# Patient Record
Sex: MALE | Race: WHITE | NOT HISPANIC OR LATINO
[De-identification: names, ages, dates, MRNs, and addresses within clinical notes are randomized per-mention and may not be internally consistent; named-entity substitution may affect disease eponyms.]

---

## 2017-01-05 ENCOUNTER — APPOINTMENT (OUTPATIENT)
Dept: CARDIOLOGY | Facility: CLINIC | Age: 77
End: 2017-01-05

## 2017-01-05 VITALS — BODY MASS INDEX: 28.8 KG/M2 | WEIGHT: 195 LBS

## 2017-02-14 ENCOUNTER — OUTPATIENT (OUTPATIENT)
Dept: OUTPATIENT SERVICES | Facility: HOSPITAL | Age: 77
LOS: 1 days | Discharge: HOME | End: 2017-02-14

## 2017-04-06 ENCOUNTER — APPOINTMENT (OUTPATIENT)
Dept: CARDIOLOGY | Facility: CLINIC | Age: 77
End: 2017-04-06

## 2017-04-06 VITALS — WEIGHT: 192 LBS | SYSTOLIC BLOOD PRESSURE: 160 MMHG | DIASTOLIC BLOOD PRESSURE: 80 MMHG | BODY MASS INDEX: 28.35 KG/M2

## 2017-04-20 ENCOUNTER — APPOINTMENT (OUTPATIENT)
Dept: CARDIOLOGY | Facility: CLINIC | Age: 77
End: 2017-04-20

## 2017-04-21 ENCOUNTER — APPOINTMENT (OUTPATIENT)
Dept: CARDIOLOGY | Facility: CLINIC | Age: 77
End: 2017-04-21

## 2017-04-21 VITALS — SYSTOLIC BLOOD PRESSURE: 120 MMHG | RESPIRATION RATE: 18 BRPM | HEART RATE: 84 BPM | DIASTOLIC BLOOD PRESSURE: 80 MMHG

## 2017-04-21 VITALS — BODY MASS INDEX: 29.92 KG/M2 | HEIGHT: 69 IN | WEIGHT: 202 LBS

## 2017-06-27 DIAGNOSIS — R97.20 ELEVATED PROSTATE SPECIFIC ANTIGEN [PSA]: ICD-10-CM

## 2017-07-13 ENCOUNTER — OUTPATIENT (OUTPATIENT)
Dept: OUTPATIENT SERVICES | Facility: HOSPITAL | Age: 77
LOS: 1 days | Discharge: HOME | End: 2017-07-13

## 2017-07-13 DIAGNOSIS — R06.09 OTHER FORMS OF DYSPNEA: ICD-10-CM

## 2017-07-13 DIAGNOSIS — E03.9 HYPOTHYROIDISM, UNSPECIFIED: ICD-10-CM

## 2017-07-13 DIAGNOSIS — I50.9 HEART FAILURE, UNSPECIFIED: ICD-10-CM

## 2017-07-13 DIAGNOSIS — R97.20 ELEVATED PROSTATE SPECIFIC ANTIGEN [PSA]: ICD-10-CM

## 2017-07-13 DIAGNOSIS — T67.1XXA HEAT SYNCOPE, INITIAL ENCOUNTER: ICD-10-CM

## 2017-07-13 DIAGNOSIS — I42.9 CARDIOMYOPATHY, UNSPECIFIED: ICD-10-CM

## 2017-07-13 DIAGNOSIS — I10 ESSENTIAL (PRIMARY) HYPERTENSION: ICD-10-CM

## 2017-07-13 DIAGNOSIS — N40.0 BENIGN PROSTATIC HYPERPLASIA WITHOUT LOWER URINARY TRACT SYMPTOMS: ICD-10-CM

## 2017-09-01 ENCOUNTER — OUTPATIENT (OUTPATIENT)
Dept: OUTPATIENT SERVICES | Facility: HOSPITAL | Age: 77
LOS: 1 days | Discharge: HOME | End: 2017-09-01

## 2017-09-01 DIAGNOSIS — R06.09 OTHER FORMS OF DYSPNEA: ICD-10-CM

## 2017-09-01 DIAGNOSIS — I10 ESSENTIAL (PRIMARY) HYPERTENSION: ICD-10-CM

## 2017-09-01 DIAGNOSIS — R05 COUGH: ICD-10-CM

## 2017-09-01 DIAGNOSIS — E03.9 HYPOTHYROIDISM, UNSPECIFIED: ICD-10-CM

## 2017-09-01 DIAGNOSIS — I42.9 CARDIOMYOPATHY, UNSPECIFIED: ICD-10-CM

## 2017-09-01 DIAGNOSIS — T67.1XXA HEAT SYNCOPE, INITIAL ENCOUNTER: ICD-10-CM

## 2017-09-01 DIAGNOSIS — N40.0 BENIGN PROSTATIC HYPERPLASIA WITHOUT LOWER URINARY TRACT SYMPTOMS: ICD-10-CM

## 2017-09-01 DIAGNOSIS — I50.9 HEART FAILURE, UNSPECIFIED: ICD-10-CM

## 2017-10-12 ENCOUNTER — APPOINTMENT (OUTPATIENT)
Dept: CARDIOLOGY | Facility: CLINIC | Age: 77
End: 2017-10-12

## 2017-10-12 ENCOUNTER — OUTPATIENT (OUTPATIENT)
Dept: OUTPATIENT SERVICES | Facility: HOSPITAL | Age: 77
LOS: 1 days | Discharge: HOME | End: 2017-10-12

## 2017-10-12 VITALS — WEIGHT: 200 LBS | BODY MASS INDEX: 29.54 KG/M2 | DIASTOLIC BLOOD PRESSURE: 68 MMHG | SYSTOLIC BLOOD PRESSURE: 136 MMHG

## 2017-10-12 DIAGNOSIS — Z00.00 ENCOUNTER FOR GENERAL ADULT MEDICAL EXAMINATION WITHOUT ABNORMAL FINDINGS: ICD-10-CM

## 2017-10-12 DIAGNOSIS — E03.9 HYPOTHYROIDISM, UNSPECIFIED: ICD-10-CM

## 2017-10-12 DIAGNOSIS — I50.9 HEART FAILURE, UNSPECIFIED: ICD-10-CM

## 2017-10-12 DIAGNOSIS — I10 ESSENTIAL (PRIMARY) HYPERTENSION: ICD-10-CM

## 2017-10-12 DIAGNOSIS — R06.09 OTHER FORMS OF DYSPNEA: ICD-10-CM

## 2017-10-12 DIAGNOSIS — T67.1XXA HEAT SYNCOPE, INITIAL ENCOUNTER: ICD-10-CM

## 2017-10-12 DIAGNOSIS — N40.0 BENIGN PROSTATIC HYPERPLASIA WITHOUT LOWER URINARY TRACT SYMPTOMS: ICD-10-CM

## 2017-10-12 DIAGNOSIS — I42.9 CARDIOMYOPATHY, UNSPECIFIED: ICD-10-CM

## 2017-10-23 ENCOUNTER — APPOINTMENT (OUTPATIENT)
Dept: CARDIOLOGY | Facility: CLINIC | Age: 77
End: 2017-10-23

## 2017-10-23 VITALS — RESPIRATION RATE: 18 BRPM | SYSTOLIC BLOOD PRESSURE: 110 MMHG | DIASTOLIC BLOOD PRESSURE: 70 MMHG | HEART RATE: 84 BPM

## 2017-10-23 VITALS — BODY MASS INDEX: 29.33 KG/M2 | WEIGHT: 198 LBS | HEIGHT: 69 IN

## 2017-11-01 ENCOUNTER — OUTPATIENT (OUTPATIENT)
Dept: OUTPATIENT SERVICES | Facility: HOSPITAL | Age: 77
LOS: 1 days | Discharge: HOME | End: 2017-11-01

## 2017-11-01 DIAGNOSIS — R06.09 OTHER FORMS OF DYSPNEA: ICD-10-CM

## 2017-11-01 DIAGNOSIS — R10.9 UNSPECIFIED ABDOMINAL PAIN: ICD-10-CM

## 2017-11-01 DIAGNOSIS — I10 ESSENTIAL (PRIMARY) HYPERTENSION: ICD-10-CM

## 2017-11-01 DIAGNOSIS — E03.9 HYPOTHYROIDISM, UNSPECIFIED: ICD-10-CM

## 2017-11-01 DIAGNOSIS — T67.1XXA HEAT SYNCOPE, INITIAL ENCOUNTER: ICD-10-CM

## 2017-11-01 DIAGNOSIS — I42.9 CARDIOMYOPATHY, UNSPECIFIED: ICD-10-CM

## 2017-11-01 DIAGNOSIS — I50.9 HEART FAILURE, UNSPECIFIED: ICD-10-CM

## 2017-11-01 DIAGNOSIS — N40.0 BENIGN PROSTATIC HYPERPLASIA WITHOUT LOWER URINARY TRACT SYMPTOMS: ICD-10-CM

## 2017-11-13 ENCOUNTER — APPOINTMENT (OUTPATIENT)
Dept: CARDIOLOGY | Facility: CLINIC | Age: 77
End: 2017-11-13

## 2017-12-07 ENCOUNTER — MOBILE ON CALL (OUTPATIENT)
Age: 77
End: 2017-12-07

## 2017-12-21 ENCOUNTER — APPOINTMENT (OUTPATIENT)
Dept: CARDIOLOGY | Facility: CLINIC | Age: 77
End: 2017-12-21

## 2017-12-21 ENCOUNTER — INPATIENT (INPATIENT)
Facility: HOSPITAL | Age: 77
LOS: 4 days | Discharge: HOME | End: 2017-12-26
Attending: INTERNAL MEDICINE | Admitting: INTERNAL MEDICINE

## 2017-12-21 DIAGNOSIS — E03.9 HYPOTHYROIDISM, UNSPECIFIED: ICD-10-CM

## 2017-12-21 DIAGNOSIS — I42.9 CARDIOMYOPATHY, UNSPECIFIED: ICD-10-CM

## 2017-12-21 DIAGNOSIS — N40.0 BENIGN PROSTATIC HYPERPLASIA WITHOUT LOWER URINARY TRACT SYMPTOMS: ICD-10-CM

## 2017-12-21 DIAGNOSIS — T67.1XXA HEAT SYNCOPE, INITIAL ENCOUNTER: ICD-10-CM

## 2017-12-21 DIAGNOSIS — R06.09 OTHER FORMS OF DYSPNEA: ICD-10-CM

## 2017-12-21 DIAGNOSIS — I50.9 HEART FAILURE, UNSPECIFIED: ICD-10-CM

## 2017-12-21 DIAGNOSIS — I10 ESSENTIAL (PRIMARY) HYPERTENSION: ICD-10-CM

## 2018-01-02 DIAGNOSIS — I48.0 PAROXYSMAL ATRIAL FIBRILLATION: ICD-10-CM

## 2018-01-02 DIAGNOSIS — J96.01 ACUTE RESPIRATORY FAILURE WITH HYPOXIA: ICD-10-CM

## 2018-01-02 DIAGNOSIS — I47.2 VENTRICULAR TACHYCARDIA: ICD-10-CM

## 2018-01-02 DIAGNOSIS — I13.0 HYPERTENSIVE HEART AND CHRONIC KIDNEY DISEASE WITH HEART FAILURE AND STAGE 1 THROUGH STAGE 4 CHRONIC KIDNEY DISEASE, OR UNSPECIFIED CHRONIC KIDNEY DISEASE: ICD-10-CM

## 2018-01-02 DIAGNOSIS — J96.02 ACUTE RESPIRATORY FAILURE WITH HYPERCAPNIA: ICD-10-CM

## 2018-01-02 DIAGNOSIS — Z87.891 PERSONAL HISTORY OF NICOTINE DEPENDENCE: ICD-10-CM

## 2018-01-02 DIAGNOSIS — I50.23 ACUTE ON CHRONIC SYSTOLIC (CONGESTIVE) HEART FAILURE: ICD-10-CM

## 2018-01-02 DIAGNOSIS — N18.9 CHRONIC KIDNEY DISEASE, UNSPECIFIED: ICD-10-CM

## 2018-01-02 DIAGNOSIS — E78.00 PURE HYPERCHOLESTEROLEMIA, UNSPECIFIED: ICD-10-CM

## 2018-01-02 DIAGNOSIS — E11.22 TYPE 2 DIABETES MELLITUS WITH DIABETIC CHRONIC KIDNEY DISEASE: ICD-10-CM

## 2018-01-02 DIAGNOSIS — Z79.84 LONG TERM (CURRENT) USE OF ORAL HYPOGLYCEMIC DRUGS: ICD-10-CM

## 2018-01-02 DIAGNOSIS — E03.9 HYPOTHYROIDISM, UNSPECIFIED: ICD-10-CM

## 2018-01-02 DIAGNOSIS — N40.0 BENIGN PROSTATIC HYPERPLASIA WITHOUT LOWER URINARY TRACT SYMPTOMS: ICD-10-CM

## 2018-01-02 DIAGNOSIS — Z95.810 PRESENCE OF AUTOMATIC (IMPLANTABLE) CARDIAC DEFIBRILLATOR: ICD-10-CM

## 2018-01-02 DIAGNOSIS — I42.0 DILATED CARDIOMYOPATHY: ICD-10-CM

## 2018-01-02 DIAGNOSIS — I48.92 UNSPECIFIED ATRIAL FLUTTER: ICD-10-CM

## 2018-01-02 DIAGNOSIS — J69.0 PNEUMONITIS DUE TO INHALATION OF FOOD AND VOMIT: ICD-10-CM

## 2018-02-08 ENCOUNTER — APPOINTMENT (OUTPATIENT)
Dept: CARDIOLOGY | Facility: CLINIC | Age: 78
End: 2018-02-08

## 2018-02-08 VITALS — OXYGEN SATURATION: 92 % | HEART RATE: 82 BPM

## 2018-02-08 VITALS — DIASTOLIC BLOOD PRESSURE: 84 MMHG | WEIGHT: 198 LBS | SYSTOLIC BLOOD PRESSURE: 130 MMHG | BODY MASS INDEX: 29.24 KG/M2

## 2018-02-12 ENCOUNTER — OUTPATIENT (OUTPATIENT)
Dept: OUTPATIENT SERVICES | Facility: HOSPITAL | Age: 78
LOS: 1 days | Discharge: HOME | End: 2018-02-12

## 2018-02-12 DIAGNOSIS — R97.20 ELEVATED PROSTATE SPECIFIC ANTIGEN [PSA]: ICD-10-CM

## 2018-03-08 ENCOUNTER — APPOINTMENT (OUTPATIENT)
Dept: CARDIOLOGY | Facility: CLINIC | Age: 78
End: 2018-03-08

## 2018-03-08 VITALS — DIASTOLIC BLOOD PRESSURE: 60 MMHG | BODY MASS INDEX: 28.8 KG/M2 | SYSTOLIC BLOOD PRESSURE: 130 MMHG | WEIGHT: 195 LBS

## 2018-03-22 ENCOUNTER — APPOINTMENT (OUTPATIENT)
Dept: CARDIOLOGY | Facility: CLINIC | Age: 78
End: 2018-03-22

## 2018-03-29 ENCOUNTER — OUTPATIENT (OUTPATIENT)
Dept: OUTPATIENT SERVICES | Facility: HOSPITAL | Age: 78
LOS: 1 days | Discharge: HOME | End: 2018-03-29

## 2018-03-29 ENCOUNTER — RESULT REVIEW (OUTPATIENT)
Age: 78
End: 2018-03-29

## 2018-03-29 VITALS
DIASTOLIC BLOOD PRESSURE: 61 MMHG | TEMPERATURE: 98 F | RESPIRATION RATE: 16 BRPM | WEIGHT: 195.11 LBS | HEART RATE: 86 BPM | HEIGHT: 68 IN | SYSTOLIC BLOOD PRESSURE: 97 MMHG

## 2018-03-29 VITALS — RESPIRATION RATE: 18 BRPM | DIASTOLIC BLOOD PRESSURE: 77 MMHG | SYSTOLIC BLOOD PRESSURE: 101 MMHG | HEART RATE: 80 BPM

## 2018-03-29 DIAGNOSIS — Z98.890 OTHER SPECIFIED POSTPROCEDURAL STATES: Chronic | ICD-10-CM

## 2018-03-29 DIAGNOSIS — Z95.810 PRESENCE OF AUTOMATIC (IMPLANTABLE) CARDIAC DEFIBRILLATOR: Chronic | ICD-10-CM

## 2018-03-29 NOTE — CHART NOTE - NSCHARTNOTEFT_GEN_A_CORE
PACU ANESTHESIA ADMISSION NOTE      Procedure:   Post op diagnosis:      ____  Intubated  TV:______       Rate: ______      FiO2: ______    _x___  Patent Airway    _x___  Full return of protective reflexes    _x___  Full recovery from anesthesia / back to baseline status    Vitals:  T(C): 36.5 (03-29-18 @ 12:37), Max: 36.5 (03-29-18 @ 12:37)  HR: 86 (03-29-18 @ 13:12) (86 - 86)  BP: 97/61 (03-29-18 @ 13:12) (97/61 - 97/61)  RR: 16 (03-29-18 @ 13:12) (16 - 16)  SpO2: --    Mental Status:  _x___ Awake   _____ Alert   _____ Drowsy   _____ Sedated    Nausea/Vomiting:  _x___  NO       ______Yes,   See Post - Op Orders         Pain Scale (0-10):  __0___    Treatment: _x___ None    ____ See Post - Op/PCA Orders    Post - Operative Fluids:   __x__ Oral   ____ See Post - Op Orders    Plan: Discharge:   _x___Home       _____Floor     _____Critical Care    _____  Other:_________________    Comments:  No anesthesia issues or complications noted.  Discharge when criteria met.

## 2018-03-29 NOTE — ASU PATIENT PROFILE, ADULT - PMH
Atrial fibrillation    Congestive heart disease    Hypercholesteremia    Hypercholesteremia    Hypothyroidism

## 2018-03-30 ENCOUNTER — APPOINTMENT (OUTPATIENT)
Dept: CARDIOLOGY | Facility: CLINIC | Age: 78
End: 2018-03-30

## 2018-04-02 LAB — SURGICAL PATHOLOGY STUDY: SIGNIFICANT CHANGE UP

## 2018-04-03 DIAGNOSIS — K29.60 OTHER GASTRITIS WITHOUT BLEEDING: ICD-10-CM

## 2018-04-03 DIAGNOSIS — I48.91 UNSPECIFIED ATRIAL FIBRILLATION: ICD-10-CM

## 2018-04-03 DIAGNOSIS — Z95.810 PRESENCE OF AUTOMATIC (IMPLANTABLE) CARDIAC DEFIBRILLATOR: ICD-10-CM

## 2018-04-03 DIAGNOSIS — Z79.01 LONG TERM (CURRENT) USE OF ANTICOAGULANTS: ICD-10-CM

## 2018-04-03 DIAGNOSIS — K44.9 DIAPHRAGMATIC HERNIA WITHOUT OBSTRUCTION OR GANGRENE: ICD-10-CM

## 2018-04-03 DIAGNOSIS — Z91.040 LATEX ALLERGY STATUS: ICD-10-CM

## 2018-04-03 DIAGNOSIS — E78.00 PURE HYPERCHOLESTEROLEMIA, UNSPECIFIED: ICD-10-CM

## 2018-04-03 DIAGNOSIS — R10.9 UNSPECIFIED ABDOMINAL PAIN: ICD-10-CM

## 2018-04-03 DIAGNOSIS — E03.9 HYPOTHYROIDISM, UNSPECIFIED: ICD-10-CM

## 2018-04-24 ENCOUNTER — APPOINTMENT (OUTPATIENT)
Dept: CARDIOLOGY | Facility: CLINIC | Age: 78
End: 2018-04-24

## 2018-05-04 ENCOUNTER — APPOINTMENT (OUTPATIENT)
Dept: CARDIOLOGY | Facility: CLINIC | Age: 78
End: 2018-05-04

## 2018-05-04 VITALS
SYSTOLIC BLOOD PRESSURE: 114 MMHG | OXYGEN SATURATION: 95 % | HEIGHT: 69 IN | DIASTOLIC BLOOD PRESSURE: 69 MMHG | HEART RATE: 71 BPM | WEIGHT: 196 LBS | BODY MASS INDEX: 29.03 KG/M2

## 2018-05-07 ENCOUNTER — CLINICAL ADVICE (OUTPATIENT)
Age: 78
End: 2018-05-07

## 2018-05-08 ENCOUNTER — APPOINTMENT (OUTPATIENT)
Dept: CARDIOLOGY | Facility: CLINIC | Age: 78
End: 2018-05-08

## 2018-05-13 ENCOUNTER — INPATIENT (INPATIENT)
Facility: HOSPITAL | Age: 78
LOS: 2 days | Discharge: HOME | End: 2018-05-16
Attending: INTERNAL MEDICINE | Admitting: INTERNAL MEDICINE

## 2018-05-13 VITALS
DIASTOLIC BLOOD PRESSURE: 75 MMHG | OXYGEN SATURATION: 95 % | HEART RATE: 91 BPM | SYSTOLIC BLOOD PRESSURE: 132 MMHG | TEMPERATURE: 96 F | RESPIRATION RATE: 20 BRPM

## 2018-05-13 DIAGNOSIS — Z95.810 PRESENCE OF AUTOMATIC (IMPLANTABLE) CARDIAC DEFIBRILLATOR: Chronic | ICD-10-CM

## 2018-05-13 DIAGNOSIS — Z98.890 OTHER SPECIFIED POSTPROCEDURAL STATES: Chronic | ICD-10-CM

## 2018-05-13 LAB
ALBUMIN SERPL ELPH-MCNC: 4.1 G/DL — SIGNIFICANT CHANGE UP (ref 3.5–5.2)
ALP SERPL-CCNC: 76 U/L — SIGNIFICANT CHANGE UP (ref 30–115)
ALT FLD-CCNC: 13 U/L — SIGNIFICANT CHANGE UP (ref 0–41)
ANION GAP SERPL CALC-SCNC: 15 MMOL/L — HIGH (ref 7–14)
APTT BLD: 30.2 SEC — SIGNIFICANT CHANGE UP (ref 27–39.2)
AST SERPL-CCNC: 13 U/L — SIGNIFICANT CHANGE UP (ref 0–41)
BASOPHILS # BLD AUTO: 0.06 K/UL — SIGNIFICANT CHANGE UP (ref 0–0.2)
BASOPHILS NFR BLD AUTO: 0.5 % — SIGNIFICANT CHANGE UP (ref 0–1)
BILIRUB SERPL-MCNC: 0.5 MG/DL — SIGNIFICANT CHANGE UP (ref 0.2–1.2)
BUN SERPL-MCNC: 26 MG/DL — HIGH (ref 10–20)
CALCIUM SERPL-MCNC: 9.1 MG/DL — SIGNIFICANT CHANGE UP (ref 8.5–10.1)
CHLORIDE SERPL-SCNC: 99 MMOL/L — SIGNIFICANT CHANGE UP (ref 98–110)
CK MB CFR SERPL CALC: 2.2 NG/ML — SIGNIFICANT CHANGE UP (ref 0.6–6.3)
CK SERPL-CCNC: 93 U/L — SIGNIFICANT CHANGE UP (ref 0–225)
CO2 SERPL-SCNC: 27 MMOL/L — SIGNIFICANT CHANGE UP (ref 17–32)
CREAT SERPL-MCNC: 1.5 MG/DL — SIGNIFICANT CHANGE UP (ref 0.7–1.5)
EOSINOPHIL # BLD AUTO: 0.15 K/UL — SIGNIFICANT CHANGE UP (ref 0–0.7)
EOSINOPHIL NFR BLD AUTO: 1.3 % — SIGNIFICANT CHANGE UP (ref 0–8)
GLUCOSE SERPL-MCNC: 216 MG/DL — HIGH (ref 70–99)
HCT VFR BLD CALC: 42.1 % — SIGNIFICANT CHANGE UP (ref 42–52)
HGB BLD-MCNC: 13.4 G/DL — LOW (ref 14–18)
IMM GRANULOCYTES NFR BLD AUTO: 0.3 % — SIGNIFICANT CHANGE UP (ref 0.1–0.3)
INR BLD: 1.16 RATIO — SIGNIFICANT CHANGE UP (ref 0.65–1.3)
LYMPHOCYTES # BLD AUTO: 0.92 K/UL — LOW (ref 1.2–3.4)
LYMPHOCYTES # BLD AUTO: 7.8 % — LOW (ref 20.5–51.1)
MCHC RBC-ENTMCNC: 28.6 PG — SIGNIFICANT CHANGE UP (ref 27–31)
MCHC RBC-ENTMCNC: 31.8 G/DL — LOW (ref 32–37)
MCV RBC AUTO: 90 FL — SIGNIFICANT CHANGE UP (ref 80–94)
MONOCYTES # BLD AUTO: 0.78 K/UL — HIGH (ref 0.1–0.6)
MONOCYTES NFR BLD AUTO: 6.6 % — SIGNIFICANT CHANGE UP (ref 1.7–9.3)
NEUTROPHILS # BLD AUTO: 9.93 K/UL — HIGH (ref 1.4–6.5)
NEUTROPHILS NFR BLD AUTO: 83.5 % — HIGH (ref 42.2–75.2)
NT-PROBNP SERPL-SCNC: 6982 PG/ML — HIGH (ref 0–300)
PLATELET # BLD AUTO: 255 K/UL — SIGNIFICANT CHANGE UP (ref 130–400)
POTASSIUM SERPL-MCNC: 4.6 MMOL/L — SIGNIFICANT CHANGE UP (ref 3.5–5)
POTASSIUM SERPL-SCNC: 4.6 MMOL/L — SIGNIFICANT CHANGE UP (ref 3.5–5)
PROT SERPL-MCNC: 6.1 G/DL — SIGNIFICANT CHANGE UP (ref 6–8)
PROTHROM AB SERPL-ACNC: 12.6 SEC — SIGNIFICANT CHANGE UP (ref 9.95–12.87)
RBC # BLD: 4.68 M/UL — LOW (ref 4.7–6.1)
RBC # FLD: 14.2 % — SIGNIFICANT CHANGE UP (ref 11.5–14.5)
SODIUM SERPL-SCNC: 141 MMOL/L — SIGNIFICANT CHANGE UP (ref 135–146)
TROPONIN T SERPL-MCNC: <0.01 NG/ML — SIGNIFICANT CHANGE UP
WBC # BLD: 11.87 K/UL — HIGH (ref 4.8–10.8)
WBC # FLD AUTO: 11.87 K/UL — HIGH (ref 4.8–10.8)

## 2018-05-13 RX ORDER — ASPIRIN/CALCIUM CARB/MAGNESIUM 324 MG
162 TABLET ORAL ONCE
Qty: 0 | Refills: 0 | Status: COMPLETED | OUTPATIENT
Start: 2018-05-13 | End: 2018-05-13

## 2018-05-13 RX ORDER — FUROSEMIDE 40 MG
40 TABLET ORAL ONCE
Qty: 0 | Refills: 0 | Status: COMPLETED | OUTPATIENT
Start: 2018-05-13 | End: 2018-05-13

## 2018-05-13 RX ADMIN — Medication 162 MILLIGRAM(S): at 19:34

## 2018-05-13 RX ADMIN — Medication 40 MILLIGRAM(S): at 21:02

## 2018-05-13 NOTE — ED PROVIDER NOTE - ATTENDING CONTRIBUTION TO CARE
77 y M PMH CHF, Afib, s/p AICD, HLD, HTN,   Exam:  A/p: Eval ACS, likely admit for high risk chest pain. 77 y M PMH CHF, Afib, s/p AICD, HLD, HTN, pw exertional chest pain today, as well as chest pain at rest. + SOB as well. no aicd firing felt.  Exam: heent nl, heart irr irr, lungs b/l crackles to bases, abd soft ntnd, ext no edema or skin changes, nl rom. neuro A&Ox3, nl strength and sensation.  A/p: Eval ACS, CHF exacerbation. likely admit for high risk chest pain.

## 2018-05-13 NOTE — ED PROVIDER NOTE - INTERPRETATION
Atrial paced rhythm at 86 with 1st degree AVB. LBBB. LAD. Normal QT, no acute ST-T changes. Similar to previous EKG

## 2018-05-13 NOTE — ED ADULT NURSE NOTE - PMH
AICD (automatic cardioverter/defibrillator) present    Atrial fibrillation    Congestive heart disease    Hypercholesteremia    Hypothyroidism    Pacemaker AICD (automatic cardioverter/defibrillator) present    Atrial fibrillation    CAD (coronary artery disease)    Congestive heart disease    COPD (chronic obstructive pulmonary disease)    Hypercholesteremia    Hypothyroidism    NANDO on CPAP    Pacemaker    Ventricular tachycardia

## 2018-05-13 NOTE — ED PROVIDER NOTE - NS ED ROS FT
Constitutional: No fever, chills.  Eyes: No visual changes.  ENT: No hearing changes. No sore throat.  Neck: No neck pain or stiffness.  Cardiovascular: + chest pain. No palpitations, edema.  Pulmonary: + SOB. No cough. No hemoptysis.  Abdominal: No abdominal pain, nausea, vomiting, diarrhea.  : No dysuria, frequency.  Neuro: No headache, syncope, dizziness.  MS: No back pain. No calf pain/swelling.  Psych: No suicidal ideations.

## 2018-05-13 NOTE — ED ADULT NURSE NOTE - OBJECTIVE STATEMENT
Pt presents with L sided chest pressure and increasing shortness of breath today. Pt states "I walk 10 feet and have to catch my breath." Pt presents with L sided chest pressure and increasing shortness of breath today. Pt states "I walk 10 feet and have to catch my breath." Pt denies n/v, chills, fever. Pt with PMH of CHF, AICD/PPM. +2 edema noted on b/l LE

## 2018-05-13 NOTE — ED PROVIDER NOTE - PHYSICAL EXAMINATION
Constitutional: Well developed, well nourished. NAD.  Head: Normocephalic, atraumatic.  Eyes: PERRL. EOMI.  ENT: No nasal discharge. Mucous membranes moist.  Neck: Supple. Painless ROM.  Cardiovascular: Normal S1, S2. Regular rate and rhythm. No murmurs, rubs, or gallops.  Pulmonary: Normal respiratory rate and effort. Lungs clear to auscultation bilaterally. No wheezing, rales, or rhonchi.  Abdominal: Soft. Nondistended. Nontender. No rebound, guarding, rigidity.  Extremities. Pelvis stable. No lower extremity edema, symmetric calves.  Skin: No rashes, cyanosis.  Neuro: AAOx3. No focal neurological deficits.  Psych: Normal mood. Normal affect.

## 2018-05-13 NOTE — ED PROVIDER NOTE - PROGRESS NOTE DETAILS
Labs, imaging reviewed. Lasix, aspirin given. Will admit for CHF exacerbation, unstable angina. Hospitalist paged. Endorsed to MAR. Received call regarding troponin elevation from [facility]. Communicated results to inpatient MDs. While admitted pending inpt bed, on monitor in ED, patient had run of sustained v-tach, interrupted by overdrive pacing of AICD, asymptomatic, lasting approx 1 minute, ending in AICD discharge. Rapid response called to the ED. Patient moved to the critical care area. Cardiology in ED.

## 2018-05-13 NOTE — ED PROVIDER NOTE - OBJECTIVE STATEMENT
Pt is a 76 y/o male with hx of CHF s/p AICD, afib on eliquis/amio, HTN, DLD, hypothyroidism, who presents to ED for worsening exertional chest pain, FRANKEL over the past month, worse today. Chest pain is left sided, does not radiate, relieved by rest. No cough, fever, n/v, diaphoresis, leg pain, swelling.

## 2018-05-14 DIAGNOSIS — Z95.810 PRESENCE OF AUTOMATIC (IMPLANTABLE) CARDIAC DEFIBRILLATOR: ICD-10-CM

## 2018-05-14 DIAGNOSIS — I25.10 ATHEROSCLEROTIC HEART DISEASE OF NATIVE CORONARY ARTERY WITHOUT ANGINA PECTORIS: ICD-10-CM

## 2018-05-14 LAB
ALBUMIN SERPL ELPH-MCNC: 3.5 G/DL — SIGNIFICANT CHANGE UP (ref 3.5–5.2)
ALP SERPL-CCNC: 59 U/L — SIGNIFICANT CHANGE UP (ref 30–115)
ALT FLD-CCNC: 10 U/L — SIGNIFICANT CHANGE UP (ref 0–41)
ANION GAP SERPL CALC-SCNC: 15 MMOL/L — HIGH (ref 7–14)
AST SERPL-CCNC: 11 U/L — SIGNIFICANT CHANGE UP (ref 0–41)
BILIRUB SERPL-MCNC: 0.7 MG/DL — SIGNIFICANT CHANGE UP (ref 0.2–1.2)
BUN SERPL-MCNC: 24 MG/DL — HIGH (ref 10–20)
CALCIUM SERPL-MCNC: 8.5 MG/DL — SIGNIFICANT CHANGE UP (ref 8.5–10.1)
CHLORIDE SERPL-SCNC: 100 MMOL/L — SIGNIFICANT CHANGE UP (ref 98–110)
CK MB CFR SERPL CALC: 2.2 NG/ML — SIGNIFICANT CHANGE UP (ref 0.6–6.3)
CK SERPL-CCNC: 84 U/L — SIGNIFICANT CHANGE UP (ref 0–225)
CK SERPL-CCNC: 88 U/L — SIGNIFICANT CHANGE UP (ref 0–225)
CK SERPL-CCNC: 93 U/L — SIGNIFICANT CHANGE UP (ref 0–225)
CO2 SERPL-SCNC: 26 MMOL/L — SIGNIFICANT CHANGE UP (ref 17–32)
CREAT SERPL-MCNC: 1.5 MG/DL — SIGNIFICANT CHANGE UP (ref 0.7–1.5)
GLUCOSE SERPL-MCNC: 139 MG/DL — HIGH (ref 70–99)
HCT VFR BLD CALC: 36.1 % — LOW (ref 42–52)
HGB BLD-MCNC: 11.7 G/DL — LOW (ref 14–18)
MAGNESIUM SERPL-MCNC: 1.9 MG/DL — SIGNIFICANT CHANGE UP (ref 1.8–2.4)
MCHC RBC-ENTMCNC: 28.7 PG — SIGNIFICANT CHANGE UP (ref 27–31)
MCHC RBC-ENTMCNC: 32.4 G/DL — SIGNIFICANT CHANGE UP (ref 32–37)
MCV RBC AUTO: 88.5 FL — SIGNIFICANT CHANGE UP (ref 80–94)
NRBC # BLD: 0 /100 WBCS — SIGNIFICANT CHANGE UP (ref 0–0)
PLATELET # BLD AUTO: 198 K/UL — SIGNIFICANT CHANGE UP (ref 130–400)
POTASSIUM SERPL-MCNC: 4.1 MMOL/L — SIGNIFICANT CHANGE UP (ref 3.5–5)
POTASSIUM SERPL-SCNC: 4.1 MMOL/L — SIGNIFICANT CHANGE UP (ref 3.5–5)
PROT SERPL-MCNC: 5.1 G/DL — LOW (ref 6–8)
RBC # BLD: 4.08 M/UL — LOW (ref 4.7–6.1)
RBC # FLD: 14.1 % — SIGNIFICANT CHANGE UP (ref 11.5–14.5)
SODIUM SERPL-SCNC: 141 MMOL/L — SIGNIFICANT CHANGE UP (ref 135–146)
TROPONIN T SERPL-MCNC: 0.01 NG/ML — SIGNIFICANT CHANGE UP
TROPONIN T SERPL-MCNC: 0.02 NG/ML — HIGH
TROPONIN T SERPL-MCNC: 0.03 NG/ML — CRITICAL HIGH
WBC # BLD: 8.84 K/UL — SIGNIFICANT CHANGE UP (ref 4.8–10.8)
WBC # FLD AUTO: 8.84 K/UL — SIGNIFICANT CHANGE UP (ref 4.8–10.8)

## 2018-05-14 RX ORDER — SIMVASTATIN 20 MG/1
20 TABLET, FILM COATED ORAL AT BEDTIME
Qty: 0 | Refills: 0 | Status: DISCONTINUED | OUTPATIENT
Start: 2018-05-14 | End: 2018-05-16

## 2018-05-14 RX ORDER — AMIODARONE HYDROCHLORIDE 400 MG/1
200 TABLET ORAL DAILY
Qty: 0 | Refills: 0 | Status: DISCONTINUED | OUTPATIENT
Start: 2018-05-14 | End: 2018-05-16

## 2018-05-14 RX ORDER — LEVOTHYROXINE SODIUM 125 MCG
50 TABLET ORAL DAILY
Qty: 0 | Refills: 0 | Status: DISCONTINUED | OUTPATIENT
Start: 2018-05-14 | End: 2018-05-16

## 2018-05-14 RX ORDER — MEXILETINE HYDROCHLORIDE 150 MG/1
150 CAPSULE ORAL EVERY 12 HOURS
Qty: 0 | Refills: 0 | Status: DISCONTINUED | OUTPATIENT
Start: 2018-05-14 | End: 2018-05-16

## 2018-05-14 RX ORDER — LIDOCAINE HCL 20 MG/ML
1 VIAL (ML) INJECTION
Qty: 2 | Refills: 0 | Status: DISCONTINUED | OUTPATIENT
Start: 2018-05-14 | End: 2018-05-14

## 2018-05-14 RX ORDER — SPIRONOLACTONE 25 MG/1
25 TABLET, FILM COATED ORAL
Qty: 0 | Refills: 0 | Status: DISCONTINUED | OUTPATIENT
Start: 2018-05-14 | End: 2018-05-14

## 2018-05-14 RX ORDER — PANTOPRAZOLE SODIUM 20 MG/1
40 TABLET, DELAYED RELEASE ORAL
Qty: 0 | Refills: 0 | Status: DISCONTINUED | OUTPATIENT
Start: 2018-05-14 | End: 2018-05-16

## 2018-05-14 RX ORDER — SPIRONOLACTONE 25 MG/1
25 TABLET, FILM COATED ORAL
Qty: 0 | Refills: 0 | Status: DISCONTINUED | OUTPATIENT
Start: 2018-05-14 | End: 2018-05-16

## 2018-05-14 RX ORDER — METOPROLOL TARTRATE 50 MG
50 TABLET ORAL
Qty: 0 | Refills: 0 | Status: DISCONTINUED | OUTPATIENT
Start: 2018-05-14 | End: 2018-05-16

## 2018-05-14 RX ORDER — FUROSEMIDE 40 MG
40 TABLET ORAL
Qty: 0 | Refills: 0 | Status: DISCONTINUED | OUTPATIENT
Start: 2018-05-14 | End: 2018-05-16

## 2018-05-14 RX ORDER — LOSARTAN POTASSIUM 100 MG/1
100 TABLET, FILM COATED ORAL DAILY
Qty: 0 | Refills: 0 | Status: DISCONTINUED | OUTPATIENT
Start: 2018-05-14 | End: 2018-05-16

## 2018-05-14 RX ADMIN — SPIRONOLACTONE 25 MILLIGRAM(S): 25 TABLET, FILM COATED ORAL at 17:12

## 2018-05-14 RX ADMIN — LOSARTAN POTASSIUM 100 MILLIGRAM(S): 100 TABLET, FILM COATED ORAL at 06:29

## 2018-05-14 RX ADMIN — Medication 7.5 MG/MIN: at 08:15

## 2018-05-14 RX ADMIN — Medication 50 MILLIGRAM(S): at 06:29

## 2018-05-14 RX ADMIN — SPIRONOLACTONE 25 MILLIGRAM(S): 25 TABLET, FILM COATED ORAL at 06:29

## 2018-05-14 RX ADMIN — AMIODARONE HYDROCHLORIDE 200 MILLIGRAM(S): 400 TABLET ORAL at 06:43

## 2018-05-14 RX ADMIN — Medication 40 MILLIGRAM(S): at 06:47

## 2018-05-14 RX ADMIN — Medication 50 MICROGRAM(S): at 06:29

## 2018-05-14 RX ADMIN — SIMVASTATIN 20 MILLIGRAM(S): 20 TABLET, FILM COATED ORAL at 22:12

## 2018-05-14 RX ADMIN — PANTOPRAZOLE SODIUM 40 MILLIGRAM(S): 20 TABLET, DELAYED RELEASE ORAL at 06:29

## 2018-05-14 RX ADMIN — Medication 50 MILLIGRAM(S): at 17:12

## 2018-05-14 RX ADMIN — Medication 40 MILLIGRAM(S): at 17:11

## 2018-05-14 NOTE — CONSULT NOTE ADULT - SUBJECTIVE AND OBJECTIVE BOX
HPI:  77 male with non-ischemic CM, HFrEF, CAD, VT, A-fib (on Eliquis), reactive airway disease, BPH presenting for dyspnea on exertion for 2 weeks, progressively getting worse associated with orthopnea. Today he felt left-sided chest pain, non-radiating, 7/10 in intensity, lasting for 10 mins, related to activity. Denies palpitations, ICD firing, dizziness, LE swelling, PND. Reports compliance to all his meds. To note the patient story was unreliable, as his thinking is tangential. As per [facility] outpatient records, patient saw Dr Holt on May 4th for ICD shocks and was started on Mexilitin and VT ablation option was discussed. He was also instructed to increase Lasix to 40 q12h for 4 days.    Cardio: Dr Varela  EP: Dr Holt  Pulm: Dr Gardner (14 May 2018 00:27)      Patient is a 77y old  Male who presents with a chief complaint of Shortness of breath (14 May 2018 00:27)        PAST MEDICAL & SURGICAL HISTORY:  CAD (coronary artery disease)  Ventricular tachycardia  NANDO on CPAP  COPD (chronic obstructive pulmonary disease)  Pacemaker  AICD (automatic cardioverter/defibrillator) present  Hypothyroidism  Atrial fibrillation  Congestive heart disease  Hypercholesteremia  AICD (automatic cardioverter/defibrillator) present  History of laparoscopic cholecystectomy      PREVIOUS DIAGNOSTIC TESTING:      ECHO   FINDINGS:  [address] [place] [address]     Transthoracic Echocardiogram   2D, M-mode, Doppler, and Color Doppler     GUIDO VILCHIS [id number]   Account number: [id number]   : 1940   Age: 77 years   Gender: Male   Study date: 23-Dec-2017   Height: 69 in   Weight: 189.6 lb   BSA: 2.02 msq     Allergies: MORPHINE, LATEX     Sonographer: DOMONIQUE Fuentes   Referring MD: RENA NAVARRO MD,   Patient Location:  Printer   Fellow: Severino Child MD   Reading Physician: CHRISTIANA Terrazas MD     Summary   Clinical question: CHF.   Left ventricle: The ventricle was mildly dilated. Systolic function was   severely reduced. Ejection fraction was estimated in the range of 15 % to 20   %. There was severe diffuse hypokinesis.   Ventricular septum: There was paradoxical motion. These changes are consistent   with right ventricular pacing.   Aortic valve: There was mild regurgitation.   Mitral valve: There was mild regurgitation.   Left atrium: The atrium was mildly dilated.   Pulmonary arteries: The artery was mildly dilated.   Tricuspid valve: There was mild regurgitation.   Pericardium: A small pericardial effusion was identified mostly posterior to   the heart.     Clinical question: CHF. Assess left ventricular function.     Procedure: The procedure was performed in the echo lab. The transthoracic   approach was used. The study included complete 2D imaging, M-mode, complete   spectral Doppler, and color Doppler. Systolic blood pressure was 114 mmHg, at   the start of the study. Diastolic blood pressure was 64 mmHg, at the start of   the study.     Left ventricle: The ventricle was mildly dilated. Systolic function was   severely reduced. Ejection fraction was estimated in the range of 15 % to 20   %. There was severe diffuse hypokinesis. Wall thickness was normal.     Ventricular septum: There was paradoxical motion. These changes are consistent   with right ventricular pacing.     Aortic valve: The valve was trileaflet. Leaflets exhibited mildly increased   thickness and mildly reduced cuspal separation. Doppler: Transaortic velocity   was within the normal range. There was no stenosis. There was mild   regurgitation.     Aorta: The root exhibited normal size.     Mitral valve: There was mild focal thickening of the posterior leaflet. There   was normal leaflet separation. Doppler: The transmitral velocity was within   the normal range. There was no evidence for stenosis. There was mild   regurgitation.     Left atrium: The atrium was mildly dilated.     Right ventricle: The size was normal. Systolic function was normal. Wall   thickness was normal.     Pulmonic valve: Leaflets exhibited normal thickness, no calcification, and   normal cuspal separation. Doppler: The transpulmonic velocity was within the   normal range. There was trivial regurgitation.     Pulmonary artery: The artery was mildly dilated. Doppler: Systolic pressure   was within the normal range.     Tricuspid valve: The valve structure was normal. There was normal leaflet   separation. Doppler: The transtricuspid velocity was within the normal range.   There was no evidence for tricuspid stenosis. There was mild regurgitation.     Right atrium: Size was normal.     Systemic veins: IVC: The inferior vena cava was normal in size.     Pericardium: A small pericardial effusion was identified mostly posterior to   the heart. The pericardium was normal in appearance.     System Measurement tables     2D   EF Biplane: 31.16 %   LVEDV MOD A2C: 82.31 ml   LVEDV MOD A4C: 198.9 ml   LVEDV MOD BP: 136.28 ml   LVEF MOD A2C: 21.59 %   LVEF MOD A4C: 37.43 %   LVESV MOD A2C: 64.54 ml   LVESV MOD A4C: 124.46 ml   LVESV MOD BP: 93.82 ml   LVLd A2C: 7.88 cm   LVLd A4C: 9.42 cm   LVLs A2C: 7.69 cm   LVLs A4C: 8.58 cm   LVOT Diam: 2.12 cm   SV MOD A2C: 17.77 ml   SV MOD A4C: 74.44 ml     CW   AR Dec [place]: 1.69 m/s2   AR Dec Time: 1904.32 ms   AR PHT: 552.25 ms   AR Vmax: 3.19 m/s   AR maxP.66 mmHg   AV Vmax: 1.55 m/s   AV maxP.13 mmHg   PV Vmax: 0.97 m/s   PV maxPG: 3.78 mmHg   RAP: 3 mmHg   TR Vmax: 2.48 m/s   TR maxP.51 mmHg     MM   %FS: 11.97 %   AV Cusp: 2.2 cm   Ao Diam: 4.29 cm   EDV(Teich): 345.6 ml   EF(Teich): 24.87 %   ESV(Teich): 259.66 ml   IVSd: 1.13 cm   IVSs: 1.18 cm   LA Diam: 5.47 cm   LA/Ao: 1.28   LVIDd: 8.01 cm   LVIDs: 7.05 cm   LVPWd: 0.96 cm   LVPWs: 0.85 cm   SV(Teich): 85.94 ml     PW   MYCHAL Vmax: 1.38 cm2   HR: 81.28 BPM   LVCO Dopp: 3.33 L/min   LVOT VTI: 11.59 cm   LVOT Vmax: 0.61 m/s   LVOT Vmean: 0.45 m/s   LVOT maxP.47 mmHg   LVOT meanP.9 mmHg   LVSV Dopp: 40.91 ml   RVSP: 27.51 mmHg     Prepared and signed by     CHRISTIANA Terrazas MD   Signed 23-Dec-2017 14:38:49    CATHETERIZATION  FINDINGS:  [facility]   [address] [place] [address]     [facility]   Cardiac Catheterization -- Comprehensive Report   MR number: [id number]   : 1940   Age: 75 years   Gender: Male   Account number: [id number]   Height: 68.9 in   Weight: 193.6 lb   BSA: 2.04 msq   Study date: 2016   Cine ID: [id number]   PCI ID:   Peripheral ID:     Allergies: MORPHINE, LATEX     Pre Procedure Nurse: Cally Ferguson RN   Diagnostic Cardiologist: Keyon Ramirez MD   Fellow: Brooklynn Vásquez MD   Anesthesiologist: MARCY Mendoza MD   Monitor: Isha Torres RN   Circulator: Akanksha Chin RN   Referring MD: NOEMY Holt MD   Referring MD: Malcolm Varela MD     SUMMARY:     IMPRESSIONS: There is significant single vessel coronary artery disease.     CORONARY CIRCULATION: There was 1-vessel coronary artery disease (circumflex).     INDICATIONS: Angina/MI: atypical chest pain. Coronary artery disease:   suspected. Congestive heart failure with dyspnea.     HISTORY: 75 year old man with history of Non-Ischemic cardiomyopathy now with   worsening Dyspnea on exertion.     PROCEDURES PERFORMED:     -- Right coronary angiography.   -- Left coronary angiography.     PROCEDURE: The risks and alternatives of the procedures and conscious sedation   were explained to the patient and informed consent was obtained. The patient   was brought to the cath lab and placed on the table. The planned puncture   sites were prepped and draped in the usual sterile fashion. Cardiac   catheterization performed electively.     -- Right radial artery access. The puncture site was infiltrated with 2 ml 2   % lidocaine. The vessel was accessed using the modified Seldinger technique,   a wire was threaded into the vessel, and a 6Fr Sheath Prelude was advanced   over the wire into the vessel.     -- Right coronary artery angiography. A JR4 catheter was advanced to the   aorta and positioned in the vessel ostium under fluoroscopic guidance.   Angiography was performed in multiple projections using hand-injection of   contrast.     -- Left coronary artery angiography. A JL3.5 catheter was advanced to the   aorta and positioned in the vessel ostium under fluoroscopic guidance.   Angiography was performed in multiple projections using hand-injection of   contrast.     PROCEDURE COMPLETION: TIMING: Test started at 09:30. Test concluded at 09:50.   RADIATION EXPOSURE: Fluoroscopy time: 7 min. MEDIA: Cine ID [id number].     HEMOSTASIS: The sheath was removed. The site was compressed with a D-Stat   device. Hemostasis was successful at the right radial artery access site.     MEDICATIONS GIVEN: Midazolam, 2 mg, IV, at 09:27. Fentanyl, 50 mcg, IV, at   09:32. Nitroglycerin, 100 mcg, intracoronary, at 09:42. Verapamil (Isoptin,   Calan, Covera), 1 mg, IA, at 09:48. Nitroglycerin, 200 mcg, IA, at 09:48.   Heparin, 3000 units, IA, last dose at 09:48. Neosynephrine (Phenylephrine),   50 mcg, IV, at 09:35. Neosynephrine (Phenylephrine), 100 mcg, IV, at 09:44.     CONTRAST GIVEN: Visipaque 65 ml.     HEMODYNAMICS: Hemodynamic assessment demonstrates normal hemodynamics.     VENTRICLES: No LV gram was performed; however, a recent echocardiogram   demonstrated an EF of 25 %.     CORONARY CIRCULATION: The coronary circulation is right dominant. There was   1-vessel coronary artery disease (circumflex). Left main: Normal. LAD: The   vessel was normal sized. Angiography showed minor luminal irregularities with   no flow limiting lesions. 1st diagonal: The vessel was normal sized.   Angiography showed moderate atherosclerosis with no clinical lesions   appreciated. Circumflex: Angiography showed minor luminal irregularities with   no flow limiting lesions. 1st obtuse marginal: The vessel was medium to large   sized. There was a discrete 70 % stenosis in the proximal third of the vessel   segment. The lesion was eccentric. There was KAEL grade 3 flow through the   vessel (brisk flow). It appears amenable to percutaneous intervention. RCA:   Angiography showed minor luminal irregularities with no flow limiting   lesions.     COMPLICATIONS: No complications occurred during the cath lab visit.     IMPRESSIONS: There is significant single vessel coronary artery disease.     RECOMMENDATIONS:   The patient should continue with the present medications.   The patient's diuretic regimen should be carefully increased.   The patient's anti-anginal regimen should be further intensified.   Patient management should include aggressive medical therapy and aggressive   risk factor modification.   Patient's heart failure management should be optimized. consider myocardial   perfusion imaging stress test to assess for ischemia in lcx/om1 territory.     DISPOSITION: The patient left the catheterization laboratory in stable   condition. The patient will be discharged on the day of the procedure,   following bed rest and subsequent ambulation, provided the recovery   parameters are appropriate. The patient has been instructed to call the   referring physician immediately if symptoms recur, or should there be any   problems with the puncture site, such as bleeding, swelling, pain or signs of   infection. The patient has been instructed to follow up with the referring   physician in the near future.     HEMODYNAMIC TABLES     Pressures: Baseline   Pressures: - HR: 64   Pressures: - Rhythm:   Pressures: -- Aortic Pressure (S/D/M): 75/66/72     Outputs: Baseline   Outputs: -- CALCULATIONS[age]   Outputs: -- CALCULATIONS: Body Surface Area: 2.04   Outputs: -- CALCULATIONS: Height in cm: 175.00   Outputs: -- CALCULATIONS: Sex: Male   Outputs: -- CALCULATIONS: Weight in k.00   Outputs: -- OUTPUTS: O2 consumption: 254.62   Outputs: -- OUTPUTS: Vo2 Indexed: 125.00     Prepared and signed by     Keyon Ramirez MD   Signed 2016 11:22:13    CHEST CT PULMONARY ANGIO with IV Contrast:  FINDINGS:  MEDICATIONS  (STANDING):  amiodarone    Tablet 200 milliGRAM(s) Oral daily  furosemide   Injectable 40 milliGRAM(s) IV Push two times a day  levothyroxine 50 MICROGram(s) Oral daily  lidocaine   Infusion 1 mG/Min (7.5 mL/Hr) IV Continuous <Continuous>  losartan 100 milliGRAM(s) Oral daily  metoprolol tartrate 50 milliGRAM(s) Oral two times a day  mexiletine 150 milliGRAM(s) Oral every 12 hours  pantoprazole    Tablet 40 milliGRAM(s) Oral before breakfast  simvastatin 20 milliGRAM(s) Oral at bedtime  spironolactone 25 milliGRAM(s) Oral two times a day    MEDICATIONS  (PRN):   Home Medications:  amiodarone 200 mg oral tablet: 1 tab(s) orally once a day (14 May 2018 06:30)  Eliquis 2.5 mg oral tablet: 1 tab(s) orally 2 times a day (14 May 2018 06:30)  furosemide 40 mg oral tablet: 1 tab(s) orally once a day (14 May 2018 06:30)  Incruse Ellipta 62.5 mcg/inh inhalation powder: 1 inch(es) inhaled once a day (14 May 2018 06:30)  levothyroxine 50 mcg (0.05 mg) oral capsule: 1 cap(s) orally once a day (14 May 2018 06:30)  losartan 100 mg oral tablet: 1 tab(s) orally once a day (14 May 2018 06:30)  metoprolol tartrate 50 mg oral tablet: 1 tab(s) orally 2 times a day (14 May 2018 06:30)  MEXILETINE 150 MG CAPSULE:  (14 May 2018 06:30)  montelukast 10 mg oral tablet: 1 tab(s) orally once a day (14 May 2018 06:30)  OMEPRAZOLE DR 40 MG CAPSULE:  (14 May 2018 06:30)  pravastatin 20 mg oral tablet: 1 tab(s) orally once a day (14 May 2018 06:30)  spironolactone 25 mg oral tablet: 1 tab(s) orally 2 times a day (14 May 2018 06:30)      FAMILY HISTORY:  No pertinent family history in first degree relatives      SOCIAL HISTORY:    CIGARETTES:     ALCOHOL:          Vital Signs Last 24 Hrs  T(C): 37 (14 May 2018 07:43), Max: 37 (14 May 2018 07:43)  T(F): 98.6 (14 May 2018 07:43), Max: 98.6 (14 May 2018 07:43)  HR: 80 (14 May 2018 07:43) (77 - 91)  BP: 105/67 (14 May 2018 07:43) (105/67 - 137/76)  BP(mean): --  RR: 18 (14 May 2018 07:43) (18 - 20)  SpO2: 98% (14 May 2018 07:43) (93% - 98%)          INTERPRETATION OF TELEMETRY:    ECG:            LABS:                        11.7   8.84  )-----------( 198      ( 14 May 2018 05:33 )             36.1     05-14    141  |  100  |  24<H>  ----------------------------<  139<H>  4.1   |  26  |  1.5    Ca    8.5      14 May 2018 05:33  Mg     1.9     05-14    TPro  5.1<L>  /  Alb  3.5  /  TBili  0.7  /  DBili  x   /  AST  11  /  ALT  10  /  AlkPhos  59  05-14    CARDIAC MARKERS ( 14 May 2018 05:33 )  x     / 0.02 ng/mL / 84 U/L / x     / x      CARDIAC MARKERS ( 14 May 2018 00:45 )  x     / 0.03 ng/mL / 93 U/L / x     / x      CARDIAC MARKERS ( 13 May 2018 19:40 )  x     / <0.01 ng/mL / 93 U/L / x     / 2.2 ng/mL      PT/INR - ( 13 May 2018 19:40 )   PT: 12.60 sec;   INR: 1.16 ratio         PTT - ( 13 May 2018 19:40 )  PTT:30.2 sec  LIVER FUNCTIONS - ( 14 May 2018 05:33 )  Alb: 3.5 g/dL / Pro: 5.1 g/dL / ALK PHOS: 59 U/L / ALT: 10 U/L / AST: 11 U/L / GGT: x               BNPSerum Pro-Brain Natriuretic Peptide: 6982 pg/mL <H> ( @ 19:40)    I&O's Detail    Daily     Daily     RADIOLOGY & ADDITIONAL STUDIES:  X-Ray: HPI:  77 male with non-ischemic CM, HFrEF, CAD, VT, A-fib (on Eliquis), reactive airway disease, BPH presenting for dyspnea on exertion for 2 weeks, progressively getting worse associated with orthopnea. Today he felt left-sided chest pain, non-radiating, 7/10 in intensity, lasting for 10 mins, related to activity. Denies palpitations, ICD firing, dizziness, LE swelling, PND. Reports compliance to all his meds. To note the patient story was unreliable, as his thinking is tangential. As per [employer] outpatient records, patient saw Dr Holt on May 4th for ICD shocks and was started on Mexilitin and VT ablation option was discussed. He was also instructed to increase Lasix to 40 q12h for 4 days.    Cardio: Dr Varela  EP: Dr Holt  Pulm: Dr Gardner (14 May 2018 00:27)      Patient is a 77y old  Male who presents with a chief complaint of Shortness of breath (14 May 2018 00:27)        PAST MEDICAL & SURGICAL HISTORY:  CAD (coronary artery disease)  Ventricular tachycardia  NANDO on CPAP  COPD (chronic obstructive pulmonary disease)  Pacemaker  AICD (automatic cardioverter/defibrillator) present  Hypothyroidism  Atrial fibrillation  Congestive heart disease  Hypercholesteremia  AICD (automatic cardioverter/defibrillator) present  History of laparoscopic cholecystectomy      PREVIOUS DIAGNOSTIC TESTING:      ECHO   FINDINGS:  [address] [place] [address]     Transthoracic Echocardiogram   2D, M-mode, Doppler, and Color Doppler     GUIDO VILCHIS [id number]   Account number: [id number]   : 1940   Age: 77 years   Gender: Male   Study date: 23-Dec-2017   Height: 69 in   Weight: 189.6 lb   BSA: 2.02 msq     Allergies: MORPHINE, LATEX     Sonographer: DOMONIQUE Fuentes   Referring MD: RENA NAVARRO MD,   Patient Location:  Printer   Fellow: Severino Child MD   Reading Physician: CHRISTIANA Terrazas MD     Summary   Clinical question: CHF.   Left ventricle: The ventricle was mildly dilated. Systolic function was   severely reduced. Ejection fraction was estimated in the range of 15 % to 20   %. There was severe diffuse hypokinesis.   Ventricular septum: There was paradoxical motion. These changes are consistent   with right ventricular pacing.   Aortic valve: There was mild regurgitation.   Mitral valve: There was mild regurgitation.   Left atrium: The atrium was mildly dilated.   Pulmonary arteries: The artery was mildly dilated.   Tricuspid valve: There was mild regurgitation.   Pericardium: A small pericardial effusion was identified mostly posterior to   the heart.     Clinical question: CHF. Assess left ventricular function.     Procedure: The procedure was performed in the echo lab. The transthoracic   approach was used. The study included complete 2D imaging, M-mode, complete   spectral Doppler, and color Doppler. Systolic blood pressure was 114 mmHg, at   the start of the study. Diastolic blood pressure was 64 mmHg, at the start of   the study.     Left ventricle: The ventricle was mildly dilated. Systolic function was   severely reduced. Ejection fraction was estimated in the range of 15 % to 20   %. There was severe diffuse hypokinesis. Wall thickness was normal.     Ventricular septum: There was paradoxical motion. These changes are consistent   with right ventricular pacing.     Aortic valve: The valve was trileaflet. Leaflets exhibited mildly increased   thickness and mildly reduced cuspal separation. Doppler: Transaortic velocity   was within the normal range. There was no stenosis. There was mild   regurgitation.     Aorta: The root exhibited normal size.     Mitral valve: There was mild focal thickening of the posterior leaflet. There   was normal leaflet separation. Doppler: The transmitral velocity was within   the normal range. There was no evidence for stenosis. There was mild   regurgitation.     Left atrium: The atrium was mildly dilated.     Right ventricle: The size was normal. Systolic function was normal. Wall   thickness was normal.     Pulmonic valve: Leaflets exhibited normal thickness, no calcification, and   normal cuspal separation. Doppler: The transpulmonic velocity was within the   normal range. There was trivial regurgitation.     Pulmonary artery: The artery was mildly dilated. Doppler: Systolic pressure   was within the normal range.     Tricuspid valve: The valve structure was normal. There was normal leaflet   separation. Doppler: The transtricuspid velocity was within the normal range.   There was no evidence for tricuspid stenosis. There was mild regurgitation.     Right atrium: Size was normal.     Systemic veins: IVC: The inferior vena cava was normal in size.     Pericardium: A small pericardial effusion was identified mostly posterior to   the heart. The pericardium was normal in appearance.     Prepared and signed by     CHRISTIANA Terrazas MD   Signed 23-Dec-2017 14:38:49    CATHETERIZATION  FINDINGS:  [facility]   [address] [place] [address]     [facility]   Cardiac Catheterization -- Comprehensive Report   MR number: [id number]   : 1940   Age: 75 years   Gender: Male   Account number: [id number]   Height: 68.9 in   Weight: 193.6 lb   BSA: 2.04 msq   Study date: 2016   Cine ID: [id number]   PCI ID:   Peripheral ID:     Allergies: MORPHINE, LATEX     Pre Procedure Nurse: Cally Ferguson RN   Diagnostic Cardiologist: Keyon Ramirez MD   Fellow: Brooklynn Vásquez MD   Anesthesiologist: MARCY Mendoza MD   Monitor: Isha Torres RN   Circulator: Akanksha Chin RN   Referring MD: NOEMY Holt MD   Referring MD: Malcolm Varela MD     SUMMARY:     IMPRESSIONS: There is significant single vessel coronary artery disease.     CORONARY CIRCULATION: There was 1-vessel coronary artery disease (circumflex).     INDICATIONS: Angina/MI: atypical chest pain. Coronary artery disease:   suspected. Congestive heart failure with dyspnea.     HISTORY: 75 year old man with history of Non-Ischemic cardiomyopathy now with   worsening Dyspnea on exertion.     PROCEDURES PERFORMED:     -- Right coronary angiography.   -- Left coronary angiography.     PROCEDURE: The risks and alternatives of the procedures and conscious sedation   were explained to the patient and informed consent was obtained. The patient   was brought to the cath lab and placed on the table. The planned puncture   sites were prepped and draped in the usual sterile fashion. Cardiac   catheterization performed electively.     -- Right radial artery access. The puncture site was infiltrated with 2 ml 2   % lidocaine. The vessel was accessed using the modified Seldinger technique,   a wire was threaded into the vessel, and a 6Fr Sheath Prelude was advanced   over the wire into the vessel.     -- Right coronary artery angiography. A JR4 catheter was advanced to the   aorta and positioned in the vessel ostium under fluoroscopic guidance.   Angiography was performed in multiple projections using hand-injection of   contrast.     -- Left coronary artery angiography. A JL3.5 catheter was advanced to the   aorta and positioned in the vessel ostium under fluoroscopic guidance.   Angiography was performed in multiple projections using hand-injection of   contrast.     PROCEDURE COMPLETION: TIMING: Test started at 09:30. Test concluded at 09:50.   RADIATION EXPOSURE: Fluoroscopy time: 7 min. MEDIA: Cine ID [id number].     HEMOSTASIS: The sheath was removed. The site was compressed with a D-Stat   device. Hemostasis was successful at the right radial artery access site.     MEDICATIONS GIVEN: Midazolam, 2 mg, IV, at 09:27. Fentanyl, 50 mcg, IV, at   09:32. Nitroglycerin, 100 mcg, intracoronary, at 09:42. Verapamil (Isoptin,   Calan, Covera), 1 mg, IA, at 09:48. Nitroglycerin, 200 mcg, IA, at 09:48.   Heparin, 3000 units, IA, last dose at 09:48. Neosynephrine (Phenylephrine),   50 mcg, IV, at 09:35. Neosynephrine (Phenylephrine), 100 mcg, IV, at 09:44.     CONTRAST GIVEN: Visipaque 65 ml.     HEMODYNAMICS: Hemodynamic assessment demonstrates normal hemodynamics.     VENTRICLES: No LV gram was performed; however, a recent echocardiogram   demonstrated an EF of 25 %.     CORONARY CIRCULATION: The coronary circulation is right dominant. There was   1-vessel coronary artery disease (circumflex). Left main: Normal. LAD: The   vessel was normal sized. Angiography showed minor luminal irregularities with   no flow limiting lesions. 1st diagonal: The vessel was normal sized.   Angiography showed moderate atherosclerosis with no clinical lesions   appreciated. Circumflex: Angiography showed minor luminal irregularities with   no flow limiting lesions. 1st obtuse marginal: The vessel was medium to large   sized. There was a discrete 70 % stenosis in the proximal third of the vessel   segment. The lesion was eccentric. There was KAEL grade 3 flow through the   vessel (brisk flow). It appears amenable to percutaneous intervention. RCA:   Angiography showed minor luminal irregularities with no flow limiting   lesions.     COMPLICATIONS: No complications occurred during the cath lab visit.     IMPRESSIONS: There is significant single vessel coronary artery disease.     RECOMMENDATIONS:   The patient should continue with the present medications.   The patient's diuretic regimen should be carefully increased.   The patient's anti-anginal regimen should be further intensified.   Patient management should include aggressive medical therapy and aggressive   risk factor modification.   Patient's heart failure management should be optimized. consider myocardial   perfusion imaging stress test to assess for ischemia in lcx/om1 territory.     DISPOSITION: The patient left the catheterization laboratory in stable   condition. The patient will be discharged on the day of the procedure,   following bed rest and subsequent ambulation, provided the recovery   parameters are appropriate. The patient has been instructed to call the   referring physician immediately if symptoms recur, or should there be any   problems with the puncture site, such as bleeding, swelling, pain or signs of   infection. The patient has been instructed to follow up with the referring   physician in the near future.     HEMODYNAMIC TABLES     Pressures: Baseline   Pressures: - HR: 64   Pressures: - Rhythm:   Pressures: -- Aortic Pressure (S/D/M): 75/66/72     Outputs: Baseline   Outputs: -- CALCULATIONS[age]   Outputs: -- CALCULATIONS: Body Surface Area: 2.04   Outputs: -- CALCULATIONS: Height in cm: 175.00   Outputs: -- CALCULATIONS: Sex: Male   Outputs: -- CALCULATIONS: Weight in k.00   Outputs: -- OUTPUTS: O2 consumption: 254.62   Outputs: -- OUTPUTS: Vo2 Indexed: 125.00     Prepared and signed by     Keyon Ramirez MD   Signed 2016 11:22:13    CHEST CT PULMONARY ANGIO with IV Contrast:  FINDINGS:  MEDICATIONS  (STANDING):  amiodarone    Tablet 200 milliGRAM(s) Oral daily  furosemide   Injectable 40 milliGRAM(s) IV Push two times a day  levothyroxine 50 MICROGram(s) Oral daily  lidocaine   Infusion 1 mG/Min (7.5 mL/Hr) IV Continuous <Continuous>  losartan 100 milliGRAM(s) Oral daily  metoprolol tartrate 50 milliGRAM(s) Oral two times a day  mexiletine 150 milliGRAM(s) Oral every 12 hours  pantoprazole    Tablet 40 milliGRAM(s) Oral before breakfast  simvastatin 20 milliGRAM(s) Oral at bedtime  spironolactone 25 milliGRAM(s) Oral two times a day    MEDICATIONS  (PRN):   Home Medications:  amiodarone 200 mg oral tablet: 1 tab(s) orally once a day (14 May 2018 06:30)  Eliquis 2.5 mg oral tablet: 1 tab(s) orally 2 times a day (14 May 2018 06:30)  furosemide 40 mg oral tablet: 1 tab(s) orally once a day (14 May 2018 06:30)  Incruse Ellipta 62.5 mcg/inh inhalation powder: 1 inch(es) inhaled once a day (14 May 2018 06:30)  levothyroxine 50 mcg (0.05 mg) oral capsule: 1 cap(s) orally once a day (14 May 2018 06:30)  losartan 100 mg oral tablet: 1 tab(s) orally once a day (14 May 2018 06:30)  metoprolol tartrate 50 mg oral tablet: 1 tab(s) orally 2 times a day (14 May 2018 06:30)  MEXILETINE 150 MG CAPSULE:  (14 May 2018 06:30)  montelukast 10 mg oral tablet: 1 tab(s) orally once a day (14 May 2018 06:30)  OMEPRAZOLE DR 40 MG CAPSULE:  (14 May 2018 06:30)  pravastatin 20 mg oral tablet: 1 tab(s) orally once a day (14 May 2018 06:30)  spironolactone 25 mg oral tablet: 1 tab(s) orally 2 times a day (14 May 2018 06:30)      FAMILY HISTORY:  No pertinent family history in first degree relatives    SOCIAL HISTORY:    CIGARETTES: denies    ALCOHOL: social    Vital Signs Last 24 Hrs  T(C): 37 (14 May 2018 07:43), Max: 37 (14 May 2018 07:43)  T(F): 98.6 (14 May 2018 07:43), Max: 98.6 (14 May 2018 07:43)  HR: 80 (14 May 2018 07:43) (77 - 91)  BP: 105/67 (14 May 2018 07:43) (105/67 - 137/76)  BP(mean): --  RR: 18 (14 May 2018 07:43) (18 - 20)  SpO2: 98% (14 May 2018 07:43) (93% - 98%)    INTERPRETATION OF TELEMETRY:  80 bpm SR, LBBB  on lidocaine Infusion 1 mG/Min    ECG:  < from: 12 Lead ECG (18 @ 18:07) >  Ventricular Rate 86 BPM    Atrial Rate 86 BPM    P-R Interval 230 ms    QRS Duration 122 ms    Q-T Interval 404 ms    QTC Calculation(Bezet) 483 ms    P Axis -22 degrees    R Axis -31 degrees    T Axis 135 degrees    Diagnosis Line Atrial-paced rhythm with prolonged AV conduction  Left axis deviation  Left bundle branch block  Abnormal ECG    Confirmed by Anca Pendleton MD ([id number]) on 2018 12:56:18 PM    LABS:                        11.7   8.84  )-----------( 198      ( 14 May 2018 05:33 )             36.1     05-14    141  |  100  |  24<H>  ----------------------------<  139<H>  4.1   |  26  |  1.5    Ca    8.5      14 May 2018 05:33  Mg     1.9     -14    TPro  5.1<L>  /  Alb  3.5  /  TBili  0.7  /  DBili  x   /  AST  11  /  ALT  10  /  AlkPhos  59  -14    CARDIAC MARKERS ( 14 May 2018 05:33 )  x     / 0.02 ng/mL / 84 U/L / x     / x      CARDIAC MARKERS ( 14 May 2018 00:45 )  x     / 0.03 ng/mL / 93 U/L / x     / x      CARDIAC MARKERS ( 13 May 2018 19:40 )  x     / <0.01 ng/mL / 93 U/L / x     / 2.2 ng/mL      PT/INR - ( 13 May 2018 19:40 )   PT: 12.60 sec;   INR: 1.16 ratio         PTT - ( 13 May 2018 19:40 )  PTT:30.2 sec  LIVER FUNCTIONS - ( 14 May 2018 05:33 )  Alb: 3.5 g/dL / Pro: 5.1 g/dL / ALK PHOS: 59 U/L / ALT: 10 U/L / AST: 11 U/L / GGT: x           BNPSerum Pro-Brain Natriuretic Peptide: 6982 pg/mL <H> ( @ 19:40)    RADIOLOGY & ADDITIONAL STUDIES:  X-Ray:  < from: Xray Chest 1 View AP/PA (18 @ 20:11) >  EXAM:  XR CHEST FRONTAL 1V            PROCEDURE DATE:  2018      INTERPRETATION:  Clinical History / Reason for exam: Chest pain    Comparison : Chest radiograph 2017.    Technique/Positioning: Frontal, portable.    Findings:    Support devices: Left-sided cardiac pacing device is again noted    Cardiac/mediastinum/hilum: Significant cardiomegaly, stable    Lung parenchyma/Pleura: Stable small right pleural effusion and   increasing right base opacity    Skeleton/soft tissues: Degenerative change    Impression:      Increasing right base opacity with stable small right pleural effusion      JAMI MONTANA M.D., ATTENDING RADIOLOGIST  This document has been electronically signed. May 14 2018  1:47PM

## 2018-05-14 NOTE — H&P ADULT - NSHPPHYSICALEXAM_GEN_ALL_CORE
PHYSICAL EXAM:    CONSTITUTIONAL: NAD, well-developed, well-nourished  HEAD:  Atraumatic, normo-cephalic  EYES: EOMI, PERRLA, clear conjunctivae, anicteric sclerae, no nystagmus  NECK: Supple, no JVD, no carotid bruit   CHEST/LUNG: Bibasilar crackles. No wheezing  HEART: S1 S2 normal, regular rate and rhythm. No murmurs, rubs, or gallops  ABDOMEN: Bowel sounds present. Soft, non-tender, non-distended. No rebound or guarding  EXTREMITIES:  2+ peripheral pulses, no clubbing, cyanosis, or edema  PSYCH: Normal affect. Cooperative.  NEUROLOGY: AAOx3. No focal deficits. CN II-XII grossly intact  MSK: No joint swelling, redness   SKIN: Normal turgor, no rashes or lesions PHYSICAL EXAM:    CONSTITUTIONAL: NAD, well-developed, well-nourished  HEAD:  Atraumatic, normo-cephalic  EYES: EOMI, PERRLA, clear conjunctivae, anicteric sclerae, no nystagmus  NECK: Supple, no JVD, no carotid bruit   CHEST/LUNG: Bibasilar crackles. No wheezing  HEART: S1 S2 normal, regular rate and rhythm. No murmurs, rubs, or gallops  ABDOMEN: Bowel sounds present. Soft, non-tender, non-distended. No rebound or guarding  EXTREMITIES:  2+ peripheral pulses, no clubbing, cyanosis, +1 ankle edema.  PSYCH: Normal affect. Cooperative.  NEUROLOGY: AAOx3. No focal deficits. CN II-XII grossly intact  MSK: No joint swelling, redness   SKIN: Normal turgor, no rashes or lesions PHYSICAL EXAM:    CONSTITUTIONAL: Anxious, well-developed, well-nourished  HEAD:  Atraumatic, normo-cephalic  EYES: EOMI, PERRLA, clear conjunctivae, anicteric sclerae, no nystagmus  NECK: Supple, no JVD, no carotid bruit   CHEST/LUNG: Bibasilar crackles. No wheezing  HEART: S1 S2 normal, regular rate and rhythm. No murmurs, rubs, or gallops  ABDOMEN: Bowel sounds present. Soft, non-tender, non-distended. No rebound or guarding  EXTREMITIES:  2+ peripheral pulses, no clubbing, cyanosis, +1 ankle edema.  PSYCH: Normal affect. Cooperative.  NEUROLOGY: AAOx3. No focal deficits. CN II-XII grossly intact  MSK: No joint swelling, redness   SKIN: Normal turgor, no rashes or lesions

## 2018-05-14 NOTE — PATIENT PROFILE ADULT. - VISION (WITH CORRECTIVE LENSES IF THE PATIENT USUALLY WEARS THEM):
glasses at the bedside/Partially impaired: cannot see medication labels or newsprint, but can see obstacles in path, and the surrounding layout; can count fingers at arm's length

## 2018-05-14 NOTE — ED ADULT NURSE REASSESSMENT NOTE - NS ED NURSE REASSESS COMMENT FT1
Rapid response initiated 0547, AICD fired x1, sustained v tach noted on monitor. Pt placed on 2L NC, AED pads applied. Pt transferred to ED critical care,endorsed to RN
Pt transfer to CCU area for his heart going to V-Tach.  Pt denies chest pain at this time and Rapid response team  was activate.  Medicine resident and cardio fellow at bedside

## 2018-05-14 NOTE — H&P ADULT - NSHPREVIEWOFSYSTEMS_GEN_ALL_CORE
REVIEW OF SYSTEMS:    CONSTITUTIONAL: No weakness, fevers or chills.  HEENT: No visual changes, no hearing loss, no throat pain, no headache.  NECK: No pain or stiffness, no lymphadenopathy.  RESPIRATORY: see HPI.   CARDIOVASCULAR: see HPI.  GASTROINTESTINAL: No abdominal pain. No nausea, vomiting, or hematemesis. No diarrhea or constipation, no bloating.  No melena or hematochezia.  GENITOURINARY: obstructive symptoms.  NEUROLOGICAL: No paresthesia or weakness, no dizziness.  MUSCULOSKELETAL: No back pain. No joint pain or swelling, no morning stiffness. Normal ROM.  SKIN: No itching, no rashes.

## 2018-05-14 NOTE — CONSULT NOTE ADULT - SUBJECTIVE AND OBJECTIVE BOX
Date of Admission:    CHIEF COMPLAINT:    HISTORY OF PRESENT ILLNESS:   77 male with non-ischemic CM, HFrEF, CAD, VT, A-fib (on Eliquis), reactive airway disease, DM,  BPH presenting for dyspnea on exertion for 2 weeks, progressively getting worse associated with orthopnea. Today he felt left-sided chest pain, non-radiating, 7/10 in intensity, lasting for 10 mins, related to activity. Denies palpitations, ICD firing, dizziness, LE swelling, PND. Reports compliance to all his meds. To note the patient story was unreliable, as his thinking is tangential. [facility], patient saw Dr Holt on May 4th for ICD shocks and was started on Mexilitine and VT ablation option was discussed. He was also instructed to increase Lasix to 40 q12h for 4 days. Last echo in Dec 2017 showing EF 15-20%.  Cardiac cath. in 2016 showed 70% OM1 stenosis.   (14 May 2018 00:27)      PAST MEDICAL & SURGICAL HISTORY:  CAD (coronary artery disease)  Ventricular tachycardia  NANDO on CPAP  COPD (chronic obstructive pulmonary disease)  Pacemaker  AICD (automatic cardioverter/defibrillator) present  Hypothyroidism  Atrial fibrillation  Congestive heart disease  Hypercholesteremia  AICD (automatic cardioverter/defibrillator) present  History of laparoscopic cholecystectomy    HEALTH ISSUES - PROBLEM Dx:        FAMILY HISTORY:  No pertinent family history in first degree relatives    Allergies    morphine (Stomach Upset)    Intolerances    	  Home Medications:  Eliquis 2.5 mg oral tablet: 1 tab(s) orally 2 times a day (14 May 2018 02:05)  furosemide 40 mg oral tablet: 1 tab(s) orally once a day (14 May 2018 02:05)  Incruse Ellipta 62.5 mcg/inh inhalation powder: 1 inch(es) inhaled once a day (14 May 2018 02:05)  levothyroxine 50 mcg (0.05 mg) oral capsule: 1 cap(s) orally once a day (14 May 2018 02:05)  losartan 100 mg oral tablet: 1 tab(s) orally once a day (14 May 2018 02:05)  metoprolol tartrate 50 mg oral tablet: 1 tab(s) orally 2 times a day (14 May 2018 02:05)  MEXILETINE 150 MG CAPSULE:  (14 May 2018 02:05)  montelukast 10 mg oral tablet: 1 tab(s) orally once a day (14 May 2018 02:05)  OMEPRAZOLE DR 40 MG CAPSULE:  (14 May 2018 02:05)  pravastatin 20 mg oral tablet: 1 tab(s) orally once a day (14 May 2018 02:05)  spironolactone 25 mg oral tablet: 1 tab(s) orally 2 times a day (14 May 2018 02:05)    MEDICATIONS  (STANDING):  furosemide   Injectable 40 milliGRAM(s) IV Push two times a day  levothyroxine 50 MICROGram(s) Oral daily  losartan 100 milliGRAM(s) Oral daily  metoprolol tartrate 50 milliGRAM(s) Oral two times a day  mexiletine 150 milliGRAM(s) Oral every 12 hours  pantoprazole    Tablet 40 milliGRAM(s) Oral before breakfast  simvastatin 20 milliGRAM(s) Oral at bedtime  spironolactone 25 milliGRAM(s) Oral two times a day      SOCIAL HISTORY:    [ ] Non-smoker  [ ] Smoker  [ ] Alcohol        PHYSICAL EXAM:  T(C): 36.2 (05-13-18 @ 21:00), Max: 36.2 (05-13-18 @ 21:00)  HR: 79 (05-14-18 @ 05:50) (79 - 91)  BP: 137/76 (05-14-18 @ 05:50) (128/77 - 137/76)  RR: 18 (05-14-18 @ 05:50) (18 - 20)  SpO2: 93% (05-14-18 @ 05:50) (93% - 96%)  Wt(kg): --  I&O's Summary    Daily     Daily             LABS:	 	                          13.4   11.87 )-----------( 255      ( 13 May 2018 19:40 )             42.1     05-13    141  |  99  |  26<H>  ----------------------------<  216<H>  4.6   |  27  |  1.5    Ca    9.1      13 May 2018 19:40    TPro  6.1  /  Alb  4.1  /  TBili  0.5  /  DBili  x   /  AST  13  /  ALT  13  /  AlkPhos  76  05-13    CARDIAC MARKERS ( 14 May 2018 00:45 )  x     / 0.03 ng/mL / 93 U/L / x     / x      CARDIAC MARKERS ( 13 May 2018 19:40 )  x     / <0.01 ng/mL / 93 U/L / x     / 2.2 ng/mL      PT/INR - ( 13 May 2018 19:40 )   PT: 12.60 sec;   INR: 1.16 ratio         PTT - ( 13 May 2018 19:40 )  PTT:30.2 sec    proBNP: Serum Pro-Brain Natriuretic Peptide: 6982 pg/mL (05-13 @ 19:40)    Lipid Profile:   HgA1c:   TSH:       CARDIAC MARKERS:            TELEMETRY EVENTS: 	    ECG:  	  RADIOLOGY:  OTHER: 	    PREVIOUS DIAGNOSTIC TESTING:    [x] Echocardiogram:  [x] Catheterization:  [ ] Stress Test:  	  	  ASSESSMENT/PLAN: Date of Admission:    CHIEF COMPLAINT:    HISTORY OF PRESENT ILLNESS:   77 male with non-ischemic CM, HFrEF, CAD, VT, A-fib (on Eliquis), reactive airway disease, DM,  BPH presenting for dyspnea on exertion for 2 weeks, progressively getting worse associated with orthopnea. Today he felt left-sided chest pain, non-radiating, 7/10 in intensity, lasting for 10 mins, related to activity. Denies palpitations, ICD firing, dizziness, LE swelling, PND. Reports compliance to all his meds. To note the patient story was unreliable, as his thinking is tangential. As per [facility] outpatient records, patient saw Dr Holt on May 4th for ICD shocks and was started on Mexilitine and VT ablation option was discussed. He was also instructed to increase Lasix to 40 q12h for 4 days. Last echo in Dec 2017 showing EF 15-20%.  Cardiac cath. in 2016 showed 70% OM1 stenosis.   (14 May 2018 00:27)      PAST MEDICAL & SURGICAL HISTORY:  CAD (coronary artery disease)  Ventricular tachycardia  NANDO on CPAP  COPD (chronic obstructive pulmonary disease)  Pacemaker  AICD (automatic cardioverter/defibrillator) present  Hypothyroidism  Atrial fibrillation  Congestive heart disease  Hypercholesteremia  AICD (automatic cardioverter/defibrillator) present  History of laparoscopic cholecystectomy    HEALTH ISSUES - PROBLEM Dx:        FAMILY HISTORY:  No pertinent family history in first degree relatives    Allergies    morphine (Stomach Upset)    Intolerances    	  Home Medications:  Eliquis 2.5 mg oral tablet: 1 tab(s) orally 2 times a day (14 May 2018 02:05)  furosemide 40 mg oral tablet: 1 tab(s) orally once a day (14 May 2018 02:05)  Incruse Ellipta 62.5 mcg/inh inhalation powder: 1 inch(es) inhaled once a day (14 May 2018 02:05)  levothyroxine 50 mcg (0.05 mg) oral capsule: 1 cap(s) orally once a day (14 May 2018 02:05)  losartan 100 mg oral tablet: 1 tab(s) orally once a day (14 May 2018 02:05)  metoprolol tartrate 50 mg oral tablet: 1 tab(s) orally 2 times a day (14 May 2018 02:05)  MEXILETINE 150 MG CAPSULE:  (14 May 2018 02:05)  montelukast 10 mg oral tablet: 1 tab(s) orally once a day (14 May 2018 02:05)  OMEPRAZOLE DR 40 MG CAPSULE:  (14 May 2018 02:05)  pravastatin 20 mg oral tablet: 1 tab(s) orally once a day (14 May 2018 02:05)  spironolactone 25 mg oral tablet: 1 tab(s) orally 2 times a day (14 May 2018 02:05)    MEDICATIONS  (STANDING):  furosemide   Injectable 40 milliGRAM(s) IV Push two times a day  levothyroxine 50 MICROGram(s) Oral daily  losartan 100 milliGRAM(s) Oral daily  metoprolol tartrate 50 milliGRAM(s) Oral two times a day  mexiletine 150 milliGRAM(s) Oral every 12 hours  pantoprazole    Tablet 40 milliGRAM(s) Oral before breakfast  simvastatin 20 milliGRAM(s) Oral at bedtime  spironolactone 25 milliGRAM(s) Oral two times a day      SOCIAL HISTORY:    [ ] Non-smoker  [ ] Smoker  [ ] Alcohol        PHYSICAL EXAM:  T(C): 36.2 (05-13-18 @ 21:00), Max: 36.2 (05-13-18 @ 21:00)  HR: 79 (05-14-18 @ 05:50) (79 - 91)  BP: 137/76 (05-14-18 @ 05:50) (128/77 - 137/76)  RR: 18 (05-14-18 @ 05:50) (18 - 20)  SpO2: 93% (05-14-18 @ 05:50) (93% - 96%)  Wt(kg): --  I&O's Summary    Daily     Daily             LABS:	 	                          13.4   11.87 )-----------( 255      ( 13 May 2018 19:40 )             42.1     05-13    141  |  99  |  26<H>  ----------------------------<  216<H>  4.6   |  27  |  1.5    Ca    9.1      13 May 2018 19:40    TPro  6.1  /  Alb  4.1  /  TBili  0.5  /  DBili  x   /  AST  13  /  ALT  13  /  AlkPhos  76  05-13    CARDIAC MARKERS ( 14 May 2018 00:45 )  x     / 0.03 ng/mL / 93 U/L / x     / x      CARDIAC MARKERS ( 13 May 2018 19:40 )  x     / <0.01 ng/mL / 93 U/L / x     / 2.2 ng/mL      PT/INR - ( 13 May 2018 19:40 )   PT: 12.60 sec;   INR: 1.16 ratio         PTT - ( 13 May 2018 19:40 )  PTT:30.2 sec    proBNP: Serum Pro-Brain Natriuretic Peptide: 6982 pg/mL (05-13 @ 19:40)    Lipid Profile:   HgA1c:   TSH:       CARDIAC MARKERS:            TELEMETRY EVENTS: 	    ECG:  	  RADIOLOGY:  OTHER: 	    PREVIOUS DIAGNOSTIC TESTING:    [x] Echocardiogram: Left ventricle: The ventricle was mildly dilated. Systolic function was   severely reduced. Ejection fraction was estimated in the range of 15 % to 20   %. There was severe diffuse hypokinesis.   Ventricular septum: There was paradoxical motion. These changes are consistent   with right ventricular pacing.   Aortic valve: There was mild regurgitation.   Mitral valve: There was mild regurgitation.   Left atrium: The atrium was mildly dilated.   Pulmonary arteries: The artery was mildly dilated.   Tricuspid valve: There was mild regurgitation.   Pericardium: A small pericardial effusion was identified mostly posterior to   the heart.       [x] Catheterization: There is significant single vessel coronary artery disease:  1st obtuse marginal: The vessel was medium to large   sized. There was a discrete 70 % stenosis in the proximal third of the vessel   segment. The lesion was eccentric.  [ ] Stress Test:  	  	  ASSESSMENT/PLAN: 	Patient is a 77y old  Male who presents with a chief complaint of Shortness of breath (14 May 2018 00:27)    #VT:   -Lidocaine gtt   -CCU admission  -Monitor electrolytes   -EP evaluation    #HFrEF:   -Repeat echo   -Continue diuresis Date of Admission:    CHIEF COMPLAINT:    HISTORY OF PRESENT ILLNESS:   77 male with non-ischemic CM, HFrEF, CAD, VT, A-fib (on Eliquis), reactive airway disease, DM,  BPH presenting for dyspnea on exertion for 2 weeks, progressively getting worse associated with orthopnea. Today he felt left-sided chest pain, non-radiating, 7/10 in intensity, lasting for 10 mins, related to activity. Denies palpitations, ICD firing, dizziness, LE swelling, PND. Reports compliance to all his meds. To note the patient story was unreliable, as his thinking is tangential. [facility] patient saw Dr Holt on May 4th for ICD shocks and was started on Mexilitine and VT ablation option was discussed. He was also instructed to increase Lasix to 40 q12h for 4 days. Last echo in Dec 2017 showing EF 15-20%.  Cardiac cath. in 2016 showed 70% OM1 stenosis.   (14 May 2018 00:27)      PAST MEDICAL & SURGICAL HISTORY:  CAD (coronary artery disease)  Ventricular tachycardia  NANDO on CPAP  COPD (chronic obstructive pulmonary disease)  Pacemaker  AICD (automatic cardioverter/defibrillator) present  Hypothyroidism  Atrial fibrillation  Congestive heart disease  Hypercholesteremia  AICD (automatic cardioverter/defibrillator) present  History of laparoscopic cholecystectomy    HEALTH ISSUES - PROBLEM Dx:        FAMILY HISTORY:  No pertinent family history in first degree relatives    Allergies    morphine (Stomach Upset)    Intolerances    	  Home Medications:  Eliquis 2.5 mg oral tablet: 1 tab(s) orally 2 times a day (14 May 2018 02:05)  furosemide 40 mg oral tablet: 1 tab(s) orally once a day (14 May 2018 02:05)  Incruse Ellipta 62.5 mcg/inh inhalation powder: 1 inch(es) inhaled once a day (14 May 2018 02:05)  levothyroxine 50 mcg (0.05 mg) oral capsule: 1 cap(s) orally once a day (14 May 2018 02:05)  losartan 100 mg oral tablet: 1 tab(s) orally once a day (14 May 2018 02:05)  metoprolol tartrate 50 mg oral tablet: 1 tab(s) orally 2 times a day (14 May 2018 02:05)  MEXILETINE 150 MG CAPSULE:  (14 May 2018 02:05)  montelukast 10 mg oral tablet: 1 tab(s) orally once a day (14 May 2018 02:05)  OMEPRAZOLE DR 40 MG CAPSULE:  (14 May 2018 02:05)  pravastatin 20 mg oral tablet: 1 tab(s) orally once a day (14 May 2018 02:05)  spironolactone 25 mg oral tablet: 1 tab(s) orally 2 times a day (14 May 2018 02:05)    MEDICATIONS  (STANDING):  furosemide   Injectable 40 milliGRAM(s) IV Push two times a day  levothyroxine 50 MICROGram(s) Oral daily  losartan 100 milliGRAM(s) Oral daily  metoprolol tartrate 50 milliGRAM(s) Oral two times a day  mexiletine 150 milliGRAM(s) Oral every 12 hours  pantoprazole    Tablet 40 milliGRAM(s) Oral before breakfast  simvastatin 20 milliGRAM(s) Oral at bedtime  spironolactone 25 milliGRAM(s) Oral two times a day      SOCIAL HISTORY:    [ ] Non-smoker  [ ] Smoker  [ ] Alcohol        PHYSICAL EXAM:  T(C): 36.2 (05-13-18 @ 21:00), Max: 36.2 (05-13-18 @ 21:00)  HR: 79 (05-14-18 @ 05:50) (79 - 91)  BP: 137/76 (05-14-18 @ 05:50) (128/77 - 137/76)  RR: 18 (05-14-18 @ 05:50) (18 - 20)  SpO2: 93% (05-14-18 @ 05:50) (93% - 96%)  Wt(kg): --  I&O's Summary    Daily     Daily             LABS:	 	                          13.4   11.87 )-----------( 255      ( 13 May 2018 19:40 )             42.1     05-13    141  |  99  |  26<H>  ----------------------------<  216<H>  4.6   |  27  |  1.5    Ca    9.1      13 May 2018 19:40    TPro  6.1  /  Alb  4.1  /  TBili  0.5  /  DBili  x   /  AST  13  /  ALT  13  /  AlkPhos  76  05-13    CARDIAC MARKERS ( 14 May 2018 00:45 )  x     / 0.03 ng/mL / 93 U/L / x     / x      CARDIAC MARKERS ( 13 May 2018 19:40 )  x     / <0.01 ng/mL / 93 U/L / x     / 2.2 ng/mL      PT/INR - ( 13 May 2018 19:40 )   PT: 12.60 sec;   INR: 1.16 ratio         PTT - ( 13 May 2018 19:40 )  PTT:30.2 sec    proBNP: Serum Pro-Brain Natriuretic Peptide: 6982 pg/mL (05-13 @ 19:40)    Lipid Profile:   HgA1c:   TSH:       CARDIAC MARKERS:            TELEMETRY EVENTS: 	    ECG:  Atrial paced at 84 bpm. LBBB	  RADIOLOGY: Bilateral pleural effusions  OTHER: 	    PREVIOUS DIAGNOSTIC TESTING:    [x] Echocardiogram: Left ventricle: The ventricle was mildly dilated. Systolic function was   severely reduced. Ejection fraction was estimated in the range of 15 % to 20   %. There was severe diffuse hypokinesis.   Ventricular septum: There was paradoxical motion. These changes are consistent   with right ventricular pacing.   Aortic valve: There was mild regurgitation.   Mitral valve: There was mild regurgitation.   Left atrium: The atrium was mildly dilated.   Pulmonary arteries: The artery was mildly dilated.   Tricuspid valve: There was mild regurgitation.   Pericardium: A small pericardial effusion was identified mostly posterior to   the heart.       [x] Catheterization: There is significant single vessel coronary artery disease:  1st obtuse marginal: The vessel was medium to large   sized. There was a discrete 70 % stenosis in the proximal third of the vessel   segment. The lesion was eccentric.  [ ] Stress Test:  	  	  ASSESSMENT/PLAN: 	Patient is a 77y old  Male who presents with a chief complaint of Shortness of breath (14 May 2018 00:27)    #VT:   -Lidocaine gtt   -CCU admission  -Monitor electrolytes   -EP evaluation    #HFrEF:   -Repeat echo   -Continue diuresis Date of Admission:    CHIEF COMPLAINT:    HISTORY OF PRESENT ILLNESS:   77 male with non-ischemic CM, HFrEF, CAD, VT, A-fib (on Eliquis), reactive airway disease, DM,  BPH presenting for dyspnea on exertion for 2 weeks, progressively getting worse associated with orthopnea. Denies palpitations, ICD firing, dizziness, LE swelling, PND. Reports compliance to all his meds. Patient saw Dr Holt on May 4th for ICD shocks and was started on Mexilitine and VT ablation option was discussed. He was also instructed to increase Lasix to 40 q12h for 4 days. Last echo in Dec 2017 showing EF 15-20%.  Cardiac cath. in 2016 showed 70% OM1 stenosis.   (14 May 2018 00:27)      PAST MEDICAL & SURGICAL HISTORY:  CAD (coronary artery disease)  Ventricular tachycardia  NANDO on CPAP  COPD (chronic obstructive pulmonary disease)  Pacemaker  AICD (automatic cardioverter/defibrillator) present  Hypothyroidism  Atrial fibrillation  Congestive heart disease  Hypercholesteremia  AICD (automatic cardioverter/defibrillator) present  History of laparoscopic cholecystectomy    HEALTH ISSUES - PROBLEM Dx:        FAMILY HISTORY:  No pertinent family history in first degree relatives    Allergies    morphine (Stomach Upset)    Intolerances    	  Home Medications:  Eliquis 2.5 mg oral tablet: 1 tab(s) orally 2 times a day (14 May 2018 02:05)  furosemide 40 mg oral tablet: 1 tab(s) orally once a day (14 May 2018 02:05)  Incruse Ellipta 62.5 mcg/inh inhalation powder: 1 inch(es) inhaled once a day (14 May 2018 02:05)  levothyroxine 50 mcg (0.05 mg) oral capsule: 1 cap(s) orally once a day (14 May 2018 02:05)  losartan 100 mg oral tablet: 1 tab(s) orally once a day (14 May 2018 02:05)  metoprolol tartrate 50 mg oral tablet: 1 tab(s) orally 2 times a day (14 May 2018 02:05)  MEXILETINE 150 MG CAPSULE:  (14 May 2018 02:05)  montelukast 10 mg oral tablet: 1 tab(s) orally once a day (14 May 2018 02:05)  OMEPRAZOLE DR 40 MG CAPSULE:  (14 May 2018 02:05)  pravastatin 20 mg oral tablet: 1 tab(s) orally once a day (14 May 2018 02:05)  spironolactone 25 mg oral tablet: 1 tab(s) orally 2 times a day (14 May 2018 02:05)    MEDICATIONS  (STANDING):  furosemide   Injectable 40 milliGRAM(s) IV Push two times a day  levothyroxine 50 MICROGram(s) Oral daily  losartan 100 milliGRAM(s) Oral daily  metoprolol tartrate 50 milliGRAM(s) Oral two times a day  mexiletine 150 milliGRAM(s) Oral every 12 hours  pantoprazole    Tablet 40 milliGRAM(s) Oral before breakfast  simvastatin 20 milliGRAM(s) Oral at bedtime  spironolactone 25 milliGRAM(s) Oral two times a day      SOCIAL HISTORY:    former smoker  social Alcohol    Review Of Systems  (+) chest pain  (+) shortness of breath  as above  all others unremarkable    PHYSICAL EXAM:  T(C): 36.2 (05-13-18 @ 21:00), Max: 36.2 (05-13-18 @ 21:00)  HR: 79 (05-14-18 @ 05:50) (79 - 91)  BP: 137/76 (05-14-18 @ 05:50) (128/77 - 137/76)  RR: 18 (05-14-18 @ 05:50) (18 - 20)    Well developed, well nourished AAO x 3 NAD.  HEENMT: WNL  Neck (+) JVD  Chest basal rales  CVS: S1 S2 irregularly irregular  Abd: soft non-tender BS(+) normoactive  Ext: No C\C\E     SpO2: 93% (05-14-18 @ 05:50) (93% - 96%)          LABS:	 	                          13.4   11.87 )-----------( 255      ( 13 May 2018 19:40 )             42.1     05-13    141  |  99  |  26<H>  ----------------------------<  216<H>  4.6   |  27  |  1.5    Ca    9.1      13 May 2018 19:40    TPro  6.1  /  Alb  4.1  /  TBili  0.5  /  DBili  x   /  AST  13  /  ALT  13  /  AlkPhos  76  05-13    CARDIAC MARKERS ( 14 May 2018 00:45 )  x     / 0.03 ng/mL / 93 U/L / x     / x      CARDIAC MARKERS ( 13 May 2018 19:40 )  x     / <0.01 ng/mL / 93 U/L / x     / 2.2 ng/mL      PT/INR - ( 13 May 2018 19:40 )   PT: 12.60 sec;   INR: 1.16 ratio         PTT - ( 13 May 2018 19:40 )  PTT:30.2 sec    proBNP: Serum Pro-Brain Natriuretic Peptide: 6982 pg/mL (05-13 @ 19:40)    Lipid Profile:   HgA1c:   TSH:       CARDIAC MARKERS:            TELEMETRY EVENTS: 	    ECG:  Atrial paced at 84 bpm. LBBB	  RADIOLOGY: Bilateral pleural effusions  OTHER: 	    PREVIOUS DIAGNOSTIC TESTING:    [x] Echocardiogram: Left ventricle: The ventricle was mildly dilated. Systolic function was   severely reduced. Ejection fraction was estimated in the range of 15 % to 20   %. There was severe diffuse hypokinesis.   Ventricular septum: There was paradoxical motion. These changes are consistent   with right ventricular pacing.   Aortic valve: There was mild regurgitation.   Mitral valve: There was mild regurgitation.   Left atrium: The atrium was mildly dilated.   Pulmonary arteries: The artery was mildly dilated.   Tricuspid valve: There was mild regurgitation.   Pericardium: A small pericardial effusion was identified mostly posterior to   the heart.       [x] Catheterization: There is significant single vessel coronary artery disease:  1st obtuse marginal: The vessel was medium to large   sized. There was a discrete 70 % stenosis in the proximal third of the vessel   segment. The lesion was eccentric.  [ ] Stress Test:  	  	  ASSESSMENT/PLAN: 	Patient is a 77y old  Male who presents with a chief complaint of Shortness of breath (14 May 2018 00:27)    #VT:   -Lidocaine gtt   -CCU admission  -Monitor electrolytes   -EP evaluation    #HFrEF:   -Repeat echo   -Continue diuresis IV Lasix Q12h  -Check BNP    #Atrial Fibrillation  -Continue Eliquis

## 2018-05-14 NOTE — H&P ADULT - NSHPSOCIALHISTORY_GEN_ALL_CORE
Marital Status:  ( x  )    (   ) Single    (   )    (  )    Lives with: (  ) alone  (  ) children   ( x ) spouse   (  ) parents  (  ) other  Substance Use (street drugs): ( x ) never used  (  ) other:  Tobacco Usage:  ( x  ) never smoked   (   ) former smoker   (   ) current smoker  (     ) pack year

## 2018-05-14 NOTE — H&P ADULT - ASSESSMENT
Patient is a 77y old  Male who presents with a chief complaint of Shortness of breath (14 May 2018 00:27) Patient is a 77y old  Male who presents with a chief complaint of Shortness of breath (14 May 2018 00:27)    # Acute exacerbation HFrEF (EF 15-20%): r/o VT, uncontrolled a-fib, ACS    - repeat CE  - Telemonitor  - IV lasix 40 q12h, strict Is and Os, low sodium diet  - Device interrogation in AM  - Cardio consult   - Continue GDMT    # A-fib:   - continue Eliquis 2.5 q12h and metoprolol    # Hyperglycemia: (DM?)    - Check HbA1c  - Monitor FS, start insulin if needed.     # Gastritis:    - Continue PPI Patient is a 77y old  Male who presents with a chief complaint of Shortness of breath (14 May 2018 00:27)    # Acute exacerbation HFrEF (EF 15-20%): r/o VT, uncontrolled a-fib, ACS    - repeat CE  - Telemonitor  - IV lasix 40 q12h, strict Is and Os, low sodium diet  - Device interrogation in AM  - Cardio consult   - Continue GDMT    # A-fib:     - continue Eliquis 2.5 q12h and metoprolol    # Hyperglycemia:     - Check HbA1c  - Monitor FS, start insulin if needed.     # Gastritis:    - Continue PPI

## 2018-05-14 NOTE — CONSULT NOTE ADULT - ATTENDING COMMENTS
Patient examined, chart reviewed, case discussed with Fellow
VT - Patient has history of multiple episodes of VT. I have discussed with patient on multiple occasions VT ablation. At this time patient is not sure if she wants the ablation. At this time I recommend continue amiodarone and mexilatin.

## 2018-05-14 NOTE — H&P ADULT - NSHPLABSRESULTS_GEN_ALL_CORE
LABS:                                   13.4<L>  11.87<H>   )-----------(   255      ( 13 May 2018 19:40 )                        42.1     Neutro%  83.5<H>   Lympho%  7.8<L>   Mono%    6.6     Bands    x        05-13    141  |  99  |  26<H>  ----------------------------<  216<H>  4.6   |  27  |  1.5    Ca    9.1      13 May 2018 19:40    TPro  6.1  /  Alb  4.1  /  TBili  0.5  /  DBili  x   /  AST  13  /  ALT  13  /  AlkPhos  76  05-13      PT/INR - ( 13 May 2018 19:40 )   PT: 12.60 sec;   INR: 1.16 ratio         PTT - ( 13 May 2018 19:40 )  PTT:30.2 sec    CARDIAC MARKERS ( 13 May 2018 19:40 )  x     / <0.01 ng/mL / 93 U/L / x     / 2.2 ng/mL        LIVER FUNCTIONS - ( 13 May 2018 19:40 )  Alb: 4.1 g/dL / Pro: 6.1 g/dL / ALK PHOS: 76 U/L / ALT: 13 U/L / AST: 13 U/L / GGT: x    Serum Pro-Brain Natriuretic Peptide: 6982 pg/mL (05.13.18 @ 19:40)    EKG: Atrial paced, v-sensed. LBBB LABS:                                   13.4<L>  11.87<H>   )-----------(   255      ( 13 May 2018 19:40 )                        42.1     Neutro%  83.5<H>   Lympho%  7.8<L>   Mono%    6.6     Bands    x        05-13    141  |  99  |  26<H>  ----------------------------<  216<H>  4.6   |  27  |  1.5    Ca    9.1      13 May 2018 19:40    TPro  6.1  /  Alb  4.1  /  TBili  0.5  /  DBili  x   /  AST  13  /  ALT  13  /  AlkPhos  76  05-13      PT/INR - ( 13 May 2018 19:40 )   PT: 12.60 sec;   INR: 1.16 ratio         PTT - ( 13 May 2018 19:40 )  PTT:30.2 sec    CARDIAC MARKERS ( 13 May 2018 19:40 )  x     / <0.01 ng/mL / 93 U/L / x     / 2.2 ng/mL        LIVER FUNCTIONS - ( 13 May 2018 19:40 )  Alb: 4.1 g/dL / Pro: 6.1 g/dL / ALK PHOS: 76 U/L / ALT: 13 U/L / AST: 13 U/L / GGT: x    Serum Pro-Brain Natriuretic Peptide: 6982 pg/mL (05.13.18 @ 19:40)    EKG: Atrial paced at 84 bpm. LBBB

## 2018-05-14 NOTE — CHART NOTE - NSCHARTNOTEFT_GEN_A_CORE
Rapid response called for Vtach on tele monitor. Pt seen and examined. States he felt a shock while he was at rest, no prodrome of "body feeling light" that he experienced with prior shocks. Denied chest pain, SOB, dizziness. Last shock was ~1 month ago. Sees Dr. Bettencourt and Dr. Holt    ICU Vital Signs Last 24 Hrs  T(C): 36.4 (14 May 2018 06:56), Max: 36.4 (14 May 2018 06:56)  T(F): 97.5 (14 May 2018 06:56), Max: 97.5 (14 May 2018 06:56)  HR: 80 (14 May 2018 06:56) (77 - 91)  BP: 114/69 (14 May 2018 06:56) (113/70 - 137/76)  BP(mean): --  ABP: --  ABP(mean): --  RR: 18 (14 May 2018 06:56) (18 - 20)  SpO2: 97% (14 May 2018 06:56) (93% - 97%)      MEDICATIONS  (STANDING):  amiodarone    Tablet 200 milliGRAM(s) Oral daily  furosemide   Injectable 40 milliGRAM(s) IV Push two times a day  levothyroxine 50 MICROGram(s) Oral daily  lidocaine   Infusion 1 mG/Min (7.5 mL/Hr) IV Continuous <Continuous>  losartan 100 milliGRAM(s) Oral daily  metoprolol tartrate 50 milliGRAM(s) Oral two times a day  mexiletine 150 milliGRAM(s) Oral every 12 hours  pantoprazole    Tablet 40 milliGRAM(s) Oral before breakfast  simvastatin 20 milliGRAM(s) Oral at bedtime  spironolactone 25 milliGRAM(s) Oral two times a day    MEDICATIONS  (PRN):      PHYSICAL EXAM:    Constitutional: NAD, EOMI    HEENT; NC/AT    Cardiovascular: +S1S2  no MGR    Extremities: no lower extremity edema    Neurological: AAO x3, no focal deficits.            A&P      VSS, no active c/p.  1st set CE negative, 2nd set 0.03  Seen by Cardiac Fellow at bedside; interrogated AICD and shocked deemed appropriate as Vtach was noted.   Continue Amio, metoprolol, mexilitine. Started on lidocaine gtt 1mg/min  Upgrade to CCU for monitoring  EP consult pending

## 2018-05-14 NOTE — H&P ADULT - HISTORY OF PRESENT ILLNESS
77 male with non-ischemic CM, HFrEF, CAD, VT, A-fib (on Eliquis), reactive airway disease, BPH presenting for dyspnea on exertion for 2 weeks, progressively getting worse associated with orthopnea. Today he felt left-sided chest pain, non-radiating, 7/10 in intensity, lasting for 10 mins, related to activity. Denies palpitations, ICD firing, dizziness, LE swelling, PND. Reports compliance to all his meds. To note the patient story was unreliable, as his thinking is tangential. As per [employer] outpatient records, patient saw Dr Holt on May 4th for ICD shocks and was started on Mexilitin and VT ablation option was discussed. He was also instructed to increase Lasix to 40 q12h for 4 days.    Cardio: Dr Varela  EP: Dr Holt  Pulm: Dr Gardner

## 2018-05-14 NOTE — H&P ADULT - PMH
<<-----Click on this checkbox to enter Past Medical History AICD (automatic cardioverter/defibrillator) present    Atrial fibrillation    CAD (coronary artery disease)    Congestive heart disease    COPD (chronic obstructive pulmonary disease)    Hypercholesteremia    Hypothyroidism    NANDO on CPAP    Pacemaker    Ventricular tachycardia

## 2018-05-15 ENCOUNTER — APPOINTMENT (OUTPATIENT)
Dept: CARDIOLOGY | Facility: CLINIC | Age: 78
End: 2018-05-15

## 2018-05-15 LAB
ANION GAP SERPL CALC-SCNC: 14 MMOL/L — SIGNIFICANT CHANGE UP (ref 7–14)
BUN SERPL-MCNC: 27 MG/DL — HIGH (ref 10–20)
CALCIUM SERPL-MCNC: 9.1 MG/DL — SIGNIFICANT CHANGE UP (ref 8.5–10.1)
CHLORIDE SERPL-SCNC: 95 MMOL/L — LOW (ref 98–110)
CO2 SERPL-SCNC: 30 MMOL/L — SIGNIFICANT CHANGE UP (ref 17–32)
CREAT SERPL-MCNC: 1.6 MG/DL — HIGH (ref 0.7–1.5)
GLUCOSE SERPL-MCNC: 157 MG/DL — HIGH (ref 70–99)
HCT VFR BLD CALC: 42.9 % — SIGNIFICANT CHANGE UP (ref 42–52)
HGB BLD-MCNC: 13.7 G/DL — LOW (ref 14–18)
MAGNESIUM SERPL-MCNC: 2.1 MG/DL — SIGNIFICANT CHANGE UP (ref 1.8–2.4)
MCHC RBC-ENTMCNC: 28.4 PG — SIGNIFICANT CHANGE UP (ref 27–31)
MCHC RBC-ENTMCNC: 31.9 G/DL — LOW (ref 32–37)
MCV RBC AUTO: 89 FL — SIGNIFICANT CHANGE UP (ref 80–94)
NRBC # BLD: 0 /100 WBCS — SIGNIFICANT CHANGE UP (ref 0–0)
PLATELET # BLD AUTO: 238 K/UL — SIGNIFICANT CHANGE UP (ref 130–400)
POTASSIUM SERPL-MCNC: 4.4 MMOL/L — SIGNIFICANT CHANGE UP (ref 3.5–5)
POTASSIUM SERPL-SCNC: 4.4 MMOL/L — SIGNIFICANT CHANGE UP (ref 3.5–5)
RBC # BLD: 4.82 M/UL — SIGNIFICANT CHANGE UP (ref 4.7–6.1)
RBC # FLD: 14 % — SIGNIFICANT CHANGE UP (ref 11.5–14.5)
SODIUM SERPL-SCNC: 139 MMOL/L — SIGNIFICANT CHANGE UP (ref 135–146)
WBC # BLD: 9.16 K/UL — SIGNIFICANT CHANGE UP (ref 4.8–10.8)
WBC # FLD AUTO: 9.16 K/UL — SIGNIFICANT CHANGE UP (ref 4.8–10.8)

## 2018-05-15 RX ORDER — SENNA PLUS 8.6 MG/1
2 TABLET ORAL AT BEDTIME
Qty: 0 | Refills: 0 | Status: DISCONTINUED | OUTPATIENT
Start: 2018-05-15 | End: 2018-05-16

## 2018-05-15 RX ORDER — DOCUSATE SODIUM 100 MG
100 CAPSULE ORAL THREE TIMES A DAY
Qty: 0 | Refills: 0 | Status: DISCONTINUED | OUTPATIENT
Start: 2018-05-15 | End: 2018-05-16

## 2018-05-15 RX ORDER — APIXABAN 2.5 MG/1
2.5 TABLET, FILM COATED ORAL EVERY 12 HOURS
Qty: 0 | Refills: 0 | Status: DISCONTINUED | OUTPATIENT
Start: 2018-05-15 | End: 2018-05-16

## 2018-05-15 RX ADMIN — Medication 50 MICROGRAM(S): at 06:29

## 2018-05-15 RX ADMIN — Medication 100 MILLIGRAM(S): at 13:49

## 2018-05-15 RX ADMIN — Medication 40 MILLIGRAM(S): at 06:29

## 2018-05-15 RX ADMIN — Medication 40 MILLIGRAM(S): at 17:56

## 2018-05-15 RX ADMIN — MEXILETINE HYDROCHLORIDE 150 MILLIGRAM(S): 150 CAPSULE ORAL at 06:29

## 2018-05-15 RX ADMIN — SIMVASTATIN 20 MILLIGRAM(S): 20 TABLET, FILM COATED ORAL at 21:27

## 2018-05-15 RX ADMIN — APIXABAN 2.5 MILLIGRAM(S): 2.5 TABLET, FILM COATED ORAL at 17:56

## 2018-05-15 RX ADMIN — Medication 100 MILLIGRAM(S): at 21:27

## 2018-05-15 RX ADMIN — PANTOPRAZOLE SODIUM 40 MILLIGRAM(S): 20 TABLET, DELAYED RELEASE ORAL at 06:29

## 2018-05-15 RX ADMIN — SPIRONOLACTONE 25 MILLIGRAM(S): 25 TABLET, FILM COATED ORAL at 06:29

## 2018-05-15 RX ADMIN — AMIODARONE HYDROCHLORIDE 200 MILLIGRAM(S): 400 TABLET ORAL at 06:29

## 2018-05-15 RX ADMIN — MEXILETINE HYDROCHLORIDE 150 MILLIGRAM(S): 150 CAPSULE ORAL at 17:57

## 2018-05-15 RX ADMIN — SENNA PLUS 2 TABLET(S): 8.6 TABLET ORAL at 21:28

## 2018-05-15 RX ADMIN — SPIRONOLACTONE 25 MILLIGRAM(S): 25 TABLET, FILM COATED ORAL at 17:57

## 2018-05-15 RX ADMIN — Medication 50 MILLIGRAM(S): at 17:56

## 2018-05-15 NOTE — PROGRESS NOTE ADULT - SUBJECTIVE AND OBJECTIVE BOX
Patient is a 77y old Male who presents with a chief complaint of Shortness of breath (14 May 2018 00:27)    Currently admitted to medicine with the primary diagnosis of CHF exacerbation    Today is hospital day 2d.    BRIEF HOSPITAL COURSE: 77 male with non-ischemic CM, HFrEF, CAD, VT, A-fib (on Eliquis), reactive airway disease, BPH presenting for dyspnea on exertion for 2 weeks, progressively getting worse associated with orthopnea. Today he felt left-sided chest pain, non-radiating, 7/10 in intensity, lasting for 10 mins, related to activity. Denies palpitations, ICD firing, dizziness, LE swelling, PND. Reports compliance to all his meds. To note the patient story was unreliable, as his thinking is tangential. As per [facility] outpatient records, patient saw Dr Holt on May 4th for ICD shocks and was started on Mexilitin and VT ablation option was discussed. He was also instructed to increase Lasix to 40 q12h for 4 days.    EVENTS LAST 24HRS: Patient went into V-tach and his AICD fired while in the ED. Was started on a lidocaine gtt and upgraded to CCU. Patient has no complaints at this time.    PAST MEDICAL & SURGICAL HISTORY  CAD (coronary artery disease)  Ventricular tachycardia  NANDO on CPAP  COPD (chronic obstructive pulmonary disease)  Pacemaker  AICD (automatic cardioverter/defibrillator) present  Hypothyroidism  Atrial fibrillation  Congestive heart disease  Hypercholesteremia  AICD (automatic cardioverter/defibrillator) present  History of laparoscopic cholecystectomy      SOCIAL HISTORY:      REVIEW OF SYSTEMS:  See HPI    ALLERGIES:  morphine (Stomach Upset)    MEDICATIONS:  STANDING MEDICATIONS  amiodarone    Tablet 200 milliGRAM(s) Oral daily  docusate sodium 100 milliGRAM(s) Oral three times a day  furosemide   Injectable 40 milliGRAM(s) IV Push two times a day  levothyroxine 50 MICROGram(s) Oral daily  losartan 100 milliGRAM(s) Oral daily  metoprolol tartrate 50 milliGRAM(s) Oral two times a day  mexiletine 150 milliGRAM(s) Oral every 12 hours  pantoprazole    Tablet 40 milliGRAM(s) Oral before breakfast  senna 2 Tablet(s) Oral at bedtime  simvastatin 20 milliGRAM(s) Oral at bedtime  spironolactone 25 milliGRAM(s) Oral two times a day    PRN MEDICATIONS    VITALS:         ICU Vital Signs Last 24 Hrs:    T(C): 36.1 (15 May 2018 12:00), Max: 36.9 (14 May 2018 13:41)  T(F): 97 (15 May 2018 12:00), Max: 98.5 (14 May 2018 13:41)  HR: 92 (15 May 2018 12:00) (78 - 92)  BP: 109/- (15 May 2018 12:00) (95/58 - 131/80)  BP(mean): 83 (15 May 2018 12:00) (69 - 84)  ABP: --  ABP(mean): --  RR: 20 (15 May 2018 12:00) (18 - 38)  SpO2: 93% (15 May 2018 12:00) (91% - 98%)          I&O's Detail:      14 May 2018 07:01  -  15 May 2018 07:00  --------------------------------------------------------  IN:  Total IN: 0 mL    OUT:    Voided: 820 mL  Total OUT: 820 mL    Total NET: -820 mL      15 May 2018 07:01  -  15 May 2018 13:40  --------------------------------------------------------  IN:    Oral Fluid: 220 mL  Total IN: 220 mL    OUT:    Voided: 250 mL  Total OUT: 250 mL    Total NET: -30 mL          LABS:                        13.7   9.16  )-----------( 238      ( 15 May 2018 12:13 )             42.9     05-14    141  |  100  |  24<H>  ----------------------------<  139<H>  4.1   |  26  |  1.5    Ca    8.5      14 May 2018 05:33  Mg     1.9     05-14    TPro  5.1<L>  /  Alb  3.5  /  TBili  0.7  /  DBili  x   /  AST  11  /  ALT  10  /  AlkPhos  59  05-14      CARDIAC MARKERS ( 14 May 2018 11:36 )  x     / 0.01 ng/mL / 88 U/L / x     / 2.2 ng/mL  CARDIAC MARKERS ( 14 May 2018 05:33 )  x     / 0.02 ng/mL / 84 U/L / x     / x      CARDIAC MARKERS ( 14 May 2018 00:45 )  x     / 0.03 ng/mL / 93 U/L / x     / x      CARDIAC MARKERS ( 13 May 2018 19:40 )  x     / <0.01 ng/mL / 93 U/L / x     / 2.2 ng/mL      CAPILLARY BLOOD GLUCOSE  149 (14 May 2018 22:00)        PT/INR - ( 13 May 2018 19:40 )   PT: 12.60 sec;   INR: 1.16 ratio         PTT - ( 13 May 2018 19:40 )  PTT:30.2 sec    CULTURES:      PHYSICAL EXAM:    General: No acute distress.  Alert, oriented, interactive, nonfocal.    HEENT: Pupils equal, reactive to light symmetrically.    PULM: Clear to auscultation bilaterally, no significant sputum production.    CVS: Regular rate and rhythm, no murmurs, rubs, or gallops.    ABD: Soft, nondistended, nontender, no masses.    EXT: No edema, nontender.    SKIN: Warm and well perfused, no rashes noted.    RADIOLOGY:

## 2018-05-15 NOTE — PROGRESS NOTE ADULT - ASSESSMENT
Patient is a 77y old  Male who presents with a chief complaint of Shortness of breath (14 May 2018 00:27)    # V-tach s/p AICD Discharge  - lidocaine drip stopped, device interoggated  - on Mexiletine and Amiodarone   - patient requesting second opinion, consult placed for Dr. Salazar    # Acute exacerbation HFrEF (EF 15-20%): r/o VT, uncontrolled a-fib, ACS  - trend CE  - IV lasix 40 q12h, strict Is and Os, low sodium diet  - Continue GDMT    # A-fib:   - continue Eliquis 2.5 q12h and metoprolol    # Hyperglycemia:   - Check HbA1c  - Monitor FS, start insulin if needed.     # Gastritis:  - Continue PPI Patient is a 77y old  Male who presents with a chief complaint of Shortness of breath (14 May 2018 00:27)    # V-tach s/p AICD Discharge  - lidocaine drip stopped, device interoggated  - on Mexiletine and Amiodarone   - patient requesting second opinion, consult placed for Dr. Salazar    # Acute exacerbation HFrEF (EF 15-20%): r/o VT, uncontrolled a-fib, ACS  - trend CE  - IV lasix 40 q12h, strict Is and Os, low sodium diet  - Continue GDMT    # A-fib:   - OFF Eliquis/ on Metoprolol    # Hyperglycemia:   - Check HbA1c  - Monitor FS, start insulin if needed.     # Gastritis:  - Continue PPI

## 2018-05-15 NOTE — PROGRESS NOTE ADULT - ATTENDING COMMENTS
Patient is seen and examined independently, I agree with resident note above and plan of care -edited and corrected where applicable- with addition:    PAST MEDICAL & SURGICAL HISTORY:  CAD (coronary artery disease)  Ventricular tachycardia  NANDO on CPAP  COPD (chronic obstructive pulmonary disease)        Vitals:  T(F): 97 (05-15-18 @ 12:00)  HR: 92 (05-15-18 @ 12:00)  BP: 109/- (05-15-18 @ 12:00)  RR: 20 (05-15-18 @ 12:00)  SpO2: 93% (05-15-18 @ 12:00)    TESTS & MEASUREMENTS:                        13.7   9.16  )-----------( 238      ( 15 May 2018 12:13 )             42.9     PT/INR - ( 13 May 2018 19:40 )   PT: 12.60 sec;   INR: 1.16 ratio         PTT - ( 13 May 2018 19:40 )  PTT:30.2 sec  05-15    139  |  95<L>  |  27<H>  ----------------------------<  157<H>  4.4   |  30  |  1.6<H>    Creatinine Trend:  Creatinine: 1.6 (05-15 @ 12:13)    Creatinine: 1.5 (05-14 @ 05:33)    Creatinine: 1.5 (05-13 @ 19:40)        Ca    9.1      15 May 2018 12:13  Mg     2.1     05-15    TPro  5.1<L>  /  Alb  3.5  /  TBili  0.7  /  DBili  x   /  AST  11  /  ALT  10  /  AlkPhos  59  05-14    LIVER FUNCTIONS - ( 14 May 2018 05:33 )  Alb: 3.5 g/dL / Pro: 5.1 g/dL / ALK PHOS: 59 U/L / ALT: 10 U/L / AST: 11 U/L / GGT: x           CARDIAC MARKERS ( 14 May 2018 11:36 )  x     / 0.01 ng/mL / 88 U/L / x     / 2.2 ng/mL  CARDIAC MARKERS ( 14 May 2018 05:33 )  x     / 0.02 ng/mL / 84 U/L / x     / x      CARDIAC MARKERS ( 14 May 2018 00:45 )  x     / 0.03 ng/mL / 93 U/L / x     / x      CARDIAC MARKERS ( 13 May 2018 19:40 )  x     / <0.01 ng/mL / 93 U/L / x     / 2.2 ng/mL    In summary:  HEALTH ISSUES - PROBLEM Dx:  1. Confirmed VTach, upgraded to critical setting  2. R/O ACS; no evidence at this time  3. Acute HFrEF on IV lasix  4. AF, OFF AC at this time (was on Eliquis), rate-controlled    At high risk for bleed and CV events despite all care  d/w PGY1

## 2018-05-16 ENCOUNTER — TRANSCRIPTION ENCOUNTER (OUTPATIENT)
Age: 78
End: 2018-05-16

## 2018-05-16 VITALS
RESPIRATION RATE: 15 BRPM | OXYGEN SATURATION: 96 % | DIASTOLIC BLOOD PRESSURE: 84 MMHG | HEART RATE: 83 BPM | SYSTOLIC BLOOD PRESSURE: 137 MMHG

## 2018-05-16 LAB
ANION GAP SERPL CALC-SCNC: 13 MMOL/L — SIGNIFICANT CHANGE UP (ref 7–14)
BASOPHILS # BLD AUTO: 0.05 K/UL — SIGNIFICANT CHANGE UP (ref 0–0.2)
BASOPHILS NFR BLD AUTO: 0.8 % — SIGNIFICANT CHANGE UP (ref 0–1)
BUN SERPL-MCNC: 32 MG/DL — HIGH (ref 10–20)
CALCIUM SERPL-MCNC: 8.6 MG/DL — SIGNIFICANT CHANGE UP (ref 8.5–10.1)
CHLORIDE SERPL-SCNC: 97 MMOL/L — LOW (ref 98–110)
CO2 SERPL-SCNC: 31 MMOL/L — SIGNIFICANT CHANGE UP (ref 17–32)
CREAT SERPL-MCNC: 1.6 MG/DL — HIGH (ref 0.7–1.5)
EOSINOPHIL # BLD AUTO: 0.32 K/UL — SIGNIFICANT CHANGE UP (ref 0–0.7)
EOSINOPHIL NFR BLD AUTO: 5.4 % — SIGNIFICANT CHANGE UP (ref 0–8)
GLUCOSE SERPL-MCNC: 126 MG/DL — HIGH (ref 70–99)
HCT VFR BLD CALC: 37.7 % — LOW (ref 42–52)
HGB BLD-MCNC: 12.2 G/DL — LOW (ref 14–18)
IMM GRANULOCYTES NFR BLD AUTO: 0.3 % — SIGNIFICANT CHANGE UP (ref 0.1–0.3)
LYMPHOCYTES # BLD AUTO: 0.93 K/UL — LOW (ref 1.2–3.4)
LYMPHOCYTES # BLD AUTO: 15.6 % — LOW (ref 20.5–51.1)
MAGNESIUM SERPL-MCNC: 2 MG/DL — SIGNIFICANT CHANGE UP (ref 1.8–2.4)
MCHC RBC-ENTMCNC: 28.4 PG — SIGNIFICANT CHANGE UP (ref 27–31)
MCHC RBC-ENTMCNC: 32.4 G/DL — SIGNIFICANT CHANGE UP (ref 32–37)
MCV RBC AUTO: 87.9 FL — SIGNIFICANT CHANGE UP (ref 80–94)
MONOCYTES # BLD AUTO: 0.68 K/UL — HIGH (ref 0.1–0.6)
MONOCYTES NFR BLD AUTO: 11.4 % — HIGH (ref 1.7–9.3)
NEUTROPHILS # BLD AUTO: 3.95 K/UL — SIGNIFICANT CHANGE UP (ref 1.4–6.5)
NEUTROPHILS NFR BLD AUTO: 66.5 % — SIGNIFICANT CHANGE UP (ref 42.2–75.2)
NRBC # BLD: 0 /100 WBCS — SIGNIFICANT CHANGE UP (ref 0–0)
PLATELET # BLD AUTO: 190 K/UL — SIGNIFICANT CHANGE UP (ref 130–400)
POTASSIUM SERPL-MCNC: 3.8 MMOL/L — SIGNIFICANT CHANGE UP (ref 3.5–5)
POTASSIUM SERPL-SCNC: 3.8 MMOL/L — SIGNIFICANT CHANGE UP (ref 3.5–5)
RBC # BLD: 4.29 M/UL — LOW (ref 4.7–6.1)
RBC # FLD: 14 % — SIGNIFICANT CHANGE UP (ref 11.5–14.5)
SODIUM SERPL-SCNC: 141 MMOL/L — SIGNIFICANT CHANGE UP (ref 135–146)
WBC # BLD: 5.95 K/UL — SIGNIFICANT CHANGE UP (ref 4.8–10.8)
WBC # FLD AUTO: 5.95 K/UL — SIGNIFICANT CHANGE UP (ref 4.8–10.8)

## 2018-05-16 RX ORDER — MEXILETINE HYDROCHLORIDE 150 MG/1
1 CAPSULE ORAL
Qty: 30 | Refills: 0 | OUTPATIENT
Start: 2018-05-16 | End: 2018-05-30

## 2018-05-16 RX ORDER — MEXILETINE HYDROCHLORIDE 150 MG/1
150 CAPSULE ORAL
Qty: 120 | Refills: 0 | OUTPATIENT
Start: 2018-05-16

## 2018-05-16 RX ORDER — SPIRONOLACTONE 25 MG/1
1 TABLET, FILM COATED ORAL
Qty: 0 | Refills: 0 | COMMUNITY

## 2018-05-16 RX ORDER — FUROSEMIDE 40 MG
40 TABLET ORAL DAILY
Qty: 0 | Refills: 0 | Status: DISCONTINUED | OUTPATIENT
Start: 2018-05-16 | End: 2018-05-16

## 2018-05-16 RX ORDER — PANTOPRAZOLE SODIUM 20 MG/1
1 TABLET, DELAYED RELEASE ORAL
Qty: 0 | Refills: 0 | COMMUNITY
Start: 2018-05-16

## 2018-05-16 RX ORDER — FUROSEMIDE 40 MG
1 TABLET ORAL
Qty: 0 | Refills: 0 | COMMUNITY

## 2018-05-16 RX ORDER — OMEPRAZOLE 10 MG/1
0 CAPSULE, DELAYED RELEASE ORAL
Qty: 30 | Refills: 0 | COMMUNITY

## 2018-05-16 RX ORDER — FUROSEMIDE 40 MG
1 TABLET ORAL
Qty: 20 | Refills: 0 | OUTPATIENT
Start: 2018-05-16 | End: 2018-06-04

## 2018-05-16 RX ORDER — MEXILETINE HYDROCHLORIDE 150 MG/1
0 CAPSULE ORAL
Qty: 120 | Refills: 0 | COMMUNITY

## 2018-05-16 RX ORDER — MEXILETINE HYDROCHLORIDE 150 MG/1
1 CAPSULE ORAL
Qty: 15 | Refills: 0 | OUTPATIENT
Start: 2018-05-16 | End: 2018-05-30

## 2018-05-16 RX ADMIN — Medication 30 MILLILITER(S): at 10:45

## 2018-05-16 RX ADMIN — Medication 50 MILLIGRAM(S): at 17:44

## 2018-05-16 RX ADMIN — AMIODARONE HYDROCHLORIDE 200 MILLIGRAM(S): 400 TABLET ORAL at 06:16

## 2018-05-16 RX ADMIN — Medication 100 MILLIGRAM(S): at 06:16

## 2018-05-16 RX ADMIN — Medication 100 MILLIGRAM(S): at 13:05

## 2018-05-16 RX ADMIN — APIXABAN 2.5 MILLIGRAM(S): 2.5 TABLET, FILM COATED ORAL at 17:44

## 2018-05-16 RX ADMIN — SPIRONOLACTONE 25 MILLIGRAM(S): 25 TABLET, FILM COATED ORAL at 17:44

## 2018-05-16 RX ADMIN — APIXABAN 2.5 MILLIGRAM(S): 2.5 TABLET, FILM COATED ORAL at 06:16

## 2018-05-16 RX ADMIN — Medication 50 MILLIGRAM(S): at 06:16

## 2018-05-16 RX ADMIN — PANTOPRAZOLE SODIUM 40 MILLIGRAM(S): 20 TABLET, DELAYED RELEASE ORAL at 06:16

## 2018-05-16 RX ADMIN — MEXILETINE HYDROCHLORIDE 150 MILLIGRAM(S): 150 CAPSULE ORAL at 17:44

## 2018-05-16 RX ADMIN — MEXILETINE HYDROCHLORIDE 150 MILLIGRAM(S): 150 CAPSULE ORAL at 06:16

## 2018-05-16 RX ADMIN — SPIRONOLACTONE 25 MILLIGRAM(S): 25 TABLET, FILM COATED ORAL at 06:16

## 2018-05-16 RX ADMIN — LOSARTAN POTASSIUM 100 MILLIGRAM(S): 100 TABLET, FILM COATED ORAL at 06:16

## 2018-05-16 RX ADMIN — Medication 40 MILLIGRAM(S): at 06:17

## 2018-05-16 RX ADMIN — Medication 50 MICROGRAM(S): at 06:16

## 2018-05-16 NOTE — PROGRESS NOTE ADULT - SUBJECTIVE AND OBJECTIVE BOX
MAME GUIDO  77y, Male  Allergy: morphine (Stomach Upset)    Hospital Day: 3d    Patient seen and examined earlier today.     PMH/PSH:  PAST MEDICAL & SURGICAL HISTORY:  CAD (coronary artery disease)  Ventricular tachycardia  NANDO on CPAP  COPD (chronic obstructive pulmonary disease)  Pacemaker  AICD (automatic cardioverter/defibrillator) present  Hypothyroidism  Atrial fibrillation  Congestive heart disease  Hypercholesteremia  AICD (automatic cardioverter/defibrillator) present  History of laparoscopic cholecystectomy      LAST 24-Hr EVENTS:    VITALS:  T(F): 96.9 (05-16-18 @ 08:00), Max: 97.3 (05-16-18 @ 00:00)  HR: 88 (05-16-18 @ 10:08)  BP: 96/65 (05-16-18 @ 10:08) (83/52 - 127/76)  RR: 15 (05-16-18 @ 10:00)  SpO2: 95% (05-16-18 @ 08:00)    TESTS & MEASUREMENTS:  Weight (Kg): 87.9 (05-14-18 @ 19:10)    05-14-18 @ 07:01  -  05-15-18 @ 07:00  --------------------------------------------------------  IN: 0 mL / OUT: 820 mL / NET: -820 mL    05-15-18 @ 07:01  -  05-16-18 @ 07:00  --------------------------------------------------------  IN: 1140 mL / OUT: 2090 mL / NET: -950 mL    05-16-18 @ 07:01  -  05-16-18 @ 10:47  --------------------------------------------------------  IN: 240 mL / OUT: 300 mL / NET: -60 mL                            12.2   5.95  )-----------( 190      ( 16 May 2018 04:48 )             37.7       05-16    141  |  97<L>  |  32<H>  ----------------------------<  126<H>  3.8   |  31  |  1.6<H>    Creatinine Trend:  1.6 (05-16 @ 04:48)    1.6 (05-15 @ 12:13)    1.5 (05-14 @ 05:33)    1.5 (05-13 @ 19:40)      BUN Trend:  32 mg/dL (05-16-18 @ 04:48)  27 mg/dL (05-15-18 @ 12:13)  24 mg/dL (05-14-18 @ 05:33)      Ca    8.6      16 May 2018 04:48  Mg     2.0     05-16    CARDIAC MARKERS ( 14 May 2018 11:36 )  x     / 0.01 ng/mL / 88 U/L / x     / 2.2 ng/mL      RADIOLOGY & ADDITIONAL TESTS:  < from: Xray Chest 1 View- PORTABLE-Routine (05.15.18 @ 04:53) >  Stable right pleural effusion and bibasilar opacities        RECENT DIAGNOSTIC ORDERS:  Diet, Regular:   Low Sodium (05-15-18 @ 14:18)      MEDICATIONS:  MEDICATIONS  (STANDING):  amiodarone    Tablet 200 milliGRAM(s) Oral daily  apixaban 2.5 milliGRAM(s) Oral every 12 hours  docusate sodium 100 milliGRAM(s) Oral three times a day  furosemide   Injectable 40 milliGRAM(s) IV Push two times a day  levothyroxine 50 MICROGram(s) Oral daily  losartan 100 milliGRAM(s) Oral daily  metoprolol tartrate 50 milliGRAM(s) Oral two times a day  mexiletine 150 milliGRAM(s) Oral every 12 hours  pantoprazole    Tablet 40 milliGRAM(s) Oral before breakfast  senna 2 Tablet(s) Oral at bedtime  simvastatin 20 milliGRAM(s) Oral at bedtime  spironolactone 25 milliGRAM(s) Oral two times a day    MEDICATIONS  (PRN):  aluminum hydroxide/magnesium hydroxide/simethicone Suspension 30 milliLiter(s) Oral every 4 hours PRN Dyspepsia      Home Medications:  amiodarone 200 mg oral tablet: 1 tab(s) orally once a day (14 May 2018 06:30)  Eliquis 2.5 mg oral tablet: 1 tab(s) orally 2 times a day (14 May 2018 06:30)  furosemide 40 mg oral tablet: 1 tab(s) orally once a day (14 May 2018 06:30)  Incruse Ellipta 62.5 mcg/inh inhalation powder: 1 inch(es) inhaled once a day (14 May 2018 06:30)  levothyroxine 50 mcg (0.05 mg) oral capsule: 1 cap(s) orally once a day (14 May 2018 06:30)  losartan 100 mg oral tablet: 1 tab(s) orally once a day (14 May 2018 06:30)  metoprolol tartrate 50 mg oral tablet: 1 tab(s) orally 2 times a day (14 May 2018 06:30)  MEXILETINE 150 MG CAPSULE:  (14 May 2018 06:30)  montelukast 10 mg oral tablet: 1 tab(s) orally once a day (14 May 2018 06:30)  OMEPRAZOLE DR 40 MG CAPSULE:  (14 May 2018 06:30)  pravastatin 20 mg oral tablet: 1 tab(s) orally once a day (14 May 2018 06:30)  spironolactone 25 mg oral tablet: 1 tab(s) orally 2 times a day (14 May 2018 06:30)            PHYSICAL EXAM:  GENERAL: A&O x3,  in NAD/P,   NECK: No Swelling, No JVD.   CHEST/LUNG: Good air entry, No crackles, No rhonchi, No wheezing.  HEART: RRR, No murmurs.  ABDOMEN: Soft, Nontender, Nondistended; Bowel sounds present.  EXTREMITIES:  Peripheral Pulses Present , No clubbing, No edema MAMEGUIDO  77y, Male  Allergy: morphine (Stomach Upset)    Hospital Day: 3d    Patient seen and examined earlier today.   Frustrated since he was expecting to be discharged early in AM today.    PMH/PSH:  PAST MEDICAL & SURGICAL HISTORY:  CAD (coronary artery disease)  Ventricular tachycardia  NANDO on CPAP  COPD (chronic obstructive pulmonary disease)  Pacemaker  AICD (automatic cardioverter/defibrillator) present  Hypothyroidism  Atrial fibrillation  Congestive heart disease  Hypercholesteremia  AICD (automatic cardioverter/defibrillator) present  History of laparoscopic cholecystectomy      LAST 24-Hr EVENTS:  NO events on tele    VITALS:  T(F): 96.9 (05-16-18 @ 08:00), Max: 97.3 (05-16-18 @ 00:00)  HR: 88 (05-16-18 @ 10:08)  BP: 96/65 (05-16-18 @ 10:08) (83/52 - 127/76)  RR: 15 (05-16-18 @ 10:00)  SpO2: 95% (05-16-18 @ 08:00)    TESTS & MEASUREMENTS:  Weight (Kg): 87.9 (05-14-18 @ 19:10)    05-14-18 @ 07:01  -  05-15-18 @ 07:00  --------------------------------------------------------  IN: 0 mL / OUT: 820 mL / NET: -820 mL    05-15-18 @ 07:01  -  05-16-18 @ 07:00  --------------------------------------------------------  IN: 1140 mL / OUT: 2090 mL / NET: -950 mL    05-16-18 @ 07:01  -  05-16-18 @ 10:47  --------------------------------------------------------  IN: 240 mL / OUT: 300 mL / NET: -60 mL                            12.2   5.95  )-----------( 190      ( 16 May 2018 04:48 )             37.7       05-16    141  |  97<L>  |  32<H>  ----------------------------<  126<H>  3.8   |  31  |  1.6<H>    Creatinine Trend:  1.6 (05-16 @ 04:48)    1.6 (05-15 @ 12:13)    1.5 (05-14 @ 05:33)    1.5 (05-13 @ 19:40)      BUN Trend:  32 mg/dL (05-16-18 @ 04:48)  27 mg/dL (05-15-18 @ 12:13)  24 mg/dL (05-14-18 @ 05:33)      Ca    8.6      16 May 2018 04:48  Mg     2.0     05-16    CARDIAC MARKERS ( 14 May 2018 11:36 )  x     / 0.01 ng/mL / 88 U/L / x     / 2.2 ng/mL      RADIOLOGY & ADDITIONAL TESTS:  < from: Xray Chest 1 View- PORTABLE-Routine (05.15.18 @ 04:53) >  Stable right pleural effusion and bibasilar opacities        RECENT DIAGNOSTIC ORDERS:  Diet, Regular:   Low Sodium (05-15-18 @ 14:18)      MEDICATIONS:  MEDICATIONS  (STANDING):  amiodarone    Tablet 200 milliGRAM(s) Oral daily  apixaban 2.5 milliGRAM(s) Oral every 12 hours  docusate sodium 100 milliGRAM(s) Oral three times a day  furosemide   Injectable 40 milliGRAM(s) IV Push two times a day  levothyroxine 50 MICROGram(s) Oral daily  losartan 100 milliGRAM(s) Oral daily  metoprolol tartrate 50 milliGRAM(s) Oral two times a day  mexiletine 150 milliGRAM(s) Oral every 12 hours  pantoprazole    Tablet 40 milliGRAM(s) Oral before breakfast  senna 2 Tablet(s) Oral at bedtime  simvastatin 20 milliGRAM(s) Oral at bedtime  spironolactone 25 milliGRAM(s) Oral two times a day    MEDICATIONS  (PRN):  aluminum hydroxide/magnesium hydroxide/simethicone Suspension 30 milliLiter(s) Oral every 4 hours PRN Dyspepsia      Home Medications:  amiodarone 200 mg oral tablet: 1 tab(s) orally once a day (14 May 2018 06:30)  Eliquis 2.5 mg oral tablet: 1 tab(s) orally 2 times a day (14 May 2018 06:30)  furosemide 40 mg oral tablet: 1 tab(s) orally once a day (14 May 2018 06:30)  Incruse Ellipta 62.5 mcg/inh inhalation powder: 1 inch(es) inhaled once a day (14 May 2018 06:30)  levothyroxine 50 mcg (0.05 mg) oral capsule: 1 cap(s) orally once a day (14 May 2018 06:30)  losartan 100 mg oral tablet: 1 tab(s) orally once a day (14 May 2018 06:30)  metoprolol tartrate 50 mg oral tablet: 1 tab(s) orally 2 times a day (14 May 2018 06:30)  MEXILETINE 150 MG CAPSULE:  (14 May 2018 06:30)  montelukast 10 mg oral tablet: 1 tab(s) orally once a day (14 May 2018 06:30)  OMEPRAZOLE DR 40 MG CAPSULE:  (14 May 2018 06:30)  pravastatin 20 mg oral tablet: 1 tab(s) orally once a day (14 May 2018 06:30)  spironolactone 25 mg oral tablet: 1 tab(s) orally 2 times a day (14 May 2018 06:30)            PHYSICAL EXAM:  GENERAL: A&O x3,  in NAD/P,   NECK: No Swelling, No JVD.   CHEST/LUNG: Good air entry, No crackles, No rhonchi, No wheezing.  HEART: RRR, No murmurs.  ABDOMEN: Soft, Nontender, Nondistended; Bowel sounds present.  EXTREMITIES:  Peripheral Pulses Present , No clubbing, No edema

## 2018-05-16 NOTE — DISCHARGE NOTE ADULT - CARE PLAN
Principal Discharge DX:	Ventricular tachycardia  Goal:	V-tach ablation  Assessment and plan of treatment:	Please continue taking your mexelitine and amidiodarone and follow up with Dr. Holt.  Secondary Diagnosis:	Acute on chronic combined systolic and diastolic congestive heart failure

## 2018-05-16 NOTE — DISCHARGE NOTE ADULT - VISION (WITH CORRECTIVE LENSES IF THE PATIENT USUALLY WEARS THEM):
Partially impaired: cannot see medication labels or newsprint, but can see obstacles in path, and the surrounding layout; can count fingers at arm's length/glasses at the bedside

## 2018-05-16 NOTE — PROGRESS NOTE ADULT - ASSESSMENT
Patient is a 77y old  Male who presents with a chief complaint of Shortness of breath (14 May 2018 00:27)    # V-tach s/p AICD Discharge  - lidocaine drip stopped, device interoggated  - on Mexiletine and Amiodarone   - patient agreeable to v-tach ablation, d/w EP if procedure must be done during this admission or can be scheduled as outpatient    # Acute exacerbation HFrEF (EF 20-25%)  - CE negative  - Lasix PO, strict Is and Os, low sodium diet  - Continue GDMT    # A-fib:   - Eliquis and Metoprolol      # Gastritis:  - Continue PPI    Dispo: DC home if cleared by EP  Code Status: Full Code

## 2018-05-16 NOTE — PROGRESS NOTE ADULT - ASSESSMENT
1. Confirmed VTach, upgraded to critical setting, stable  2. R/O ACS; no evidence at this time; no further w/u as per cardio  3. Acute HFrEF on IV lasix; will be switched to PO (early signs of contraction alkalosis)  4. AF, OFF AC at this time (was on Eliquis), rate-controlled.    A/P  May resume home (outpatient) regimen of diuretics  May resume previous meds  Antiarrhythmics as per EP  Cleared by cardio attending (Dr Varela) to be d/c today after EP eval    Needs to f/u with cardio and EP as outpatient re: need for ablation  D/W RN and CCU team 1. Confirmed VTach, upgraded to critical setting, stable  2. R/O ACS; no evidence at this time; no further w/u as per cardio  3. Acute HFrEF on IV lasix; will be switched to PO (early signs of contraction alkalosis)  4. AF, OFF AC at this time (was on Eliquis), rate-controlled.    A/P  May resume home (outpatient) regimen of diuretics  May resume previous meds  Antiarrhythmics as per EP  Cleared by cardio attending (Dr Varela) to be d/c today after EP eval    Needs to f/u with cardio and EP as outpatient re: need for ablation  D/W RN and CCU team  total time for d/c 35 mins (coordination, resident supervision..)

## 2018-05-16 NOTE — DISCHARGE NOTE ADULT - MEDICATION SUMMARY - MEDICATIONS TO TAKE
I will START or STAY ON the medications listed below when I get home from the hospital:    spironolactone 25 mg oral tablet  -- 1 tab(s) by mouth 2 times a day  -- Indication: For CHF EXACERBATION;UNSTABLE ANGINA    losartan 100 mg oral tablet  -- 1 tab(s) by mouth once a day  -- Indication: For CHF EXACERBATION;UNSTABLE ANGINA    MEXILETINE 150 MG CAPSULE  -- Indication: For Ventricular tachycardia    amiodarone 200 mg oral tablet  -- 1 tab(s) by mouth once a day  -- Indication: For Ventricular tachycardia    Eliquis 2.5 mg oral tablet  -- 1 tab(s) by mouth 2 times a day  -- Indication: For Atrial Fibrillation    pravastatin 20 mg oral tablet  -- 1 tab(s) by mouth once a day  -- Indication: For Hyperlipidemia    metoprolol tartrate 50 mg oral tablet  -- 1 tab(s) by mouth 2 times a day  -- Indication: For CHF EXACERBATION;UNSTABLE ANGINA    Incruse Ellipta 62.5 mcg/inh inhalation powder  -- 1 inch(es) inhaled once a day  -- Indication: For COPD (chronic obstructive pulmonary disease)    furosemide 40 mg oral tablet  -- 1 tab(s) by mouth once a day  -- Indication: For Acute on chronic combined systolic and diastolic congestive heart failure    montelukast 10 mg oral tablet  -- 1 tab(s) by mouth once a day  -- Indication: For COPD (chronic obstructive pulmonary disease)    OMEPRAZOLE DR 40 MG CAPSULE  -- Indication: For GERD    levothyroxine 50 mcg (0.05 mg) oral capsule  -- 1 cap(s) by mouth once a day  -- Indication: For Hypothyroidism I will START or STAY ON the medications listed below when I get home from the hospital:    losartan 100 mg oral tablet  -- 1 tab(s) by mouth once a day  -- Indication: For CHF EXACERBATION;UNSTABLE ANGINA    amiodarone 200 mg oral tablet  -- 1 tab(s) by mouth once a day  -- Indication: For Ventricular tachycardia    mexiletine 150 mg oral capsule  -- 1 cap(s) by mouth 2 times a day   -- It is very important that you take or use this exactly as directed.  Do not skip doses or discontinue unless directed by your doctor.  May cause drowsiness or dizziness.  Take with food or milk.    -- Indication: For Ventricular tachycardia    Eliquis 2.5 mg oral tablet  -- 1 tab(s) by mouth 2 times a day  -- Indication: For Atrial Fibrillation    pravastatin 20 mg oral tablet  -- 1 tab(s) by mouth once a day  -- Indication: For Hyperlipidemia    metoprolol tartrate 50 mg oral tablet  -- 1 tab(s) by mouth 2 times a day  -- Indication: For CHF EXACERBATION;UNSTABLE ANGINA    Incruse Ellipta 62.5 mcg/inh inhalation powder  -- 1 inch(es) inhaled once a day  -- Indication: For COPD (chronic obstructive pulmonary disease)    furosemide 40 mg oral tablet  -- 1 tab(s) by mouth once a day   -- Avoid prolonged or excessive exposure to direct and/or artificial sunlight while taking this medication.  It is very important that you take or use this exactly as directed.  Do not skip doses or discontinue unless directed by your doctor.  It may be advisable to drink a full glass orange juice or eat a banana daily while taking this medication.    -- Indication: For CHF EXACERBATION;UNSTABLE ANGINA    montelukast 10 mg oral tablet  -- 1 tab(s) by mouth once a day  -- Indication: For COPD (chronic obstructive pulmonary disease)    OMEPRAZOLE DR 40 MG CAPSULE  -- 40  by mouth once a day  -- Indication: For gerd    Protonix 40 mg oral delayed release tablet  -- 1 tab(s) by mouth once a day (before a meal)  -- Indication: For gerd    levothyroxine 50 mcg (0.05 mg) oral capsule  -- 1 cap(s) by mouth once a day  -- Indication: For Hypothyroidism

## 2018-05-16 NOTE — PROGRESS NOTE ADULT - SUBJECTIVE AND OBJECTIVE BOX
Patient is a 77y old Male who presents with a chief complaint of Shortness of breath (14 May 2018 00:27)    Currently admitted to medicine with the primary diagnosis of CHF exacerbation    Today is hospital day 3d.    BRIEF HOSPITAL COURSE: 77 male with non-ischemic CM, HFrEF, CAD, VT, A-fib (on Eliquis), reactive airway disease, BPH presenting for dyspnea on exertion for 2 weeks, progressively getting worse associated with orthopnea. Today he felt left-sided chest pain, non-radiating, 7/10 in intensity, lasting for 10 mins, related to activity. Denies palpitations, ICD firing, dizziness, LE swelling, PND. Reports compliance to all his meds. To note the patient story was unreliable, as his thinking is tangential. As per [employer] outpatient records, patient saw Dr Holt on May 4th for ICD shocks and was started on Mexilitin and VT ablation option was discussed. He was also instructed to increase Lasix to 40 q12h for 4 days.    Patient went into V-tach and his AICD fired while in the ED. Was started on a lidocaine gtt and upgraded to CCU. Patient has no complaints at this time.    EVENTS LAST 24HRS: No acute overnight events, patient denies any complaints wishes to go home. He is agreeable to v-tach ablation by Dr. Holt      PAST MEDICAL & SURGICAL HISTORY  CAD (coronary artery disease)  Ventricular tachycardia  NANDO on CPAP  COPD (chronic obstructive pulmonary disease)  Pacemaker  AICD (automatic cardioverter/defibrillator) present  Hypothyroidism  Atrial fibrillation  Congestive heart disease  Hypercholesteremia  AICD (automatic cardioverter/defibrillator) present  History of laparoscopic cholecystectomy      SOCIAL HISTORY:      REVIEW OF SYSTEMS:  See HPI    ALLERGIES:  morphine (Stomach Upset)    MEDICATIONS:  STANDING MEDICATIONS  amiodarone    Tablet 200 milliGRAM(s) Oral daily  apixaban 2.5 milliGRAM(s) Oral every 12 hours  docusate sodium 100 milliGRAM(s) Oral three times a day  furosemide   Injectable 40 milliGRAM(s) IV Push daily  levothyroxine 50 MICROGram(s) Oral daily  losartan 100 milliGRAM(s) Oral daily  metoprolol tartrate 50 milliGRAM(s) Oral two times a day  mexiletine 150 milliGRAM(s) Oral every 12 hours  pantoprazole    Tablet 40 milliGRAM(s) Oral before breakfast  senna 2 Tablet(s) Oral at bedtime  simvastatin 20 milliGRAM(s) Oral at bedtime  spironolactone 25 milliGRAM(s) Oral two times a day    PRN MEDICATIONS  aluminum hydroxide/magnesium hydroxide/simethicone Suspension 30 milliLiter(s) Oral every 4 hours PRN    VITALS:         ICU Vital Signs Last 24 Hrs:    T(C): 36.1 (16 May 2018 12:00), Max: 36.3 (16 May 2018 00:00)  T(F): 97 (16 May 2018 12:00), Max: 97.3 (16 May 2018 00:00)  HR: 82 (16 May 2018 12:00) (78 - 90)  BP: 100/57 (16 May 2018 12:00) (83/52 - 127/76)  BP(mean): 74 (16 May 2018 12:00) (60 - 98)  ABP: --  ABP(mean): --  RR: 16 (16 May 2018 12:00) (14 - 23)  SpO2: 95% (16 May 2018 12:00) (94% - 98%)          I&O's Detail:      15 May 2018 07:01  -  16 May 2018 07:00  --------------------------------------------------------  IN:    IV PiggyBack: 500 mL    Oral Fluid: 640 mL  Total IN: 1140 mL    OUT:    Voided: 2090 mL  Total OUT: 2090 mL    Total NET: -950 mL      16 May 2018 07:01  -  16 May 2018 13:27  --------------------------------------------------------  IN:    Oral Fluid: 480 mL  Total IN: 480 mL    OUT:    Voided: 550 mL  Total OUT: 550 mL    Total NET: -70 mL          LABS:                        12.2   5.95  )-----------( 190      ( 16 May 2018 04:48 )             37.7     05-16    141  |  97<L>  |  32<H>  ----------------------------<  126<H>  3.8   |  31  |  1.6<H>    Ca    8.6      16 May 2018 04:48  Mg     2.0     05-16            CAPILLARY BLOOD GLUCOSE  154 (15 May 2018 22:00)            CULTURES:      PHYSICAL EXAM:    General: No acute distress.  Alert, oriented, interactive, nonfocal.    HEENT: Pupils equal, reactive to light symmetrically.    PULM: Clear to auscultation bilaterally, no significant sputum production.    CVS: Regular rate and rhythm, no murmurs, rubs, or gallops.    ABD: Soft, nondistended, nontender, no masses.    EXT: No edema, nontender.    SKIN: Warm and well perfused, no rashes noted.    RADIOLOGY:

## 2018-05-16 NOTE — DISCHARGE NOTE ADULT - HOSPITAL COURSE
77 male with non-ischemic CM, HFrEF, CAD, VT, A-fib (on Eliquis), reactive airway disease, BPH presenting for dyspnea on exertion for 2 weeks, progressively getting worse associated with orthopnea. Today he felt left-sided chest pain, non-radiating, 7/10 in intensity, lasting for 10 mins, related to activity. Denies palpitations, ICD firing, dizziness, LE swelling, PND. Reports compliance to all his meds. To note the patient story was unreliable, as his thinking is tangential. As per [facility] outpatient records, patient saw Dr Holt on May 4th for ICD shocks and was started on Mexilitin and VT ablation option was discussed. He was also instructed to increase Lasix to 40 q12h for 4 days.Patient went into V-tach and his AICD fired while in the ED. Was started on a lidocaine gtt and upgraded to CCU. Lidocaine was stopped later in the day, patient's shortness of breath improved with IV Lasix and now he is feeling much better.

## 2018-05-16 NOTE — DISCHARGE NOTE ADULT - CARE PROVIDER_API CALL
Malcolm Varela), Cardiovascular Disease; Internal Medicine  [address]  [address]  [place] [address]  Phone: [phone]  Fax: [phone]    Dimas Holt; CHARLES), Cardiac Electrophysiology; Cardiovascular Disease  [address] [place] [address]  Phone: [phone]  Fax: [phone]

## 2018-05-16 NOTE — DISCHARGE NOTE ADULT - PLAN OF CARE
V-tach ablation Please continue taking your mexelitine and amidiodarone and follow up with Dr. Holt.

## 2018-05-17 ENCOUNTER — CHART COPY (OUTPATIENT)
Age: 78
End: 2018-05-17

## 2018-05-21 ENCOUNTER — MOBILE ON CALL (OUTPATIENT)
Age: 78
End: 2018-05-21

## 2018-05-23 DIAGNOSIS — R07.9 CHEST PAIN, UNSPECIFIED: ICD-10-CM

## 2018-05-23 DIAGNOSIS — I42.9 CARDIOMYOPATHY, UNSPECIFIED: ICD-10-CM

## 2018-05-23 DIAGNOSIS — E78.00 PURE HYPERCHOLESTEROLEMIA, UNSPECIFIED: ICD-10-CM

## 2018-05-23 DIAGNOSIS — J45.909 UNSPECIFIED ASTHMA, UNCOMPLICATED: ICD-10-CM

## 2018-05-23 DIAGNOSIS — Z91.040 LATEX ALLERGY STATUS: ICD-10-CM

## 2018-05-23 DIAGNOSIS — E11.65 TYPE 2 DIABETES MELLITUS WITH HYPERGLYCEMIA: ICD-10-CM

## 2018-05-23 DIAGNOSIS — G47.33 OBSTRUCTIVE SLEEP APNEA (ADULT) (PEDIATRIC): ICD-10-CM

## 2018-05-23 DIAGNOSIS — I47.2 VENTRICULAR TACHYCARDIA: ICD-10-CM

## 2018-05-23 DIAGNOSIS — I11.0 HYPERTENSIVE HEART DISEASE WITH HEART FAILURE: ICD-10-CM

## 2018-05-23 DIAGNOSIS — Z79.01 LONG TERM (CURRENT) USE OF ANTICOAGULANTS: ICD-10-CM

## 2018-05-23 DIAGNOSIS — N40.0 BENIGN PROSTATIC HYPERPLASIA WITHOUT LOWER URINARY TRACT SYMPTOMS: ICD-10-CM

## 2018-05-23 DIAGNOSIS — I48.91 UNSPECIFIED ATRIAL FIBRILLATION: ICD-10-CM

## 2018-05-23 DIAGNOSIS — K29.70 GASTRITIS, UNSPECIFIED, WITHOUT BLEEDING: ICD-10-CM

## 2018-05-23 DIAGNOSIS — I25.110 ATHEROSCLEROTIC HEART DISEASE OF NATIVE CORONARY ARTERY WITH UNSTABLE ANGINA PECTORIS: ICD-10-CM

## 2018-05-23 DIAGNOSIS — I50.43 ACUTE ON CHRONIC COMBINED SYSTOLIC (CONGESTIVE) AND DIASTOLIC (CONGESTIVE) HEART FAILURE: ICD-10-CM

## 2018-05-23 DIAGNOSIS — Z95.810 PRESENCE OF AUTOMATIC (IMPLANTABLE) CARDIAC DEFIBRILLATOR: ICD-10-CM

## 2018-05-23 DIAGNOSIS — E03.9 HYPOTHYROIDISM, UNSPECIFIED: ICD-10-CM

## 2018-05-30 ENCOUNTER — OTHER (OUTPATIENT)
Age: 78
End: 2018-05-30

## 2018-05-30 ENCOUNTER — INPATIENT (INPATIENT)
Facility: HOSPITAL | Age: 78
LOS: 0 days | Discharge: HOME | End: 2018-05-31
Attending: INTERNAL MEDICINE | Admitting: INTERNAL MEDICINE

## 2018-05-30 VITALS
DIASTOLIC BLOOD PRESSURE: 88 MMHG | HEART RATE: 80 BPM | HEIGHT: 67.72 IN | RESPIRATION RATE: 20 BRPM | SYSTOLIC BLOOD PRESSURE: 119 MMHG | WEIGHT: 193.79 LBS

## 2018-05-30 DIAGNOSIS — Z45.018 ENCOUNTER FOR ADJUSTMENT AND MANAGEMENT OF OTHER PART OF CARDIAC PACEMAKER: ICD-10-CM

## 2018-05-30 DIAGNOSIS — I48.91 UNSPECIFIED ATRIAL FIBRILLATION: ICD-10-CM

## 2018-05-30 DIAGNOSIS — I50.22 CHRONIC SYSTOLIC (CONGESTIVE) HEART FAILURE: ICD-10-CM

## 2018-05-30 DIAGNOSIS — Z95.810 PRESENCE OF AUTOMATIC (IMPLANTABLE) CARDIAC DEFIBRILLATOR: Chronic | ICD-10-CM

## 2018-05-30 DIAGNOSIS — Z45.02 ENCOUNTER FOR ADJUSTMENT AND MANAGEMENT OF AUTOMATIC IMPLANTABLE CARDIAC DEFIBRILLATOR: ICD-10-CM

## 2018-05-30 DIAGNOSIS — G47.33 OBSTRUCTIVE SLEEP APNEA (ADULT) (PEDIATRIC): ICD-10-CM

## 2018-05-30 DIAGNOSIS — I25.10 ATHEROSCLEROTIC HEART DISEASE OF NATIVE CORONARY ARTERY WITHOUT ANGINA PECTORIS: ICD-10-CM

## 2018-05-30 DIAGNOSIS — Z98.890 OTHER SPECIFIED POSTPROCEDURAL STATES: Chronic | ICD-10-CM

## 2018-05-30 DIAGNOSIS — J44.9 CHRONIC OBSTRUCTIVE PULMONARY DISEASE, UNSPECIFIED: ICD-10-CM

## 2018-05-30 DIAGNOSIS — E78.00 PURE HYPERCHOLESTEROLEMIA, UNSPECIFIED: ICD-10-CM

## 2018-05-30 DIAGNOSIS — E03.9 HYPOTHYROIDISM, UNSPECIFIED: ICD-10-CM

## 2018-05-30 DIAGNOSIS — Z87.891 PERSONAL HISTORY OF NICOTINE DEPENDENCE: ICD-10-CM

## 2018-05-30 RX ORDER — ACETAMINOPHEN 500 MG
650 TABLET ORAL EVERY 6 HOURS
Qty: 0 | Refills: 0 | Status: DISCONTINUED | OUTPATIENT
Start: 2018-05-30 | End: 2018-05-31

## 2018-05-30 RX ORDER — METOPROLOL TARTRATE 50 MG
50 TABLET ORAL
Qty: 0 | Refills: 0 | Status: DISCONTINUED | OUTPATIENT
Start: 2018-05-30 | End: 2018-05-31

## 2018-05-30 RX ORDER — HYDROMORPHONE HYDROCHLORIDE 2 MG/ML
0.2 INJECTION INTRAMUSCULAR; INTRAVENOUS; SUBCUTANEOUS
Qty: 0 | Refills: 0 | Status: DISCONTINUED | OUTPATIENT
Start: 2018-05-30 | End: 2018-05-31

## 2018-05-30 RX ORDER — AMIODARONE HYDROCHLORIDE 400 MG/1
200 TABLET ORAL DAILY
Qty: 0 | Refills: 0 | Status: DISCONTINUED | OUTPATIENT
Start: 2018-05-30 | End: 2018-05-31

## 2018-05-30 RX ORDER — FUROSEMIDE 40 MG
40 TABLET ORAL DAILY
Qty: 0 | Refills: 0 | Status: DISCONTINUED | OUTPATIENT
Start: 2018-05-30 | End: 2018-05-31

## 2018-05-30 RX ORDER — PANTOPRAZOLE SODIUM 20 MG/1
40 TABLET, DELAYED RELEASE ORAL
Qty: 0 | Refills: 0 | Status: DISCONTINUED | OUTPATIENT
Start: 2018-05-30 | End: 2018-05-31

## 2018-05-30 RX ORDER — HYDROMORPHONE HYDROCHLORIDE 2 MG/ML
0.5 INJECTION INTRAMUSCULAR; INTRAVENOUS; SUBCUTANEOUS
Qty: 0 | Refills: 0 | Status: DISCONTINUED | OUTPATIENT
Start: 2018-05-30 | End: 2018-05-31

## 2018-05-30 RX ORDER — MONTELUKAST 4 MG/1
10 TABLET, CHEWABLE ORAL AT BEDTIME
Qty: 0 | Refills: 0 | Status: DISCONTINUED | OUTPATIENT
Start: 2018-05-30 | End: 2018-05-31

## 2018-05-30 RX ORDER — MEXILETINE HYDROCHLORIDE 150 MG/1
150 CAPSULE ORAL EVERY 12 HOURS
Qty: 0 | Refills: 0 | Status: DISCONTINUED | OUTPATIENT
Start: 2018-05-30 | End: 2018-05-31

## 2018-05-30 RX ORDER — VANCOMYCIN HCL 1 G
1000 VIAL (EA) INTRAVENOUS EVERY 12 HOURS
Qty: 0 | Refills: 0 | Status: DISCONTINUED | OUTPATIENT
Start: 2018-05-30 | End: 2018-05-31

## 2018-05-30 RX ORDER — LOSARTAN POTASSIUM 100 MG/1
100 TABLET, FILM COATED ORAL DAILY
Qty: 0 | Refills: 0 | Status: DISCONTINUED | OUTPATIENT
Start: 2018-05-30 | End: 2018-05-31

## 2018-05-30 RX ADMIN — MONTELUKAST 10 MILLIGRAM(S): 4 TABLET, CHEWABLE ORAL at 21:59

## 2018-05-30 RX ADMIN — MEXILETINE HYDROCHLORIDE 150 MILLIGRAM(S): 150 CAPSULE ORAL at 19:05

## 2018-05-30 RX ADMIN — Medication 250 MILLIGRAM(S): at 21:59

## 2018-05-30 NOTE — PRE-ANESTHESIA EVALUATION ADULT - NSANTHOSAYNRD_GEN_A_CORE
No. NANDO screening performed.  STOP BANG Legend: 0-2 = LOW Risk; 3-4 = INTERMEDIATE Risk; 5-8 = HIGH Risk

## 2018-05-31 ENCOUNTER — TRANSCRIPTION ENCOUNTER (OUTPATIENT)
Age: 78
End: 2018-05-31

## 2018-05-31 VITALS
RESPIRATION RATE: 18 BRPM | SYSTOLIC BLOOD PRESSURE: 123 MMHG | DIASTOLIC BLOOD PRESSURE: 59 MMHG | TEMPERATURE: 97 F | HEART RATE: 73 BPM

## 2018-05-31 RX ORDER — CEPHALEXIN 500 MG
1 CAPSULE ORAL
Qty: 10 | Refills: 0 | OUTPATIENT
Start: 2018-05-31 | End: 2018-06-04

## 2018-05-31 RX ORDER — DOCUSATE SODIUM 100 MG
100 CAPSULE ORAL DAILY
Qty: 0 | Refills: 0 | Status: DISCONTINUED | OUTPATIENT
Start: 2018-05-31 | End: 2018-05-31

## 2018-05-31 RX ADMIN — LOSARTAN POTASSIUM 100 MILLIGRAM(S): 100 TABLET, FILM COATED ORAL at 07:09

## 2018-05-31 RX ADMIN — Medication 250 MILLIGRAM(S): at 11:48

## 2018-05-31 RX ADMIN — PANTOPRAZOLE SODIUM 40 MILLIGRAM(S): 20 TABLET, DELAYED RELEASE ORAL at 07:09

## 2018-05-31 RX ADMIN — AMIODARONE HYDROCHLORIDE 200 MILLIGRAM(S): 400 TABLET ORAL at 07:09

## 2018-05-31 RX ADMIN — Medication 40 MILLIGRAM(S): at 07:09

## 2018-05-31 RX ADMIN — Medication 50 MILLIGRAM(S): at 07:09

## 2018-05-31 RX ADMIN — Medication 100 MILLIGRAM(S): at 11:49

## 2018-05-31 RX ADMIN — MEXILETINE HYDROCHLORIDE 150 MILLIGRAM(S): 150 CAPSULE ORAL at 07:09

## 2018-05-31 NOTE — PROGRESS NOTE ADULT - SUBJECTIVE AND OBJECTIVE BOX
Electrophysiology Brief Post-Op Note    I have personally seen and examined the patient.  I agree with the history and physical which I have reviewed and noted any changes below.  05-30-18 @ 21:12    PRE-OP DIAGNOSIS: HF, VT    POST-OP DIAGNOSIS: HF, VT    PROCEDURE: LV lead implant    Physician: Yanet  Assistant: None    ESTIMATED BLOOD LOSS:   20    mL    ANESTHESIA TYPE:  [  ]General Anesthesia  [X  ] Sedation  [  ] Local/Regional    CONDITION  [  ] Critical  [  ] Serious  [  ]Fair  [ X ]Good      SPECIMENS REMOVED (IF APPLICABLE):  generator    IMPLANTS (IF APPLICABLE)  LV lead and new generator    FINDINGS  PLAN OF CARE    - Vancomyocin 1g IV q12 h  - Chest X-ray PA/Lat now and tomorrow am  - Device interrogation tomorrow in am  - DC all heparin produces and lovenox  - No anticoagulation unless recomended by EP attending
INTERVAL HPI/OVERNIGHT EVENTS:    Patietn s/p ICD upgrade to BiV- ICD implant  No event over night. Pt without complains    MEDICATIONS  (STANDING):  amiodarone    Tablet 200 milliGRAM(s) Oral daily  docusate sodium 100 milliGRAM(s) Oral daily  furosemide    Tablet 40 milliGRAM(s) Oral daily  losartan 100 milliGRAM(s) Oral daily  metoprolol tartrate 50 milliGRAM(s) Oral two times a day  mexiletine 150 milliGRAM(s) Oral every 12 hours  montelukast 10 milliGRAM(s) Oral at bedtime  pantoprazole    Tablet 40 milliGRAM(s) Oral before breakfast  vancomycin  IVPB 1000 milliGRAM(s) IV Intermittent every 12 hours    MEDICATIONS  (PRN):    Allergies  morphine (Stomach Upset)    Vital Signs Last 24 Hrs  T(C): 35.9 (31 May 2018 05:54), Max: 37 (30 May 2018 21:00)  T(F): 96.7 (31 May 2018 05:54), Max: 98.6 (30 May 2018 21:00)  HR: 69 (31 May 2018 05:54) (68 - 97)  BP: 125/66 (31 May 2018 05:54) (90/61 - 125/66)  BP(mean): --  RR: 18 (31 May 2018 05:54) (15 - 21)  SpO2: 95% (31 May 2018 02:36) (94% - 98%)    Dressing removed, steri strips dry and intact, wound healing well; no hematoma; no bleeding.  Device interrogated no events noted, parameters in appropriate range, chest x ray reviewed.     A/P   Patient s/p ICD upgrade to BiV- ICD implant    - restart Eliquis on Friday June 1 2018  - Keflex 500 mg PO q12 h x 5 days  - post device implant instructions discussed with patient,  all questions answered  - follow up in office in 3-4 weeks
DISPLAY PLAN FREE TEXT

## 2018-05-31 NOTE — DISCHARGE NOTE ADULT - PATIENT PORTAL LINK FT
You can access the [facility] Patient Portal, offered by [facility], by registering with the following website: [email or web address]

## 2018-05-31 NOTE — DISCHARGE NOTE ADULT - PLAN OF CARE
Resolution of symptoms Keep all follow up appointments  Take medications as prescribed  Follow diet as prescribed Keep all follow up appointments  Take medications as prescribed  Resume Eliquis on Friday, June 1 2018  Follow diet as prescribed

## 2018-05-31 NOTE — DISCHARGE NOTE ADULT - ADDITIONAL INSTRUCTIONS
no over extending left arm, no lifting anything over 10 lbs with left arm, no driving x 2 weeks  follow up in office in 3 - weeks

## 2018-05-31 NOTE — PATIENT PROFILE ADULT. - PMH
AICD (automatic cardioverter/defibrillator) present    Atrial fibrillation    CAD (coronary artery disease)    Congestive heart disease    COPD (chronic obstructive pulmonary disease)    Hypercholesteremia    Hypothyroidism    NANDO on CPAP    Pacemaker    Ventricular tachycardia

## 2018-05-31 NOTE — DISCHARGE NOTE ADULT - MEDICATION SUMMARY - MEDICATIONS TO TAKE
I will START or STAY ON the medications listed below when I get home from the hospital:    losartan 100 mg oral tablet  -- 1 tab(s) by mouth once a day  -- Indication: For HTN    amiodarone 200 mg oral tablet  -- 1 tab(s) by mouth once a day  -- Indication: For ARRHYTHMIA    mexiletine 150 mg oral capsule  -- 1 cap(s) by mouth 2 times a day   -- It is very important that you take or use this exactly as directed.  Do not skip doses or discontinue unless directed by your doctor.  May cause drowsiness or dizziness.  Take with food or milk.    -- Indication: For ARRHYTHMIA    Eliquis 2.5 mg oral tablet  -- 1 tab(s) by mouth 2 times a day  -- Indication: For ARRHYTHMIA    pravastatin 20 mg oral tablet  -- 1 tab(s) by mouth once a day  -- Indication: For CAD    metoprolol tartrate 50 mg oral tablet  -- 1 tab(s) by mouth 2 times a day  -- Indication: For ARRHYTHMIA    Incruse Ellipta 62.5 mcg/inh inhalation powder  -- 1 inch(es) inhaled once a day  -- Indication: For INHALER    Keflex 500 mg oral capsule  -- 1 cap(s) by mouth 2 times a day   -- Finish all this medication unless otherwise directed by prescriber.    -- Indication: For ANTIBIOTIC    furosemide 40 mg oral tablet  -- 1 tab(s) by mouth once a day   -- Avoid prolonged or excessive exposure to direct and/or artificial sunlight while taking this medication.  It is very important that you take or use this exactly as directed.  Do not skip doses or discontinue unless directed by your doctor.  It may be advisable to drink a full glass orange juice or eat a banana daily while taking this medication.    -- Indication: For DIURETIC    montelukast 10 mg oral tablet  -- 1 tab(s) by mouth once a day  -- Indication: For ALLERGIES/RESPIRATORY    OMEPRAZOLE DR 40 MG CAPSULE  -- 40  by mouth once a day  -- Indication: For GERD    Protonix 40 mg oral delayed release tablet  -- 1 tab(s) by mouth once a day (before a meal)  -- Indication: For GERD    levothyroxine 50 mcg (0.05 mg) oral capsule  -- 1 cap(s) by mouth once a day  -- Indication: For THYROID

## 2018-05-31 NOTE — DISCHARGE NOTE ADULT - CARE PROVIDER_API CALL
Dimas Holt; CHARLES), Cardiac Electrophysiology; Cardiovascular Disease  [address] [place] [address]  Phone: [phone]  Fax: [phone]

## 2018-05-31 NOTE — DISCHARGE NOTE ADULT - CARE PLAN
Principal Discharge DX:	Congestive heart disease  Goal:	Resolution of symptoms  Assessment and plan of treatment:	Keep all follow up appointments  Take medications as prescribed  Follow diet as prescribed Principal Discharge DX:	Congestive heart disease  Goal:	Resolution of symptoms  Assessment and plan of treatment:	Keep all follow up appointments  Take medications as prescribed  Tysone Eliquis on Friday, June 1 2018  Follow diet as prescribed

## 2018-06-22 ENCOUNTER — APPOINTMENT (OUTPATIENT)
Dept: CARDIOLOGY | Facility: CLINIC | Age: 78
End: 2018-06-22

## 2018-06-22 VITALS — BODY MASS INDEX: 28.21 KG/M2 | WEIGHT: 191 LBS

## 2018-06-22 VITALS — SYSTOLIC BLOOD PRESSURE: 124 MMHG | HEART RATE: 60 BPM | DIASTOLIC BLOOD PRESSURE: 66 MMHG

## 2018-07-12 ENCOUNTER — APPOINTMENT (OUTPATIENT)
Dept: CARDIOLOGY | Facility: CLINIC | Age: 78
End: 2018-07-12

## 2018-07-17 PROBLEM — E78.00 PURE HYPERCHOLESTEROLEMIA, UNSPECIFIED: Chronic | Status: INACTIVE | Noted: 2018-03-29 | Resolved: 2018-05-13

## 2018-08-14 ENCOUNTER — APPOINTMENT (OUTPATIENT)
Dept: CARDIOLOGY | Facility: CLINIC | Age: 78
End: 2018-08-14

## 2018-08-14 VITALS — DIASTOLIC BLOOD PRESSURE: 80 MMHG | HEART RATE: 84 BPM | SYSTOLIC BLOOD PRESSURE: 130 MMHG | RESPIRATION RATE: 18 BRPM

## 2018-08-14 VITALS — WEIGHT: 183 LBS | BODY MASS INDEX: 27.11 KG/M2 | HEIGHT: 69 IN

## 2018-08-14 PROBLEM — J44.9 CHRONIC OBSTRUCTIVE PULMONARY DISEASE, UNSPECIFIED: Chronic | Status: ACTIVE | Noted: 2018-05-13

## 2018-08-14 PROBLEM — E78.00 PURE HYPERCHOLESTEROLEMIA, UNSPECIFIED: Chronic | Status: ACTIVE | Noted: 2018-03-29

## 2018-08-14 PROBLEM — I50.9 HEART FAILURE, UNSPECIFIED: Chronic | Status: ACTIVE | Noted: 2018-03-29

## 2018-08-14 PROBLEM — I47.2 VENTRICULAR TACHYCARDIA: Chronic | Status: ACTIVE | Noted: 2018-05-13

## 2018-08-14 PROBLEM — Z95.810 PRESENCE OF AUTOMATIC (IMPLANTABLE) CARDIAC DEFIBRILLATOR: Chronic | Status: ACTIVE | Noted: 2018-05-13

## 2018-08-14 PROBLEM — I25.10 ATHEROSCLEROTIC HEART DISEASE OF NATIVE CORONARY ARTERY WITHOUT ANGINA PECTORIS: Chronic | Status: ACTIVE | Noted: 2018-05-13

## 2018-08-14 PROBLEM — G47.33 OBSTRUCTIVE SLEEP APNEA (ADULT) (PEDIATRIC): Chronic | Status: ACTIVE | Noted: 2018-05-13

## 2018-08-14 PROBLEM — E03.9 HYPOTHYROIDISM, UNSPECIFIED: Chronic | Status: ACTIVE | Noted: 2018-03-29

## 2018-08-14 PROBLEM — Z95.0 PRESENCE OF CARDIAC PACEMAKER: Chronic | Status: ACTIVE | Noted: 2018-05-13

## 2018-08-14 PROBLEM — I48.91 UNSPECIFIED ATRIAL FIBRILLATION: Chronic | Status: ACTIVE | Noted: 2018-03-29

## 2018-08-24 ENCOUNTER — APPOINTMENT (OUTPATIENT)
Dept: CARDIOLOGY | Facility: CLINIC | Age: 78
End: 2018-08-24

## 2018-08-30 ENCOUNTER — APPOINTMENT (OUTPATIENT)
Dept: CARDIOLOGY | Facility: CLINIC | Age: 78
End: 2018-08-30

## 2018-08-30 VITALS — SYSTOLIC BLOOD PRESSURE: 138 MMHG | DIASTOLIC BLOOD PRESSURE: 80 MMHG

## 2018-09-28 ENCOUNTER — OUTPATIENT (OUTPATIENT)
Dept: OUTPATIENT SERVICES | Facility: HOSPITAL | Age: 78
LOS: 1 days | Discharge: HOME | End: 2018-09-28

## 2018-09-28 DIAGNOSIS — Z98.890 OTHER SPECIFIED POSTPROCEDURAL STATES: Chronic | ICD-10-CM

## 2018-09-28 DIAGNOSIS — Z95.810 PRESENCE OF AUTOMATIC (IMPLANTABLE) CARDIAC DEFIBRILLATOR: Chronic | ICD-10-CM

## 2018-09-28 DIAGNOSIS — R10.9 UNSPECIFIED ABDOMINAL PAIN: ICD-10-CM

## 2018-10-18 ENCOUNTER — APPOINTMENT (OUTPATIENT)
Dept: CARDIOLOGY | Facility: CLINIC | Age: 78
End: 2018-10-18

## 2018-10-18 VITALS — DIASTOLIC BLOOD PRESSURE: 70 MMHG | SYSTOLIC BLOOD PRESSURE: 130 MMHG | WEIGHT: 181 LBS | BODY MASS INDEX: 26.73 KG/M2

## 2018-10-22 ENCOUNTER — INPATIENT (INPATIENT)
Facility: HOSPITAL | Age: 78
LOS: 9 days | Discharge: HOME HEALTH | End: 2018-11-01
Attending: INTERNAL MEDICINE | Admitting: INTERNAL MEDICINE

## 2018-10-22 VITALS
WEIGHT: 190.04 LBS | DIASTOLIC BLOOD PRESSURE: 75 MMHG | TEMPERATURE: 96 F | HEART RATE: 77 BPM | HEIGHT: 69 IN | SYSTOLIC BLOOD PRESSURE: 133 MMHG | OXYGEN SATURATION: 97 % | RESPIRATION RATE: 20 BRPM

## 2018-10-22 DIAGNOSIS — N40.0 BENIGN PROSTATIC HYPERPLASIA WITHOUT LOWER URINARY TRACT SYMPTOMS: ICD-10-CM

## 2018-10-22 DIAGNOSIS — I25.10 ATHEROSCLEROTIC HEART DISEASE OF NATIVE CORONARY ARTERY WITHOUT ANGINA PECTORIS: ICD-10-CM

## 2018-10-22 DIAGNOSIS — E78.5 HYPERLIPIDEMIA, UNSPECIFIED: ICD-10-CM

## 2018-10-22 DIAGNOSIS — K59.00 CONSTIPATION, UNSPECIFIED: ICD-10-CM

## 2018-10-22 DIAGNOSIS — J44.9 CHRONIC OBSTRUCTIVE PULMONARY DISEASE, UNSPECIFIED: ICD-10-CM

## 2018-10-22 DIAGNOSIS — I47.2 VENTRICULAR TACHYCARDIA: ICD-10-CM

## 2018-10-22 DIAGNOSIS — E87.6 HYPOKALEMIA: ICD-10-CM

## 2018-10-22 DIAGNOSIS — I42.9 CARDIOMYOPATHY, UNSPECIFIED: ICD-10-CM

## 2018-10-22 DIAGNOSIS — Z87.891 PERSONAL HISTORY OF NICOTINE DEPENDENCE: ICD-10-CM

## 2018-10-22 DIAGNOSIS — R91.1 SOLITARY PULMONARY NODULE: ICD-10-CM

## 2018-10-22 DIAGNOSIS — Z98.890 OTHER SPECIFIED POSTPROCEDURAL STATES: Chronic | ICD-10-CM

## 2018-10-22 DIAGNOSIS — Z79.01 LONG TERM (CURRENT) USE OF ANTICOAGULANTS: ICD-10-CM

## 2018-10-22 DIAGNOSIS — E03.9 HYPOTHYROIDISM, UNSPECIFIED: ICD-10-CM

## 2018-10-22 DIAGNOSIS — R00.0 TACHYCARDIA, UNSPECIFIED: ICD-10-CM

## 2018-10-22 DIAGNOSIS — G47.33 OBSTRUCTIVE SLEEP APNEA (ADULT) (PEDIATRIC): ICD-10-CM

## 2018-10-22 DIAGNOSIS — N18.2 CHRONIC KIDNEY DISEASE, STAGE 2 (MILD): ICD-10-CM

## 2018-10-22 DIAGNOSIS — I13.0 HYPERTENSIVE HEART AND CHRONIC KIDNEY DISEASE WITH HEART FAILURE AND STAGE 1 THROUGH STAGE 4 CHRONIC KIDNEY DISEASE, OR UNSPECIFIED CHRONIC KIDNEY DISEASE: ICD-10-CM

## 2018-10-22 DIAGNOSIS — I48.0 PAROXYSMAL ATRIAL FIBRILLATION: ICD-10-CM

## 2018-10-22 DIAGNOSIS — Z95.810 PRESENCE OF AUTOMATIC (IMPLANTABLE) CARDIAC DEFIBRILLATOR: ICD-10-CM

## 2018-10-22 DIAGNOSIS — E11.22 TYPE 2 DIABETES MELLITUS WITH DIABETIC CHRONIC KIDNEY DISEASE: ICD-10-CM

## 2018-10-22 DIAGNOSIS — I50.22 CHRONIC SYSTOLIC (CONGESTIVE) HEART FAILURE: ICD-10-CM

## 2018-10-22 DIAGNOSIS — Z95.810 PRESENCE OF AUTOMATIC (IMPLANTABLE) CARDIAC DEFIBRILLATOR: Chronic | ICD-10-CM

## 2018-10-22 DIAGNOSIS — Z66 DO NOT RESUSCITATE: ICD-10-CM

## 2018-10-22 DIAGNOSIS — G93.41 METABOLIC ENCEPHALOPATHY: ICD-10-CM

## 2018-10-22 LAB
ALBUMIN SERPL ELPH-MCNC: 4 G/DL — SIGNIFICANT CHANGE UP (ref 3.5–5.2)
ALP SERPL-CCNC: 105 U/L — SIGNIFICANT CHANGE UP (ref 30–115)
ALT FLD-CCNC: 10 U/L — SIGNIFICANT CHANGE UP (ref 0–41)
ANION GAP SERPL CALC-SCNC: 18 MMOL/L — HIGH (ref 7–14)
AST SERPL-CCNC: 13 U/L — SIGNIFICANT CHANGE UP (ref 0–41)
BASOPHILS # BLD AUTO: 0.05 K/UL — SIGNIFICANT CHANGE UP (ref 0–0.2)
BASOPHILS NFR BLD AUTO: 0.7 % — SIGNIFICANT CHANGE UP (ref 0–1)
BILIRUB SERPL-MCNC: 0.3 MG/DL — SIGNIFICANT CHANGE UP (ref 0.2–1.2)
BUN SERPL-MCNC: 16 MG/DL — SIGNIFICANT CHANGE UP (ref 10–20)
CALCIUM SERPL-MCNC: 9.3 MG/DL — SIGNIFICANT CHANGE UP (ref 8.5–10.1)
CHLORIDE SERPL-SCNC: 96 MMOL/L — LOW (ref 98–110)
CK SERPL-CCNC: 75 U/L — SIGNIFICANT CHANGE UP (ref 0–225)
CO2 SERPL-SCNC: 26 MMOL/L — SIGNIFICANT CHANGE UP (ref 17–32)
CREAT SERPL-MCNC: 1.3 MG/DL — SIGNIFICANT CHANGE UP (ref 0.7–1.5)
EOSINOPHIL # BLD AUTO: 0.24 K/UL — SIGNIFICANT CHANGE UP (ref 0–0.7)
EOSINOPHIL NFR BLD AUTO: 3.5 % — SIGNIFICANT CHANGE UP (ref 0–8)
GLUCOSE SERPL-MCNC: 213 MG/DL — HIGH (ref 70–99)
HCT VFR BLD CALC: 38.5 % — LOW (ref 42–52)
HGB BLD-MCNC: 12.2 G/DL — LOW (ref 14–18)
IMM GRANULOCYTES NFR BLD AUTO: 0.3 % — SIGNIFICANT CHANGE UP (ref 0.1–0.3)
LYMPHOCYTES # BLD AUTO: 0.83 K/UL — LOW (ref 1.2–3.4)
LYMPHOCYTES # BLD AUTO: 12 % — LOW (ref 20.5–51.1)
MAGNESIUM SERPL-MCNC: 2.1 MG/DL — SIGNIFICANT CHANGE UP (ref 1.8–2.4)
MCHC RBC-ENTMCNC: 27.1 PG — SIGNIFICANT CHANGE UP (ref 27–31)
MCHC RBC-ENTMCNC: 31.7 G/DL — LOW (ref 32–37)
MCV RBC AUTO: 85.6 FL — SIGNIFICANT CHANGE UP (ref 80–94)
MONOCYTES # BLD AUTO: 0.49 K/UL — SIGNIFICANT CHANGE UP (ref 0.1–0.6)
MONOCYTES NFR BLD AUTO: 7.1 % — SIGNIFICANT CHANGE UP (ref 1.7–9.3)
NEUTROPHILS # BLD AUTO: 5.27 K/UL — SIGNIFICANT CHANGE UP (ref 1.4–6.5)
NEUTROPHILS NFR BLD AUTO: 76.4 % — HIGH (ref 42.2–75.2)
NRBC # BLD: 0 /100 WBCS — SIGNIFICANT CHANGE UP (ref 0–0)
PLATELET # BLD AUTO: 313 K/UL — SIGNIFICANT CHANGE UP (ref 130–400)
POTASSIUM SERPL-MCNC: 4 MMOL/L — SIGNIFICANT CHANGE UP (ref 3.5–5)
POTASSIUM SERPL-SCNC: 4 MMOL/L — SIGNIFICANT CHANGE UP (ref 3.5–5)
PROT SERPL-MCNC: 6.8 G/DL — SIGNIFICANT CHANGE UP (ref 6–8)
RBC # BLD: 4.5 M/UL — LOW (ref 4.7–6.1)
RBC # FLD: 14.9 % — HIGH (ref 11.5–14.5)
SODIUM SERPL-SCNC: 140 MMOL/L — SIGNIFICANT CHANGE UP (ref 135–146)
TROPONIN T SERPL-MCNC: <0.01 NG/ML — SIGNIFICANT CHANGE UP
WBC # BLD: 6.9 K/UL — SIGNIFICANT CHANGE UP (ref 4.8–10.8)
WBC # FLD AUTO: 6.9 K/UL — SIGNIFICANT CHANGE UP (ref 4.8–10.8)

## 2018-10-22 RX ORDER — LIDOCAINE HCL 20 MG/ML
1 VIAL (ML) INJECTION
Qty: 2 | Refills: 0 | Status: DISCONTINUED | OUTPATIENT
Start: 2018-10-22 | End: 2018-10-27

## 2018-10-22 NOTE — ED PROVIDER NOTE - MEDICAL DECISION MAKING DETAILS
77 Y/O M S/P AICD FIRING TODAY, INTERROGATIO REVEALED V TACH AND VT STORM. PT TO BE SCHEDULED FOR ABLATION WITH BETHANY, EVALUATED BY CARDIOLOGY FELLOW. ADMITTED TO CCU.

## 2018-10-22 NOTE — ED PROVIDER NOTE - NS ED ROS FT
Constitutional: No fever. +fatigue  Eyes:  No visual changes  ENMT:  No neck pain  Cardiac:  No chest pain, palpitations  Respiratory:  No cough. +SOB  GI:  No nausea, vomiting, diarrhea, abdominal pain  :  No dysuria  MS:  No back pain.  Neuro:  No headache or lightheadedness  Skin:  No skin rash  Endocrine: No history of thyroid disease or diabetes  Except as documented in the HPI,  all other systems are negative.

## 2018-10-22 NOTE — ED PROVIDER NOTE - OBJECTIVE STATEMENT
79yo M with PMHx HTN, DLD, CAD, CHF, Afib on eliquis, Vtach / AICD (biventricular), ischemic CM, COPD, hypothyroid, NANDO/CPAP, p/w chief complaint of AICD firing early this am. Patient has difficult to describe prodrome where he feels like AICD is going to fire, felt it again this evening so came to ED. C/o generalized fatigue and SOB for past week. Denies CP, headache, lightheadedness, cough, fever, nausea, vomiting. In the past, pt refused ablation twice but now AICD firing more regularly so waiting to be scheduled for ablation (Dr. oreilly).

## 2018-10-22 NOTE — ED PROVIDER NOTE - PROGRESS NOTE DETAILS
I personally evaluated the patient. I reviewed the Resident’s or Physician Assistant’s note (as assigned above), and agree with the findings and plan except as documented in my note.  79 Y/O M HTN, DYSLIPIDEMIA, CAD, ISCHEMIC CM, CHF, AFIB ON ELIQUIS, COPD, HYPOTHYROIDISM, NANDO ON CPAP NIGHTLY, VTACH, S/P AICD UPGRADED TO BI-V 5/2018 NOW REPORTS AOCD FIRED AT 4:30 AM TODAY. D/W [employer] REP    Looks like the patient had 8 VT episodes that were pace terminated with ATP, the last one, the ATP accelerated the VT into the VF zone and the patient was terminated with 35J shock early this AM.  The patient's Optivol also suggests pulmonary congestion on page 5.  Dr. Holt is the patient's EP.  Young Pineda, Jack.   [id number]    Patient also had 52 NSVT episodes suggestive of VT storm CASE D/W DR. MONTEMAYOR, CARDILOGY FELLOW, WILL EVALUATE PT IN THE ED. I personally evaluated the patient. I reviewed the Resident’s or Physician Assistant’s note (as assigned above), and agree with the findings and plan except as documented in my note.  79 Y/O M HTN, DYSLIPIDEMIA, CAD, ISCHEMIC CM, CHF, AFIB ON ELIQUIS, COPD, HYPOTHYROIDISM, NANDO ON CPAP NIGHTLY, V TACH, S/P AICD UPGRADED TO BI-V 5/2018 NOW REPORTS AICD FIRED AT 4:30 AM TODAY. THIS EVENING, PT FELT AS THOUGH HIS DEFIBRILLATOR WOULD FIRE AGAIN. PT C/O LETHARGY X 2 DAYS AND SOB X ONE WEEK. NO CP. NO PALPITATIONS, DIZZINESS. NO RECENT ILLNESS, FEVER, CHILLS. PT HAD BEEN REFUSING ABLATION UNTIL RECENTLY AND IS NOW AGREEABLE AND IS WAITING TO BE SCHEDULED FOR THE PROCEDURE. VITALS NOTED. ALERT OX3 NAD WELL APPEARING. NECK SUPPLE. NO JVD. LUNGS CLEAR B/L. RRR S1S2. AICD SITE WITH NO EVIDENCE OF INFECTION. ABD-SOFT NONTENDER. + VENTRAL HERNIA-REDUCIBLE. NO LEG EDEMA. CASE D/W DR. MONTEMAYOR, CARDIOLOGY FELLOW, WILL EVALUATE PT IN THE ED. DR. MONTEMAYOR EVALUATED PT IN THE ED, ADMIT TO CCU. DR. MONTEMAYOR EVALUATED PT IN THE ED, ADMIT TO CCU. RECOMMENDS LIDOCAINE DRIP.

## 2018-10-22 NOTE — ED ADULT NURSE NOTE - OBJECTIVE STATEMENT
as per pt he felt his defibrillator go off this morning and now he is short of breath and feels as if it will go off again. pt denies chest pain.

## 2018-10-22 NOTE — ED PROVIDER NOTE - PHYSICAL EXAMINATION
Vital signs reviewed  GENERAL: Patient nontoxic appearing, NAD  HEAD: NCAT  EYES: Anicteric  ENT: MMM  NECK: Supple, non tender  RESPIRATORY: Normal respiratory effort. CTA B/L. No wheezing, rales, rhonchi  CARDIOVASCULAR: Regular rate and rhythm. Normal S1/S2. No murmurs, rubs or gallops.  ABDOMEN: Soft. Nondistended. Nontender. No guarding or rebound. Redicuble ventral hernia  MUSCULOSKELETAL/EXTREMITIES: Brisk cap refill. 2+ radial pulses. No leg edema  SKIN:  Warm and dry. AICD site without signs of infection.   NEURO: AAOx3. No gross FND.  PSYCHIATRIC: Cooperative. Affect appropriate

## 2018-10-23 ENCOUNTER — CLINICAL ADVICE (OUTPATIENT)
Age: 78
End: 2018-10-23

## 2018-10-23 LAB
ALBUMIN SERPL ELPH-MCNC: 3.8 G/DL — SIGNIFICANT CHANGE UP (ref 3.5–5.2)
ALP SERPL-CCNC: 93 U/L — SIGNIFICANT CHANGE UP (ref 30–115)
ALT FLD-CCNC: 9 U/L — SIGNIFICANT CHANGE UP (ref 0–41)
ANION GAP SERPL CALC-SCNC: 13 MMOL/L — SIGNIFICANT CHANGE UP (ref 7–14)
APTT BLD: 37.4 SEC — SIGNIFICANT CHANGE UP (ref 27–39.2)
AST SERPL-CCNC: 13 U/L — SIGNIFICANT CHANGE UP (ref 0–41)
BASOPHILS # BLD AUTO: 0.05 K/UL — SIGNIFICANT CHANGE UP (ref 0–0.2)
BASOPHILS NFR BLD AUTO: 0.8 % — SIGNIFICANT CHANGE UP (ref 0–1)
BILIRUB SERPL-MCNC: 0.4 MG/DL — SIGNIFICANT CHANGE UP (ref 0.2–1.2)
BUN SERPL-MCNC: 16 MG/DL — SIGNIFICANT CHANGE UP (ref 10–20)
CALCIUM SERPL-MCNC: 9.1 MG/DL — SIGNIFICANT CHANGE UP (ref 8.5–10.1)
CHLORIDE SERPL-SCNC: 99 MMOL/L — SIGNIFICANT CHANGE UP (ref 98–110)
CO2 SERPL-SCNC: 28 MMOL/L — SIGNIFICANT CHANGE UP (ref 17–32)
CREAT SERPL-MCNC: 1.3 MG/DL — SIGNIFICANT CHANGE UP (ref 0.7–1.5)
EOSINOPHIL # BLD AUTO: 0.24 K/UL — SIGNIFICANT CHANGE UP (ref 0–0.7)
EOSINOPHIL NFR BLD AUTO: 3.9 % — SIGNIFICANT CHANGE UP (ref 0–8)
GLUCOSE BLDC GLUCOMTR-MCNC: 136 MG/DL — HIGH (ref 70–99)
GLUCOSE BLDC GLUCOMTR-MCNC: 153 MG/DL — HIGH (ref 70–99)
GLUCOSE SERPL-MCNC: 141 MG/DL — HIGH (ref 70–99)
HCT VFR BLD CALC: 37.7 % — LOW (ref 42–52)
HGB BLD-MCNC: 11.9 G/DL — LOW (ref 14–18)
IMM GRANULOCYTES NFR BLD AUTO: 0.5 % — HIGH (ref 0.1–0.3)
INR BLD: 1.35 RATIO — HIGH (ref 0.65–1.3)
LACTATE SERPL-SCNC: 1.3 MMOL/L — SIGNIFICANT CHANGE UP (ref 0.5–2.2)
LYMPHOCYTES # BLD AUTO: 0.87 K/UL — LOW (ref 1.2–3.4)
LYMPHOCYTES # BLD AUTO: 14 % — LOW (ref 20.5–51.1)
MAGNESIUM SERPL-MCNC: 2 MG/DL — SIGNIFICANT CHANGE UP (ref 1.8–2.4)
MCHC RBC-ENTMCNC: 27.1 PG — SIGNIFICANT CHANGE UP (ref 27–31)
MCHC RBC-ENTMCNC: 31.6 G/DL — LOW (ref 32–37)
MCV RBC AUTO: 85.9 FL — SIGNIFICANT CHANGE UP (ref 80–94)
MONOCYTES # BLD AUTO: 0.44 K/UL — SIGNIFICANT CHANGE UP (ref 0.1–0.6)
MONOCYTES NFR BLD AUTO: 7.1 % — SIGNIFICANT CHANGE UP (ref 1.7–9.3)
NEUTROPHILS # BLD AUTO: 4.57 K/UL — SIGNIFICANT CHANGE UP (ref 1.4–6.5)
NEUTROPHILS NFR BLD AUTO: 73.7 % — SIGNIFICANT CHANGE UP (ref 42.2–75.2)
PHOSPHATE SERPL-MCNC: 3.3 MG/DL — SIGNIFICANT CHANGE UP (ref 2.1–4.9)
PLATELET # BLD AUTO: 297 K/UL — SIGNIFICANT CHANGE UP (ref 130–400)
POTASSIUM SERPL-MCNC: 4.3 MMOL/L — SIGNIFICANT CHANGE UP (ref 3.5–5)
POTASSIUM SERPL-SCNC: 4.3 MMOL/L — SIGNIFICANT CHANGE UP (ref 3.5–5)
PROT SERPL-MCNC: 6.4 G/DL — SIGNIFICANT CHANGE UP (ref 6–8)
PROTHROM AB SERPL-ACNC: 15.5 SEC — HIGH (ref 9.95–12.87)
RBC # BLD: 4.39 M/UL — LOW (ref 4.7–6.1)
RBC # FLD: 15 % — HIGH (ref 11.5–14.5)
SODIUM SERPL-SCNC: 140 MMOL/L — SIGNIFICANT CHANGE UP (ref 135–146)
WBC # BLD: 6.2 K/UL — SIGNIFICANT CHANGE UP (ref 4.8–10.8)
WBC # FLD AUTO: 6.2 K/UL — SIGNIFICANT CHANGE UP (ref 4.8–10.8)

## 2018-10-23 RX ORDER — MONTELUKAST 4 MG/1
10 TABLET, CHEWABLE ORAL DAILY
Qty: 0 | Refills: 0 | Status: DISCONTINUED | OUTPATIENT
Start: 2018-10-23 | End: 2018-11-01

## 2018-10-23 RX ORDER — METOPROLOL TARTRATE 50 MG
50 TABLET ORAL
Qty: 0 | Refills: 0 | Status: DISCONTINUED | OUTPATIENT
Start: 2018-10-23 | End: 2018-10-25

## 2018-10-23 RX ORDER — APIXABAN 2.5 MG/1
2.5 TABLET, FILM COATED ORAL EVERY 12 HOURS
Qty: 0 | Refills: 0 | Status: DISCONTINUED | OUTPATIENT
Start: 2018-10-23 | End: 2018-10-25

## 2018-10-23 RX ORDER — ALPRAZOLAM 0.25 MG
0.25 TABLET ORAL DAILY
Qty: 0 | Refills: 0 | Status: DISCONTINUED | OUTPATIENT
Start: 2018-10-23 | End: 2018-10-27

## 2018-10-23 RX ORDER — FINASTERIDE 5 MG/1
1 TABLET, FILM COATED ORAL
Qty: 0 | Refills: 0 | COMMUNITY

## 2018-10-23 RX ORDER — MEXILETINE HYDROCHLORIDE 150 MG/1
150 CAPSULE ORAL EVERY 12 HOURS
Qty: 0 | Refills: 0 | Status: DISCONTINUED | OUTPATIENT
Start: 2018-10-23 | End: 2018-11-01

## 2018-10-23 RX ORDER — PANTOPRAZOLE SODIUM 20 MG/1
40 TABLET, DELAYED RELEASE ORAL
Qty: 0 | Refills: 0 | Status: DISCONTINUED | OUTPATIENT
Start: 2018-10-23 | End: 2018-11-01

## 2018-10-23 RX ORDER — UMECLIDINIUM 62.5 UG/1
1 AEROSOL, POWDER ORAL
Qty: 0 | Refills: 0 | COMMUNITY

## 2018-10-23 RX ORDER — ALPRAZOLAM 0.25 MG
1 TABLET ORAL
Qty: 0 | Refills: 0 | COMMUNITY

## 2018-10-23 RX ORDER — TAMSULOSIN HYDROCHLORIDE 0.4 MG/1
1 CAPSULE ORAL
Qty: 0 | Refills: 0 | COMMUNITY

## 2018-10-23 RX ORDER — MONTELUKAST 4 MG/1
1 TABLET, CHEWABLE ORAL
Qty: 0 | Refills: 0 | COMMUNITY

## 2018-10-23 RX ORDER — FINASTERIDE 5 MG/1
5 TABLET, FILM COATED ORAL DAILY
Qty: 0 | Refills: 0 | Status: DISCONTINUED | OUTPATIENT
Start: 2018-10-23 | End: 2018-11-01

## 2018-10-23 RX ORDER — FUROSEMIDE 40 MG
40 TABLET ORAL DAILY
Qty: 0 | Refills: 0 | Status: DISCONTINUED | OUTPATIENT
Start: 2018-10-23 | End: 2018-11-01

## 2018-10-23 RX ORDER — SIMVASTATIN 20 MG/1
10 TABLET, FILM COATED ORAL AT BEDTIME
Qty: 0 | Refills: 0 | Status: DISCONTINUED | OUTPATIENT
Start: 2018-10-23 | End: 2018-11-01

## 2018-10-23 RX ORDER — TAMSULOSIN HYDROCHLORIDE 0.4 MG/1
0.4 CAPSULE ORAL AT BEDTIME
Qty: 0 | Refills: 0 | Status: DISCONTINUED | OUTPATIENT
Start: 2018-10-23 | End: 2018-10-25

## 2018-10-23 RX ORDER — LOSARTAN POTASSIUM 100 MG/1
100 TABLET, FILM COATED ORAL DAILY
Qty: 0 | Refills: 0 | Status: DISCONTINUED | OUTPATIENT
Start: 2018-10-23 | End: 2018-10-25

## 2018-10-23 RX ORDER — IPRATROPIUM/ALBUTEROL SULFATE 18-103MCG
3 AEROSOL WITH ADAPTER (GRAM) INHALATION EVERY 6 HOURS
Qty: 0 | Refills: 0 | Status: DISCONTINUED | OUTPATIENT
Start: 2018-10-23 | End: 2018-11-01

## 2018-10-23 RX ORDER — AMIODARONE HYDROCHLORIDE 400 MG/1
200 TABLET ORAL
Qty: 0 | Refills: 0 | Status: DISCONTINUED | OUTPATIENT
Start: 2018-10-23 | End: 2018-10-25

## 2018-10-23 RX ORDER — INFLUENZA VIRUS VACCINE 15; 15; 15; 15 UG/.5ML; UG/.5ML; UG/.5ML; UG/.5ML
0.5 SUSPENSION INTRAMUSCULAR ONCE
Qty: 0 | Refills: 0 | Status: COMPLETED | OUTPATIENT
Start: 2018-10-23 | End: 2018-10-23

## 2018-10-23 RX ORDER — LEVOTHYROXINE SODIUM 125 MCG
50 TABLET ORAL DAILY
Qty: 0 | Refills: 0 | Status: DISCONTINUED | OUTPATIENT
Start: 2018-10-23 | End: 2018-11-01

## 2018-10-23 RX ADMIN — PANTOPRAZOLE SODIUM 40 MILLIGRAM(S): 20 TABLET, DELAYED RELEASE ORAL at 06:19

## 2018-10-23 RX ADMIN — MEXILETINE HYDROCHLORIDE 150 MILLIGRAM(S): 150 CAPSULE ORAL at 17:47

## 2018-10-23 RX ADMIN — Medication 40 MILLIGRAM(S): at 06:19

## 2018-10-23 RX ADMIN — APIXABAN 2.5 MILLIGRAM(S): 2.5 TABLET, FILM COATED ORAL at 06:18

## 2018-10-23 RX ADMIN — Medication 50 MICROGRAM(S): at 06:18

## 2018-10-23 RX ADMIN — MEXILETINE HYDROCHLORIDE 150 MILLIGRAM(S): 150 CAPSULE ORAL at 06:19

## 2018-10-23 RX ADMIN — Medication 7.5 MG/MIN: at 06:07

## 2018-10-23 RX ADMIN — TAMSULOSIN HYDROCHLORIDE 0.4 MILLIGRAM(S): 0.4 CAPSULE ORAL at 21:43

## 2018-10-23 RX ADMIN — APIXABAN 2.5 MILLIGRAM(S): 2.5 TABLET, FILM COATED ORAL at 17:47

## 2018-10-23 RX ADMIN — AMIODARONE HYDROCHLORIDE 200 MILLIGRAM(S): 400 TABLET ORAL at 17:47

## 2018-10-23 RX ADMIN — MONTELUKAST 10 MILLIGRAM(S): 4 TABLET, CHEWABLE ORAL at 12:12

## 2018-10-23 RX ADMIN — Medication 7.5 MG/MIN: at 00:25

## 2018-10-23 RX ADMIN — Medication 7.5 MG/MIN: at 19:47

## 2018-10-23 RX ADMIN — SIMVASTATIN 10 MILLIGRAM(S): 20 TABLET, FILM COATED ORAL at 21:43

## 2018-10-23 RX ADMIN — FINASTERIDE 5 MILLIGRAM(S): 5 TABLET, FILM COATED ORAL at 12:12

## 2018-10-23 NOTE — CONSULT NOTE ADULT - ASSESSMENT
78Y M with pmh of Afib on eliquis, non ischmeic CMP s/p AICD with BiV upgrade in 1/2018, COPD, DM2, DLD and hypothyroidism p/w AICD firing    Incessant VT  Nonischemic CMP s/p BiV AICD  Afib on eliquis  COPD/NANDO  Hypothyrodism    CCU monitoring  C/w amiodarone 200 mg BID, Mexiletine 150 mg BID, BB  c/w eliquis for now  Start lidocaine gtt  Trend CE  EP eval for possible VT ablation  will d/w attending 78Y M with pmh of Afib on eliquis, non ischmeic CMP s/p AICD with BiV upgrade in 1/2018, COPD, DM2, DLD and hypothyroidism p/w AICD firing    Incessant VT  Nonischemic CMP s/p BiV AICD  Afib on eliquis  COPD/NANDO  Hypothyrodism    CCU monitoring  C/w amiodarone 200 mg BID, Mexiletine 150 mg BID, BB  c/w eliquis for now  Start lidocaine gtt  Trend CE  EP eval for VT ablation

## 2018-10-23 NOTE — CONSULT NOTE ADULT - SUBJECTIVE AND OBJECTIVE BOX
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 As per patient he saw Dr Santos on Thursday who reprogrammed his device and advised him for ablation. Patient says he has been refusing ablation for months but now understands that its necessary. He has been having worsening SOB on exertion for the past few months and developed a cough for the past few weeks which his cardiologist informed him was due to fluid n his lungs. Patient says he has an aura like sensation when he knows that his AICD is about to fire and he feel like he has gas travelling up his abdomen and then chest pressure. Patient denies chest pain otherwise, nausea, vomiting or diarrhea. (23 Oct 2018 00:04)    AICD interrogation in ER revelead multiple epsiodes of NSVT 52 in last 24 hrs, 8 of VT treated with ATPs, 1 VT and VF treated with 35J shock    ROS:  Constitutional: No fever, chill, sweats  Eye: No recent visual problem  ENMT: No ear pain, nasal congestion, throat pain  Respiratoty: No SOB, cough  Cardiovascular: No chest pain, palpitation syncope  Gastrointestinal: No nausea, vomitting, diarhea  Genitourinary: No dysuria, hematuria  Heam/Lymp: No brusing tendency, no swollen glands  Endocrine: Negative for excessive hunger, thirst  Musculoskeletal: No neck pain, back pain, joint pain  Intergumentory: No rash, skin lesions  Neurologic: alert and oriented    PAST MEDICAL & SURGICAL HISTORY  CAD (coronary artery disease)  Ventricular tachycardia  NANDO on CPAP  COPD (chronic obstructive pulmonary disease)  Pacemaker  AICD (automatic cardioverter/defibrillator) present  Hypothyroidism  Atrial fibrillation  Congestive heart disease  Hypercholesteremia  AICD (automatic cardioverter/defibrillator) present  History of laparoscopic cholecystectomy      FAMILY HISTORY:  FAMILY HISTORY:  Family history of acute myocardial infarction (Aunt)    SOCIAL HISTORY:  Denies smoking, alcohol    ALLERGIES:  morphine (Stomach Upset)    MEDICATIONS:  MEDICATIONS  (STANDING):  amiodarone    Tablet 200 milliGRAM(s) Oral two times a day  apixaban 2.5 milliGRAM(s) Oral every 12 hours  finasteride 5 milliGRAM(s) Oral daily  furosemide    Tablet 40 milliGRAM(s) Oral daily  levothyroxine 50 MICROGram(s) Oral daily  lidocaine   Infusion 1 mG/Min (7.5 mL/Hr) IV Continuous <Continuous>  losartan 100 milliGRAM(s) Oral daily  metoprolol tartrate 50 milliGRAM(s) Oral two times a day  mexiletine 150 milliGRAM(s) Oral every 12 hours  montelukast 10 milliGRAM(s) Oral daily  pantoprazole    Tablet 40 milliGRAM(s) Oral before breakfast  simvastatin 10 milliGRAM(s) Oral at bedtime  tamsulosin 0.4 milliGRAM(s) Oral at bedtime    MEDICATIONS  (PRN):  ALBUTerol/ipratropium for Nebulization 3 milliLiter(s) Nebulizer every 6 hours PRN Shortness of Breath and/or Wheezing  ALPRAZolam 0.25 milliGRAM(s) Oral daily PRN anxiety      HOME MEDICATIONS:  Home Medications:  ALPRAZolam 0.25 mg oral tablet: 1 tab(s) orally once a day, As Needed (23 Oct 2018 00:22)  amiodarone 200 mg oral tablet: 1 tab(s) orally once a day (23 Oct 2018 00:22)  Eliquis 2.5 mg oral tablet: 1 tab(s) orally 2 times a day (23 Oct 2018 00:22)  finasteride 5 mg oral tablet: 1 tab(s) orally once a day (23 Oct 2018 00:22)  glyBURIDE 5 mg oral tablet: 1 tab(s) orally once a day (23 Oct 2018 00:22)  levothyroxine 50 mcg (0.05 mg) oral capsule: 1 cap(s) orally once a day (23 Oct 2018 00:22)  losartan 100 mg oral tablet: 1 tab(s) orally once a day (23 Oct 2018 00:22)  metoprolol tartrate 50 mg oral tablet: 1 tab(s) orally 2 times a day (23 Oct 2018 00:22)  montelukast 10 mg oral tablet: 1 tab(s) orally once a day (23 Oct 2018 00:22)  OMEPRAZOLE DR 40 MG CAPSULE: 40  orally once a day (23 Oct 2018 00:22)  pravastatin 20 mg oral tablet: 1 tab(s) orally once a day (23 Oct 2018 00:22)  tamsulosin 0.4 mg oral capsule: 1 cap(s) orally once a day (23 Oct 2018 00:22)      VITALS:   T(F): 97.5 (10-22 @ 23:26), Max: 97.5 (10-22 @ 23:26)  HR: 70 (10-22 @ 23:26) (70 - 77)  BP: 131/64 (10-22 @ 23:26) (131/64 - 133/75)  BP(mean): --  RR: 19 (10-22 @ 23:26) (19 - 20)  SpO2: 96% (10-22 @ 23:26) (96% - 97%)    I&O's Summary      PHYSICAL EXAM:  GEN: Alert and oriented X 3, Well nourished, No acute distress  NECK: Supple, non tender, NO JVD, No carotid bruit,   LUNGS: Clear to auscultation bilaterally, non labored respiration  CARDIOVASCULAR: S1/S2 present, RRR , no murmus or rubs  ABD: Soft, non-tender, non-distended,   EXT: No Lower extremity edema, no tenderness  NEURO: Non focal  SKIN: Intact    LABS:                        12.2   6.90  )-----------( 313      ( 22 Oct 2018 21:55 )             38.5     10-22    140  |  96<L>  |  16  ----------------------------<  213<H>  4.0   |  26  |  1.3    Ca    9.3      22 Oct 2018 21:55  Mg     2.1     10-22    TPro  6.8  /  Alb  4.0  /  TBili  0.3  /  DBili  x   /  AST  13  /  ALT  10  /  AlkPhos  105  10-22      Creatine Kinase, Serum: 75 U/L (10-22-18 @ 21:55)  Troponin T, Serum: <0.01 ng/mL (10-22-18 @ 21:55)    CARDIAC MARKERS ( 22 Oct 2018 21:55 )  x     / <0.01 ng/mL / 75 U/L / x     / x          RADIOLOGY:  -CXR:      -TTE:    < from: Transthoracic Echocardiogram (05.14.18 @ 12:53) >  Summary:   1. Left ventricular ejection fraction, by visual estimation, is 20 to   25%.   2. Severely decreased global left ventricular systolic function.   3. Moderate to severe left atrial enlargement.   4. LV Ejection Fraction by Amaro's Method with a biplane EF of 26 %.   5. Mildly increased LV wall thickness.   6. Severely increased left ventricular internal cavity size.   7. Thickening and calcification of the anterior mitral valve leaflet.   8. Estimated pulmonary artery systolic pressure is 36.0 mmHg assuming a   right atrial pressure of 5 mmHg, which is consistent with borderline   pulmonary hypertension.    < end of copied text >    -CCTA:  -STRESS TEST:  -CATHETERIZATION:    There is significant single vessel coronary artery disease: 1st obtuse marginal: The vessel was medium to large   sized. There was a discrete 70 % stenosis in the proximal third of the vessel   segment. The lesion was eccentric.    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 As per patient he saw Dr Santos on Thursday who reprogrammed his device and advised him for V-tach ablation. Patient says he has been refusing ablation for months but now understands that its necessary. He has been having worsening SOB on exertion for the past few months and developed a cough for the past few weeks which his cardiologist informed him was due to fluid in his lungs. Patient says he has an aura like sensation when he knows that his AICD is about to fire and he feel like he has gas travelling up his abdomen and then chest pressure. Patient denies chest pain otherwise, nausea, vomiting or diarrhea. (23 Oct 2018 00:04)    AICD interrogation in ER revelead multiple epsiodes of NSVT 52 in last 24 hrs, 8 of VT treated with ATPs, 1 VT and VF treated with 35J shock    ROS:  Constitutional: No fever, chill, sweats  Eye: No recent visual problem  ENMT: No ear pain, nasal congestion, throat pain  Respiratoty: No SOB, cough  Cardiovascular: No chest pain, palpitation syncope  Gastrointestinal: No nausea, vomitting, diarhea  Genitourinary: No dysuria, hematuria  Heam/Lymp: No brusing tendency, no swollen glands  Endocrine: Negative for excessive hunger, thirst  Musculoskeletal: No neck pain, back pain, joint pain  Intergumentory: No rash, skin lesions  Neurologic: alert and oriented    PAST MEDICAL & SURGICAL HISTORY  CAD (coronary artery disease)  Ventricular tachycardia  NANDO on CPAP  COPD (chronic obstructive pulmonary disease)  Pacemaker  AICD (automatic cardioverter/defibrillator) present  Hypothyroidism  Atrial fibrillation  Congestive heart disease  Hypercholesteremia  AICD (automatic cardioverter/defibrillator) present  History of laparoscopic cholecystectomy      FAMILY HISTORY:  FAMILY HISTORY:  Family history of acute myocardial infarction (Aunt)    SOCIAL HISTORY:  Denies smoking, alcohol    ALLERGIES:  morphine (Stomach Upset)    MEDICATIONS:  MEDICATIONS  (STANDING):  amiodarone    Tablet 200 milliGRAM(s) Oral two times a day  apixaban 2.5 milliGRAM(s) Oral every 12 hours  finasteride 5 milliGRAM(s) Oral daily  furosemide    Tablet 40 milliGRAM(s) Oral daily  levothyroxine 50 MICROGram(s) Oral daily  lidocaine   Infusion 1 mG/Min (7.5 mL/Hr) IV Continuous <Continuous>  losartan 100 milliGRAM(s) Oral daily  metoprolol tartrate 50 milliGRAM(s) Oral two times a day  mexiletine 150 milliGRAM(s) Oral every 12 hours  montelukast 10 milliGRAM(s) Oral daily  pantoprazole    Tablet 40 milliGRAM(s) Oral before breakfast  simvastatin 10 milliGRAM(s) Oral at bedtime  tamsulosin 0.4 milliGRAM(s) Oral at bedtime    MEDICATIONS  (PRN):  ALBUTerol/ipratropium for Nebulization 3 milliLiter(s) Nebulizer every 6 hours PRN Shortness of Breath and/or Wheezing  ALPRAZolam 0.25 milliGRAM(s) Oral daily PRN anxiety      HOME MEDICATIONS:  Home Medications:  ALPRAZolam 0.25 mg oral tablet: 1 tab(s) orally once a day, As Needed (23 Oct 2018 00:22)  amiodarone 200 mg oral tablet: 1 tab(s) orally once a day (23 Oct 2018 00:22)  Eliquis 2.5 mg oral tablet: 1 tab(s) orally 2 times a day (23 Oct 2018 00:22)  finasteride 5 mg oral tablet: 1 tab(s) orally once a day (23 Oct 2018 00:22)  glyBURIDE 5 mg oral tablet: 1 tab(s) orally once a day (23 Oct 2018 00:22)  levothyroxine 50 mcg (0.05 mg) oral capsule: 1 cap(s) orally once a day (23 Oct 2018 00:22)  losartan 100 mg oral tablet: 1 tab(s) orally once a day (23 Oct 2018 00:22)  metoprolol tartrate 50 mg oral tablet: 1 tab(s) orally 2 times a day (23 Oct 2018 00:22)  montelukast 10 mg oral tablet: 1 tab(s) orally once a day (23 Oct 2018 00:22)  OMEPRAZOLE DR 40 MG CAPSULE: 40  orally once a day (23 Oct 2018 00:22)  pravastatin 20 mg oral tablet: 1 tab(s) orally once a day (23 Oct 2018 00:22)  tamsulosin 0.4 mg oral capsule: 1 cap(s) orally once a day (23 Oct 2018 00:22)      VITALS:   T(F): 97.5 (10-22 @ 23:26), Max: 97.5 (10-22 @ 23:26)  HR: 70 (10-22 @ 23:26) (70 - 77)  BP: 131/64 (10-22 @ 23:26) (131/64 - 133/75)  BP(mean): --  RR: 19 (10-22 @ 23:26) (19 - 20)  SpO2: 96% (10-22 @ 23:26) (96% - 97%)    I&O's Summary      PHYSICAL EXAM:  GEN: W\D W\N Alert and oriented X 3, Well nourished, No acute distress  HEENMT: WNL  NECK: Supple, non tender, NO JVD, No carotid bruit,   LUNGS: Clear to auscultation bilaterally, non labored respiration  CARDIOVASCULAR: S1/S2 present, RRR ,   ABD: Soft, non-tender, non-distended,   EXT: No Lower extremity edema, no tenderness  NEURO: Non focal  SKIN: Intact    LABS:                        12.2   6.90  )-----------( 313      ( 22 Oct 2018 21:55 )             38.5     10-22    140  |  96<L>  |  16  ----------------------------<  213<H>  4.0   |  26  |  1.3    Ca    9.3      22 Oct 2018 21:55  Mg     2.1     10-22    TPro  6.8  /  Alb  4.0  /  TBili  0.3  /  DBili  x   /  AST  13  /  ALT  10  /  AlkPhos  105  10-22      Creatine Kinase, Serum: 75 U/L (10-22-18 @ 21:55)  Troponin T, Serum: <0.01 ng/mL (10-22-18 @ 21:55)    CARDIAC MARKERS ( 22 Oct 2018 21:55 )  x     / <0.01 ng/mL / 75 U/L / x     / x          RADIOLOGY:  -CXR:      -TTE:    < from: Transthoracic Echocardiogram (05.14.18 @ 12:53) >  Summary:   1. Left ventricular ejection fraction, by visual estimation, is 20 to   25%.   2. Severely decreased global left ventricular systolic function.   3. Moderate to severe left atrial enlargement.   4. LV Ejection Fraction by Amaro's Method with a biplane EF of 26 %.   5. Mildly increased LV wall thickness.   6. Severely increased left ventricular internal cavity size.   7. Thickening and calcification of the anterior mitral valve leaflet.   8. Estimated pulmonary artery systolic pressure is 36.0 mmHg assuming a   right atrial pressure of 5 mmHg, which is consistent with borderline   pulmonary hypertension.    < end of copied text >    -CCTA:  -STRESS TEST:  -CATHETERIZATION:    There is significant single vessel coronary artery disease: 1st obtuse marginal: The vessel was medium to large   sized. There was a discrete 70 % stenosis in the proximal third of the vessel   segment. The lesion was eccentric.    ECG:  Paced rythum  TELEMETRY EVENTS:  None paced rythum

## 2018-10-23 NOTE — H&P ADULT - NSHPLABSRESULTS_GEN_ALL_CORE
12.2   6.90  )-----------( 313      ( 22 Oct 2018 21:55 )             38.5       10-22    140  |  96<L>  |  16  ----------------------------<  213<H>  4.0   |  26  |  1.3    Ca    9.3      22 Oct 2018 21:55  Mg     2.1     10-22    TPro  6.8  /  Alb  4.0  /  TBili  0.3  /  DBili  x   /  AST  13  /  ALT  10  /  AlkPhos  105  10-22            CARDIAC MARKERS ( 22 Oct 2018 21:55 )  x     / <0.01 ng/mL / 75 U/L / x     / x

## 2018-10-23 NOTE — H&P ADULT - HISTORY OF PRESENT ILLNESS
78Y M with pmh of Afib on eliquis, CAD with SCHF s/p AICD, COPD, DM2, DLD and hypothyroidism presented to the ED after having multiple episodes of his AICD firing. As per patient he saw Dr Santos on Thursday who reprogrammed his device and advised him for ablation. Patient says he has been refusing ablation for months but now understands that its necessary. He has been having worsening SOB on exertion for the past few months and developed a cough for the past few weeks which his cardiologist informed him was due to fluid n his lungs. Patient says he has an aura like sensation when he knows that his AICD is about to fire and he feel like he has gas travelling up his abdomen and then chest pressure. On Sunday morning he had a terrible attack of the defibrillator firing after which he decided to come to the ED. As per patient he had a pacemaker which was changed to an AICD in January. Patient denies chest pain otherwise, nausea, vomiting or diarrhea.

## 2018-10-23 NOTE — CONSULT NOTE ADULT - SUBJECTIVE AND OBJECTIVE BOX
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 As per patient he saw Dr Santos on Thursday who reprogrammed his device and advised him for ablation. Patient says he has been refusing ablation for months but now understands that its necessary. He has been having worsening SOB on exertion for the past few months and developed a cough for the past few weeks which his cardiologist informed him was due to fluid n his lungs. Patient says he has an aura like sensation when he knows that his AICD is about to fire and he feel like he has gas travelling up his abdomen and then chest pressure. Patient denies chest pain otherwise, nausea, vomiting or diarrhea. (23 Oct 2018 00:04)    AICD interrogation in ER revelead multiple epsiodes of NSVT 52 in last 24 hrs, 8 of VT treated with ATPs, 1 VT and VF treated with 35J shock    ROS:  Constitutional: No fever, chill, sweats  Eye: No recent visual problem  ENMT: No ear pain, nasal congestion, throat pain  Respiratoty: No SOB, cough  Cardiovascular: No chest pain, palpitation syncope  Gastrointestinal: No nausea, vomitting, diarhea  Genitourinary: No dysuria, hematuria  Heam/Lymp: No brusing tendency, no swollen glands  Endocrine: Negative for excessive hunger, thirst  Musculoskeletal: No neck pain, back pain, joint pain  Intergumentory: No rash, skin lesions  Neurologic: alert and oriented    PAST MEDICAL & SURGICAL HISTORY  CAD (coronary artery disease)  Ventricular tachycardia  NANDO on CPAP  COPD (chronic obstructive pulmonary disease)  Pacemaker  AICD (automatic cardioverter/defibrillator) present  Hypothyroidism  Atrial fibrillation  Congestive heart disease  Hypercholesteremia  AICD (automatic cardioverter/defibrillator) present  History of laparoscopic cholecystectomy      FAMILY HISTORY:  FAMILY HISTORY:  Family history of acute myocardial infarction (Aunt)    SOCIAL HISTORY:  Denies smoking, alcohol    ALLERGIES:  morphine (Stomach Upset)    MEDICATIONS:  MEDICATIONS  (STANDING):  amiodarone    Tablet 200 milliGRAM(s) Oral two times a day  apixaban 2.5 milliGRAM(s) Oral every 12 hours  finasteride 5 milliGRAM(s) Oral daily  furosemide    Tablet 40 milliGRAM(s) Oral daily  levothyroxine 50 MICROGram(s) Oral daily  lidocaine   Infusion 1 mG/Min (7.5 mL/Hr) IV Continuous <Continuous>  losartan 100 milliGRAM(s) Oral daily  metoprolol tartrate 50 milliGRAM(s) Oral two times a day  mexiletine 150 milliGRAM(s) Oral every 12 hours  montelukast 10 milliGRAM(s) Oral daily  pantoprazole    Tablet 40 milliGRAM(s) Oral before breakfast  simvastatin 10 milliGRAM(s) Oral at bedtime  tamsulosin 0.4 milliGRAM(s) Oral at bedtime    MEDICATIONS  (PRN):  ALBUTerol/ipratropium for Nebulization 3 milliLiter(s) Nebulizer every 6 hours PRN Shortness of Breath and/or Wheezing  ALPRAZolam 0.25 milliGRAM(s) Oral daily PRN anxiety      HOME MEDICATIONS:  Home Medications:  ALPRAZolam 0.25 mg oral tablet: 1 tab(s) orally once a day, As Needed (23 Oct 2018 00:22)  amiodarone 200 mg oral tablet: 1 tab(s) orally once a day (23 Oct 2018 00:22)  Eliquis 2.5 mg oral tablet: 1 tab(s) orally 2 times a day (23 Oct 2018 00:22)  finasteride 5 mg oral tablet: 1 tab(s) orally once a day (23 Oct 2018 00:22)  glyBURIDE 5 mg oral tablet: 1 tab(s) orally once a day (23 Oct 2018 00:22)  levothyroxine 50 mcg (0.05 mg) oral capsule: 1 cap(s) orally once a day (23 Oct 2018 00:22)  losartan 100 mg oral tablet: 1 tab(s) orally once a day (23 Oct 2018 00:22)  metoprolol tartrate 50 mg oral tablet: 1 tab(s) orally 2 times a day (23 Oct 2018 00:22)  montelukast 10 mg oral tablet: 1 tab(s) orally once a day (23 Oct 2018 00:22)  OMEPRAZOLE DR 40 MG CAPSULE: 40  orally once a day (23 Oct 2018 00:22)  pravastatin 20 mg oral tablet: 1 tab(s) orally once a day (23 Oct 2018 00:22)  tamsulosin 0.4 mg oral capsule: 1 cap(s) orally once a day (23 Oct 2018 00:22)      VITALS:   T(F): 97.5 (10-22 @ 23:26), Max: 97.5 (10-22 @ 23:26)  HR: 70 (10-22 @ 23:26) (70 - 77)  BP: 131/64 (10-22 @ 23:26) (131/64 - 133/75)  BP(mean): --  RR: 19 (10-22 @ 23:26) (19 - 20)  SpO2: 96% (10-22 @ 23:26) (96% - 97%)    I&O's Summary      PHYSICAL EXAM:  GEN: Alert and oriented X 3, Well nourished, No acute distress  NECK: Supple, non tender, NO JVD, No carotid bruit,   LUNGS: Clear to auscultation bilaterally, non labored respiration  CARDIOVASCULAR: S1/S2 present, RRR , no murmus or rubs  ABD: Soft, non-tender, non-distended,   EXT: No Lower extremity edema, no tenderness  NEURO: Non focal  SKIN: Intact    LABS:                        12.2   6.90  )-----------( 313      ( 22 Oct 2018 21:55 )             38.5     10-22    140  |  96<L>  |  16  ----------------------------<  213<H>  4.0   |  26  |  1.3    Ca    9.3      22 Oct 2018 21:55  Mg     2.1     10-22    TPro  6.8  /  Alb  4.0  /  TBili  0.3  /  DBili  x   /  AST  13  /  ALT  10  /  AlkPhos  105  10-22      Creatine Kinase, Serum: 75 U/L (10-22-18 @ 21:55)  Troponin T, Serum: <0.01 ng/mL (10-22-18 @ 21:55)    CARDIAC MARKERS ( 22 Oct 2018 21:55 )  x     / <0.01 ng/mL / 75 U/L / x     / x          RADIOLOGY:  -CXR:      -TTE:    < from: Transthoracic Echocardiogram (05.14.18 @ 12:53) >  Summary:   1. Left ventricular ejection fraction, by visual estimation, is 20 to   25%.   2. Severely decreased global left ventricular systolic function.   3. Moderate to severe left atrial enlargement.   4. LV Ejection Fraction by Amaro's Method with a biplane EF of 26 %.   5. Mildly increased LV wall thickness.   6. Severely increased left ventricular internal cavity size.   7. Thickening and calcification of the anterior mitral valve leaflet.   8. Estimated pulmonary artery systolic pressure is 36.0 mmHg assuming a   right atrial pressure of 5 mmHg, which is consistent with borderline   pulmonary hypertension.    < end of copied text >    -CCTA:  -STRESS TEST:  -CATHETERIZATION:    There is significant single vessel coronary artery disease: 1st obtuse marginal: The vessel was medium to large   sized. There was a discrete 70 % stenosis in the proximal third of the vessel   segment. The lesion was eccentric.    ECG:  Paced rythum  TELEMETRY EVENTS:  None paced rythum

## 2018-10-23 NOTE — CONSULT NOTE ADULT - ASSESSMENT
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

## 2018-10-23 NOTE — H&P ADULT - ASSESSMENT
78Y M with pmh of Afib on eliquis, CAD with SCHF s/p AICD, COPD, DM2, DLD and hypothyroidism presented to the ED after having multiple episodes of his AICD firing.    Chest pressure 2/2 AICD firing 2/2 episodes of VTach 2/2 cardiomyopathy   -Admit to CCU  -As per ED device was interrogated remotely and patient has recent episodes and well as 52 episodes in the past of AICD firing  -Hemodynamically stable with electrolytes wnl  -As per conversation with cardiac fellow will increase amio to BID and start on lidocaine drip. Will continue all other home meds for now  -EP consult for ablation which patient now agrees to  -Monitor K and keep above 4 and keep Mg above 2    Afib   -rate is controlled  -c/w amiodarone, mexiletine, metoprolol  -c/w eliquis    COPD  -inhalers prn  -c/w montelukast    DM2  -monitor fingersticks and if >200 start insulin sliding scale    hypothyroidism  -c/w levothyroxine     DLD  -c/w simvastatin    DVT ppx  -on eliquis    Dispo: As per EP and further intervention   Patient is from home 78Y M with pmh of Afib on eliquis, CAD with SCHF s/p AICD, COPD, DM2, DLD and hypothyroidism presented to the ED after having multiple episodes of his AICD firing.    Chest pressure 2/2 AICD firing 2/2 episodes of VTach 2/2 cardiomyopathy   -Admit to CCU  -As per ED device was interrogated remotely and patient has recent episodes and well as 52 episodes in the past of AICD firing  -Hemodynamically stable with electrolytes wnl  -As per conversation with cardiac fellow will increase amio to BID and start on lidocaine drip. Will continue all other home meds for now  -EP consult for ablation which patient now agrees to  -Monitor K and keep above 4 and keep Mg above 2  -Cardiac MRI     Afib   -rate is controlled  -c/w amiodarone, mexiletine, metoprolol  -c/w eliquis    COPD  -inhalers prn  -c/w montelukast    DM2  -monitor fingersticks and if >200 start insulin sliding scale    hypothyroidism  -c/w levothyroxine     DLD  -c/w simvastatin    DVT ppx  -on eliquis    Dispo: As per EP and further intervention   Patient is from home

## 2018-10-23 NOTE — H&P ADULT - NSHPSOCIALHISTORY_GEN_ALL_CORE
Marital Status:  (x )    (   ) Single    (   )    (  )   Occupation: retired  Lives with: (  ) alone  (  ) children   ( x) spouse   (  ) parents  (  ) other    Substance Use (street drugs): ( x ) never used  (  ) other:  Tobacco Usage:  (   ) never smoked   (quit 20yrs ago, used to smoke 1/2 ppd for 20yrs) former smoker   (   ) current smoker  (     ) pack year  (        ) last cigarette date  Alcohol Usage: none

## 2018-10-23 NOTE — H&P ADULT - FAMILY HISTORY
Aunt  Still living? Unknown  Family history of acute myocardial infarction, Age at diagnosis: Age Unknown

## 2018-10-23 NOTE — H&P ADULT - ATTENDING COMMENTS
Patient seen and examined independently. Agree with resident note/ history / physical exam and plan of care with following exceptions/additions/updates. Case discussed with house-staff, nursing and patient/pt decision maker.   Cardio and EP notes appreciated,

## 2018-10-23 NOTE — H&P ADULT - NSHPPHYSICALEXAM_GEN_ALL_CORE
T(F): 97.5 (10-22-18 @ 23:26), Max: 97.5 (10-22-18 @ 23:26)  HR: 70 (10-22-18 @ 23:26) (70 - 77)  BP: 131/64 (10-22-18 @ 23:26) (131/64 - 133/75)  RR: 19 (10-22-18 @ 23:26) (19 - 20)  SpO2: 96% (10-22-18 @ 23:26) (96% - 97%)  PHYSICAL EXAM:  GENERAL: NAD, speaks in full sentences, no signs of respiratory distress  HEAD:  Atraumatic, Normocephalic  EYES: EOMI,  conjunctiva and sclera clear  CHEST/LUNG: R sided basal crackles   HEART: Irregular rhythm with regular rate; No murmurs;   ABDOMEN: Soft, Nontender, Nondistended; Bowel sounds present; No guarding  EXTREMITIES:  No cyanosis or edema  PSYCH: AAOx3  NEUROLOGY: non-focal  SKIN: No rashes or lesions

## 2018-10-24 LAB
ANION GAP SERPL CALC-SCNC: 12 MMOL/L — SIGNIFICANT CHANGE UP (ref 7–14)
APTT BLD: 35 SEC — SIGNIFICANT CHANGE UP (ref 27–39.2)
BASOPHILS # BLD AUTO: 0.04 K/UL — SIGNIFICANT CHANGE UP (ref 0–0.2)
BASOPHILS NFR BLD AUTO: 0.7 % — SIGNIFICANT CHANGE UP (ref 0–1)
BUN SERPL-MCNC: 16 MG/DL — SIGNIFICANT CHANGE UP (ref 10–20)
CALCIUM SERPL-MCNC: 8.5 MG/DL — SIGNIFICANT CHANGE UP (ref 8.5–10.1)
CHLORIDE SERPL-SCNC: 101 MMOL/L — SIGNIFICANT CHANGE UP (ref 98–110)
CO2 SERPL-SCNC: 27 MMOL/L — SIGNIFICANT CHANGE UP (ref 17–32)
CREAT SERPL-MCNC: 1.3 MG/DL — SIGNIFICANT CHANGE UP (ref 0.7–1.5)
EOSINOPHIL # BLD AUTO: 0.33 K/UL — SIGNIFICANT CHANGE UP (ref 0–0.7)
EOSINOPHIL NFR BLD AUTO: 6 % — SIGNIFICANT CHANGE UP (ref 0–8)
GLUCOSE BLDC GLUCOMTR-MCNC: 111 MG/DL — HIGH (ref 70–99)
GLUCOSE BLDC GLUCOMTR-MCNC: 138 MG/DL — HIGH (ref 70–99)
GLUCOSE BLDC GLUCOMTR-MCNC: 182 MG/DL — HIGH (ref 70–99)
GLUCOSE SERPL-MCNC: 123 MG/DL — HIGH (ref 70–99)
HCT VFR BLD CALC: 32.4 % — LOW (ref 42–52)
HGB BLD-MCNC: 10.2 G/DL — LOW (ref 14–18)
IMM GRANULOCYTES NFR BLD AUTO: 0.2 % — SIGNIFICANT CHANGE UP (ref 0.1–0.3)
INR BLD: 1.3 RATIO — SIGNIFICANT CHANGE UP (ref 0.65–1.3)
LYMPHOCYTES # BLD AUTO: 0.71 K/UL — LOW (ref 1.2–3.4)
LYMPHOCYTES # BLD AUTO: 12.9 % — LOW (ref 20.5–51.1)
MAGNESIUM SERPL-MCNC: 2 MG/DL — SIGNIFICANT CHANGE UP (ref 1.8–2.4)
MCHC RBC-ENTMCNC: 26.6 PG — LOW (ref 27–31)
MCHC RBC-ENTMCNC: 31.5 G/DL — LOW (ref 32–37)
MCV RBC AUTO: 84.6 FL — SIGNIFICANT CHANGE UP (ref 80–94)
MONOCYTES # BLD AUTO: 0.5 K/UL — SIGNIFICANT CHANGE UP (ref 0.1–0.6)
MONOCYTES NFR BLD AUTO: 9.1 % — SIGNIFICANT CHANGE UP (ref 1.7–9.3)
NEUTROPHILS # BLD AUTO: 3.92 K/UL — SIGNIFICANT CHANGE UP (ref 1.4–6.5)
NEUTROPHILS NFR BLD AUTO: 71.1 % — SIGNIFICANT CHANGE UP (ref 42.2–75.2)
PLATELET # BLD AUTO: 226 K/UL — SIGNIFICANT CHANGE UP (ref 130–400)
POTASSIUM SERPL-MCNC: 3.9 MMOL/L — SIGNIFICANT CHANGE UP (ref 3.5–5)
POTASSIUM SERPL-SCNC: 3.9 MMOL/L — SIGNIFICANT CHANGE UP (ref 3.5–5)
PROTHROM AB SERPL-ACNC: 14.9 SEC — HIGH (ref 9.95–12.87)
RBC # BLD: 3.83 M/UL — LOW (ref 4.7–6.1)
RBC # FLD: 14.8 % — HIGH (ref 11.5–14.5)
SODIUM SERPL-SCNC: 140 MMOL/L — SIGNIFICANT CHANGE UP (ref 135–146)
WBC # BLD: 5.51 K/UL — SIGNIFICANT CHANGE UP (ref 4.8–10.8)
WBC # FLD AUTO: 5.51 K/UL — SIGNIFICANT CHANGE UP (ref 4.8–10.8)

## 2018-10-24 RX ORDER — SENNA PLUS 8.6 MG/1
2 TABLET ORAL AT BEDTIME
Qty: 0 | Refills: 0 | Status: DISCONTINUED | OUTPATIENT
Start: 2018-10-24 | End: 2018-11-01

## 2018-10-24 RX ORDER — POTASSIUM CHLORIDE 20 MEQ
20 PACKET (EA) ORAL
Qty: 0 | Refills: 0 | Status: COMPLETED | OUTPATIENT
Start: 2018-10-24 | End: 2018-10-24

## 2018-10-24 RX ORDER — CHLORHEXIDINE GLUCONATE 213 G/1000ML
1 SOLUTION TOPICAL
Qty: 0 | Refills: 0 | Status: DISCONTINUED | OUTPATIENT
Start: 2018-10-24 | End: 2018-11-01

## 2018-10-24 RX ORDER — DOCUSATE SODIUM 100 MG
100 CAPSULE ORAL THREE TIMES A DAY
Qty: 0 | Refills: 0 | Status: DISCONTINUED | OUTPATIENT
Start: 2018-10-24 | End: 2018-11-01

## 2018-10-24 RX ADMIN — APIXABAN 2.5 MILLIGRAM(S): 2.5 TABLET, FILM COATED ORAL at 06:27

## 2018-10-24 RX ADMIN — Medication 7.5 MG/MIN: at 06:18

## 2018-10-24 RX ADMIN — MONTELUKAST 10 MILLIGRAM(S): 4 TABLET, CHEWABLE ORAL at 15:54

## 2018-10-24 RX ADMIN — MEXILETINE HYDROCHLORIDE 150 MILLIGRAM(S): 150 CAPSULE ORAL at 06:26

## 2018-10-24 RX ADMIN — Medication 50 MILLIGRAM(S): at 06:27

## 2018-10-24 RX ADMIN — AMIODARONE HYDROCHLORIDE 200 MILLIGRAM(S): 400 TABLET ORAL at 06:27

## 2018-10-24 RX ADMIN — APIXABAN 2.5 MILLIGRAM(S): 2.5 TABLET, FILM COATED ORAL at 18:06

## 2018-10-24 RX ADMIN — PANTOPRAZOLE SODIUM 40 MILLIGRAM(S): 20 TABLET, DELAYED RELEASE ORAL at 06:27

## 2018-10-24 RX ADMIN — Medication 50 MICROGRAM(S): at 06:27

## 2018-10-24 RX ADMIN — LOSARTAN POTASSIUM 100 MILLIGRAM(S): 100 TABLET, FILM COATED ORAL at 06:26

## 2018-10-24 RX ADMIN — SIMVASTATIN 10 MILLIGRAM(S): 20 TABLET, FILM COATED ORAL at 21:25

## 2018-10-24 RX ADMIN — MEXILETINE HYDROCHLORIDE 150 MILLIGRAM(S): 150 CAPSULE ORAL at 18:06

## 2018-10-24 RX ADMIN — Medication 20 MILLIEQUIVALENT(S): at 18:06

## 2018-10-24 RX ADMIN — FINASTERIDE 5 MILLIGRAM(S): 5 TABLET, FILM COATED ORAL at 15:54

## 2018-10-24 RX ADMIN — AMIODARONE HYDROCHLORIDE 200 MILLIGRAM(S): 400 TABLET ORAL at 18:06

## 2018-10-24 RX ADMIN — Medication 100 MILLIGRAM(S): at 15:56

## 2018-10-24 RX ADMIN — Medication 100 MILLIGRAM(S): at 21:26

## 2018-10-24 RX ADMIN — Medication 20 MILLIEQUIVALENT(S): at 15:56

## 2018-10-24 RX ADMIN — Medication 40 MILLIGRAM(S): at 06:26

## 2018-10-24 RX ADMIN — TAMSULOSIN HYDROCHLORIDE 0.4 MILLIGRAM(S): 0.4 CAPSULE ORAL at 21:25

## 2018-10-24 RX ADMIN — SENNA PLUS 2 TABLET(S): 8.6 TABLET ORAL at 21:25

## 2018-10-24 NOTE — PROGRESS NOTE ADULT - ASSESSMENT
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 As per patient he saw Dr Santos on Thursday who reprogrammed his device and advised him for V-tach ablation. Patient says he has been refusing ablation for months but now understands that its necessary. He has been having worsening SOB on exertion for the past few months and developed a cough for the past few weeks which his cardiologist informed him was due to fluid in his lungs. Patient says he has an aura like sensation when he knows that his AICD is about to fire and he feel like he has gas travelling up his abdomen and then chest pressure.   AICD interrogation in ER revelead multiple epsiodes of NSVT 52 in 24 hrs, 8 of VT treated with ATPs, 1 VT and VF treated with 35J shock    #Chest pressure 2/2 AICD firing 2/2 episodes of VTach 2/2 cardiomyopathy   -Hemodynamically stable with electrolytes wnl  -Lidocaine GGT  -Amiodarone 200 BID  -Mexiletine 150mg BID  -Metoprolol 50mg BID  -EP consult for ablation which patient now agrees to  -Dr. 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 As per patient he saw Dr Santos on Thursday who reprogrammed his device and advised him for V-tach ablation. Patient says he has been refusing ablation for months but now understands that its necessary. He has been having worsening SOB on exertion for the past few months and developed a cough for the past few weeks which his cardiologist informed him was due to fluid in his lungs. Patient says he has an aura like sensation when he knows that his AICD is about to fire and he feel like he has gas travelling up his abdomen and then chest pressure.   AICD interrogation in ER revelead multiple epsiodes of NSVT 52 in 24 hrs, 8 of VT treated with ATPs, 1 VT and VF treated with 35J shock    #Chest pressure 2/2 AICD firing 2/2 episodes of VTach 2/2 cardiomyopathy   -Hemodynamically stable with electrolytes wnl  -Lidocaine GGT  -Amiodarone 200 BID  -Mexiletine 150mg BID  -Metoprolol 50mg BID  -EP consult for ablation which patient now agrees to  -Dr. Holt following  -Cardiac MRI pending results  -Monitor K and keep above 4 and keep Mg above 2    #Afib   -rate controlled  -Metoprolol 50mg BID  -c/w eliquis 2.5mg BID    #Pulmonary Nodule  Right lower lung field 1.3 cm nodular opacity.   CT chest outpatient      #COPD  -Duonebs Q6 prn  -c/w montelukast  -stable no wheezing    #DM2  -monitor fingersticks and if >200 start insulin sliding scale    #hypothyroidism  -c/w levothyroxine 50mcg qd    #DLD  -c/w simvastatin 10mg QHS    DVT ppx  -on eliquis    Dispo: As per EP and further intervention   Patient is from home

## 2018-10-24 NOTE — PROGRESS NOTE ADULT - SUBJECTIVE AND OBJECTIVE BOX
SUBJECTIVE:    Patient is a 78y old Male who presents with a chief complaint of Multiple incidences of AICD firing (24 Oct 2018 12:07)    Currently admitted to medicine with the primary diagnosis of Ventricular tachycardia and AICD Firing 52x in 24 hrs      Today is hospital day 2d. This morning he is resting comfortably in bed and reports no new issues or overnight events. No further episodes of VT since admission doing well on lidocaine GGT. Denies, dizziness, weakness, cp, sob nausea, emesis.     PAST MEDICAL & SURGICAL HISTORY  CAD (coronary artery disease)  Ventricular tachycardia  NANDO on CPAP  COPD (chronic obstructive pulmonary disease)  Pacemaker  AICD (automatic cardioverter/defibrillator) present  Hypothyroidism  Atrial fibrillation  Congestive heart disease  Hypercholesteremia  AICD (automatic cardioverter/defibrillator) present  History of laparoscopic cholecystectomy      ALLERGIES:  morphine (Stomach Upset)    MEDICATIONS:  STANDING MEDICATIONS  amiodarone    Tablet 200 milliGRAM(s) Oral two times a day  apixaban 2.5 milliGRAM(s) Oral every 12 hours  chlorhexidine 4% Liquid 1 Application(s) Topical <User Schedule>  docusate sodium 100 milliGRAM(s) Oral three times a day  finasteride 5 milliGRAM(s) Oral daily  furosemide    Tablet 40 milliGRAM(s) Oral daily  levothyroxine 50 MICROGram(s) Oral daily  lidocaine   Infusion 1 mG/Min IV Continuous <Continuous>  losartan 100 milliGRAM(s) Oral daily  metoprolol tartrate 50 milliGRAM(s) Oral two times a day  mexiletine 150 milliGRAM(s) Oral every 12 hours  montelukast 10 milliGRAM(s) Oral daily  pantoprazole    Tablet 40 milliGRAM(s) Oral before breakfast  senna 2 Tablet(s) Oral at bedtime  simvastatin 10 milliGRAM(s) Oral at bedtime  tamsulosin 0.4 milliGRAM(s) Oral at bedtime    PRN MEDICATIONS  ALBUTerol/ipratropium for Nebulization 3 milliLiter(s) Nebulizer every 6 hours PRN  ALPRAZolam 0.25 milliGRAM(s) Oral daily PRN    ICU Vital Signs Last 24 Hrs  T(C): 35.9 (24 Oct 2018 12:00), Max: 36.8 (24 Oct 2018 04:00)  T(F): 96.6 (24 Oct 2018 12:00), Max: 98.3 (24 Oct 2018 04:00)  HR: 68 (24 Oct 2018 12:00) (68 - 84)  BP: 116/- (24 Oct 2018 12:00) (109/60 - 166/59)  BP(mean): 78 (24 Oct 2018 12:00) (78 - 128)  ABP: --  ABP(mean): --  RR: 19 (24 Oct 2018 12:00) (18 - 38)  SpO2: 97% (24 Oct 2018 12:00) (94% - 99%)      LABS:                        10.2   5.51  )-----------( 226      ( 24 Oct 2018 04:33 )             32.4     10-24    140  |  101  |  16  ----------------------------<  123<H>  3.9   |  27  |  1.3    Ca    8.5      24 Oct 2018 04:33  Phos  3.3     10-23  Mg     2.0     10-24    TPro  6.4  /  Alb  3.8  /  TBili  0.4  /  DBili  x   /  AST  13  /  ALT  9   /  AlkPhos  93  10-23    PT/INR - ( 24 Oct 2018 04:33 )   PT: 14.90 sec;   INR: 1.30 ratio       PTT - ( 24 Oct 2018 04:33 )  PTT:35.0 sec    CARDIAC MARKERS ( 22 Oct 2018 21:55 )  x     / <0.01 ng/mL / 75 U/L / x     / x          RADIOLOGY:  -TTE:    < from: Transthoracic Echocardiogram (05.14.18 @ 12:53) >  Summary:   1. Left ventricular ejection fraction, by visual estimation, is 20 to   25%.   2. Severely decreased global left ventricular systolic function.   3. Moderate to severe left atrial enlargement.   4. LV Ejection Fraction by Amaro's Method with a biplane EF of 26 %.   5. Mildly increased LV wall thickness.   6. Severely increased left ventricular internal cavity size.   7. Thickening and calcification of the anterior mitral valve leaflet.   8. Estimated pulmonary artery systolic pressure is 36.0 mmHg assuming a   right atrial pressure of 5 mmHg, which is consistent with borderline   pulmonary hypertension.    < end of copied text >    < from: Xray Chest 1 View- PORTABLE-Urgent (10.22.18 @ 22:30) >  INTERPRETATION:  Clinical History / Reason for exam: AICD fired    Comparison : Chest radiograph 5/31/2018.    Technique/Positioning: AP radiograph the chest. The right hemithorax is   partially excluded from field-of-view.    Findings:    Support devices: Left ICD with stable lead positions.    Cardiac/mediastinum/hilum: Stable cardiomegaly.    Lung parenchyma/Pleura: Bibasilar scarring/atelectasis. No focal   consolidation. Right lower lung field 1.3 cm nodular opacity. No   pneumothorax.    Skeleton/soft tissues: Stable.    Impression:      Right lower lung field 1.3 cm nodular opacity. CT of the chest may be   obtained for further evaluation.    < end of copied text >      -CCTA:  -STRESS TEST:  -CATHETERIZATION:    There is significant single vessel coronary artery disease: 1st obtuse marginal: The vessel was medium to large   sized. There was a discrete 70 % stenosis in the proximal third of the vessel   segment. The lesion was eccentric.    PHYSICAL EXAM:  GEN: No acute distress  LUNGS: Clear to auscultation bilaterally   HEART: S1/S2 present. irregularly irregular rate and rhythm   ABD: Soft, non-tender, non-distended. Bowel sounds present  EXT: NC/NC/NE/2+PP/SAMUELS/Skin Intact.   NEURO: AAOX3    Intravenous access:   NG tube:   Johnson cathter:

## 2018-10-24 NOTE — PROGRESS NOTE ADULT - SUBJECTIVE AND OBJECTIVE BOX
pt seen and examined.   cc constipation  no cp, no palpitation, no sob  no fever, chills, no pain    Vital Signs Last 24 Hrs  T(C): 36 (24 Oct 2018 08:00), Max: 36.8 (24 Oct 2018 04:00)  T(F): 96.8 (24 Oct 2018 08:00), Max: 98.3 (24 Oct 2018 04:00)  HR: 76 (24 Oct 2018 10:00) (70 - 84)  BP: 109/60 (24 Oct 2018 10:00) (109/60 - 166/59)  BP(mean): 78 (24 Oct 2018 10:00) (78 - 128)  RR: 26 (24 Oct 2018 10:00) (18 - 38)  SpO2: 94% (24 Oct 2018 10:00) (94% - 99%)    Physical exam:   constitutional NAD, AAOX3, Respiratory  lungs CTA, CVS heart RRR, GI: abdomen Soft NT, ND, BS+, skin: intact, aicd site is clean , healed skin. no open wound  neuro exam non focal.                           10.2   5.51  )-----------( 226      ( 24 Oct 2018 04:33 )             32.4     10-24    140  |  101  |  16  ----------------------------<  123<H>  3.9   |  27  |  1.3    Ca    8.5      24 Oct 2018 04:33  Phos  3.3     10-23  Mg     2.0     10-24    TPro  6.4  /  Alb  3.8  /  TBili  0.4  /  DBili  x   /  AST  13  /  ALT  9   /  AlkPhos  93  10-23      < from: Xray Chest 1 View- PORTABLE-Urgent (10.22.18 @ 22:30) >    Right lower lung field 1.3 cm nodular opacity. CT of the chest may be   obtained for further evaluation.    < end of copied text >    a/p  1- aicd firing, arrhythima, for cardiac MRI as per ep, cont lidocaine drip and meds per ep    2- gary nodule, pulm eval, will probably need ct chest    3- ckd? stable cr, outpt fu with nephro    4- constipation, stool softner ordered.     5- dodie, copd, stable, ? home cpap, pulm to see pt for the setting.     DVT prophylaxis    full code pt seen and examined.   cc constipation  no cp, no palpitation, no sob  no fever, chills, no pain    Vital Signs Last 24 Hrs  T(C): 36 (24 Oct 2018 08:00), Max: 36.8 (24 Oct 2018 04:00)  T(F): 96.8 (24 Oct 2018 08:00), Max: 98.3 (24 Oct 2018 04:00)  HR: 76 (24 Oct 2018 10:00) (70 - 84)  BP: 109/60 (24 Oct 2018 10:00) (109/60 - 166/59)  BP(mean): 78 (24 Oct 2018 10:00) (78 - 128)  RR: 26 (24 Oct 2018 10:00) (18 - 38)  SpO2: 94% (24 Oct 2018 10:00) (94% - 99%)    Physical exam:   constitutional NAD, AAOX3, Respiratory  lungs CTA, CVS heart RRR, GI: abdomen Soft NT, ND, BS+, skin: intact, aicd site is clean , healed skin. no open wound  neuro exam non focal.                           10.2   5.51  )-----------( 226      ( 24 Oct 2018 04:33 )             32.4     10-24    140  |  101  |  16  ----------------------------<  123<H>  3.9   |  27  |  1.3    Ca    8.5      24 Oct 2018 04:33  Phos  3.3     10-23  Mg     2.0     10-24    TPro  6.4  /  Alb  3.8  /  TBili  0.4  /  DBili  x   /  AST  13  /  ALT  9   /  AlkPhos  93  10-23      < from: Xray Chest 1 View- PORTABLE-Urgent (10.22.18 @ 22:30) >    Right lower lung field 1.3 cm nodular opacity. CT of the chest may be   obtained for further evaluation.    < end of copied text >    a/p  1- aicd firing, arrhythima, for cardiac MRI as per ep, cont lidocaine drip and meds per ep    2- gary nodule, pulm eval, will probably need ct chest    3- ckd? stable cr, outpt fu with nephro    4- constipation, stool softner ordered.     5- dodie, copd, stable, ? home cpap, pulm to see pt for the setting.     6- afib, dvt prophylaxis, pt is on AC.     dw resident.     full code

## 2018-10-25 DIAGNOSIS — I47.2 VENTRICULAR TACHYCARDIA: ICD-10-CM

## 2018-10-25 DIAGNOSIS — Z95.810 PRESENCE OF AUTOMATIC (IMPLANTABLE) CARDIAC DEFIBRILLATOR: ICD-10-CM

## 2018-10-25 LAB
ALBUMIN SERPL ELPH-MCNC: 3.4 G/DL — LOW (ref 3.5–5.2)
ALP SERPL-CCNC: 81 U/L — SIGNIFICANT CHANGE UP (ref 30–115)
ALT FLD-CCNC: 8 U/L — SIGNIFICANT CHANGE UP (ref 0–41)
ANION GAP SERPL CALC-SCNC: 14 MMOL/L — SIGNIFICANT CHANGE UP (ref 7–14)
AST SERPL-CCNC: 12 U/L — SIGNIFICANT CHANGE UP (ref 0–41)
BASOPHILS # BLD AUTO: 0.03 K/UL — SIGNIFICANT CHANGE UP (ref 0–0.2)
BASOPHILS NFR BLD AUTO: 0.5 % — SIGNIFICANT CHANGE UP (ref 0–1)
BILIRUB SERPL-MCNC: 0.5 MG/DL — SIGNIFICANT CHANGE UP (ref 0.2–1.2)
BUN SERPL-MCNC: 21 MG/DL — HIGH (ref 10–20)
CALCIUM SERPL-MCNC: 8.9 MG/DL — SIGNIFICANT CHANGE UP (ref 8.5–10.1)
CHLORIDE SERPL-SCNC: 98 MMOL/L — SIGNIFICANT CHANGE UP (ref 98–110)
CO2 SERPL-SCNC: 27 MMOL/L — SIGNIFICANT CHANGE UP (ref 17–32)
CREAT SERPL-MCNC: 1.4 MG/DL — SIGNIFICANT CHANGE UP (ref 0.7–1.5)
EOSINOPHIL # BLD AUTO: 0.28 K/UL — SIGNIFICANT CHANGE UP (ref 0–0.7)
EOSINOPHIL NFR BLD AUTO: 4.7 % — SIGNIFICANT CHANGE UP (ref 0–8)
GLUCOSE BLDC GLUCOMTR-MCNC: 113 MG/DL — HIGH (ref 70–99)
GLUCOSE BLDC GLUCOMTR-MCNC: 152 MG/DL — HIGH (ref 70–99)
GLUCOSE BLDC GLUCOMTR-MCNC: 166 MG/DL — HIGH (ref 70–99)
GLUCOSE BLDC GLUCOMTR-MCNC: 205 MG/DL — HIGH (ref 70–99)
GLUCOSE SERPL-MCNC: 109 MG/DL — HIGH (ref 70–99)
HCT VFR BLD CALC: 34.2 % — LOW (ref 42–52)
HGB BLD-MCNC: 11 G/DL — LOW (ref 14–18)
IMM GRANULOCYTES NFR BLD AUTO: 0.3 % — SIGNIFICANT CHANGE UP (ref 0.1–0.3)
LYMPHOCYTES # BLD AUTO: 0.88 K/UL — LOW (ref 1.2–3.4)
LYMPHOCYTES # BLD AUTO: 14.7 % — LOW (ref 20.5–51.1)
MAGNESIUM SERPL-MCNC: 2 MG/DL — SIGNIFICANT CHANGE UP (ref 1.8–2.4)
MCHC RBC-ENTMCNC: 27.3 PG — SIGNIFICANT CHANGE UP (ref 27–31)
MCHC RBC-ENTMCNC: 32.2 G/DL — SIGNIFICANT CHANGE UP (ref 32–37)
MCV RBC AUTO: 84.9 FL — SIGNIFICANT CHANGE UP (ref 80–94)
MONOCYTES # BLD AUTO: 0.58 K/UL — SIGNIFICANT CHANGE UP (ref 0.1–0.6)
MONOCYTES NFR BLD AUTO: 9.7 % — HIGH (ref 1.7–9.3)
NEUTROPHILS # BLD AUTO: 4.18 K/UL — SIGNIFICANT CHANGE UP (ref 1.4–6.5)
NEUTROPHILS NFR BLD AUTO: 70.1 % — SIGNIFICANT CHANGE UP (ref 42.2–75.2)
NRBC # BLD: 0 /100 WBCS — SIGNIFICANT CHANGE UP (ref 0–0)
PHOSPHATE SERPL-MCNC: 3.5 MG/DL — SIGNIFICANT CHANGE UP (ref 2.1–4.9)
PLATELET # BLD AUTO: 219 K/UL — SIGNIFICANT CHANGE UP (ref 130–400)
POTASSIUM SERPL-MCNC: 4.2 MMOL/L — SIGNIFICANT CHANGE UP (ref 3.5–5)
POTASSIUM SERPL-SCNC: 4.2 MMOL/L — SIGNIFICANT CHANGE UP (ref 3.5–5)
PROT SERPL-MCNC: 5.5 G/DL — LOW (ref 6–8)
RBC # BLD: 4.03 M/UL — LOW (ref 4.7–6.1)
RBC # FLD: 14.9 % — HIGH (ref 11.5–14.5)
SODIUM SERPL-SCNC: 139 MMOL/L — SIGNIFICANT CHANGE UP (ref 135–146)
WBC # BLD: 5.97 K/UL — SIGNIFICANT CHANGE UP (ref 4.8–10.8)
WBC # FLD AUTO: 5.97 K/UL — SIGNIFICANT CHANGE UP (ref 4.8–10.8)

## 2018-10-25 RX ORDER — MIDAZOLAM HYDROCHLORIDE 1 MG/ML
1 INJECTION, SOLUTION INTRAMUSCULAR; INTRAVENOUS ONCE
Qty: 0 | Refills: 0 | Status: DISCONTINUED | OUTPATIENT
Start: 2018-10-25 | End: 2018-10-25

## 2018-10-25 RX ORDER — AMIODARONE HYDROCHLORIDE 400 MG/1
1 TABLET ORAL
Qty: 900 | Refills: 0 | Status: DISCONTINUED | OUTPATIENT
Start: 2018-10-25 | End: 2018-10-27

## 2018-10-25 RX ORDER — PROCAINAMIDE HCL 500 MG
1 TABLET, EXTENDED RELEASE ORAL
Qty: 2000 | Refills: 0 | Status: DISCONTINUED | OUTPATIENT
Start: 2018-10-25 | End: 2018-10-27

## 2018-10-25 RX ORDER — PROCAINAMIDE HCL 500 MG
0.5 TABLET, EXTENDED RELEASE ORAL
Qty: 2000 | Refills: 0 | Status: DISCONTINUED | OUTPATIENT
Start: 2018-10-25 | End: 2018-10-25

## 2018-10-25 RX ORDER — TAMSULOSIN HYDROCHLORIDE 0.4 MG/1
0.4 CAPSULE ORAL AT BEDTIME
Qty: 0 | Refills: 0 | Status: DISCONTINUED | OUTPATIENT
Start: 2018-10-25 | End: 2018-11-01

## 2018-10-25 RX ORDER — METOPROLOL TARTRATE 50 MG
12.5 TABLET ORAL EVERY 6 HOURS
Qty: 0 | Refills: 0 | Status: DISCONTINUED | OUTPATIENT
Start: 2018-10-25 | End: 2018-10-27

## 2018-10-25 RX ORDER — AMIODARONE HYDROCHLORIDE 400 MG/1
150 TABLET ORAL ONCE
Qty: 0 | Refills: 0 | Status: COMPLETED | OUTPATIENT
Start: 2018-10-25 | End: 2018-10-25

## 2018-10-25 RX ADMIN — Medication 50 MICROGRAM(S): at 05:48

## 2018-10-25 RX ADMIN — PANTOPRAZOLE SODIUM 40 MILLIGRAM(S): 20 TABLET, DELAYED RELEASE ORAL at 06:08

## 2018-10-25 RX ADMIN — AMIODARONE HYDROCHLORIDE 200 MILLIGRAM(S): 400 TABLET ORAL at 05:47

## 2018-10-25 RX ADMIN — Medication 15 MG/MIN: at 20:34

## 2018-10-25 RX ADMIN — TAMSULOSIN HYDROCHLORIDE 0.4 MILLIGRAM(S): 0.4 CAPSULE ORAL at 21:10

## 2018-10-25 RX ADMIN — MONTELUKAST 10 MILLIGRAM(S): 4 TABLET, CHEWABLE ORAL at 14:44

## 2018-10-25 RX ADMIN — FINASTERIDE 5 MILLIGRAM(S): 5 TABLET, FILM COATED ORAL at 14:45

## 2018-10-25 RX ADMIN — SIMVASTATIN 10 MILLIGRAM(S): 20 TABLET, FILM COATED ORAL at 21:10

## 2018-10-25 RX ADMIN — MEXILETINE HYDROCHLORIDE 150 MILLIGRAM(S): 150 CAPSULE ORAL at 17:50

## 2018-10-25 RX ADMIN — LOSARTAN POTASSIUM 100 MILLIGRAM(S): 100 TABLET, FILM COATED ORAL at 05:48

## 2018-10-25 RX ADMIN — Medication 12.5 MILLIGRAM(S): at 23:21

## 2018-10-25 RX ADMIN — MEXILETINE HYDROCHLORIDE 150 MILLIGRAM(S): 150 CAPSULE ORAL at 05:49

## 2018-10-25 RX ADMIN — Medication 40 MILLIGRAM(S): at 05:48

## 2018-10-25 RX ADMIN — AMIODARONE HYDROCHLORIDE 618 MILLIGRAM(S): 400 TABLET ORAL at 09:05

## 2018-10-25 RX ADMIN — Medication 100 MILLIGRAM(S): at 21:10

## 2018-10-25 RX ADMIN — Medication 7.5 MG/MIN: at 08:30

## 2018-10-25 RX ADMIN — Medication 100 MILLIGRAM(S): at 05:48

## 2018-10-25 RX ADMIN — SENNA PLUS 2 TABLET(S): 8.6 TABLET ORAL at 21:10

## 2018-10-25 RX ADMIN — Medication 7.5 MG/MIN: at 14:41

## 2018-10-25 RX ADMIN — AMIODARONE HYDROCHLORIDE 33.33 MG/MIN: 400 TABLET ORAL at 10:15

## 2018-10-25 RX ADMIN — MIDAZOLAM HYDROCHLORIDE 1 MILLIGRAM(S): 1 INJECTION, SOLUTION INTRAMUSCULAR; INTRAVENOUS at 09:55

## 2018-10-25 RX ADMIN — CHLORHEXIDINE GLUCONATE 1 APPLICATION(S): 213 SOLUTION TOPICAL at 05:47

## 2018-10-25 RX ADMIN — Medication 12.5 MILLIGRAM(S): at 17:50

## 2018-10-25 RX ADMIN — APIXABAN 2.5 MILLIGRAM(S): 2.5 TABLET, FILM COATED ORAL at 05:48

## 2018-10-25 RX ADMIN — Medication 12.5 MILLIGRAM(S): at 14:46

## 2018-10-25 RX ADMIN — Medication 50 MILLIGRAM(S): at 05:48

## 2018-10-25 NOTE — PROGRESS NOTE ADULT - ASSESSMENT
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 As per patient he saw Dr Santos on Thursday who reprogrammed his device and advised him for V-tach ablation. Patient says he has been refusing ablation for months but now understands that its necessary. He has been having worsening SOB on exertion for the past few months and developed a cough for the past few weeks which his cardiologist informed him was due to fluid in his lungs. Patient says he has an aura like sensation when he knows that his AICD is about to fire and he feel like he has gas travelling up his abdomen and then chest pressure.   AICD interrogation in ER revelead multiple epsiodes of NSVT 52 in 24 hrs, 8 of VT treated with ATPs, 1 VT and VF treated with 35J shock    #Chest pressure 2/2 AICD firing 2/2 Multiple episodes of VTach 2/2 Non Ischemic cardiomyopathy   -systolic HF EF 20%  -electrolytes wnl  -Lidocaine GGT  -Amiodarone GGT  -Procainamide GGT at 0.5 due to hypotension  -Mexiletine 150mg BID  -Metoprolol 12.5mg Q6hrs  -EP consult for ablation which patient now agrees to Dr. Holt following plans for possible ablation this week  -EP reprogramed pacing to 90bmp  -Cardiac MRI shows  There is mid myocardial late gadolinium enhancement predominantly involving the entire septal wall, with similar findings in the mid to apical anterior wall.   -Monitor K and keep above 4 and keep Mg above 2    #Afib   -rate controlled  -Metoprolol 12mg Q6  -c/w eliquis 2.5mg BID    #Pulmonary Nodule  Right lower lung field 1.3 cm nodular opacity.   CT chest outpatient      #COPD  -Duonebs Q6 prn  -c/w montelukast  -stable no wheezing    #DM2  -monitor fingersticks and if >200 start insulin sliding scale    #hypothyroidism  -c/w levothyroxine 50mcg qd    #DLD  -c/w simvastatin 10mg QHS    DVT ppx  -on eliquis    Dispo: As per EP and further intervention   Patient is from home      Poor Prognosis- Patient and family aware, they hope for ablation

## 2018-10-25 NOTE — CHART NOTE - NSCHARTNOTEFT_GEN_A_CORE
Nurse called patient in Sustained V tach 120-130 bpm for >10 minutes, feeling slightly dizzy and anxious but stable. No chest pain, sob. /75. Lidocaine GGT was restarted at 1mg/min and will titrate. Dr. Garcia and EP lab notified as well as Cardiac fellow Dr. Suhas Elise, they will come assess the patient and intervene. Patient is feeling nervous and refuses to get back to bed, says that he will stay in the chair and feels this will pass as this has happened before. K+ 4.2 Mag 2.0 Ca2+ 8.9 Nurse called patient in Sustained V tach 120-130 bpm for >10 minutes, feeling slightly dizzy and anxious but stable. No chest pain, sob. /75. Lidocaine GGT was restarted at 1mg/min and will titrate also Amiodarone 150mg IVPB given over 10 minutes. Dr. Garcia and EP lab notified as well as Cardiac fellow Dr. Suhas Elise, they will come assess the patient and intervene. Patient is feeling nervous and refuses to get back to bed, says that he will stay in the chair and feels this will pass as this has happened before. K+ 4.2 Mag 2.0 Ca2+ 8.9. After further discussion with EP attending, patient returned back to bed. Will monitor closely and intervene as necessary. Nurse called patient in Sustained V tach 120-140 bpm for >20 minutes, feeling slightly dizzy and anxious but stable. No chest pain, sob. /75 initially then reading 50/40's though patient mentating well AAOX3 no loss of conciousness possibly inacurate. Lidocaine, Amiodarone, and Procainamide drips were started and patient was overdrive paced by EP Dr. Modi and Dr. Mock for the second episode of Vtach for 5 minutes. Patient blood pressure picked up after 150mcg of Neosynephrine and decrease in procainamide GGT to 0.5, Blood pressure's now range between Dr. Garcia and EP lab notified. K+ 4.2 Mag 2.0 Ca2+ 8.9, all within normal limits.  Wife and son well informed and understand the seriousness of the patients condition. Patient will remain closely monitored in the CCU and Dr. Holt  plans for possible ablation of the Vtach focus this week. Nurse called patient in Sustained V tach 120-140 bpm for >20 minutes, feeling slightly dizzy and anxious but stable. No chest pain, sob. /75 initially then reading 50/40's though patient mentating well AAOX3 no loss of conciousness possibly inacurate. Lidocaine, Amiodarone, and Procainamide drips were started and patient was overdrive paced by EP Dr. Modi and Dr. Mock for the second episode of Vtach for 5 minutes. Patient blood pressure picked up after 150mcg of Neosynephrine and decrease in procainamide GGT to 0.5, Blood pressure's now range between 80's and 90's systolic. Dr. Garcia and EP lab notified. K+ 4.2 Mag 2.0 Ca2+ 8.9, all within normal limits.  Wife and son well informed and understand the seriousness of the patients condition. Patient will remain closely monitored in the CCU and Dr. Holt  plans for possible ablation of the Vtach focus this week. Nurse called patient in Sustained V tach 120-140 bpm for >20 minutes, feeling slightly dizzy and anxious but stable. No chest pain, sob. /75 initially then reading 50/40's though patient mentating well AAOX3 no loss of conciousness possibly inacurate. Lidocaine, Amiodarone, and Procainamide drips were started and patient was overdrive paced by EP Dr. Modi and Dr. Mock for the second episode of Vtach for 5 minutes. Patient blood pressure picked up after 150mcg of Neosynephrine and decrease in procainamide GGT to 0.5, Blood pressure's now range between 80's and 90's systolic and patient is back in normal sinus rhythm. Dr. Garcia and EP lab notified. K+ 4.2 Mag 2.0 Ca2+ 8.9, all within normal limits.  Wife and son well informed and understand the seriousness of the patients condition. Patient will remain closely monitored in the CCU and Dr. Holt  plans for possible ablation of the Vtach focus this week.

## 2018-10-25 NOTE — PROGRESS NOTE ADULT - ASSESSMENT
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 Loaded with Amio and Lidocain was restarted   Nonischemic CMP s/p BiV AICD  Afib on eliquis  COPD/NANDO  Hypothyrodism    Plan:  Maintain PO Amio Lidocaine  EP follow up for overdrive pacing

## 2018-10-25 NOTE — PROGRESS NOTE ADULT - SUBJECTIVE AND OBJECTIVE BOX
The patient is in sustained VT which has been hemodynamically stable. Ventricular rate is 125-130. He is essentially asymptomatic     PHYSICAL EXAM:  Vital Signs Last 24 Hrs  T(C): 35.9 (25 Oct 2018 08:00), Max: 36.1 (25 Oct 2018 04:00)  T(F): 96.6 (25 Oct 2018 08:00), Max: 97 (25 Oct 2018 04:00)  HR: 124 (25 Oct 2018 09:20) (68 - 130)  BP: 88/54 (25 Oct 2018 09:20) (79/39 - 146/55)  BP(mean): 63 (25 Oct 2018 09:20) (47 - 104)  RR: 18 (25 Oct 2018 09:20) (12 - 38)  SpO2: 93% (25 Oct 2018 09:20) (93% - 97%)  Daily     Daily   I&O's Detail    24 Oct 2018 07:01  -  25 Oct 2018 07:00  --------------------------------------------------------  IN:    lidocaine   Infusion: 86.3 mL    Oral Fluid: 380 mL  Total IN: 466.3 mL    OUT:    Voided: 1950 mL  Total OUT: 1950 mL    Total NET: -1483.7 mL      25 Oct 2018 07:01  -  25 Oct 2018 09:33  --------------------------------------------------------  IN:    lidocaine   Infusion: 15 mL  Total IN: 15 mL    OUT:    Voided: 200 mL  Total OUT: 200 mL    Total NET: -185 mL        GENERAL: NAD, well-groomed, well-developed  HEAD:  Atraumatic, Normocephalic  NECK: Supple, No JVD, Normal thyroid  NERVOUS SYSTEM:  Alert & Oriented X3,  CHEST/LUNG: Clear to percussion bilaterally; No rales, rhonchi, wheezing, or rubs  HEART: Regular rate and rhythm; No murmurs, rubs, or gallops  ABDOMEN: Soft, Nontender, Nondistended; Bowel sounds present  EXTREMITIES:  No edema         LABS:                        11.0   5.97  )-----------( 219      ( 25 Oct 2018 05:05 )             34.2     10-25    139  |  98  |  21<H>  ----------------------------<  109<H>  4.2   |  27  |  1.4    Ca    8.9      25 Oct 2018 05:05  Phos  3.5     10-25  Mg     2.0     10-25    TPro  5.5<L>  /  Alb  3.4<L>  /  TBili  0.5  /  DBili  x   /  AST  12  /  ALT  8   /  AlkPhos  81  10-25    PT/INR - ( 24 Oct 2018 04:33 )   PT: 14.90 sec;   INR: 1.30 ratio         PTT - ( 24 Oct 2018 04:33 )  PTT:35.0 sec  MEDICATIONS  (STANDING):  amiodarone    Tablet 200 milliGRAM(s) Oral two times a day  amiodarone IVPB 150 milliGRAM(s) IV Intermittent once  apixaban 2.5 milliGRAM(s) Oral every 12 hours  chlorhexidine 4% Liquid 1 Application(s) Topical <User Schedule>  docusate sodium 100 milliGRAM(s) Oral three times a day  finasteride 5 milliGRAM(s) Oral daily  furosemide    Tablet 40 milliGRAM(s) Oral daily  levothyroxine 50 MICROGram(s) Oral daily  lidocaine   Infusion 1 mG/Min (7.5 mL/Hr) IV Continuous <Continuous>  losartan 100 milliGRAM(s) Oral daily  metoprolol tartrate 50 milliGRAM(s) Oral two times a day  mexiletine 150 milliGRAM(s) Oral every 12 hours  montelukast 10 milliGRAM(s) Oral daily  pantoprazole    Tablet 40 milliGRAM(s) Oral before breakfast  senna 2 Tablet(s) Oral at bedtime  simvastatin 10 milliGRAM(s) Oral at bedtime  tamsulosin 0.4 milliGRAM(s) Oral at bedtime    MEDICATIONS  (PRN):  ALBUTerol/ipratropium for Nebulization 3 milliLiter(s) Nebulizer every 6 hours PRN Shortness of Breath and/or Wheezing  ALPRAZolam 0.25 milliGRAM(s) Oral daily PRN anxiety

## 2018-10-25 NOTE — PROGRESS NOTE ADULT - SUBJECTIVE AND OBJECTIVE BOX
SUBJECTIVE:    Patient is a 78y old Male who presents with a chief complaint of Multiple incidences of AICD firing (25 Oct 2018 14:56)    Currently admitted to medicine with the primary diagnosis of Incessant Ventricular tachycardia     Today is hospital day 3d. This morning he had 2 episodes of asymptomatic  sustained Vtach 's-140's. Patient was overdrived paced by EP and lidocaine, procainamide, Amiodarone GGT were started. Vtach broken x2.    PAST MEDICAL & SURGICAL HISTORY  CAD (coronary artery disease)  Ventricular tachycardia  NANDO on CPAP  COPD (chronic obstructive pulmonary disease)  Pacemaker  AICD (automatic cardioverter/defibrillator) present  Hypothyroidism  Atrial fibrillation  Congestive heart disease  Hypercholesteremia  AICD (automatic cardioverter/defibrillator) present  History of laparoscopic cholecystectomy    ALLERGIES:  morphine (Stomach Upset)    MEDICATIONS:  STANDING MEDICATIONS  amiodarone Infusion 1 mG/Min IV Continuous <Continuous>  apixaban 2.5 milliGRAM(s) Oral every 12 hours  chlorhexidine 4% Liquid 1 Application(s) Topical <User Schedule>  docusate sodium 100 milliGRAM(s) Oral three times a day  finasteride 5 milliGRAM(s) Oral daily  furosemide    Tablet 40 milliGRAM(s) Oral daily  levothyroxine 50 MICROGram(s) Oral daily  lidocaine   Infusion 1 mG/Min IV Continuous <Continuous>  metoprolol tartrate 12.5 milliGRAM(s) Oral every 6 hours  mexiletine 150 milliGRAM(s) Oral every 12 hours  montelukast 10 milliGRAM(s) Oral daily  pantoprazole    Tablet 40 milliGRAM(s) Oral before breakfast  procainamide   Infusion 0.5 mG/Min IV Continuous <Continuous>  senna 2 Tablet(s) Oral at bedtime  simvastatin 10 milliGRAM(s) Oral at bedtime  tamsulosin 0.4 milliGRAM(s) Oral at bedtime    PRN MEDICATIONS  ALBUTerol/ipratropium for Nebulization 3 milliLiter(s) Nebulizer every 6 hours PRN  ALPRAZolam 0.25 milliGRAM(s) Oral daily PRN    VITALS:   T(F): 97  HR: 88  BP: 83/51 Map 65  RR: 46  SpO2: 97%    LABS:                        11.0   5.97  )-----------( 219      ( 25 Oct 2018 05:05 )             34.2     10-25    139  |  98  |  21<H>  ----------------------------<  109<H>  4.2   |  27  |  1.4    Ca    8.9      25 Oct 2018 05:05  Phos  3.5     10-25  Mg     2.0     10-25    TPro  5.5<L>  /  Alb  3.4<L>  /  TBili  0.5  /  DBili  x   /  AST  12  /  ALT  8   /  AlkPhos  81  10-25    PT/INR - ( 24 Oct 2018 04:33 )   PT: 14.90 sec;   INR: 1.30 ratio         PTT - ( 24 Oct 2018 04:33 )  PTT:35.0 sec    RADIOLOGY:  < from: Xray Chest 1 View- PORTABLE-Routine (10.25.18 @ 05:07) >  Findings:    Support devices: Stable left chest wall AICD.    Cardiac/mediastinum/hilum: Stable cardiomegaly.    Lung parenchyma/Pleura: Stable right lower lobe opacity. No pneumothorax.    Skeleton/soft tissues: Stable.    Impression:      Stable right lower lobe opacity.    < end of copied text >    < from: MR Cardiac w/wo IV Cont (10.24.18 @ 14:00) >  FINDINGS:     LEFT:  Moderately dilated left cardiac chambers.  Global left ventricular   hypokinesis with severely depressed function (quantitative measurements   were not obtained due to significant ICD artifact). There is mid myocardial late gadolinium enhancement predominantly involving the entire   septal wall, with similar findings in the mid to apical anterior wall.     RIGHT:  The right cardiac chambers are normal in size.  Global right ventricular   hypokinesis with moderately depressed function (quantitative measurements   were not obtained due to significant ICD artifact). There is no late   gadolinium enhancement.      VALVES  There is a dephasing jet of mitral regurgitation.    VESSELS  The thoracic aorta has normal caliber.    The central pulmonary arteries are normal in caliber.      Small pericardial effusion.      IMPRESSION:      Biventricular dilation and depressed function, with late gadolinium enhancement pattern compatible with dilated cardiomyopathy.    < end of copied text >        PHYSICAL EXAM:  GEN: Anxious, good color, no cyanosis  LUNGS: Clear to auscultation bilaterally   HEART: S1/S2 present. RRR.   ABD: Soft, non-tender, non-distended. Bowel sounds present  EXT: NC/NC/NE/2+PP/SAMUELS/Skin Intact. warm extremities  NEURO: AAOX3, no focal defecits    Intravenous access:   NG tube:   Alex whiting:     I&O's Summary    24 Oct 2018 07:01  -  25 Oct 2018 07:00  --------------------------------------------------------  IN: 466.3 mL / OUT: 1950 mL / NET: -1483.7 mL    25 Oct 2018 07:01  -  25 Oct 2018 16:54  --------------------------------------------------------  IN: 482.3 mL / OUT: 400 mL / NET: 82.3 mL

## 2018-10-25 NOTE — PROGRESS NOTE ADULT - SUBJECTIVE AND OBJECTIVE BOX
pt came in because of aicd firing, today he had multipe episodes of vt, seen by ep and meds adjusted.   now pt is feeling better and has no symptoms    Vital Signs Last 24 Hrs  T(C): 36.1 (25 Oct 2018 12:09), Max: 36.1 (25 Oct 2018 04:00)  T(F): 97 (25 Oct 2018 12:09), Max: 97 (25 Oct 2018 04:00)  HR: 88 (25 Oct 2018 14:30) (68 - 140)  BP: 75/38 (25 Oct 2018 14:30) (57/25 - 146/55)  BP(mean): 60 (25 Oct 2018 14:30) (39 - 104)  RR: 42 (25 Oct 2018 14:30) (12 - 46)  SpO2: 93% (25 Oct 2018 14:30) (91% - 98%)    Physical exam:   constitutional NAD, AAOX3, Respiratory  lungs CTA, CVS heart RRR, GI: abdomen Soft NT, ND, BS+, skin: intact  neuro exam non focal.                           11.0   5.97  )-----------( 219      ( 25 Oct 2018 05:05 )             34.2   10-25    139  |  98  |  21<H>  ----------------------------<  109<H>  4.2   |  27  |  1.4    Ca    8.9      25 Oct 2018 05:05  Phos  3.5     10-25  Mg     2.0     10-25    TPro  5.5<L>  /  Alb  3.4<L>  /  TBili  0.5  /  DBili  x   /  AST  12  /  ALT  8   /  AlkPhos  81  10-25    a/p  1- vt, aicd adjusted , pt needs ablation ( ?monday?)   cont meds per EP    2- gary nodule, pulm eval, will probably need ct chest    3- ckd? stable cr, outpt fu with nephro, monitor cr     4- constipation, stool softner ordered. has had bm today     5- dodie, copd, stable, ? home cpap, pulm to see pt for the setting.     6- afib, dvt prophylaxis, pt is on AC. pt came in because of aicd firing, today he had multipe episodes of vt, seen by ep and meds adjusted.   now pt is feeling better and has no symptoms    Vital Signs Last 24 Hrs  T(C): 36.1 (25 Oct 2018 12:09), Max: 36.1 (25 Oct 2018 04:00)  T(F): 97 (25 Oct 2018 12:09), Max: 97 (25 Oct 2018 04:00)  HR: 88 (25 Oct 2018 14:30) (68 - 140)  BP: 75/38 (25 Oct 2018 14:30) (57/25 - 146/55)  BP(mean): 60 (25 Oct 2018 14:30) (39 - 104)  RR: 42 (25 Oct 2018 14:30) (12 - 46)  SpO2: 93% (25 Oct 2018 14:30) (91% - 98%)    Physical exam:   constitutional NAD, AAOX3, Respiratory  lungs CTA, CVS heart RRR, GI: abdomen Soft NT, ND, BS+, skin: intact  neuro exam non focal.                           11.0   5.97  )-----------( 219      ( 25 Oct 2018 05:05 )             34.2   10-25    139  |  98  |  21<H>  ----------------------------<  109<H>  4.2   |  27  |  1.4    Ca    8.9      25 Oct 2018 05:05  Phos  3.5     10-25  Mg     2.0     10-25    TPro  5.5<L>  /  Alb  3.4<L>  /  TBili  0.5  /  DBili  x   /  AST  12  /  ALT  8   /  AlkPhos  81  10-25    a/p  1- vt, aicd adjusted , pt needs ablation ( ?monday?)   cont meds per EP    2- gary nodule, pulm eval, will probably need ct chest    3- ckd? stable cr, outpt fu with nephro, monitor cr     4- constipation, stool softner ordered. has had bm today     5- dodie, copd, stable, ? home cpap, pulm to see pt for the setting.     6- afib, dvt prophylaxis, pt is on AC.     Full code

## 2018-10-25 NOTE — CONSULT NOTE ADULT - SUBJECTIVE AND OBJECTIVE BOX
HPI:  78Y M with pmh of Afib on eliquis, CAD with S/CHF s/p AICD for dilated cardiomyopathy , COPD, DM2, DLD and hypothyroidism presented to the ED after having multiple episodes of his AICD firing. As per patient he saw Dr Santos on Thursday who reprogrammed his device and advised him for ablation. Patient says he has been refusing ablation for months but now understands thati it is necessary. He has been having worsening SOB on exertion for the past few months and developed a cough for the past few weeks which his cardiologist informed him was due to fluid n his lungs. . On Sunday morning he received several  shocks..patient had previous admissions for  recurrent V.T.)      Patient is a 78y old  Male who presents with a chief complaint of Multiple  D/C SHOCK therapies by ICD due to recurrent symptomatic tachycardiaf AICD firing (25 Oct 2018 09:33)        PAST MEDICAL & SURGICAL HISTORY:  CAD (coronary artery disease)  Ventricular tachycardia  NANDO on CPAP  COPD (chronic obstructive pulmonary disease)  Pacemaker  AICD (automatic cardioverter/defibrillator) present  Hypothyroidism  Atrial fibrillation  Congestive heart disease  Hypercholesteremia  AICD (automatic cardioverter/defibrillator) present  History of laparoscopic cholecystectomy                  PREVIOUS DIAGNOSTIC TESTING:      ECHO   FINDINGS:     STRESS  FINDINGS:    CATHETERIZATION  FINDINGS:    ELECTROPHYSIOLOGY STUDY  FINDINGS:    CAROTID ULTRASOUND:  FINDINGS    VENOUS DUPLEX SCAN:  FINDINGS:    CHEST CT PULMONARY ANGIO with IV Contrast:  FINDINGS:  MEDICATIONS  (STANDING):  amiodarone    Tablet 200 milliGRAM(s) Oral two times a day  amiodarone Infusion 1 mG/Min (33.333 mL/Hr) IV Continuous <Continuous>  apixaban 2.5 milliGRAM(s) Oral every 12 hours  chlorhexidine 4% Liquid 1 Application(s) Topical <User Schedule>  docusate sodium 100 milliGRAM(s) Oral three times a day  finasteride 5 milliGRAM(s) Oral daily  furosemide    Tablet 40 milliGRAM(s) Oral daily  levothyroxine 50 MICROGram(s) Oral daily  lidocaine   Infusion 1 mG/Min (7.5 mL/Hr) IV Continuous <Continuous>  losartan 100 milliGRAM(s) Oral daily  metoprolol tartrate 50 milliGRAM(s) Oral two times a day  mexiletine 150 milliGRAM(s) Oral every 12 hours  montelukast 10 milliGRAM(s) Oral daily  pantoprazole    Tablet 40 milliGRAM(s) Oral before breakfast  procainamide   Infusion 0.5 mG/Min (7.5 mL/Hr) IV Continuous <Continuous>  senna 2 Tablet(s) Oral at bedtime  simvastatin 10 milliGRAM(s) Oral at bedtime  tamsulosin 0.4 milliGRAM(s) Oral at bedtime    MEDICATIONS  (PRN):  ALBUTerol/ipratropium for Nebulization 3 milliLiter(s) Nebulizer every 6 hours PRN Shortness of Breath and/or Wheezing  ALPRAZolam 0.25 milliGRAM(s) Oral daily PRN anxiety   Home Medications:  ALPRAZolam 0.25 mg oral tablet: 1 tab(s) orally once a day, As Needed (23 Oct 2018 00:22)  amiodarone 200 mg oral tablet: 1 tab(s) orally once a day (23 Oct 2018 00:22)  Eliquis 2.5 mg oral tablet: 1 tab(s) orally 2 times a day (23 Oct 2018 00:22)  finasteride 5 mg oral tablet: 1 tab(s) orally once a day (23 Oct 2018 00:22)  glyBURIDE 5 mg oral tablet: 1 tab(s) orally once a day (23 Oct 2018 00:22)  levothyroxine 50 mcg (0.05 mg) oral capsule: 1 cap(s) orally once a day (23 Oct 2018 00:22)  losartan 100 mg oral tablet: 1 tab(s) orally once a day (23 Oct 2018 00:22)  metoprolol tartrate 50 mg oral tablet: 1 tab(s) orally 2 times a day (23 Oct 2018 00:22)  montelukast 10 mg oral tablet: 1 tab(s) orally once a day (23 Oct 2018 00:22)  OMEPRAZOLE DR 40 MG CAPSULE: 40  orally once a day (23 Oct 2018 00:22)  pravastatin 20 mg oral tablet: 1 tab(s) orally once a day (23 Oct 2018 00:22)  tamsulosin 0.4 mg oral capsule: 1 cap(s) orally once a day (23 Oct 2018 00:22)      FAMILY HISTORY:  Family history of acute myocardial infarction (Aunt)      SOCIAL HISTORY:    CIGARETTES:    ALCOHOL:          Vital Signs Last 24 Hrs  T(C): 35.9 (25 Oct 2018 08:00), Max: 36.1 (25 Oct 2018 04:00)  T(F): 96.6 (25 Oct 2018 08:00), Max: 97 (25 Oct 2018 04:00)  HR: 88 (25 Oct 2018 12:15) (68 - 140)  BP: 94/49 (25 Oct 2018 12:15) (57/25 - 146/55)  BP(mean): 68 (25 Oct 2018 12:15) (39 - 104)  RR: 28 (25 Oct 2018 12:15) (12 - 38)  SpO2: 96% (25 Oct 2018 12:15) (91% - 98%)          INTERPRETATION OF TELEMETRY:    ECG:        LABS:                        11.0   5.97  )-----------( 219      ( 25 Oct 2018 05:05 )             34.2     10-25    139  |  98  |  21<H>  ----------------------------<  109<H>  4.2   |  27  |  1.4    Ca    8.9      25 Oct 2018 05:05  Phos  3.5     10-25  Mg     2.0     10-25    TPro  5.5<L>  /  Alb  3.4<L>  /  TBili  0.5  /  DBili  x   /  AST  12  /  ALT  8   /  AlkPhos  81  10-25        PT/INR - ( 24 Oct 2018 04:33 )   PT: 14.90 sec;   INR: 1.30 ratio         PTT - ( 24 Oct 2018 04:33 )  PTT:35.0 sec  LIVER FUNCTIONS - ( 25 Oct 2018 05:05 )  Alb: 3.4 g/dL / Pro: 5.5 g/dL / ALK PHOS: 81 U/L / ALT: 8 U/L / AST: 12 U/L / GGT: x               BNP  I&O's Detail    24 Oct 2018 07:01  -  25 Oct 2018 07:00  --------------------------------------------------------  IN:    lidocaine   Infusion: 86.3 mL    Oral Fluid: 380 mL  Total IN: 466.3 mL    OUT:    Voided: 1950 mL  Total OUT: 1950 mL    Total NET: -1483.7 mL      25 Oct 2018 07:01  -  25 Oct 2018 13:11  --------------------------------------------------------  IN:    amiodarone Infusion: 99.9 mL    lidocaine   Infusion: 37.5 mL  Total IN: 137.4 mL    OUT:    Voided: 200 mL  Total OUT: 200 mL    Total NET: -62.6 mL        Daily     Daily     RADIOLOGY & ADDITIONAL STUDIES: HPI:  78Y M with pmh of Afib on eliquis, CAD with S/CHF s/p AICD for dilated cardiomyopathy , COPD, DM2, DLD and hypothyroidism presented to the ED after having multiple episodes of his AICD firing. As per patient he saw Dr Santos on Thursday who reprogrammed his device and advised him for ablation. Patient says he has been refusing ablation for months but now understands thati it is necessary. He has been having worsening SOB on exertion for the past few months and developed a cough for the past few weeks which his cardiologist informed him was due to fluid n his lungs. . On Sunday morning he received several  shocks..patient had previous admissions for  recurrent V.T.)      Patient is a 78y old  Male who presents with a chief complaint of Multiple  D/C SHOCK therapies by ICD due to recurrent symptomatic tachycardiaf AICD firing (25 Oct 2018 09:33)        PAST MEDICAL & SURGICAL HISTORY:  CAD (coronary artery disease)  Ventricular tachycardia  NANDO on CPAP  COPD (chronic obstructive pulmonary disease)  Pacemaker  AICD (automatic cardioverter/defibrillator) present  Hypothyroidism  Atrial fibrillation  Congestive heart disease  Hypercholesteremia  AICD (automatic cardioverter/defibrillator) present  History of laparoscopic cholecystectomy                  PREVIOUS DIAGNOSTIC TESTING:      ECHO   FINDINGS:     STRESS  FINDINGS:    CATHETERIZATION  FINDINGS:    ELECTROPHYSIOLOGY STUDY  FINDINGS:    CAROTID ULTRASOUND:  FINDINGS    VENOUS DUPLEX SCAN:  FINDINGS:    CHEST CT PULMONARY ANGIO with IV Contrast:  FINDINGS:  MEDICATIONS  (STANDING):  amiodarone    Tablet 200 milliGRAM(s) Oral two times a day  amiodarone Infusion 1 mG/Min (33.333 mL/Hr) IV Continuous <Continuous>  apixaban 2.5 milliGRAM(s) Oral every 12 hours  chlorhexidine 4% Liquid 1 Application(s) Topical <User Schedule>  docusate sodium 100 milliGRAM(s) Oral three times a day  finasteride 5 milliGRAM(s) Oral daily  furosemide    Tablet 40 milliGRAM(s) Oral daily  levothyroxine 50 MICROGram(s) Oral daily  lidocaine   Infusion 1 mG/Min (7.5 mL/Hr) IV Continuous <Continuous>  losartan 100 milliGRAM(s) Oral daily  metoprolol tartrate 50 milliGRAM(s) Oral two times a day  mexiletine 150 milliGRAM(s) Oral every 12 hours  montelukast 10 milliGRAM(s) Oral daily  pantoprazole    Tablet 40 milliGRAM(s) Oral before breakfast  procainamide   Infusion 0.5 mG/Min (7.5 mL/Hr) IV Continuous <Continuous>  senna 2 Tablet(s) Oral at bedtime  simvastatin 10 milliGRAM(s) Oral at bedtime  tamsulosin 0.4 milliGRAM(s) Oral at bedtime    MEDICATIONS  (PRN):  ALBUTerol/ipratropium for Nebulization 3 milliLiter(s) Nebulizer every 6 hours PRN Shortness of Breath and/or Wheezing  ALPRAZolam 0.25 milliGRAM(s) Oral daily PRN anxiety   Home Medications:  ALPRAZolam 0.25 mg oral tablet: 1 tab(s) orally once a day, As Needed (23 Oct 2018 00:22)  amiodarone 200 mg oral tablet: 1 tab(s) orally once a day (23 Oct 2018 00:22)  Eliquis 2.5 mg oral tablet: 1 tab(s) orally 2 times a day (23 Oct 2018 00:22)  finasteride 5 mg oral tablet: 1 tab(s) orally once a day (23 Oct 2018 00:22)  glyBURIDE 5 mg oral tablet: 1 tab(s) orally once a day (23 Oct 2018 00:22)  levothyroxine 50 mcg (0.05 mg) oral capsule: 1 cap(s) orally once a day (23 Oct 2018 00:22)  losartan 100 mg oral tablet: 1 tab(s) orally once a day (23 Oct 2018 00:22)  metoprolol tartrate 50 mg oral tablet: 1 tab(s) orally 2 times a day (23 Oct 2018 00:22)  montelukast 10 mg oral tablet: 1 tab(s) orally once a day (23 Oct 2018 00:22)  OMEPRAZOLE DR 40 MG CAPSULE: 40  orally once a day (23 Oct 2018 00:22)  pravastatin 20 mg oral tablet: 1 tab(s) orally once a day (23 Oct 2018 00:22)  tamsulosin 0.4 mg oral capsule: 1 cap(s) orally once a day (23 Oct 2018 00:22)      FAMILY HISTORY:  Family history of acute myocardial infarction (Aunt)      SOCIAL HISTORY:    CIGARETTES:    ALCOHOL:          Vital Signs Last 24 Hrs  T(C): 35.9 (25 Oct 2018 08:00), Max: 36.1 (25 Oct 2018 04:00)  T(F): 96.6 (25 Oct 2018 08:00), Max: 97 (25 Oct 2018 04:00)  HR: 88 (25 Oct 2018 12:15) (68 - 140)  BP: 94/49 (25 Oct 2018 12:15) (57/25 - 146/55)  BP(mean): 68 (25 Oct 2018 12:15) (39 - 104)  RR: 28 (25 Oct 2018 12:15) (12 - 38)  SpO2: 96% (25 Oct 2018 12:15) (91% - 98%)          INTERPRETATION OF TELEMETRY:    ECG:   A PACIND  BIV PACING      LABS:                        11.0   5.97  )-----------( 219      ( 25 Oct 2018 05:05 )             34.2     10-25    139  |  98  |  21<H>  ----------------------------<  109<H>  4.2   |  27  |  1.4    Ca    8.9      25 Oct 2018 05:05  Phos  3.5     10-25  Mg     2.0     10-25    TPro  5.5<L>  /  Alb  3.4<L>  /  TBili  0.5  /  DBili  x   /  AST  12  /  ALT  8   /  AlkPhos  81  10-25        PT/INR - ( 24 Oct 2018 04:33 )   PT: 14.90 sec;   INR: 1.30 ratio         PTT - ( 24 Oct 2018 04:33 )  PTT:35.0 sec  LIVER FUNCTIONS - ( 25 Oct 2018 05:05 )  Alb: 3.4 g/dL / Pro: 5.5 g/dL / ALK PHOS: 81 U/L / ALT: 8 U/L / AST: 12 U/L / GGT: x               BNP  I&O's Detail    24 Oct 2018 07:01  -  25 Oct 2018 07:00  --------------------------------------------------------  IN:    lidocaine   Infusion: 86.3 mL    Oral Fluid: 380 mL  Total IN: 466.3 mL    OUT:    Voided: 1950 mL  Total OUT: 1950 mL    Total NET: -1483.7 mL      25 Oct 2018 07:01  -  25 Oct 2018 13:11  --------------------------------------------------------  IN:    amiodarone Infusion: 99.9 mL    lidocaine   Infusion: 37.5 mL  Total IN: 137.4 mL    OUT:    Voided: 200 mL  Total OUT: 200 mL    Total NET: -62.6 mL        Daily     Daily     RADIOLOGY & ADDITIONAL STUDIES:

## 2018-10-25 NOTE — PROCEDURE NOTE - NSPROCDETAILS_GEN_ALL_CORE
connected to a pressurized flush line/all materials/supplies accounted for at end of procedure/location identified, draped/prepped, sterile technique used, needle inserted/introduced/positive blood return obtained via catheter/sutured in place/hemostasis with direct pressure, dressing applied

## 2018-10-25 NOTE — CONSULT NOTE ADULT - PROBLEM SELECTOR RECOMMENDATION 9
INCESSANT V.T RATE 130/MIN  HYPOTENSION BUT STABLE  RECOMMEND :   PROCAINE AMIDE  IV PUSH 100 MG THEN 1 MG/MIN ,LIDOCAINE 50 MG BUGR3MH/MIN  AMIODARONE 150 MG BOLUS THN PROTOCOL  METOPROLOL 12,5  MG PO Q 6  HRS  D/C  LOSARTAN  MAY NEED IV LASIX 20 MG FOR DIURESIS    OVERDRIVE PACING (ATP)  TO BREAK VT

## 2018-10-26 LAB
ABO RH CONFIRMATION: SIGNIFICANT CHANGE UP
ALBUMIN SERPL ELPH-MCNC: 3.2 G/DL — LOW (ref 3.5–5.2)
ALP SERPL-CCNC: 82 U/L — SIGNIFICANT CHANGE UP (ref 30–115)
ALT FLD-CCNC: 9 U/L — SIGNIFICANT CHANGE UP (ref 0–41)
ANION GAP SERPL CALC-SCNC: 13 MMOL/L — SIGNIFICANT CHANGE UP (ref 7–14)
ANION GAP SERPL CALC-SCNC: 14 MMOL/L — SIGNIFICANT CHANGE UP (ref 7–14)
AST SERPL-CCNC: 11 U/L — SIGNIFICANT CHANGE UP (ref 0–41)
BASOPHILS # BLD AUTO: 0.03 K/UL — SIGNIFICANT CHANGE UP (ref 0–0.2)
BASOPHILS NFR BLD AUTO: 0.3 % — SIGNIFICANT CHANGE UP (ref 0–1)
BILIRUB SERPL-MCNC: 0.6 MG/DL — SIGNIFICANT CHANGE UP (ref 0.2–1.2)
BLD GP AB SCN SERPL QL: SIGNIFICANT CHANGE UP
BUN SERPL-MCNC: 21 MG/DL — HIGH (ref 10–20)
BUN SERPL-MCNC: 22 MG/DL — HIGH (ref 10–20)
CALCIUM SERPL-MCNC: 8.6 MG/DL — SIGNIFICANT CHANGE UP (ref 8.5–10.1)
CALCIUM SERPL-MCNC: 8.7 MG/DL — SIGNIFICANT CHANGE UP (ref 8.5–10.1)
CHLORIDE SERPL-SCNC: 98 MMOL/L — SIGNIFICANT CHANGE UP (ref 98–110)
CHLORIDE SERPL-SCNC: 98 MMOL/L — SIGNIFICANT CHANGE UP (ref 98–110)
CO2 SERPL-SCNC: 25 MMOL/L — SIGNIFICANT CHANGE UP (ref 17–32)
CO2 SERPL-SCNC: 26 MMOL/L — SIGNIFICANT CHANGE UP (ref 17–32)
CREAT SERPL-MCNC: 1.6 MG/DL — HIGH (ref 0.7–1.5)
CREAT SERPL-MCNC: 1.7 MG/DL — HIGH (ref 0.7–1.5)
EOSINOPHIL # BLD AUTO: 0.15 K/UL — SIGNIFICANT CHANGE UP (ref 0–0.7)
EOSINOPHIL NFR BLD AUTO: 1.6 % — SIGNIFICANT CHANGE UP (ref 0–8)
GAS PNL BLDA: SIGNIFICANT CHANGE UP
GLUCOSE BLDC GLUCOMTR-MCNC: 143 MG/DL — HIGH (ref 70–99)
GLUCOSE SERPL-MCNC: 132 MG/DL — HIGH (ref 70–99)
GLUCOSE SERPL-MCNC: 168 MG/DL — HIGH (ref 70–99)
HCT VFR BLD CALC: 32.7 % — LOW (ref 42–52)
HGB BLD-MCNC: 10.5 G/DL — LOW (ref 14–18)
IMM GRANULOCYTES NFR BLD AUTO: 0.4 % — HIGH (ref 0.1–0.3)
INR BLD: 1.24 RATIO — SIGNIFICANT CHANGE UP (ref 0.65–1.3)
LYMPHOCYTES # BLD AUTO: 0.72 K/UL — LOW (ref 1.2–3.4)
LYMPHOCYTES # BLD AUTO: 7.6 % — LOW (ref 20.5–51.1)
MAGNESIUM SERPL-MCNC: 2 MG/DL — SIGNIFICANT CHANGE UP (ref 1.8–2.4)
MCHC RBC-ENTMCNC: 27.1 PG — SIGNIFICANT CHANGE UP (ref 27–31)
MCHC RBC-ENTMCNC: 32.1 G/DL — SIGNIFICANT CHANGE UP (ref 32–37)
MCV RBC AUTO: 84.3 FL — SIGNIFICANT CHANGE UP (ref 80–94)
MONOCYTES # BLD AUTO: 0.8 K/UL — HIGH (ref 0.1–0.6)
MONOCYTES NFR BLD AUTO: 8.5 % — SIGNIFICANT CHANGE UP (ref 1.7–9.3)
NEUTROPHILS # BLD AUTO: 7.72 K/UL — HIGH (ref 1.4–6.5)
NEUTROPHILS NFR BLD AUTO: 81.6 % — HIGH (ref 42.2–75.2)
NRBC # BLD: 0 /100 WBCS — SIGNIFICANT CHANGE UP (ref 0–0)
PHOSPHATE SERPL-MCNC: 3.7 MG/DL — SIGNIFICANT CHANGE UP (ref 2.1–4.9)
PLATELET # BLD AUTO: 252 K/UL — SIGNIFICANT CHANGE UP (ref 130–400)
POTASSIUM SERPL-MCNC: 3.9 MMOL/L — SIGNIFICANT CHANGE UP (ref 3.5–5)
POTASSIUM SERPL-MCNC: 4 MMOL/L — SIGNIFICANT CHANGE UP (ref 3.5–5)
POTASSIUM SERPL-SCNC: 3.9 MMOL/L — SIGNIFICANT CHANGE UP (ref 3.5–5)
POTASSIUM SERPL-SCNC: 4 MMOL/L — SIGNIFICANT CHANGE UP (ref 3.5–5)
PROT SERPL-MCNC: 5.6 G/DL — LOW (ref 6–8)
PROTHROM AB SERPL-ACNC: 14.2 SEC — HIGH (ref 9.95–12.87)
RBC # BLD: 3.88 M/UL — LOW (ref 4.7–6.1)
RBC # FLD: 15.1 % — HIGH (ref 11.5–14.5)
SODIUM SERPL-SCNC: 137 MMOL/L — SIGNIFICANT CHANGE UP (ref 135–146)
SODIUM SERPL-SCNC: 137 MMOL/L — SIGNIFICANT CHANGE UP (ref 135–146)
TROPONIN T SERPL-MCNC: 0.01 NG/ML — SIGNIFICANT CHANGE UP
TYPE + AB SCN PNL BLD: SIGNIFICANT CHANGE UP
WBC # BLD: 9.46 K/UL — SIGNIFICANT CHANGE UP (ref 4.8–10.8)
WBC # FLD AUTO: 9.46 K/UL — SIGNIFICANT CHANGE UP (ref 4.8–10.8)

## 2018-10-26 RX ORDER — FUROSEMIDE 40 MG
40 TABLET ORAL ONCE
Qty: 0 | Refills: 0 | Status: COMPLETED | OUTPATIENT
Start: 2018-10-26 | End: 2018-10-26

## 2018-10-26 RX ORDER — VANCOMYCIN HCL 1 G
1250 VIAL (EA) INTRAVENOUS ONCE
Qty: 0 | Refills: 0 | Status: DISCONTINUED | OUTPATIENT
Start: 2018-10-26 | End: 2018-11-01

## 2018-10-26 RX ORDER — NOREPINEPHRINE BITARTRATE/D5W 8 MG/250ML
0.05 PLASTIC BAG, INJECTION (ML) INTRAVENOUS
Qty: 16 | Refills: 0 | Status: DISCONTINUED | OUTPATIENT
Start: 2018-10-26 | End: 2018-10-31

## 2018-10-26 RX ORDER — FENTANYL CITRATE 50 UG/ML
0.5 INJECTION INTRAVENOUS
Qty: 5000 | Refills: 0 | Status: DISCONTINUED | OUTPATIENT
Start: 2018-10-26 | End: 2018-10-27

## 2018-10-26 RX ORDER — AMIODARONE HYDROCHLORIDE 400 MG/1
0.5 TABLET ORAL
Qty: 900 | Refills: 0 | Status: DISCONTINUED | OUTPATIENT
Start: 2018-10-26 | End: 2018-11-01

## 2018-10-26 RX ADMIN — PANTOPRAZOLE SODIUM 40 MILLIGRAM(S): 20 TABLET, DELAYED RELEASE ORAL at 06:59

## 2018-10-26 RX ADMIN — Medication 40 MILLIGRAM(S): at 21:20

## 2018-10-26 RX ADMIN — Medication 40 MILLIGRAM(S): at 05:59

## 2018-10-26 RX ADMIN — Medication 100 MILLIGRAM(S): at 05:59

## 2018-10-26 RX ADMIN — Medication 12.5 MILLIGRAM(S): at 05:59

## 2018-10-26 RX ADMIN — TAMSULOSIN HYDROCHLORIDE 0.4 MILLIGRAM(S): 0.4 CAPSULE ORAL at 23:41

## 2018-10-26 RX ADMIN — SIMVASTATIN 10 MILLIGRAM(S): 20 TABLET, FILM COATED ORAL at 23:41

## 2018-10-26 RX ADMIN — MEXILETINE HYDROCHLORIDE 150 MILLIGRAM(S): 150 CAPSULE ORAL at 05:59

## 2018-10-26 RX ADMIN — CHLORHEXIDINE GLUCONATE 1 APPLICATION(S): 213 SOLUTION TOPICAL at 05:58

## 2018-10-26 RX ADMIN — AMIODARONE HYDROCHLORIDE 33.33 MG/MIN: 400 TABLET ORAL at 06:05

## 2018-10-26 RX ADMIN — Medication 50 MICROGRAM(S): at 05:59

## 2018-10-26 NOTE — PROGRESS NOTE ADULT - SUBJECTIVE AND OBJECTIVE BOX
SUBJECTIVE:    Patient is a 78y old Male who presents with a chief complaint of Multiple incidences of AICD firing (25 Oct 2018 16:42)    Currently admitted to medicine with the primary diagnosis of Incessant Ventricular tachycardia 2/2 to Non Ischemic Cardiomyopathy     Today is hospital day 4d. This morning he is resting in bed overnight the patient had 1 further episode of sustained monomorphic Vtach, was overdrive paced and shocked once for a short episode of Vfib. No further episodes over night, patient went for Ablation today. Denies, cp, sob, headache, dizziness, palpitations this AM    PAST MEDICAL & SURGICAL HISTORY  CAD (coronary artery disease)  Ventricular tachycardia  NANDO on CPAP  COPD (chronic obstructive pulmonary disease)  Pacemaker  AICD (automatic cardioverter/defibrillator) present  Hypothyroidism  Atrial fibrillation  Congestive heart disease  Hypercholesteremia  AICD (automatic cardioverter/defibrillator) present  History of laparoscopic cholecystectomy      ALLERGIES:  morphine (Stomach Upset)    MEDICATIONS:  STANDING MEDICATIONS  amiodarone Infusion 1 mG/Min IV Continuous <Continuous>  chlorhexidine 4% Liquid 1 Application(s) Topical <User Schedule>  docusate sodium 100 milliGRAM(s) Oral three times a day  finasteride 5 milliGRAM(s) Oral daily  furosemide    Tablet 40 milliGRAM(s) Oral daily  levothyroxine 50 MICROGram(s) Oral daily  lidocaine   Infusion 1 mG/Min IV Continuous <Continuous>  metoprolol tartrate 12.5 milliGRAM(s) Oral every 6 hours  mexiletine 150 milliGRAM(s) Oral every 12 hours  montelukast 10 milliGRAM(s) Oral daily  pantoprazole    Tablet 40 milliGRAM(s) Oral before breakfast  procainamide   Infusion 1 mG/Min IV Continuous <Continuous>  senna 2 Tablet(s) Oral at bedtime  simvastatin 10 milliGRAM(s) Oral at bedtime  tamsulosin 0.4 milliGRAM(s) Oral at bedtime  vancomycin  IVPB 1250 milliGRAM(s) IV Intermittent once    PRN MEDICATIONS  ALBUTerol/ipratropium for Nebulization 3 milliLiter(s) Nebulizer every 6 hours PRN  ALPRAZolam 0.25 milliGRAM(s) Oral daily PRN    VITALS:   T(F): 96.7  HR: 98  BP: 115/66  RR: 20  SpO2: 95%    LABS:                        10.5   9.46  )-----------( 252      ( 26 Oct 2018 05:15 )             32.7     10-26    137  |  98  |  21<H>  ----------------------------<  168<H>  4.0   |  26  |  1.6<H>    Ca    8.6      26 Oct 2018 07:54  Phos  3.7     10-26  Mg     2.0     10-26    TPro  5.6<L>  /  Alb  3.2<L>  /  TBili  0.6  /  DBili  x   /  AST  11  /  ALT  9   /  AlkPhos  82  10-26    PT/INR - ( 26 Oct 2018 07:54 )   PT: 14.20 sec;   INR: 1.24 ratio        ABG - ( 26 Oct 2018 12:19 )  pH, Arterial: 7.34  pH, Blood: x     /  pCO2: 51    /  pO2: 152   / HCO3: 27    / Base Excess: 0.9   /  SaO2: 99          Troponin T, Serum: 0.01 ng/mL (10-26-18 @ 05:15)      CARDIAC MARKERS ( 26 Oct 2018 05:15 )  x     / 0.01 ng/mL / x     / x     / x          RADIOLOGY:   from: MR Cardiac w/wo IV Cont (10.24.18 @ 14:00) >  FINDINGS:     LEFT:  Moderately dilated left cardiac chambers.  Global left ventricular   hypokinesis with severely depressed function (quantitative measurements   were not obtained due to significant ICD artifact). There is mid myocardial late gadolinium enhancement predominantly involving the entire   septal wall, with similar findings in the mid to apical anterior wall.     RIGHT:  The right cardiac chambers are normal in size.  Global right ventricular   hypokinesis with moderately depressed function (quantitative measurements   were not obtained due to significant ICD artifact). There is no late   gadolinium enhancement.      VALVES  There is a dephasing jet of mitral regurgitation.    VESSELS  The thoracic aorta has normal caliber.    The central pulmonary arteries are normal in caliber.      Small pericardial effusion.      IMPRESSION:      Biventricular dilation and depressed function, with late gadolinium enhancement pattern compatible with dilated cardiomyopathy.    < end of copied text >    < from: Transthoracic Echocardiogram (05.14.18 @ 12:53) >  Summary:   1. Left ventricular ejection fraction, by visual estimation, is 20 to 25%.   2. Severely decreased global left ventricular systolic function.   3. Moderate to severe left atrial enlargement.   4. LV Ejection Fraction by Amaro's Method with a biplane EF of 26 %.   5. Mildly increased LV wall thickness.   6. Severely increased left ventricular internal cavity size.   7. Thickening and calcification of the anterior mitral valve leaflet.   8. Estimated pulmonary artery systolic pressure is 36.0 mmHg assuming a   right atrial pressure of 5 mmHg, which is consistent with borderline   pulmonary hypertension.    PHYSICIAN INTERPRETATION:  Left Ventricle: The left ventricular internal cavity size is severely   increased. Left ventricular wall thickness is mildly increased. Global LV   systolic function was severely decreased. Left ventricular ejection   fraction, by visual estimation, is 20 to 25%. LV Ejection Fraction by   Amaro's Method with a biplane EF of 26 %.  Right Ventricle: Normal right ventricular size and function.  Left Atrium: Moderate to severe left atrial enlargement.  Right Atrium: Normal right atrial size.  Pericardium: A moderately sized pericardial effusion is present. There is   inversion of the right atrial wall.  Mitral Valve: Thickening and calcification of the anterior mitral valve   leaflet. Moderate mitral valve regurgitation is seen.  Tricuspid Valve: Structurally normal tricuspid valve, with normal leaflet   excursion. The tricuspid valve is normal in structure. Estimated   pulmonary artery systolic pressure is 36.0 mmHg assuming a right atrial   pressure of 5 mmHg, which is consistent with borderline pulmonary   hypertension.  Aortic Valve: Normal trileaflet aortic valve withnormal opening.  Pulmonic Valve: Structurally normal pulmonic valve, with normal leaflet   excursion.  Aorta: The aortic root and ascending aorta are structurally normal, with   no evidence of dilitation.  Pulmonary Artery: The main pulmonary artery is normal in size.  Venous: The inferior vena cava was normal sized, with respiratory size   variation greater than 50%.  Additional Comments: A pacer wire is visualized in the right atrium and   right ventricle.       < end of copied text >        PHYSICAL EXAM:  GEN: Anxious, good color, no cyanosis  LUNGS: Clear to auscultation bilaterally   HEART: S1/S2 present. RRR.   ABD: Soft, non-tender, non-distended. Bowel sounds present  EXT: NC/NC/NE/2+PP/SAMUELS/Skin Intact. warm extremities  NEURO: AAOX3, no focal defecits  Intravenous access:   NG tube:   Johnson cathter:

## 2018-10-26 NOTE — CHART NOTE - NSCHARTNOTEFT_GEN_A_CORE
Cardiac Electrophysiology Procedure Note      Procedure:  Electrophysiological Study  Right ventricular Mapping with CARTO  Impella insertion and removal  Transeptal puncture  Intra-cardiac echo  VT ablation  Fluoroscopy    Indication: Patient with history of  sustained hemodynmiclay unstable monomorphic VT suspicious for ARVD who came in with VT storm resistant to antiarrhythmic medication.     OPERATORS:    Attending:	Dimas Holt MD    MATERIALS    Sheath	Insertion Site	Catheter				Location  11 Fr	LFV		ICE				RA		  8Fr	RFA		4 mm Navistar			LV  6 Fr	RFV		CDR-2 Quad			His  6 Fr	RFV		Diag 5 F quad			RVA   12F	LFA		Impella				LV      FINDINGS  BASELINE   Rhythm	CL	DC	QRS	QT	AH	HV  SR	876	150	150	450	130	48    Atrial Pacing  AV Wenckeback CL: 450 mses  AVNERP	600/340    -	Rapid atrial pacing was performed form HRA without crossover           Arrhythmias induced  		  DESCRIPTION OF PROCEDURE    The patient was brought to the Procedure Room in a nonsedated and fasting state. Informed, written consent was obtained prior to the procedure. Patient was intubated by anesthesia.  Brain perfusion was monitored using cerebral mix venous saturation thorught the procedure.  . The right groin region was cleaned and prepped with the applications of Betadyne Solution. Patient was then covered from head to toe with sterile drapes in the usual manner. Patient's ICD was interrogated and appropriate sensing, and pacing thresholds were found.     The right inguinal area and arterial pulse were defined; 20 cc of lidocaine solution were infiltrated into the skin overlying the area.     Next, using needle and guidewire, the right femoral vein was accessed three times using modified Seldinger technique. All venousand arterial accesses were obtain with US guidance. The guidewires were advanced to the  IVC, left of the spine, to confirm venous access. Three introducer sheaths were placed into the right femoral vein and two in the left femoral vein. The dilators and guidewires were removed and, through the sheaths, the catheters were advanced into the heart under fluoroscopic guidance. Catheters were placed over the septal tricuspid valve area to obtain and confirm a proximal His signal, and at the RV apex. Diastolic thresholds were found to be adequate    Baseline parameters were obtained and intervals were measured at this site.   Pacing was then performed at the RV apex and VA conduction was midline and decramental.     Ventricular-programmed stimulation was performed at twice diastolic threshold from the RV apical  position to evaluate ERP and induce arrhythmias.     Seven different sustained monomorthic VT morphology were induced both right and left BBB morphology. During the VT’s patient was hemodynamicly unstable, decreased in BP.  Next ICE was advanced into the heart   and during ablation ICE was used to determine location and contact of the catheter.   Arterial access was obtained impella was advanced into the LV thought the aortic valve without difficultly. Position was confirmed by ICE and fluoroscopy.     Next the RV was mapped using CARMELO 3D system and HD Grid catheter. No scar was found in the RV    A 10F AcuNav ICE ultrasound catheter was placed through the 11F sheath and positioned into the right atrium to allow visualization of the intra-atrial septum. Two SL-1 sheaths were exchanged for the three 8F introducer sheath previously inserted in the right femoral vein. Double transeptal puncture was performed with BRK1 needle and two [place]augh[place] 8 F degree SL-1 sheaths via the right femoral vein under direct visualization with intracardiac echocardiography and  fluoroscopy guidance. In both instances, a J wire was followed to the left subclavian vein and the sheath and dilator combo inserted to that level. The wire was exchanged for the BRK1 needle and pulled back under fluoroscopic visualization until the interatrial septum was reached. On all occasions, the sheath needle combo was placed over the septum and into the left atrium with needle advancement. Left atrial location was confirmed for each transeptal puncture with pressure monitoring, micro-bubble confirmation on ICE, after withdrawal of bright red blood from the catheter and with advancement of a guide wire into the left pulmonary vein. Left atrial pressure was measured at 12 mmHg. On of the SL-1 sheaths were exchanged over a J wire located in in the left pulmonary vein for the Agilis catheter. Intravenous heparin was given prior to performing transeptal puncture and ACTs followed during the rest of the procedure to ensure an ACT greater than 300 secs.    Next the LV was mapped using CARMELO 3D system and HD Grid catheter through the mitral annuls. There was a scar at the base and apex and inferior wall. Next, multiple RF lesions were diploid in the inferior and septal walls around the scar boarder using connecting and also connecting the scar to the mitral annuls with power of 35-40 W. After each lesion the distal ablation was paced at 10 V to ensure appropriate lesion. The inferior wall was also ablated to homogenizing the scar. During ablation of the septal wall scar patient became pacemaker dependent. As the ablation of the apex was perform, a VT originating from the apex was spontaneously induced. A area of early activation with mid diastolic potentials was found. Application of RF energy in that area terminated VT within 20 sec.  Further consolidation of the area was performed and no further VT was induced. RV program stimulation was performed and no further VT’s were induced.    The catheters were removed and the sheaths pulled with manual pressure applied to ensure hemostasis. The impella was removed without difficulty and Per close deployed successfully.  A dry, sterile dressing was placed over this. The patient was returned to a hospital room in stable condition. Gauze and needle count were performed and found to be consistent at the end of the procedure. The ICD was interrogated and appropriate sensing and pacing thresholds were found. VT zones were programmed appropriately.        COMPLICATIONS:  The patient tolerated the procedure well. There were no immediate complications.    CONCLUSIONS:  Successful ablation of ventricular tachycardia

## 2018-10-26 NOTE — PROGRESS NOTE ADULT - SUBJECTIVE AND OBJECTIVE BOX
Electrophysiology Brief Post-Op Note      PRE-OP DIAGNOSIS: VT    POST-OP DIAGNOSIS: VT    PROCEDURE: Vt ablation  insertion and  removal of impella     Physician: Yanet  Assistant: None    ANESTHESIA TYPE:  [X  ]General Anesthesia  [  ] Sedation  [  ] Local/Regional    ESTIMATED BLOOD LOSS:   300    mL    CONDITION  [  ] Critical  [  ] Serious  [  ]Fair  [X  ]Good      SPECIMENS REMOVED (IF APPLICABLE):  none    IMPLANTS (IF APPLICABLE)  None    FINDINGS  PLAN OF CARE  -	hold Eliquis   -	Cont amio  - 	DC procanamide and lidocain  -	Extubated when ready   -	Admit to CTU

## 2018-10-26 NOTE — PROGRESS NOTE ADULT - ASSESSMENT
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 As per patient he saw Dr Santos on Thursday who reprogrammed his device and advised him for V-tach ablation. Patient says he has been refusing ablation for months but now understands that its necessary. He has been having worsening SOB on exertion for the past few months and developed a cough for the past few weeks which his cardiologist informed him was due to fluid in his lungs. Patient says he has an aura like sensation when he knows that his AICD is about to fire and he feel like he has gas travelling up his abdomen and then chest pressure.   AICD interrogation in ER revelead multiple epsiodes of NSVT 52 in 24 hrs, 8 of VT treated with ATPs, 1 VT and VF treated with 35J shock    #Chest pressure 2/2 AICD firing 2/2 Multiple episodes of VTach 2/2 Non Ischemic cardiomyopathy   -systolic HF EF 20%  -electrolytes wnl  -Lidocaine GGT  -Amiodarone GGT  -Procainamide GGT   -Mexiletine 150mg BID  -Metoprolol 12.5mg Q6hrs  -EP reprogramed pacing to 90bmp  -Cardiac MRI shows  There is mid myocardial late gadolinium enhancement predominantly involving the entire septal wall, with similar findings in the mid to apical anterior wall.   -Monitor K and keep above 4 and keep Mg above 2  -Patient will go for Ablation today    #Afib   -rate controlled  -Metoprolol 12mg Q6  -c/w eliquis 2.5mg BID    #Pulmonary Nodule  Right lower lung field 1.3 cm nodular opacity.   CT chest outpatient      #COPD  -Duonebs Q6 prn  -c/w montelukast  -stable no wheezing    #DM2  -monitor fingersticks and if >200 start insulin sliding scale    #hypothyroidism  -c/w levothyroxine 50mcg qd    #DLD  -c/w simvastatin 10mg QHS    DVT ppx SCDs  GI ppx: Protonix    Dispo: As per EP and further intervention   Patient is from home, lives with wife 78Y M with pmh of Afib on eliquis, non ischmeic CMP s/p AICD with BiV upgrade in 1/2018, COPD, DM2, DLD and hypothyroidism presented to the ED after having episode of his AICD firing 4:30 in the morning. As per patient he saw Dr Santos on Thursday who reprogrammed his device and advised him for V-tach ablation. Patient says he has been refusing ablation for months but now understands that its necessary. He has been having worsening SOB on exertion for the past few months and developed a cough for the past few weeks which his cardiologist informed him was due to fluid in his lungs. Patient says he has an aura like sensation when he knows that his AICD is about to fire and he feel like he has gas travelling up his abdomen and then chest pressure.   AICD interrogation in ER revelead multiple epsiodes of NSVT 52 in 24 hrs, 8 of VT treated with ATPs, 1 VT and VF treated with 35J shock    #Chest pressure 2/2 AICD firing 2/2 Multiple episodes of VTach 2/2 Non Ischemic cardiomyopathy   -systolic HF EF 20%  - 3 episodes of sustained monomorphic Vtach since admission- overdrive paced ATPx3, shocked x2  -electrolytes wnl  -Lidocaine GGT  -Amiodarone GGT  -Procainamide GGT   -Mexiletine 150mg BID  -Metoprolol 12.5mg Q6hrs  -EP reprogramed pacing to 90bmp  -Cardiac MRI shows  There is mid myocardial late gadolinium enhancement predominantly involving the entire septal wall, with similar findings in the mid to apical anterior wall.   -Monitor K and keep above 4 and keep Mg above 2  -Patient will go for Ablation today    #Afib   -rate controlled  -Metoprolol 12mg Q6  -c/w eliquis 2.5mg BID    #Pulmonary Nodule  Right lower lung field 1.3 cm nodular opacity.   CT chest outpatient      #COPD  -Duonebs Q6 prn  -c/w montelukast  -stable no wheezing    #DM2  -monitor fingersticks and if >200 start insulin sliding scale    #hypothyroidism  -c/w levothyroxine 50mcg qd    #DLD  -c/w simvastatin 10mg QHS    DVT ppx SCDs  GI ppx: Protonix    Dispo: As per EP and further intervention   Patient is from home, lives with wife 78Y M with pmh of Afib on eliquis, non ischmeic CMP s/p AICD with BiV upgrade in 1/2018, COPD, DM2, DLD and hypothyroidism presented to the ED after having episode of his AICD firing 4:30 in the morning. As per patient he saw Dr Santos on Thursday who reprogrammed his device and advised him for V-tach ablation. Patient says he has been refusing ablation for months but now understands that its necessary. He has been having worsening SOB on exertion for the past few months and developed a cough for the past few weeks which his cardiologist informed him was due to fluid in his lungs. Patient says he has an aura like sensation when he knows that his AICD is about to fire and he feel like he has gas travelling up his abdomen and then chest pressure.   AICD interrogation in ER revelead multiple epsiodes of NSVT 52 in 24 hrs, 8 of VT treated with ATPs, 1 VT and VF treated with 35J shock    #Chest pressure 2/2 AICD firing 2/2 Multiple episodes of VTach 2/2 Non Ischemic cardiomyopathy   -systolic HF EF 20%  - 3 episodes of sustained monomorphic Vtach since admission- overdrive paced ATPx3, shocked x2  -electrolytes wnl  -Lidocaine GGT  -Amiodarone GGT  -Procainamide GGT   -Mexiletine 150mg BID  -Metoprolol 12.5mg Q6hrs  -EP reprogramed pacing to 90bmp  -Cardiac MRI shows  There is mid myocardial late gadolinium enhancement predominantly involving the entire septal wall, with similar findings in the mid to apical anterior wall.   -Monitor K and keep above 4 and keep Mg above 2  - NPO for VT Ablation today    #Afib   -rate controlled  -Metoprolol 12mg Q6  -c/w eliquis 2.5mg BID    #Pulmonary Nodule  Right lower lung field 1.3 cm nodular opacity.   CT chest outpatient      #COPD  -Duonebs Q6 prn  -c/w montelukast  -stable no wheezing    #DM2  -monitor fingersticks and if >200 start insulin sliding scale    #hypothyroidism  -c/w levothyroxine 50mcg qd    #DLD  -c/w simvastatin 10mg QHS    DVT ppx SCDs  GI ppx: Protonix    Dispo: As per EP and further intervention   Patient is from home, lives with wife

## 2018-10-27 DIAGNOSIS — N18.2 CHRONIC KIDNEY DISEASE, STAGE 2 (MILD): ICD-10-CM

## 2018-10-27 DIAGNOSIS — E87.6 HYPOKALEMIA: ICD-10-CM

## 2018-10-27 DIAGNOSIS — R41.0 DISORIENTATION, UNSPECIFIED: ICD-10-CM

## 2018-10-27 DIAGNOSIS — D64.9 ANEMIA, UNSPECIFIED: ICD-10-CM

## 2018-10-27 DIAGNOSIS — I50.22 CHRONIC SYSTOLIC (CONGESTIVE) HEART FAILURE: ICD-10-CM

## 2018-10-27 DIAGNOSIS — R74.0 NONSPECIFIC ELEVATION OF LEVELS OF TRANSAMINASE AND LACTIC ACID DEHYDROGENASE [LDH]: ICD-10-CM

## 2018-10-27 LAB
ALBUMIN SERPL ELPH-MCNC: 2.9 G/DL — LOW (ref 3.5–5.2)
ALBUMIN SERPL ELPH-MCNC: 3 G/DL — LOW (ref 3.5–5.2)
ALP SERPL-CCNC: 63 U/L — SIGNIFICANT CHANGE UP (ref 30–115)
ALP SERPL-CCNC: 68 U/L — SIGNIFICANT CHANGE UP (ref 30–115)
ALT FLD-CCNC: 16 U/L — SIGNIFICANT CHANGE UP (ref 0–41)
ALT FLD-CCNC: 18 U/L — SIGNIFICANT CHANGE UP (ref 0–41)
ANION GAP SERPL CALC-SCNC: 11 MMOL/L — SIGNIFICANT CHANGE UP (ref 7–14)
ANION GAP SERPL CALC-SCNC: 14 MMOL/L — SIGNIFICANT CHANGE UP (ref 7–14)
APTT BLD: 34.1 SEC — SIGNIFICANT CHANGE UP (ref 27–39.2)
AST SERPL-CCNC: 121 U/L — HIGH (ref 0–41)
AST SERPL-CCNC: 72 U/L — HIGH (ref 0–41)
BASE EXCESS BLDA CALC-SCNC: 0.7 MMOL/L — SIGNIFICANT CHANGE UP (ref -2–2)
BILIRUB SERPL-MCNC: 0.5 MG/DL — SIGNIFICANT CHANGE UP (ref 0.2–1.2)
BILIRUB SERPL-MCNC: 1 MG/DL — SIGNIFICANT CHANGE UP (ref 0.2–1.2)
BUN SERPL-MCNC: 14 MG/DL — SIGNIFICANT CHANGE UP (ref 10–20)
BUN SERPL-MCNC: 16 MG/DL — SIGNIFICANT CHANGE UP (ref 10–20)
CALCIUM SERPL-MCNC: 7 MG/DL — LOW (ref 8.5–10.1)
CALCIUM SERPL-MCNC: 7.3 MG/DL — LOW (ref 8.5–10.1)
CHLORIDE SERPL-SCNC: 106 MMOL/L — SIGNIFICANT CHANGE UP (ref 98–110)
CHLORIDE SERPL-SCNC: 106 MMOL/L — SIGNIFICANT CHANGE UP (ref 98–110)
CO2 SERPL-SCNC: 22 MMOL/L — SIGNIFICANT CHANGE UP (ref 17–32)
CO2 SERPL-SCNC: 24 MMOL/L — SIGNIFICANT CHANGE UP (ref 17–32)
CREAT SERPL-MCNC: 1.4 MG/DL — SIGNIFICANT CHANGE UP (ref 0.7–1.5)
CREAT SERPL-MCNC: 1.4 MG/DL — SIGNIFICANT CHANGE UP (ref 0.7–1.5)
GAS PNL BLDA: SIGNIFICANT CHANGE UP
GLUCOSE SERPL-MCNC: 154 MG/DL — HIGH (ref 70–99)
GLUCOSE SERPL-MCNC: 173 MG/DL — HIGH (ref 70–99)
HCO3 BLDA-SCNC: 25 MMOL/L — SIGNIFICANT CHANGE UP (ref 23–27)
HCT VFR BLD CALC: 25.4 % — LOW (ref 42–52)
HCT VFR BLD CALC: 28.3 % — LOW (ref 42–52)
HGB BLD-MCNC: 8.1 G/DL — LOW (ref 14–18)
HGB BLD-MCNC: 8.9 G/DL — LOW (ref 14–18)
HOROWITZ INDEX BLDA+IHG-RTO: 36 — SIGNIFICANT CHANGE UP
INR BLD: 1.27 RATIO — SIGNIFICANT CHANGE UP (ref 0.65–1.3)
MAGNESIUM SERPL-MCNC: 1.6 MG/DL — LOW (ref 1.8–2.4)
MCHC RBC-ENTMCNC: 27 PG — SIGNIFICANT CHANGE UP (ref 27–31)
MCHC RBC-ENTMCNC: 27.3 PG — SIGNIFICANT CHANGE UP (ref 27–31)
MCHC RBC-ENTMCNC: 31.4 G/DL — LOW (ref 32–37)
MCHC RBC-ENTMCNC: 31.9 G/DL — LOW (ref 32–37)
MCV RBC AUTO: 85.5 FL — SIGNIFICANT CHANGE UP (ref 80–94)
MCV RBC AUTO: 85.8 FL — SIGNIFICANT CHANGE UP (ref 80–94)
NRBC # BLD: 0 /100 WBCS — SIGNIFICANT CHANGE UP (ref 0–0)
NRBC # BLD: 0 /100 WBCS — SIGNIFICANT CHANGE UP (ref 0–0)
PCO2 BLDA: 40 MMHG — SIGNIFICANT CHANGE UP (ref 38–42)
PH BLDA: 7.41 — SIGNIFICANT CHANGE UP (ref 7.38–7.42)
PLATELET # BLD AUTO: 165 K/UL — SIGNIFICANT CHANGE UP (ref 130–400)
PLATELET # BLD AUTO: 202 K/UL — SIGNIFICANT CHANGE UP (ref 130–400)
PO2 BLDA: 115 MMHG — HIGH (ref 78–95)
POTASSIUM SERPL-MCNC: 3.5 MMOL/L — SIGNIFICANT CHANGE UP (ref 3.5–5)
POTASSIUM SERPL-MCNC: 3.7 MMOL/L — SIGNIFICANT CHANGE UP (ref 3.5–5)
POTASSIUM SERPL-SCNC: 3.5 MMOL/L — SIGNIFICANT CHANGE UP (ref 3.5–5)
POTASSIUM SERPL-SCNC: 3.7 MMOL/L — SIGNIFICANT CHANGE UP (ref 3.5–5)
PROT SERPL-MCNC: 4.7 G/DL — LOW (ref 6–8)
PROT SERPL-MCNC: 4.8 G/DL — LOW (ref 6–8)
PROTHROM AB SERPL-ACNC: 14.6 SEC — HIGH (ref 9.95–12.87)
RBC # BLD: 2.97 M/UL — LOW (ref 4.7–6.1)
RBC # BLD: 3.3 M/UL — LOW (ref 4.7–6.1)
RBC # FLD: 15.3 % — HIGH (ref 11.5–14.5)
RBC # FLD: 15.5 % — HIGH (ref 11.5–14.5)
SAO2 % BLDA: 98 % — SIGNIFICANT CHANGE UP (ref 94–98)
SODIUM SERPL-SCNC: 141 MMOL/L — SIGNIFICANT CHANGE UP (ref 135–146)
SODIUM SERPL-SCNC: 142 MMOL/L — SIGNIFICANT CHANGE UP (ref 135–146)
WBC # BLD: 11.98 K/UL — HIGH (ref 4.8–10.8)
WBC # BLD: 8.04 K/UL — SIGNIFICANT CHANGE UP (ref 4.8–10.8)
WBC # FLD AUTO: 11.98 K/UL — HIGH (ref 4.8–10.8)
WBC # FLD AUTO: 8.04 K/UL — SIGNIFICANT CHANGE UP (ref 4.8–10.8)

## 2018-10-27 RX ORDER — MAGNESIUM SULFATE 500 MG/ML
1 VIAL (ML) INJECTION ONCE
Qty: 0 | Refills: 0 | Status: COMPLETED | OUTPATIENT
Start: 2018-10-27 | End: 2018-10-27

## 2018-10-27 RX ORDER — POTASSIUM CHLORIDE 20 MEQ
20 PACKET (EA) ORAL ONCE
Qty: 0 | Refills: 0 | Status: COMPLETED | OUTPATIENT
Start: 2018-10-27 | End: 2018-10-27

## 2018-10-27 RX ORDER — POTASSIUM CHLORIDE 20 MEQ
40 PACKET (EA) ORAL ONCE
Qty: 0 | Refills: 0 | Status: COMPLETED | OUTPATIENT
Start: 2018-10-27 | End: 2018-10-27

## 2018-10-27 RX ORDER — POTASSIUM CHLORIDE 20 MEQ
20 PACKET (EA) ORAL DAILY
Qty: 0 | Refills: 0 | Status: DISCONTINUED | OUTPATIENT
Start: 2018-10-28 | End: 2018-10-29

## 2018-10-27 RX ORDER — SPIRONOLACTONE 25 MG/1
25 TABLET, FILM COATED ORAL DAILY
Qty: 0 | Refills: 0 | Status: DISCONTINUED | OUTPATIENT
Start: 2018-10-27 | End: 2018-11-01

## 2018-10-27 RX ADMIN — SIMVASTATIN 10 MILLIGRAM(S): 20 TABLET, FILM COATED ORAL at 21:27

## 2018-10-27 RX ADMIN — Medication 100 MILLIGRAM(S): at 14:30

## 2018-10-27 RX ADMIN — MONTELUKAST 10 MILLIGRAM(S): 4 TABLET, CHEWABLE ORAL at 12:15

## 2018-10-27 RX ADMIN — Medication 3 MILLILITER(S): at 23:37

## 2018-10-27 RX ADMIN — MEXILETINE HYDROCHLORIDE 150 MILLIGRAM(S): 150 CAPSULE ORAL at 18:51

## 2018-10-27 RX ADMIN — MEXILETINE HYDROCHLORIDE 150 MILLIGRAM(S): 150 CAPSULE ORAL at 06:45

## 2018-10-27 RX ADMIN — SENNA PLUS 2 TABLET(S): 8.6 TABLET ORAL at 21:27

## 2018-10-27 RX ADMIN — Medication 100 MILLIEQUIVALENT(S): at 04:00

## 2018-10-27 RX ADMIN — Medication 50 MICROGRAM(S): at 06:44

## 2018-10-27 RX ADMIN — Medication 20 MILLIEQUIVALENT(S): at 15:03

## 2018-10-27 RX ADMIN — Medication 100 GRAM(S): at 05:15

## 2018-10-27 RX ADMIN — FINASTERIDE 5 MILLIGRAM(S): 5 TABLET, FILM COATED ORAL at 12:15

## 2018-10-27 RX ADMIN — Medication 3 MILLILITER(S): at 17:24

## 2018-10-27 RX ADMIN — Medication 0.25 MILLIGRAM(S): at 14:22

## 2018-10-27 RX ADMIN — Medication 100 MILLIGRAM(S): at 21:26

## 2018-10-27 RX ADMIN — PANTOPRAZOLE SODIUM 40 MILLIGRAM(S): 20 TABLET, DELAYED RELEASE ORAL at 07:47

## 2018-10-27 RX ADMIN — TAMSULOSIN HYDROCHLORIDE 0.4 MILLIGRAM(S): 0.4 CAPSULE ORAL at 21:27

## 2018-10-27 RX ADMIN — CHLORHEXIDINE GLUCONATE 1 APPLICATION(S): 213 SOLUTION TOPICAL at 06:36

## 2018-10-27 RX ADMIN — Medication 100 GRAM(S): at 15:04

## 2018-10-27 RX ADMIN — Medication 100 MILLIGRAM(S): at 06:44

## 2018-10-27 RX ADMIN — SPIRONOLACTONE 25 MILLIGRAM(S): 25 TABLET, FILM COATED ORAL at 11:33

## 2018-10-27 RX ADMIN — Medication 40 MILLIEQUIVALENT(S): at 10:44

## 2018-10-27 RX ADMIN — Medication 100 MILLIEQUIVALENT(S): at 03:00

## 2018-10-27 NOTE — PROGRESS NOTE ADULT - ASSESSMENT
VT - no VT over night  - cont amio drip at 0.5; will dc tomorrow and change to PO  - No lidocaine or procainamide  - will removed femoral vein sheath tomorrow    PAF - hold eliqst till tomorrow    homeodynamics stable  - titrate down pressors    visual hallucination  - CT brain     LFT - slight increase  - might be from ablation (enzyme leak)  - will monitor

## 2018-10-27 NOTE — PROGRESS NOTE ADULT - SUBJECTIVE AND OBJECTIVE BOX
INTERVAL HPI/OVERNIGHT EVENTS:    Pt s/p VT ablation. Pt is doing great ; extubated and alert. Pt had some visual hallucinations in am. No VT    MEDICATIONS  (STANDING):  amiodarone Infusion 0.5 mG/Min (16.667 mL/Hr) IV Continuous <Continuous>  chlorhexidine 4% Liquid 1 Application(s) Topical <User Schedule>  docusate sodium 100 milliGRAM(s) Oral three times a day  finasteride 5 milliGRAM(s) Oral daily  furosemide    Tablet 40 milliGRAM(s) Oral daily  levothyroxine 50 MICROGram(s) Oral daily  mexiletine 150 milliGRAM(s) Oral every 12 hours  montelukast 10 milliGRAM(s) Oral daily  norepinephrine Infusion 0.05 MICROgram(s)/kG/Min (4.041 mL/Hr) IV Continuous <Continuous>  pantoprazole    Tablet 40 milliGRAM(s) Oral before breakfast  potassium chloride    Tablet ER 40 milliEquivalent(s) Oral once  senna 2 Tablet(s) Oral at bedtime  simvastatin 10 milliGRAM(s) Oral at bedtime  spironolactone 25 milliGRAM(s) Oral daily  tamsulosin 0.4 milliGRAM(s) Oral at bedtime  vancomycin  IVPB 1250 milliGRAM(s) IV Intermittent once    MEDICATIONS  (PRN):  ALBUTerol/ipratropium for Nebulization 3 milliLiter(s) Nebulizer every 6 hours PRN Shortness of Breath and/or Wheezing  ALPRAZolam 0.25 milliGRAM(s) Oral daily PRN anxiety      Allergies    morphine (Stomach Upset)    Intolerances        Vital Signs Last 24 Hrs  T(C): 37.1 (27 Oct 2018 03:15), Max: 37.1 (27 Oct 2018 03:15)  T(F): 98.7 (27 Oct 2018 03:15), Max: 98.7 (27 Oct 2018 03:15)  HR: 92 (27 Oct 2018 07:00) (88 - 112)  BP: 102/61 (27 Oct 2018 05:00) (100/67 - 135/77)  BP(mean): 71 (27 Oct 2018 05:00) (71 - 107)  RR: 24 (27 Oct 2018 07:00) (14 - 34)  SpO2: 99% (27 Oct 2018 07:00) (94% - 100%)    10-26-18 @ 07:01  -  10-27-18 @ 07:00  --------------------------------------------------------  IN: 1461.7 mL / OUT: 3807 mL / NET: -2345.3 mL        Physical Exam    GENERAL: In no apparent distress, well nourished, and hydrated.  HEAD:  Atraumatic, Normocephalic  EYES: EOMI, PERRLA, conjunctiva and sclera clear  ENMT: No tonsillar erythema, exudates, or enlargements; ist mucous membranes, Good dentition, No lesions  NECK: Supple and normal thyroid.  No JVD or carotid bruit.  Carotid pulse is 2+ bilaterally.  HEART: Regular rate and rhythm; No murmurs, rubs, or gallops.  PULMONARY: Clear to auscultation and perfusion.  No rales, wheezing, or rhonchi bilaterally.  ABDOMEN: Soft, Nontender, Nondistended; Bowel sounds present  EXTREMITIES:  2+ Peripheral Pulses, No clubbing, cyanosis, or edema  LYMPH: No lymphadenopathy noted  NEUROLOGICAL: Grossly nonfocal    LABS:                        8.9    11.98 )-----------( 202      ( 27 Oct 2018 01:30 )             28.3     10-27    142  |  106  |  16  ----------------------------<  173<H>  3.5   |  22  |  1.4    Ca    7.0<L>      27 Oct 2018 01:30  Phos  3.7     10-26  Mg     1.6     10-27    TPro  4.8<L>  /  Alb  3.0<L>  /  TBili  1.0  /  DBili  x   /  AST  121<H>  /  ALT  18  /  AlkPhos  68  10-27    PT/INR - ( 27 Oct 2018 01:30 )   PT: 14.60 sec;   INR: 1.27 ratio         PTT - ( 27 Oct 2018 01:30 )  PTT:34.1 sec      RADIOLOGY & ADDITIONAL TESTS:

## 2018-10-27 NOTE — PROGRESS NOTE ADULT - ASSESSMENT
PLAN  Neuro: move all 4 extremities.  Pain management done.   ALPRAZolam 0.25 milliGRAM(s) Oral daily PRN    Pulm: Encourage coughing, deep breathing and use of incentive spirometry. Wean off supplemental oxygen as able. Daily CXR.   ALBUTerol/ipratropium for Nebulization 3 milliLiter(s) Nebulizer every 6 hours PRN  montelukast 10 milliGRAM(s) Oral daily    Cardio: Monitor telemetry/alarms. Continue supportive care   amiodarone Infusion 0.5 mG/Min IV Continuous <Continuous>  furosemide    Tablet 40 milliGRAM(s) Oral daily  mexiletine 150 milliGRAM(s) Oral every 12 hours  norepinephrine Infusion 0.05 MICROgram(s)/kG/Min IV Continuous <Continuous>  spironolactone 25 milliGRAM(s) Oral daily  tamsulosin 0.4 milliGRAM(s) Oral at bedtime    GI: Tolerating diet. Continue stool softeners.    docusate sodium 100 milliGRAM(s) Oral three times a day  pantoprazole    Tablet 40 milliGRAM(s) Oral before breakfast  senna 2 Tablet(s) Oral at bedtime    Nutrition: Continue cardiac, carb consistent diet as tolerated  Endocrine and glucose control:   finasteride 5 milliGRAM(s) Oral daily  levothyroxine 50 MICROGram(s) Oral daily  simvastatin 10 milliGRAM(s) Oral at bedtime    Renal: monitor urine output, supplement electrolytes as needed,     Vasc: Heparin SC and/or SCDs for DVT prophylaxis  Heme: 8.9 g/dL  10.5 g/dL      ID: Off antibiotics. Stable, no fever , no chills.   vancomycin  IVPB 1250 milliGRAM(s) IV Intermittent once    Tubes: Monitor Chest tube output  Therapy: OOB/ambulate  Disposition: start planing discharge home or placement    Pertinent clinical, laboratory, radiographic, hemodynamic, echocardiographic, respiratory data, microbiologic data and chart were reviewed and analyzed frequently throughout the course of the day and night. GI and DVT prophylaxis, glycemic control, head of bed elevation and skin care issues were addressed.  Patient seen, examined and plan discussed with CT Surgery / CTICU team during rounds.

## 2018-10-27 NOTE — PROGRESS NOTE ADULT - SUBJECTIVE AND OBJECTIVE BOX
[facility]UIDO  78y  [facility]      Patient is a 78y old  Male who presents with a chief complaint of Multiple incidences of AICD firing (27 Oct 2018 11:18)      INTERVAL HPI/OVERNIGHT EVENTS:  visual hallucinations reported by staff and noted by myself      REVIEW OF SYSTEMS:  CONSTITUTIONAL: No fever, weight loss, or fatigue  EYES: No eye pain, visual disturbances, or discharge  ENMT:  No difficulty hearing, tinnitus, vertigo; No sinus or throat pain  NECK: No pain or stiffness  BREASTS: No pain, masses, or nipple discharge  RESPIRATORY: No cough, wheezing, chills or hemoptysis; No shortness of breath  CARDIOVASCULAR: No chest pain, palpitations, dizziness, or leg swelling  GASTROINTESTINAL: No abdominal or epigastric pain. No nausea, vomiting, or hematemesis; No diarrhea or constipation. No melena or hematochezia.  GENITOURINARY: No dysuria, frequency, hematuria, or incontinence  NEUROLOGICAL: + Visual hallucination  SKIN: No itching, burning, rashes, or lesions   LYMPH NODES: No enlarged glands  ENDOCRINE: No heat or cold intolerance; No hair loss  MUSCULOSKELETAL: No joint pain or swelling; No muscle, back, or extremity pain  PSYCHIATRIC: No depression, anxiety, mood swings, or difficulty sleeping  HEME/LYMPH: No easy bruising, or bleeding gums  ALLERY AND IMMUNOLOGIC: No hives or eczema  FAMILY HISTORY:  Family history of acute myocardial infarction (Aunt)    T(C): 36.1 (10-27-18 @ 16:00), Max: 37.2 (10-27-18 @ 08:00)  HR: 88 (10-27-18 @ 16:00) (78 - 112)  BP: 112/63 (10-27-18 @ 16:00) (100/67 - 135/77)  RR: 27 (10-27-18 @ 16:00) (14 - 39)  SpO2: 100% (10-27-18 @ 16:00) (94% - 100%)  Wt(kg): --Vital Signs Last 24 Hrs  T(C): 36.1 (27 Oct 2018 16:00), Max: 37.2 (27 Oct 2018 08:00)  T(F): 96.9 (27 Oct 2018 16:00), Max: 99 (27 Oct 2018 08:00)  HR: 88 (27 Oct 2018 16:00) (78 - 112)  BP: 112/63 (27 Oct 2018 16:00) (100/67 - 135/77)  BP(mean): 80 (27 Oct 2018 16:00) (71 - 107)  RR: 27 (27 Oct 2018 16:00) (14 - 39)  SpO2: 100% (27 Oct 2018 16:00) (94% - 100%)    PHYSICAL EXAM:  GENERAL: NAD, well-groomed, well-developed  HEAD:  Atraumatic, Normocephalic  EYES: EOMI, PERRLA, conjunctiva and sclera clear  ENMT: No tonsillar erythema, exudates, or enlargement; Moist mucous membranes, Good dentition, No lesions  NECK: Supple, No JVD, Normal thyroid  NERVOUS SYSTEM:  Alert  PULM: Clear to auscultation bilaterally  CARDIAC: Irregular   GI: Soft, Nontender, Nondistended; Bowel sounds present  EXTREMITIES:  2+ Peripheral Pulses, No clubbing, cyanosis, or edema  LYMPH: No lymphadenopathy noted  SKIN: No rashes or lesions    Consultant(s) Notes Reviewed:  [x ] YES  [ ] NO  Care Discussed with Consultants/Other Providers [ x] YES  [ ] NO    LABS:                            8.1    8.04  )-----------( 165      ( 27 Oct 2018 09:15 )             25.4   10-27    141  |  106  |  14  ----------------------------<  154<H>  3.7   |  24  |  1.4    Ca    7.3<L>      27 Oct 2018 09:15  Phos  3.7     10-26  Mg     1.6     10-27    TPro  4.7<L>  /  Alb  2.9<L>  /  TBili  0.5  /  DBili  x   /  AST  72<H>  /  ALT  16  /  AlkPhos  63  10-27            ALBUTerol/ipratropium for Nebulization 3 milliLiter(s) Nebulizer every 6 hours PRN  ALPRAZolam 0.25 milliGRAM(s) Oral daily PRN  amiodarone Infusion 0.5 mG/Min IV Continuous <Continuous>  chlorhexidine 4% Liquid 1 Application(s) Topical <User Schedule>  docusate sodium 100 milliGRAM(s) Oral three times a day  finasteride 5 milliGRAM(s) Oral daily  furosemide    Tablet 40 milliGRAM(s) Oral daily  levothyroxine 50 MICROGram(s) Oral daily  mexiletine 150 milliGRAM(s) Oral every 12 hours  montelukast 10 milliGRAM(s) Oral daily  norepinephrine Infusion 0.05 MICROgram(s)/kG/Min IV Continuous <Continuous>  pantoprazole    Tablet 40 milliGRAM(s) Oral before breakfast  senna 2 Tablet(s) Oral at bedtime  simvastatin 10 milliGRAM(s) Oral at bedtime  spironolactone 25 milliGRAM(s) Oral daily  tamsulosin 0.4 milliGRAM(s) Oral at bedtime  vancomycin  IVPB 1250 milliGRAM(s) IV Intermittent once      HEALTH ISSUES - PROBLEM Dx:  Stage 2 chronic kidney disease: Stage 2 chronic kidney disease  Hypokalemia: Hypokalemia  Transaminasemia: Transaminasemia  Anemia, unspecified type: Anemia, unspecified type  Confusion: Confusion  Chronic systolic heart failure: Chronic systolic heart failure  AICD (automatic cardioverter/defibrillator) present: AICD (automatic cardioverter/defibrillator) present  Ventricular tachycardia: Ventricular tachycardia

## 2018-10-27 NOTE — PROGRESS NOTE ADULT - SUBJECTIVE AND OBJECTIVE BOX
CTU Attending Progress Daily Note     27 Oct 2018 11:19  Admited 10-22-18, Hospital Day 5d  POD# - 1    HPI:  78Y M with pmh of Afib on eliquis, CAD with SCHF s/p AICD, COPD, DM2, DLD and hypothyroidism presented to the ED after having multiple episodes of his AICD firing. As per patient he saw Dr Santos on Thursday who reprogrammed his device and advised him for ablation. Patient says he has been refusing ablation for months but now understands that its necessary. He has been having worsening SOB on exertion for the past few months and developed a cough for the past few weeks which his cardiologist informed him was due to fluid n his lungs. Patient says he has an aura like sensation when he knows that his AICD is about to fire and he feel like he has gas travelling up his abdomen and then chest pressure. On Sunday morning he had a terrible attack of the defibrillator firing after which he decided to come to the ED. As per patient he had a pacemaker which was changed to an AICD in January. Patient denies chest pain otherwise, nausea, vomiting or diarrhea. (23 Oct 2018 00:04)    Home Medications:  ALPRAZolam 0.25 mg oral tablet: 1 tab(s) orally once a day, As Needed (23 Oct 2018 00:22)  amiodarone 200 mg oral tablet: 1 tab(s) orally once a day (23 Oct 2018 00:22)  Eliquis 2.5 mg oral tablet: 1 tab(s) orally 2 times a day (23 Oct 2018 00:22)  finasteride 5 mg oral tablet: 1 tab(s) orally once a day (23 Oct 2018 00:22)  glyBURIDE 5 mg oral tablet: 1 tab(s) orally once a day (23 Oct 2018 00:22)  levothyroxine 50 mcg (0.05 mg) oral capsule: 1 cap(s) orally once a day (23 Oct 2018 00:22)  losartan 100 mg oral tablet: 1 tab(s) orally once a day (23 Oct 2018 00:22)  metoprolol tartrate 50 mg oral tablet: 1 tab(s) orally 2 times a day (23 Oct 2018 00:22)  montelukast 10 mg oral tablet: 1 tab(s) orally once a day (23 Oct 2018 00:22)  OMEPRAZOLE DR 40 MG CAPSULE: 40  orally once a day (23 Oct 2018 00:22)  pravastatin 20 mg oral tablet: 1 tab(s) orally once a day (23 Oct 2018 00:22)  tamsulosin 0.4 mg oral capsule: 1 cap(s) orally once a day (23 Oct 2018 00:22)    FAMILY HISTORY:  Family history of acute myocardial infarction (Aunt)    PAST MEDICAL & SURGICAL HISTORY:  CAD (coronary artery disease)  Ventricular tachycardia  NANDO on CPAP  COPD (chronic obstructive pulmonary disease)  Pacemaker  AICD (automatic cardioverter/defibrillator) present  Hypothyroidism  Atrial fibrillation  Congestive heart disease  Hypercholesteremia  AICD (automatic cardioverter/defibrillator) present  History of laparoscopic cholecystectomy    Interval event for past 24 hr:  GUIDO VILCHIS  78y had no event.   Current Complains:  GUIDO VILCHIS has no new complains  Allergies    morphine (Stomach Upset)    Intolerances      OBJECTIVE:  Vitals last 24 hrs  T(C): 37.1 (10-27-18 @ 03:15), Max: 37.1 (10-27-18 @ 03:15)  T(F): 98.7 (10-27-18 @ 03:15), Max: 98.7 (10-27-18 @ 03:15)  HR: 92 (10-27-18 @ 07:00) (88 - 112)  BP: 102/61 (10-27-18 @ 05:00) (100/67 - 135/77)  ABP: 134/64 (10-27-18 @ 07:00) (80/44 - 150/82)  ABP(mean): 86 (10-27-18 @ 07:00) (56 - 144)  RR: 24 (10-27-18 @ 07:00) (14 - 34)  SpO2: 99% (10-27-18 @ 07:00) (94% - 100%)      10-26-18 @ 07:01  -  10-27-18 @ 07:00  --------------------------------------------------------  IN:    0.9% NaCl: 240 mL    amiodarone Infusion: 216.7 mL    IV PiggyBack: 300 mL    norepinephrine Infusion: 105 mL    Oral Fluid: 600 mL  Total IN: 1461.7 mL    OUT:    Indwelling Catheter - Urethral: 3775 mL    Voided: 32 mL  Total OUT: 3807 mL    Total NET: -2345.3 mL        CAPILLARY BLOOD GLUCOSE        LABS:  ABG - ( 27 Oct 2018 09:23 )  pH, Arterial: 7.41  pH, Blood: x     /  pCO2: 40    /  pO2: 115   / HCO3: 25    / Base Excess: 0.7   /  SaO2: 98                             8.9    11.98 )-----------( 202      ( 27 Oct 2018 01:30 )             28.3     Hemoglobin: 8.9 g/dL (10-27 @ 01:30)  Hemoglobin: 10.5 g/dL (10-26 @ 05:15)  Hemoglobin: 11.0 g/dL (10-25 @ 05:05)    10-27    142  |  106  |  16  ----------------------------<  173<H>  3.5   |  22  |  1.4    Ca    7.0<L>      27 Oct 2018 01:30  Phos  3.7     10-26  Mg     1.6     10-27    TPro  4.8<L>  /  Alb  3.0<L>  /  TBili  1.0  /  DBili  x   /  AST  121<H>  /  ALT  18  /  AlkPhos  68  10-27    Creatinine, Serum: 1.4 mg/dL (10-27 @ 01:30)  Creatinine, Serum: 1.6 mg/dL (10-26 @ 07:54)  Creatinine, Serum: 1.7 mg/dL (10-26 @ 05:15)  Creatinine, Serum: 1.4 mg/dL (10-25 @ 05:05)  Creatinine, Serum: 1.3 mg/dL (10-24 @ 04:33)    PT/INR - ( 27 Oct 2018 01:30 )   PT: 14.60 sec;   INR: 1.27 ratio     PTT - ( 27 Oct 2018 01:30 )  PTT:34.1 sec      HOSPITAL MEDICATIONS:  MEDICATIONS  (STANDING):  amiodarone Infusion 0.5 mG/Min (16.667 mL/Hr) IV Continuous <Continuous>  chlorhexidine 4% Liquid 1 Application(s) Topical <User Schedule>  docusate sodium 100 milliGRAM(s) Oral three times a day  finasteride 5 milliGRAM(s) Oral daily  furosemide    Tablet 40 milliGRAM(s) Oral daily  levothyroxine 50 MICROGram(s) Oral daily  mexiletine 150 milliGRAM(s) Oral every 12 hours  montelukast 10 milliGRAM(s) Oral daily  norepinephrine Infusion 0.05 MICROgram(s)/kG/Min (4.041 mL/Hr) IV Continuous <Continuous>  pantoprazole    Tablet 40 milliGRAM(s) Oral before breakfast  senna 2 Tablet(s) Oral at bedtime  simvastatin 10 milliGRAM(s) Oral at bedtime  spironolactone 25 milliGRAM(s) Oral daily  tamsulosin 0.4 milliGRAM(s) Oral at bedtime  vancomycin  IVPB 1250 milliGRAM(s) IV Intermittent once    MEDICATIONS  (PRN):  ALBUTerol/ipratropium for Nebulization 3 milliLiter(s) Nebulizer every 6 hours PRN Shortness of Breath and/or Wheezing  ALPRAZolam 0.25 milliGRAM(s) Oral daily PRN anxiety      REVIEW OF SYSTEMS:  CONSTITUTIONAL: [X] all negative; [ ] weakness, [ ] fevers, [ ] chills  EYES/ENT: [X] all negative; [ ] visual changes, [ ] vertigo, [ ] throat pain   NECK: [X] all negative; [ ] pain, [ ] stiffness  RESPIRATORY: [x ] all negative, [ ] cough, [ ] wheezing, [ ] hemoptysis, [ ] shortness of breath  CARDIOVASCULAR: [ ] all negative; [ ] chest pain, [ ] palpitations, [ ] orthopnea  GASTROINTESTINAL: [X] all negative; [ ]abdominal pain, [ ] nausea, [ ] vomiting, [ ] hematemesis, [ ] diarrhea, [ ] constipation, [ ] melena, [ ] hematochezia.  GENITOURINARY: [X] all negative; [ ] dysuria, [ ] frequency, [ ] hematuria  NEUROLOGICAL: [X] all negative; [ ] numbness, [ ] weakness  SKIN: [X] all negative; [ ] itching, [ ] burning, [ ] rashes, [ ] lesions   All other review of systems is negative unless indicated above.    [  ] Unable to assess ROS because     PHYSICAL EXAM:          CONSTITUTIONAL: Well-developed; well-nourished; in no acute distress.   	SKIN: warm, dry, no rashes or lesions  	HENT: Atrumatic. Normocephalic. PERRL. Moist membranes, no conj injection, sclera clear  	NECK: Supple; non tender.  No JVD. No lymphadenopathy.  	CARD: Normal S1, S2. Rate and Rythm are regular. No murmurs.  	RESP: CTA B/L. no wheezes, no rales no rhonchi.  	ABD: Soft, not tender, not distended, no CVA ttp no rebound no guarding, bowel sounds present  	EXT: Normal ROM.  No clubbing, no cyanosis, no pedal edema, no calf pain b/l, Peripheral pulses intact.  	LYMPH: No acute cervical adenopathy.  	NEURO: Alert, awake, motor 5/5 R, 5/5 L, sensation intact bilat, CN 2-12 intact,          PSYCH: Cooperative, appropriate. Alert & oriented x 3, abnormal affect, visual halucination    RADIOLOGY:  X Reviewed and interpreted by me  CxR from 10-27-18 shows mild congestion, no pneumothorax, no effusion, no cardiomegaly,       ECG:  X Reviewed and interpreted by paige Oswald

## 2018-10-27 NOTE — PROGRESS NOTE ADULT - ASSESSMENT
VT secondary to paroxysmal atrial fib with chronic systolic chf   - no episodes overnight sp imprellar removal   -cw curretn medication  -ep following  -cw ccu monitoring   CKD 2 monitor    Visual hallucination and encephalopathy secondary to unknown etiology - ct scan of head and neurology consult if not improved    Transaminitis monitor     Anemia no signs of acute blood loss monitor     DW HS

## 2018-10-28 LAB
ALBUMIN SERPL ELPH-MCNC: 2.9 G/DL — LOW (ref 3.5–5.2)
ALP SERPL-CCNC: 63 U/L — SIGNIFICANT CHANGE UP (ref 30–115)
ALT FLD-CCNC: 15 U/L — SIGNIFICANT CHANGE UP (ref 0–41)
ANION GAP SERPL CALC-SCNC: 12 MMOL/L — SIGNIFICANT CHANGE UP (ref 7–14)
AST SERPL-CCNC: 49 U/L — HIGH (ref 0–41)
BILIRUB SERPL-MCNC: 0.4 MG/DL — SIGNIFICANT CHANGE UP (ref 0.2–1.2)
BUN SERPL-MCNC: 14 MG/DL — SIGNIFICANT CHANGE UP (ref 10–20)
CALCIUM SERPL-MCNC: 7.8 MG/DL — LOW (ref 8.5–10.1)
CHLORIDE SERPL-SCNC: 102 MMOL/L — SIGNIFICANT CHANGE UP (ref 98–110)
CO2 SERPL-SCNC: 23 MMOL/L — SIGNIFICANT CHANGE UP (ref 17–32)
CREAT SERPL-MCNC: 1.3 MG/DL — SIGNIFICANT CHANGE UP (ref 0.7–1.5)
GLUCOSE SERPL-MCNC: 169 MG/DL — HIGH (ref 70–99)
HCT VFR BLD CALC: 24.9 % — LOW (ref 42–52)
HGB BLD-MCNC: 7.8 G/DL — LOW (ref 14–18)
MCHC RBC-ENTMCNC: 26.7 PG — LOW (ref 27–31)
MCHC RBC-ENTMCNC: 31.3 G/DL — LOW (ref 32–37)
MCV RBC AUTO: 85.3 FL — SIGNIFICANT CHANGE UP (ref 80–94)
NRBC # BLD: 0 /100 WBCS — SIGNIFICANT CHANGE UP (ref 0–0)
PLATELET # BLD AUTO: 149 K/UL — SIGNIFICANT CHANGE UP (ref 130–400)
POTASSIUM SERPL-MCNC: 3.6 MMOL/L — SIGNIFICANT CHANGE UP (ref 3.5–5)
POTASSIUM SERPL-SCNC: 3.6 MMOL/L — SIGNIFICANT CHANGE UP (ref 3.5–5)
PROT SERPL-MCNC: 4.9 G/DL — LOW (ref 6–8)
RBC # BLD: 2.92 M/UL — LOW (ref 4.7–6.1)
RBC # FLD: 15.4 % — HIGH (ref 11.5–14.5)
SODIUM SERPL-SCNC: 137 MMOL/L — SIGNIFICANT CHANGE UP (ref 135–146)
WBC # BLD: 7.26 K/UL — SIGNIFICANT CHANGE UP (ref 4.8–10.8)
WBC # FLD AUTO: 7.26 K/UL — SIGNIFICANT CHANGE UP (ref 4.8–10.8)

## 2018-10-28 RX ORDER — MAGNESIUM SULFATE 500 MG/ML
2 VIAL (ML) INJECTION ONCE
Qty: 0 | Refills: 0 | Status: COMPLETED | OUTPATIENT
Start: 2018-10-28 | End: 2018-10-28

## 2018-10-28 RX ORDER — APIXABAN 2.5 MG/1
2.5 TABLET, FILM COATED ORAL EVERY 12 HOURS
Qty: 0 | Refills: 0 | Status: DISCONTINUED | OUTPATIENT
Start: 2018-10-28 | End: 2018-11-01

## 2018-10-28 RX ORDER — POTASSIUM CHLORIDE 20 MEQ
40 PACKET (EA) ORAL ONCE
Qty: 0 | Refills: 0 | Status: COMPLETED | OUTPATIENT
Start: 2018-10-28 | End: 2018-10-28

## 2018-10-28 RX ORDER — ENOXAPARIN SODIUM 100 MG/ML
40 INJECTION SUBCUTANEOUS DAILY
Qty: 0 | Refills: 0 | Status: DISCONTINUED | OUTPATIENT
Start: 2018-10-28 | End: 2018-10-28

## 2018-10-28 RX ORDER — AMIODARONE HYDROCHLORIDE 400 MG/1
400 TABLET ORAL EVERY 12 HOURS
Qty: 0 | Refills: 0 | Status: DISCONTINUED | OUTPATIENT
Start: 2018-10-28 | End: 2018-10-28

## 2018-10-28 RX ADMIN — Medication 12.5 GRAM(S): at 15:55

## 2018-10-28 RX ADMIN — Medication 100 MILLIGRAM(S): at 21:38

## 2018-10-28 RX ADMIN — ENOXAPARIN SODIUM 40 MILLIGRAM(S): 100 INJECTION SUBCUTANEOUS at 12:53

## 2018-10-28 RX ADMIN — Medication 20 MILLIEQUIVALENT(S): at 11:20

## 2018-10-28 RX ADMIN — PANTOPRAZOLE SODIUM 40 MILLIGRAM(S): 20 TABLET, DELAYED RELEASE ORAL at 06:20

## 2018-10-28 RX ADMIN — MONTELUKAST 10 MILLIGRAM(S): 4 TABLET, CHEWABLE ORAL at 11:20

## 2018-10-28 RX ADMIN — APIXABAN 2.5 MILLIGRAM(S): 2.5 TABLET, FILM COATED ORAL at 21:38

## 2018-10-28 RX ADMIN — Medication 40 MILLIGRAM(S): at 06:20

## 2018-10-28 RX ADMIN — Medication 50 MICROGRAM(S): at 06:20

## 2018-10-28 RX ADMIN — SIMVASTATIN 10 MILLIGRAM(S): 20 TABLET, FILM COATED ORAL at 21:38

## 2018-10-28 RX ADMIN — SENNA PLUS 2 TABLET(S): 8.6 TABLET ORAL at 21:38

## 2018-10-28 RX ADMIN — TAMSULOSIN HYDROCHLORIDE 0.4 MILLIGRAM(S): 0.4 CAPSULE ORAL at 21:38

## 2018-10-28 RX ADMIN — AMIODARONE HYDROCHLORIDE 16.67 MG/MIN: 400 TABLET ORAL at 06:54

## 2018-10-28 RX ADMIN — FINASTERIDE 5 MILLIGRAM(S): 5 TABLET, FILM COATED ORAL at 11:20

## 2018-10-28 RX ADMIN — MEXILETINE HYDROCHLORIDE 150 MILLIGRAM(S): 150 CAPSULE ORAL at 06:21

## 2018-10-28 RX ADMIN — Medication 3 MILLILITER(S): at 19:03

## 2018-10-28 RX ADMIN — MEXILETINE HYDROCHLORIDE 150 MILLIGRAM(S): 150 CAPSULE ORAL at 18:48

## 2018-10-28 RX ADMIN — SPIRONOLACTONE 25 MILLIGRAM(S): 25 TABLET, FILM COATED ORAL at 06:20

## 2018-10-28 RX ADMIN — Medication 100 MILLIGRAM(S): at 06:20

## 2018-10-28 RX ADMIN — CHLORHEXIDINE GLUCONATE 1 APPLICATION(S): 213 SOLUTION TOPICAL at 06:20

## 2018-10-28 RX ADMIN — Medication 40 MILLIEQUIVALENT(S): at 15:56

## 2018-10-28 NOTE — PROGRESS NOTE ADULT - SUBJECTIVE AND OBJECTIVE BOX
Patient is a 78y old Male who presents with a chief complaint of Multiple incidences of AICD firing. Currently admitted to medicine with the primary diagnosis of Ventricular tachycardia. Overnight he had a brief moment of Vtach which resolved on its own after a minute. Pt pulled out his arterial line, not knowing where he was. He denies any pain, no shortness of breath.    Today is hospital day 6d.    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 As per patient he saw Dr Santos on Thursday who reprogrammed his device and advised him for V-tach ablation. Patient says he has been refusing ablation for months but now understands that its necessary. He has been having worsening SOB on exertion for the past few months and developed a cough for the past few weeks which his cardiologist informed him was due to fluid in his lungs. Patient says he has an aura like sensation when he knows that his AICD is about to fire and he feel like he has gas travelling up his abdomen and then chest pressure.   AICD interrogation in ER revelead multiple epsiodes of NSVT 52 in 24 hrs, 8 of VT treated with ATPs, 1 VT and VF treated with 35J shock      PAST MEDICAL & SURGICAL HISTORY  CAD (coronary artery disease)  Ventricular tachycardia  NANDO on CPAP  COPD (chronic obstructive pulmonary disease)  Pacemaker  AICD (automatic cardioverter/defibrillator) present  Hypothyroidism  Atrial fibrillation  Congestive heart disease  Hypercholesteremia  AICD (automatic cardioverter/defibrillator) present  History of laparoscopic cholecystectomy      SOCIAL HISTORY:      REVIEW OF SYSTEMS:  See HPI    ALLERGIES:  morphine (Stomach Upset)    MEDICATIONS:  STANDING MEDICATIONS  amiodarone Infusion 0.5 mG/Min IV Continuous <Continuous>  chlorhexidine 4% Liquid 1 Application(s) Topical <User Schedule>  docusate sodium 100 milliGRAM(s) Oral three times a day  finasteride 5 milliGRAM(s) Oral daily  furosemide    Tablet 40 milliGRAM(s) Oral daily  levothyroxine 50 MICROGram(s) Oral daily  mexiletine 150 milliGRAM(s) Oral every 12 hours  montelukast 10 milliGRAM(s) Oral daily  norepinephrine Infusion 0.05 MICROgram(s)/kG/Min IV Continuous <Continuous>  pantoprazole    Tablet 40 milliGRAM(s) Oral before breakfast  potassium chloride    Tablet ER 20 milliEquivalent(s) Oral daily  senna 2 Tablet(s) Oral at bedtime  simvastatin 10 milliGRAM(s) Oral at bedtime  spironolactone 25 milliGRAM(s) Oral daily  tamsulosin 0.4 milliGRAM(s) Oral at bedtime  vancomycin  IVPB 1250 milliGRAM(s) IV Intermittent once    PRN MEDICATIONS  ALBUTerol/ipratropium for Nebulization 3 milliLiter(s) Nebulizer every 6 hours PRN  ALPRAZolam 0.25 milliGRAM(s) Oral daily PRN    VITALS:         ICU Vital Signs Last 24 Hrs:    T(C): 36.8 (28 Oct 2018 04:08), Max: 37.2 (27 Oct 2018 08:00)  T(F): 98.3 (28 Oct 2018 04:08), Max: 99 (27 Oct 2018 08:00)  HR: 88 (28 Oct 2018 06:08) (78 - 88)  BP: 127/63 (28 Oct 2018 06:08) (100/52 - 127/63)  BP(mean): 85 (28 Oct 2018 06:08) (71 - 86)  ABP: 100/38 (28 Oct 2018 01:00) (100/38 - 124/60)  ABP(mean): 56 (28 Oct 2018 01:00) (56 - 80)  RR: 31 (28 Oct 2018 06:08) (8 - 40)  SpO2: 99% (28 Oct 2018 06:08) (96% - 100%)      ABG - ( 27 Oct 2018 09:23 )  pH, Arterial: 7.41  pH, Blood: x     /  pCO2: 40    /  pO2: 115   / HCO3: 25    / Base Excess: 0.7   /  SaO2: 98                  I&O's Detail:      27 Oct 2018 07:01  -  28 Oct 2018 07:00  --------------------------------------------------------  IN:    amiodarone Infusion: 400.1 mL    Oral Fluid: 480 mL  Total IN: 880.1 mL    OUT:    Indwelling Catheter - Urethral: 1210 mL  Total OUT: 1210 mL    Total NET: -329.9 mL          LABS:                        7.8    7.26  )-----------( 149      ( 27 Oct 2018 23:00 )             24.9     10-27    137  |  102  |  14  ----------------------------<  169<H>  3.6   |  23  |  1.3    Ca    7.8<L>      27 Oct 2018 23:00  Mg     1.6     10-27    TPro  4.9<L>  /  Alb  2.9<L>  /  TBili  0.4  /  DBili  x   /  AST  49<H>  /  ALT  15  /  AlkPhos  63  10-27          CAPILLARY BLOOD GLUCOSE      POCT Blood Glucose.: 143 mg/dL (26 Oct 2018 07:28)    PT/INR - ( 27 Oct 2018 01:30 )   PT: 14.60 sec;   INR: 1.27 ratio         PTT - ( 27 Oct 2018 01:30 )  PTT:34.1 sec    CULTURES:      PHYSICAL EXAM:    General: No acute distress.  Alert, oriented, interactive, nonfocal.    HEENT: Pupils equal, reactive to light symmetrically.    PULM: Clear to auscultation bilaterally, no significant sputum production.    CVS: Regular rate and rhythm, no murmurs, rubs, or gallops.    ABD: Soft, nondistended, nontender, no masses.    EXT: No edema, nontender.    SKIN: Warm and well perfused, no rashes noted.    RADIOLOGY:

## 2018-10-28 NOTE — PROGRESS NOTE ADULT - SUBJECTIVE AND OBJECTIVE BOX
SUBJECTIVE:    Patient is a 78y old Male who presents with a chief complaint of Multiple incidences of AICD firing (28 Oct 2018 07:08)    Currently admitted to medicine with the primary diagnosis of Ventricular tachycardia s/p VT Ablation     Today is hospital day 6d. This morning he is resting comfortably in bed. PM nurse endorses some ICU agitation and delirium overnight. Patient appears slightly confused but denies headache, dizziness, sob, cp, palpitations, abdominal pain, n/v    PAST MEDICAL & SURGICAL HISTORY  CAD (coronary artery disease)  Ventricular tachycardia  NANDO on CPAP  COPD (chronic obstructive pulmonary disease)  Pacemaker  AICD (automatic cardioverter/defibrillator) present  Hypothyroidism  Atrial fibrillation  Congestive heart disease  Hypercholesteremia  AICD (automatic cardioverter/defibrillator) present  History of laparoscopic cholecystectomy      ALLERGIES:  morphine (Stomach Upset)    MEDICATIONS:  STANDING MEDICATIONS  amiodarone Infusion 0.5 mG/Min IV Continuous <Continuous>  apixaban 2.5 milliGRAM(s) Oral every 12 hours  chlorhexidine 4% Liquid 1 Application(s) Topical <User Schedule>  docusate sodium 100 milliGRAM(s) Oral three times a day  finasteride 5 milliGRAM(s) Oral daily  furosemide    Tablet 40 milliGRAM(s) Oral daily  levothyroxine 50 MICROGram(s) Oral daily  mexiletine 150 milliGRAM(s) Oral every 12 hours  montelukast 10 milliGRAM(s) Oral daily  norepinephrine Infusion 0.05 MICROgram(s)/kG/Min IV Continuous <Continuous>  pantoprazole    Tablet 40 milliGRAM(s) Oral before breakfast  potassium chloride    Tablet ER 20 milliEquivalent(s) Oral daily  senna 2 Tablet(s) Oral at bedtime  simvastatin 10 milliGRAM(s) Oral at bedtime  spironolactone 25 milliGRAM(s) Oral daily  tamsulosin 0.4 milliGRAM(s) Oral at bedtime  vancomycin  IVPB 1250 milliGRAM(s) IV Intermittent once    PRN MEDICATIONS  ALBUTerol/ipratropium for Nebulization 3 milliLiter(s) Nebulizer every 6 hours PRN  ALPRAZolam 0.25 milliGRAM(s) Oral daily PRN    VITALS:   T(F): 98.1  HR: 90  BP: 141/67  RR: 26  SpO2: 97%    LABS:                        7.8    7.26  )-----------( 149      ( 27 Oct 2018 23:00 )             24.9     10-27    137  |  102  |  14  ----------------------------<  169<H>  3.6   |  23  |  1.3    Ca    7.8<L>      27 Oct 2018 23:00  Mg     1.6     10-27    TPro  4.9<L>  /  Alb  2.9<L>  /  TBili  0.4  /  DBili  x   /  AST  49<H>  /  ALT  15  /  AlkPhos  63  10-27    PT/INR - ( 27 Oct 2018 01:30 )   PT: 14.60 sec;   INR: 1.27 ratio         PTT - ( 27 Oct 2018 01:30 )  PTT:34.1 sec    ABG - ( 27 Oct 2018 09:23 )  pH, Arterial: 7.41  pH, Blood: x     /  pCO2: 40    /  pO2: 115   / HCO3: 25    / Base Excess: 0.7   /  SaO2: 98          RADIOLOGY:  < from: Transthoracic Echocardiogram (10.28.18 @ 08:47) >   1. LV Ejection Fraction by Amaro's Method with a biplane EF of 39 %.   2. Increased LV wall thickness.   3. Mildly increased left ventricular internal cavity size.   4. Normal left atrial size.   5.Normal right atrial size.   6. Small pericardial effusion.   7. Mild to moderate mitral valve regurgitation.   8. Moderate mitral annular calcification.   9. Thickening and calcification of the anterior mitral valve leaflet.  10. Trace tricuspid regurgitation.  11. Trace pulmonic valve regurgitation.    PHYSICIAN INTERPRETATION:  Left Ventricle: The left ventricular internal cavity size is mildly   increased. Left ventricular wall thickness is increased. LV Ejection   Fraction by Amaro's Methodwith a biplane EF of 39 %.  Right Ventricle: The right ventricular size is normal. RV systolic   function is normal.  Left Atrium: Normal left atrial size.  Right Atrium: Normal right atrial size.  Pericardium: A small pericardial effusion is present.  Mitral Valve: Mild to moderate mitral valve regurgitation is seen.   Thickening and calcification of the anterior mitral valve leaflet. There   is moderate mitral annular calcification.  Tricuspid Valve: Trivial tricuspid regurgitation is visualized. The   tricuspid valve is normal.  Aortic Valve: Trivial aortic valve regurgitation is seen. The aortic   valve is trileaflet. No evidence of aortic stenosis.  Pulmonic Valve: Trace pulmonic valve regurgitation. The pulmonic valve is   thickened with good excursion.  Aorta: Aortic root measured at Sinus of Valsalva is normal.  Additional Comments: A pacer wire is visualized in the right atrium and   right ventricle.     < end of copied text >    < from: Transthoracic Echocardiogram (05.14.18 @ 12:53) >  Summary:   1. Left ventricular ejection fraction, by visual estimation, is 20 to 25%.   2. Severely decreased global left ventricular systolic function.   3. Moderate to severe left atrial enlargement.   4. LV Ejection Fraction by Amaro's Method with a biplane EF of 26 %.   5. Mildly increased LV wall thickness.   6. Severely increased left ventricular internal cavity size.   7. Thickening and calcification of the anterior mitral valve leaflet.   8. Estimated pulmonary artery systolic pressure is 36.0 mmHg assuming a   right atrial pressure of 5 mmHg, which is consistent with borderline   pulmonary hypertension.  < end of copied text >    < from: Xray Chest 1 View- PORTABLE-Routine (10.28.18 @ 05:35) >    Findings:    Support devices: Stable left AICD    Cardiac/mediastinum/hilum: Stable cardiomegaly    Lung parenchyma/Pleura: Within normal limits.    Skeleton/soft tissues: Unremarkable.    Impression:      No focal pulmonary consolidation.  Stable exam.    < end of copied text >    -Cardiac MRI shows  There is mid myocardial late gadolinium enhancement predominantly involving the entire septal wall, with similar findings in the mid to apical anterior wall.       PHYSICAL EXAM:  GEN: No acute distress  LUNGS: Clear to auscultation bilaterally   HEART: S1/S2 present. RRR.   ABD: Soft, non-tender, non-distended. Bowel sounds present  EXT: NC/NC/NE/2+PP/SAMUELS/Skin Intact.   NEURO: AAOX2, no focal deficits    Intravenous access:   NG tube:   Johnson cathter: Indwelling Urethral Catheter:     Connect To:  Straight Drainage/Gravity    Indication:  Urine Output Monitoring in Critically Ill (10-27-18 @ 09:17) (not performed) [active]    I&O's Summary    27 Oct 2018 07:01  -  28 Oct 2018 07:00  --------------------------------------------------------  IN: 880.1 mL / OUT: 1210 mL / NET: -329.9 mL    28 Oct 2018 07:01  -  28 Oct 2018 17:37  --------------------------------------------------------  IN: 630 mL / OUT: 1400 mL / NET: -770 mL

## 2018-10-28 NOTE — PROGRESS NOTE ADULT - ASSESSMENT
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 As per patient he saw Dr Santos on Thursday who reprogrammed his device and advised him for V-tach ablation. Patient says he has been refusing ablation for months but now understands that its necessary. He has been having worsening SOB on exertion for the past few months and developed a cough for the past few weeks which his cardiologist informed him was due to fluid in his lungs. Patient says he has an aura like sensation when he knows that his AICD is about to fire and he feel like he has gas travelling up his abdomen and then chest pressure.   AICD interrogation in ER revelead multiple epsiodes of NSVT 52 in 24 hrs, 8 of VT treated with ATPs, 1 VT and VF treated with 35J shock    #Chest pressure 2/2 AICD firing 2/2 Multiple episodes of VTach 2/2 Non Ischemic cardiomyopathy   -systolic HF EF 20%, now 39%   - 3 episodes of sustained monomorphic Vtach since admission- overdrive paced ATPx3, shocked x2  -s/p VT Ablation 10/26  -electrolytes repleated  -Amiodarone GGT  -Mexiletine 150mg Q12  -Lasix 40mg qd  Spironolactone 25mg qd  -Monitor K and keep above 4 and keep Mg above 2    #Afib   -rate controlled   -On amio ggt  -c/w eliquis 2.5mg BID    #Pulmonary Nodule  Right lower lung field 1.3 cm nodular opacity.   CT chest outpatient      #COPD  -Duonebs Q6 prn  -c/w montelukast  -stable no wheezing    #DM2  -monitor fingersticks and if >200 start insulin sliding scale    #hypothyroidism  -c/w levothyroxine 50mcg qd    #DLD  -c/w simvastatin 10mg QHS    #BPH  Finasteride 5mg qd  Flomax 0.4mg at bedtime    DVT ppx Eliquis  GI ppx: Protonix    Dispo: As per EP and further intervention   Patient is from home, lives with wife

## 2018-10-28 NOTE — PROVIDER CONTACT NOTE (OTHER) - SITUATION
Md notified that patient went into Vtach for about 14 beats and than it broke on its own. pt now in NSR, remains on amio gtt.

## 2018-10-29 LAB
ALBUMIN SERPL ELPH-MCNC: 2.8 G/DL — LOW (ref 3.5–5.2)
ALP SERPL-CCNC: 65 U/L — SIGNIFICANT CHANGE UP (ref 30–115)
ALT FLD-CCNC: 13 U/L — SIGNIFICANT CHANGE UP (ref 0–41)
ANION GAP SERPL CALC-SCNC: 10 MMOL/L — SIGNIFICANT CHANGE UP (ref 7–14)
ANION GAP SERPL CALC-SCNC: 10 MMOL/L — SIGNIFICANT CHANGE UP (ref 7–14)
AST SERPL-CCNC: 23 U/L — SIGNIFICANT CHANGE UP (ref 0–41)
BILIRUB SERPL-MCNC: 0.5 MG/DL — SIGNIFICANT CHANGE UP (ref 0.2–1.2)
BLD GP AB SCN SERPL QL: SIGNIFICANT CHANGE UP
BUN SERPL-MCNC: 10 MG/DL — SIGNIFICANT CHANGE UP (ref 10–20)
BUN SERPL-MCNC: 11 MG/DL — SIGNIFICANT CHANGE UP (ref 10–20)
CALCIUM SERPL-MCNC: 8 MG/DL — LOW (ref 8.5–10.1)
CALCIUM SERPL-MCNC: 8.1 MG/DL — LOW (ref 8.5–10.1)
CHLORIDE SERPL-SCNC: 98 MMOL/L — SIGNIFICANT CHANGE UP (ref 98–110)
CHLORIDE SERPL-SCNC: 98 MMOL/L — SIGNIFICANT CHANGE UP (ref 98–110)
CO2 SERPL-SCNC: 24 MMOL/L — SIGNIFICANT CHANGE UP (ref 17–32)
CO2 SERPL-SCNC: 25 MMOL/L — SIGNIFICANT CHANGE UP (ref 17–32)
CREAT SERPL-MCNC: 1.2 MG/DL — SIGNIFICANT CHANGE UP (ref 0.7–1.5)
CREAT SERPL-MCNC: 1.2 MG/DL — SIGNIFICANT CHANGE UP (ref 0.7–1.5)
GLUCOSE BLDC GLUCOMTR-MCNC: 140 MG/DL — HIGH (ref 70–99)
GLUCOSE BLDC GLUCOMTR-MCNC: 143 MG/DL — HIGH (ref 70–99)
GLUCOSE BLDC GLUCOMTR-MCNC: 163 MG/DL — HIGH (ref 70–99)
GLUCOSE BLDC GLUCOMTR-MCNC: 186 MG/DL — HIGH (ref 70–99)
GLUCOSE SERPL-MCNC: 155 MG/DL — HIGH (ref 70–99)
GLUCOSE SERPL-MCNC: 180 MG/DL — HIGH (ref 70–99)
HCT VFR BLD CALC: 25.3 % — LOW (ref 42–52)
HGB BLD-MCNC: 8.1 G/DL — LOW (ref 14–18)
MAGNESIUM SERPL-MCNC: 2.1 MG/DL — SIGNIFICANT CHANGE UP (ref 1.8–2.4)
MAGNESIUM SERPL-MCNC: 2.1 MG/DL — SIGNIFICANT CHANGE UP (ref 1.8–2.4)
MCHC RBC-ENTMCNC: 27.3 PG — SIGNIFICANT CHANGE UP (ref 27–31)
MCHC RBC-ENTMCNC: 32 G/DL — SIGNIFICANT CHANGE UP (ref 32–37)
MCV RBC AUTO: 85.2 FL — SIGNIFICANT CHANGE UP (ref 80–94)
NRBC # BLD: 0 /100 WBCS — SIGNIFICANT CHANGE UP (ref 0–0)
PHOSPHATE SERPL-MCNC: 1.2 MG/DL — LOW (ref 2.1–4.9)
PLATELET # BLD AUTO: 162 K/UL — SIGNIFICANT CHANGE UP (ref 130–400)
POTASSIUM SERPL-MCNC: 4.4 MMOL/L — SIGNIFICANT CHANGE UP (ref 3.5–5)
POTASSIUM SERPL-MCNC: 4.4 MMOL/L — SIGNIFICANT CHANGE UP (ref 3.5–5)
POTASSIUM SERPL-SCNC: 4.4 MMOL/L — SIGNIFICANT CHANGE UP (ref 3.5–5)
POTASSIUM SERPL-SCNC: 4.4 MMOL/L — SIGNIFICANT CHANGE UP (ref 3.5–5)
PROT SERPL-MCNC: 5 G/DL — LOW (ref 6–8)
RBC # BLD: 2.97 M/UL — LOW (ref 4.7–6.1)
RBC # FLD: 15.3 % — HIGH (ref 11.5–14.5)
SODIUM SERPL-SCNC: 132 MMOL/L — LOW (ref 135–146)
SODIUM SERPL-SCNC: 133 MMOL/L — LOW (ref 135–146)
TYPE + AB SCN PNL BLD: SIGNIFICANT CHANGE UP
WBC # BLD: 8.27 K/UL — SIGNIFICANT CHANGE UP (ref 4.8–10.8)
WBC # FLD AUTO: 8.27 K/UL — SIGNIFICANT CHANGE UP (ref 4.8–10.8)

## 2018-10-29 RX ORDER — AMIODARONE HYDROCHLORIDE 400 MG/1
400 TABLET ORAL EVERY 12 HOURS
Qty: 0 | Refills: 0 | Status: DISCONTINUED | OUTPATIENT
Start: 2018-10-29 | End: 2018-11-01

## 2018-10-29 RX ORDER — SODIUM,POTASSIUM PHOSPHATES 278-250MG
1 POWDER IN PACKET (EA) ORAL THREE TIMES A DAY
Qty: 0 | Refills: 0 | Status: DISCONTINUED | OUTPATIENT
Start: 2018-10-29 | End: 2018-10-29

## 2018-10-29 RX ORDER — SODIUM,POTASSIUM PHOSPHATES 278-250MG
1 POWDER IN PACKET (EA) ORAL
Qty: 0 | Refills: 0 | Status: DISCONTINUED | OUTPATIENT
Start: 2018-10-29 | End: 2018-10-29

## 2018-10-29 RX ADMIN — AMIODARONE HYDROCHLORIDE 400 MILLIGRAM(S): 400 TABLET ORAL at 21:50

## 2018-10-29 RX ADMIN — Medication 100 MILLIGRAM(S): at 15:06

## 2018-10-29 RX ADMIN — CHLORHEXIDINE GLUCONATE 1 APPLICATION(S): 213 SOLUTION TOPICAL at 05:15

## 2018-10-29 RX ADMIN — PANTOPRAZOLE SODIUM 40 MILLIGRAM(S): 20 TABLET, DELAYED RELEASE ORAL at 06:01

## 2018-10-29 RX ADMIN — AMIODARONE HYDROCHLORIDE 400 MILLIGRAM(S): 400 TABLET ORAL at 12:02

## 2018-10-29 RX ADMIN — SPIRONOLACTONE 25 MILLIGRAM(S): 25 TABLET, FILM COATED ORAL at 05:21

## 2018-10-29 RX ADMIN — FINASTERIDE 5 MILLIGRAM(S): 5 TABLET, FILM COATED ORAL at 15:06

## 2018-10-29 RX ADMIN — Medication 40 MILLIGRAM(S): at 05:21

## 2018-10-29 RX ADMIN — MONTELUKAST 10 MILLIGRAM(S): 4 TABLET, CHEWABLE ORAL at 15:06

## 2018-10-29 RX ADMIN — Medication 100 MILLIGRAM(S): at 21:50

## 2018-10-29 RX ADMIN — MEXILETINE HYDROCHLORIDE 150 MILLIGRAM(S): 150 CAPSULE ORAL at 05:22

## 2018-10-29 RX ADMIN — Medication 100 MILLIGRAM(S): at 05:21

## 2018-10-29 RX ADMIN — Medication 50 MICROGRAM(S): at 05:21

## 2018-10-29 RX ADMIN — APIXABAN 2.5 MILLIGRAM(S): 2.5 TABLET, FILM COATED ORAL at 18:04

## 2018-10-29 RX ADMIN — MEXILETINE HYDROCHLORIDE 150 MILLIGRAM(S): 150 CAPSULE ORAL at 18:04

## 2018-10-29 RX ADMIN — APIXABAN 2.5 MILLIGRAM(S): 2.5 TABLET, FILM COATED ORAL at 05:23

## 2018-10-29 RX ADMIN — SENNA PLUS 2 TABLET(S): 8.6 TABLET ORAL at 21:50

## 2018-10-29 RX ADMIN — TAMSULOSIN HYDROCHLORIDE 0.4 MILLIGRAM(S): 0.4 CAPSULE ORAL at 21:50

## 2018-10-29 RX ADMIN — Medication 600 MILLIGRAM(S): at 15:06

## 2018-10-29 RX ADMIN — SIMVASTATIN 10 MILLIGRAM(S): 20 TABLET, FILM COATED ORAL at 21:50

## 2018-10-29 NOTE — PROGRESS NOTE ADULT - ASSESSMENT
VT s/p ablation  - no VT over night hemodynamically stable  suspected meningioma on CT brain  Hx of pafib       Plan  - cont po amiodarone 400 mg bid for now   - No lidocaine or procainamide  - continue eliquis if no contraindication from neuro  - Neurology evaluation   - Maintain electrolytes within normal ranges.

## 2018-10-29 NOTE — DIETITIAN INITIAL EVALUATION ADULT. - DIET TYPE
DASH/TLC (sodium and cholesterol restricted diet)/Observed leftover from breakfast tray with 50% intake. Pt reports if he is feeling anxious, he has decreased interest in eating but baseline appetite remains without periods of anxiety. Denies offer of nutrition supplements at this time. will f/u in 3 days to assess PO intake.

## 2018-10-29 NOTE — CONSULT NOTE ADULT - ATTENDING COMMENTS
78 year old man- with repeated AICD firing- manged by cardiology  transient hallucinations after anesthesia-  spoke to the son- no concerns for memory loss or cognitive decline-  agree with note above- check B12- supplement if <400  Thiamine supplements    CT reviewed with son- small meningioma lt post temporal- no further recommendation at this time    if MRI cannot be done due to AICD- that is ok  repeat head CT in 6-9 months
VT - cont amio and lidocaine drip  - pt also will require a cardiac MRI. Pt has atrial lead that is not MRI conditions. I have discussed with patients the risk and benefits of the scan and benefits outweigh the risks. Pt agrees to proceeds with the scan.
Patient examined, chart reviewed , case discussed with fellow

## 2018-10-29 NOTE — PROGRESS NOTE ADULT - SUBJECTIVE AND OBJECTIVE BOX
SUBJ:  Patient seen this morning, sitting on chair, awake oriented, denies chest pain active shortness of breath or palpitations, complains of cough  s/p VT ablation, tele reviewed no major events, paced rhythm.   CT brain showed suspected meningioma? neurology team consulted.     MEDICATIONS  (STANDING):  amiodarone    Tablet 400 milliGRAM(s) Oral every 12 hours  amiodarone Infusion 0.5 mG/Min (16.667 mL/Hr) IV Continuous <Continuous>  apixaban 2.5 milliGRAM(s) Oral every 12 hours  chlorhexidine 4% Liquid 1 Application(s) Topical <User Schedule>  docusate sodium 100 milliGRAM(s) Oral three times a day  finasteride 5 milliGRAM(s) Oral daily  furosemide    Tablet 40 milliGRAM(s) Oral daily  guaiFENesin  milliGRAM(s) Oral every 12 hours  levothyroxine 50 MICROGram(s) Oral daily  mexiletine 150 milliGRAM(s) Oral every 12 hours  montelukast 10 milliGRAM(s) Oral daily  norepinephrine Infusion 0.05 MICROgram(s)/kG/Min (4.041 mL/Hr) IV Continuous <Continuous>  pantoprazole    Tablet 40 milliGRAM(s) Oral before breakfast  senna 2 Tablet(s) Oral at bedtime  simvastatin 10 milliGRAM(s) Oral at bedtime  spironolactone 25 milliGRAM(s) Oral daily  tamsulosin 0.4 milliGRAM(s) Oral at bedtime  vancomycin  IVPB 1250 milliGRAM(s) IV Intermittent once    MEDICATIONS  (PRN):  ALBUTerol/ipratropium for Nebulization 3 milliLiter(s) Nebulizer every 6 hours PRN Shortness of Breath and/or Wheezing  ALPRAZolam 0.25 milliGRAM(s) Oral daily PRN anxiety            Vital Signs Last 24 Hrs  T(C): 36.7 (29 Oct 2018 11:14), Max: 37.7 (29 Oct 2018 00:00)  T(F): 98 (29 Oct 2018 11:14), Max: 99.9 (29 Oct 2018 00:00)  HR: 90 (29 Oct 2018 13:15) (88 - 106)  BP: 110/71 (29 Oct 2018 13:15) (102/57 - 154/71)  BP(mean): 88 (29 Oct 2018 13:15) (76 - 121)  RR: 37 (29 Oct 2018 13:15) (12 - 37)  SpO2: 96% (29 Oct 2018 13:15) (96% - 99%)          PHYSICAL EXAM:  · CONSTITUTIONAL:	awake oriented   ·RESPIRATORY:  decrease R>L airway entry   · CARDIOVASCULAR	normal S1 S2 no murmur.   · EXTREMITIES: no NOEL  · VASCULAR: 	+  	      ECG:    < from: 12 Lead ECG (10.29.18 @ 08:08) >  AV dual-paced rhythm  Abnormal ECG    < end of copied text >    TTE:  < from: Transthoracic Echocardiogram (10.28.18 @ 08:47) >  Summary:   1. LV Ejection Fraction by Amaro's Method with a biplane EF of 39 %.   2. Increased LV wall thickness.   3. Mildly increased left ventricular internal cavity size.   4. Normal left atrial size.   5.Normal right atrial size.   6. Small pericardial effusion.   7. Mild to moderate mitral valve regurgitation.   8. Moderate mitral annular calcification.   9. Thickening and calcification of the anterior mitral valve leaflet.  10. Trace tricuspid regurgitation.  11. Trace pulmonic valve regurgitation.    < end of copied text >    LABS:                        8.1    8.27  )-----------( 162      ( 29 Oct 2018 04:48 )             25.3     10-29    132<L>  |  98  |  10  ----------------------------<  155<H>  4.4   |  24  |  1.2    Ca    8.0<L>      29 Oct 2018 04:48  Phos  1.2     10-29  Mg     2.1     10-29    TPro  5.0<L>  /  Alb  2.8<L>  /  TBili  0.5  /  DBili  x   /  AST  23  /  ALT  13  /  AlkPhos  65  10-29              I&O's Summary    28 Oct 2018 07:01  -  29 Oct 2018 07:00  --------------------------------------------------------  IN: 1063.4 mL / OUT: 2525 mL / NET: -1461.6 mL    29 Oct 2018 07:01  -  29 Oct 2018 15:06  --------------------------------------------------------  IN: 563.4 mL / OUT: 1200 mL / NET: -636.7 mL        RADIOLOGY & ADDITIONAL STUDIES:  < from: CT Head No Cont (10.27.18 @ 14:09) >  Impression:      No mass effect or intracranial hemorrhage.     Interval development of a focal slightly hyperdense lesion within the   region of the left posterior temporal lobe. This may be extra-axial   representing a meningioma. Recommend further evaluation with MRI of the   brain with and without contrast.    < end of copied text >      < from: Xray Chest 1 View- PORTABLE-Routine (10.29.18 @ 05:47) >  Impression:      Right basilar opacity.    < end of copied text > SUBJ:  Patient seen this morning, sitting on chair, awake oriented, denies chest pain active shortness of breath or palpitations, complains of cough s/p VT ablation, tele reviewed no major events, paced rhythm.   CT brain showed suspected meningioma? neurology team consulted.     MEDICATIONS  (STANDING):  amiodarone    Tablet 400 milliGRAM(s) Oral every 12 hours  amiodarone Infusion 0.5 mG/Min (16.667 mL/Hr) IV Continuous <Continuous>  apixaban 2.5 milliGRAM(s) Oral every 12 hours  chlorhexidine 4% Liquid 1 Application(s) Topical <User Schedule>  docusate sodium 100 milliGRAM(s) Oral three times a day  finasteride 5 milliGRAM(s) Oral daily  furosemide    Tablet 40 milliGRAM(s) Oral daily  guaiFENesin  milliGRAM(s) Oral every 12 hours  levothyroxine 50 MICROGram(s) Oral daily  mexiletine 150 milliGRAM(s) Oral every 12 hours  montelukast 10 milliGRAM(s) Oral daily  norepinephrine Infusion 0.05 MICROgram(s)/kG/Min (4.041 mL/Hr) IV Continuous <Continuous>  pantoprazole    Tablet 40 milliGRAM(s) Oral before breakfast  senna 2 Tablet(s) Oral at bedtime  simvastatin 10 milliGRAM(s) Oral at bedtime  spironolactone 25 milliGRAM(s) Oral daily  tamsulosin 0.4 milliGRAM(s) Oral at bedtime  vancomycin  IVPB 1250 milliGRAM(s) IV Intermittent once    MEDICATIONS  (PRN):  ALBUTerol/ipratropium for Nebulization 3 milliLiter(s) Nebulizer every 6 hours PRN Shortness of Breath and/or Wheezing  ALPRAZolam 0.25 milliGRAM(s) Oral daily PRN anxiety      Vital Signs Last 24 Hrs  T(C): 36.7 (29 Oct 2018 11:14), Max: 37.7 (29 Oct 2018 00:00)  T(F): 98 (29 Oct 2018 11:14), Max: 99.9 (29 Oct 2018 00:00)  HR: 90 (29 Oct 2018 13:15) (88 - 106)  BP: 110/71 (29 Oct 2018 13:15) (102/57 - 154/71)  BP(mean): 88 (29 Oct 2018 13:15) (76 - 121)  RR: 37 (29 Oct 2018 13:15) (12 - 37)  SpO2: 96% (29 Oct 2018 13:15) (96% - 99%)          PHYSICAL EXAM:  · CONSTITUTIONAL:	awake oriented   ·RESPIRATORY:  decrease R>L airway entry   · CARDIOVASCULAR	normal S1 S2 no murmur.   · EXTREMITIES: no NOEL  · VASCULAR: 	+  	      ECG:    < from: 12 Lead ECG (10.29.18 @ 08:08) >  AV dual-paced rhythm  Abnormal ECG    < end of copied text >    TTE:  < from: Transthoracic Echocardiogram (10.28.18 @ 08:47) >  Summary:   1. LV Ejection Fraction by Amaro's Method with a biplane EF of 39 %.   2. Increased LV wall thickness.   3. Mildly increased left ventricular internal cavity size.   4. Normal left atrial size.   5.Normal right atrial size.   6. Small pericardial effusion.   7. Mild to moderate mitral valve regurgitation.   8. Moderate mitral annular calcification.   9. Thickening and calcification of the anterior mitral valve leaflet.  10. Trace tricuspid regurgitation.  11. Trace pulmonic valve regurgitation.    < end of copied text >    LABS:                        8.1    8.27  )-----------( 162      ( 29 Oct 2018 04:48 )             25.3     10-29    132<L>  |  98  |  10  ----------------------------<  155<H>  4.4   |  24  |  1.2    Ca    8.0<L>      29 Oct 2018 04:48  Phos  1.2     10-29  Mg     2.1     10-29    TPro  5.0<L>  /  Alb  2.8<L>  /  TBili  0.5  /  DBili  x   /  AST  23  /  ALT  13  /  AlkPhos  65  10-29              I&O's Summary    28 Oct 2018 07:01  -  29 Oct 2018 07:00  --------------------------------------------------------  IN: 1063.4 mL / OUT: 2525 mL / NET: -1461.6 mL    29 Oct 2018 07:01  -  29 Oct 2018 15:06  --------------------------------------------------------  IN: 563.4 mL / OUT: 1200 mL / NET: -636.7 mL        RADIOLOGY & ADDITIONAL STUDIES:  < from: CT Head No Cont (10.27.18 @ 14:09) >  Impression:      No mass effect or intracranial hemorrhage.     Interval development of a focal slightly hyperdense lesion within the   region of the left posterior temporal lobe. This may be extra-axial   representing a meningioma. Recommend further evaluation with MRI of the   brain with and without contrast.    < end of copied text >      < from: Xray Chest 1 View- PORTABLE-Routine (10.29.18 @ 05:47) >  Impression:      Right basilar opacity.    < end of copied text >

## 2018-10-29 NOTE — CONSULT NOTE ADULT - SUBJECTIVE AND OBJECTIVE BOX
Neurology Consult    78Y M with pmh of Afib on eliquis, CAD with SCHF s/p AICD, COPD, DM2, DLD and hypothyroidism presented to the ED after having multiple episodes of his AICD firing w/ neuro consult called for AMS w/ VH s/p anesthesia.      HPI:  78Y M with pmh of Afib on eliquis, CAD with SCHF s/p AICD, COPD, DM2, DLD and hypothyroidism presented to the ED after having multiple episodes of his AICD firing w/ neuro consult called for AMS w/ VH s/p anesthesia. Pt reports he saw dust particles after waking up from anesthesia, and asked people in room why there was dust. He was told he was experiencing VH. Pt denies further AMS or VH/AH. He denies behavioral disturbance. During exam today he is AAOx4 and without perceptual disturbances. His reality testing is intact. Of note, pt received Xanax .25mg last night. He denies this as home med.  As per primary team request, CT finding concerning for meningioma, requesting MRI.      PAST MEDICAL & SURGICAL HISTORY:  CAD (coronary artery disease)  Ventricular tachycardia  NANDO on CPAP  COPD (chronic obstructive pulmonary disease)  Pacemaker  AICD (automatic cardioverter/defibrillator) present  Hypothyroidism  Atrial fibrillation  Congestive heart disease  Hypercholesteremia  AICD (automatic cardioverter/defibrillator) present  History of laparoscopic cholecystectomy      FAMILY HISTORY:  Family history of acute myocardial infarction (Aunt)      Social History: (-) x 3    Allergies    morphine (Stomach Upset)    Intolerances        MEDICATIONS  (STANDING):  amiodarone    Tablet 400 milliGRAM(s) Oral every 12 hours  amiodarone Infusion 0.5 mG/Min (16.667 mL/Hr) IV Continuous <Continuous>  apixaban 2.5 milliGRAM(s) Oral every 12 hours  chlorhexidine 4% Liquid 1 Application(s) Topical <User Schedule>  docusate sodium 100 milliGRAM(s) Oral three times a day  finasteride 5 milliGRAM(s) Oral daily  furosemide    Tablet 40 milliGRAM(s) Oral daily  guaiFENesin  milliGRAM(s) Oral every 12 hours  levothyroxine 50 MICROGram(s) Oral daily  mexiletine 150 milliGRAM(s) Oral every 12 hours  montelukast 10 milliGRAM(s) Oral daily  norepinephrine Infusion 0.05 MICROgram(s)/kG/Min (4.041 mL/Hr) IV Continuous <Continuous>  pantoprazole    Tablet 40 milliGRAM(s) Oral before breakfast  senna 2 Tablet(s) Oral at bedtime  simvastatin 10 milliGRAM(s) Oral at bedtime  spironolactone 25 milliGRAM(s) Oral daily  tamsulosin 0.4 milliGRAM(s) Oral at bedtime  vancomycin  IVPB 1250 milliGRAM(s) IV Intermittent once    MEDICATIONS  (PRN):  ALBUTerol/ipratropium for Nebulization 3 milliLiter(s) Nebulizer every 6 hours PRN Shortness of Breath and/or Wheezing  ALPRAZolam 0.25 milliGRAM(s) Oral daily PRN anxiety    Vital Signs Last 24 Hrs  T(C): 36.9 (29 Oct 2018 08:14), Max: 37.7 (29 Oct 2018 00:00)  T(F): 98.4 (29 Oct 2018 08:14), Max: 99.9 (29 Oct 2018 00:00)  HR: 92 (29 Oct 2018 11:14) (88 - 106)  BP: 118/68 (29 Oct 2018 11:14) (102/57 - 154/71)  BP(mean): 85 (29 Oct 2018 11:14) (76 - 121)  RR: 22 (29 Oct 2018 11:14) (12 - 34)  SpO2: 99% (29 Oct 2018 11:14) (96% - 99%)    Neurologic Examination:  General:  Appearance is consistent with chronologic age.  No abnormal facies.   General: The patient is oriented to person, place, time and date.  Fund of knowledge is intact and normal.  Language with normal comprehension. Nondysarthric.    Cranial nerves:  EOMI w/o nystagmus, skew or reported double vision.  PERRL.  No ptosis/weakness of eyelid closure.  Facial sensation is normal. No facial asymmetry.  Hearing grossly intact b/l. Tongue midline.  Motor examination:   Normal tone, bulk. No tenderness, twitching, tremors or involuntary movements.  Formal Muscle Strength Testing: (MRC grade R/L) 5/5 UE; 5/5 LE.    Sensory examination:   Intact to light touch in all extremities.    Labs:   CBC Full  -  ( 29 Oct 2018 04:48 )  WBC Count : 8.27 K/uL  Hemoglobin : 8.1 g/dL  Hematocrit : 25.3 %  Platelet Count - Automated : 162 K/uL  Mean Cell Volume : 85.2 fL  Mean Cell Hemoglobin : 27.3 pg  Mean Cell Hemoglobin Concentration : 32.0 g/dL  Auto Neutrophil # : x  Auto Lymphocyte # : x  Auto Monocyte # : x  Auto Eosinophil # : x  Auto Basophil # : x  Auto Neutrophil % : x  Auto Lymphocyte % : x  Auto Monocyte % : x  Auto Eosinophil % : x  Auto Basophil % : x    10-29    132<L>  |  98  |  10  ----------------------------<  155<H>  4.4   |  24  |  1.2    Ca    8.0<L>      29 Oct 2018 04:48  Phos  1.2     10-29  Mg     2.1     10-29    TPro  5.0<L>  /  Alb  2.8<L>  /  TBili  0.5  /  DBili  x   /  AST  23  /  ALT  13  /  AlkPhos  65  10-29    LIVER FUNCTIONS - ( 29 Oct 2018 04:48 )  Alb: 2.8 g/dL / Pro: 5.0 g/dL / ALK PHOS: 65 U/L / ALT: 13 U/L / AST: 23 U/L / GGT: x                   Neuroimaging:  NCHCT: < from: CT Head No Cont (10.27.18 @ 14:09) >  No mass effect or intracranial hemorrhage.     Interval development of a focal slightly hyperdense lesion within the   region of the left posterior temporal lobe. This may be extra-axial   representing a meningioma. Recommend further evaluation with MRI of the   brain with and without contrast.    Sequela of chronic traumatic injury in the inferior frontal lobes   bilaterally.    < end of copied text >      A/P  78Y M with pmh of Afib on eliquis, CAD with SCHF s/p AICD, COPD, DM2, DLD and hypothyroidism presented to the ED after having multiple episodes of his AICD firing w/ neuro consult called for AMS w/ VH s/p anesthesia. Upon exam, pt AAOx4 without perceptual disturbances.  -Recommend avoid benzos, especially when not home med, as can cause acute confusion, agitation, fall risk.  -Recommend MRI w/ and w/o contrast to correlate CT findings

## 2018-10-29 NOTE — DIETITIAN INITIAL EVALUATION ADULT. - PHYSICAL APPEARANCE
BMI 28 (189#, 69in). Pt unsure of UBW but denies recent changes. no edema noted. skin intact/ BS 19.

## 2018-10-29 NOTE — DIETITIAN INITIAL EVALUATION ADULT. - ENERGY NEEDS
total kcal = 7515-7147 kcal/day (MSJ x 1.0-1.1 d/t overweight BMI with adv age considered).   total protein = 73-87 g/day (1.0-1.2 g/kg IBW).   total fluid = per CCU team

## 2018-10-29 NOTE — DIETITIAN INITIAL EVALUATION ADULT. - ORAL INTAKE PTA
Pt reports adequate appetite and fair PO intake PTA. Reports age related decrease in appetite but always consuming something at meal times with occasional snacks. NKFA. Denies use of nutrition supplements./fair

## 2018-10-29 NOTE — PROGRESS NOTE ADULT - SUBJECTIVE AND OBJECTIVE BOX
SUBJECTIVE:    Patient is a 78y old Male who presents with a chief complaint of Multiple incidences of AICD firing (28 Oct 2018 17:30)    Currently admitted to medicine with the primary diagnosis of Ventricular tachycardia s/p Vtach Ablation     Today is hospital day 7d. This morning he is resting comfortably in bed and reports no new issues or overnight events. Patient complains of cough with phlem this AM otherwise feeling improved. Denies headache, dizziness, sob, cp, palpitations, abdominal pain, n/v    PAST MEDICAL & SURGICAL HISTORY  CAD (coronary artery disease)  Ventricular tachycardia  NANDO on CPAP  COPD (chronic obstructive pulmonary disease)  Pacemaker  AICD (automatic cardioverter/defibrillator) present  Hypothyroidism  Atrial fibrillation  Congestive heart disease  Hypercholesteremia  AICD (automatic cardioverter/defibrillator) present  History of laparoscopic cholecystectomy    ALLERGIES:  morphine (Stomach Upset)    MEDICATIONS:  STANDING MEDICATIONS  amiodarone    Tablet 400 milliGRAM(s) Oral every 12 hours  amiodarone Infusion 0.5 mG/Min IV Continuous <Continuous>  apixaban 2.5 milliGRAM(s) Oral every 12 hours  chlorhexidine 4% Liquid 1 Application(s) Topical <User Schedule>  docusate sodium 100 milliGRAM(s) Oral three times a day  finasteride 5 milliGRAM(s) Oral daily  furosemide    Tablet 40 milliGRAM(s) Oral daily  levothyroxine 50 MICROGram(s) Oral daily  mexiletine 150 milliGRAM(s) Oral every 12 hours  montelukast 10 milliGRAM(s) Oral daily  norepinephrine Infusion 0.05 MICROgram(s)/kG/Min IV Continuous <Continuous>  pantoprazole    Tablet 40 milliGRAM(s) Oral before breakfast  senna 2 Tablet(s) Oral at bedtime  simvastatin 10 milliGRAM(s) Oral at bedtime  spironolactone 25 milliGRAM(s) Oral daily  tamsulosin 0.4 milliGRAM(s) Oral at bedtime  vancomycin  IVPB 1250 milliGRAM(s) IV Intermittent once    PRN MEDICATIONS  ALBUTerol/ipratropium for Nebulization 3 milliLiter(s) Nebulizer every 6 hours PRN  ALPRAZolam 0.25 milliGRAM(s) Oral daily PRN    VITALS:   T(F): 98.4  HR: 92  BP: 118/68  RR: 22  SpO2: 99%    LABS:                        8.1    8.27  )-----------( 162      ( 29 Oct 2018 04:48 )             25.3     10-29    132<L>  |  98  |  10  ----------------------------<  155<H>  4.4   |  24  |  1.2    Ca    8.0<L>      29 Oct 2018 04:48  Phos  1.2     10-29  Mg     2.1     10-29    TPro  5.0<L>  /  Alb  2.8<L>  /  TBili  0.5  /  DBili  x   /  AST  23  /  ALT  13  /  AlkPhos  65  10-29      RADIOLOGY:  < from: Transthoracic Echocardiogram (10.28.18 @ 08:47) >   1. LV Ejection Fraction by Amaro's Method with a biplane EF of 39 %.   2. Increased LV wall thickness.   3. Mildly increased left ventricular internal cavity size.   4. Normal left atrial size.   5.Normal right atrial size.   6. Small pericardial effusion.   7. Mild to moderate mitral valve regurgitation.   8. Moderate mitral annular calcification.   9. Thickening and calcification of the anterior mitral valve leaflet.  10. Trace tricuspid regurgitation.  11. Trace pulmonic valve regurgitation.    PHYSICIAN INTERPRETATION:  Left Ventricle: The left ventricular internal cavity size is mildly   increased. Left ventricular wall thickness is increased. LV Ejection   Fraction by Amaro's Methodwith a biplane EF of 39 %.  Right Ventricle: The right ventricular size is normal. RV systolic   function is normal.  Left Atrium: Normal left atrial size.  Right Atrium: Normal right atrial size.  Pericardium: A small pericardial effusion is present.  Mitral Valve: Mild to moderate mitral valve regurgitation is seen.   Thickening and calcification of the anterior mitral valve leaflet. There   is moderate mitral annular calcification.  Tricuspid Valve: Trivial tricuspid regurgitation is visualized. The   tricuspid valve is normal.  Aortic Valve: Trivial aortic valve regurgitation is seen. The aortic   valve is trileaflet. No evidence of aortic stenosis.  Pulmonic Valve: Trace pulmonic valve regurgitation. The pulmonic valve is   thickened with good excursion.  Aorta: Aortic root measured at Sinus of Valsalva is normal.  Additional Comments: A pacer wire is visualized in the right atrium and   right ventricle.     < end of copied text >    < from: Transthoracic Echocardiogram (05.14.18 @ 12:53) >  Summary:   1. Left ventricular ejection fraction, by visual estimation, is 20 to 25%.   2. Severely decreased global left ventricular systolic function.   3. Moderate to severe left atrial enlargement.   4. LV Ejection Fraction by Amaro's Method with a biplane EF of 26 %.   5. Mildly increased LV wall thickness.   6. Severely increased left ventricular internal cavity size.   7. Thickening and calcification of the anterior mitral valve leaflet.   8. Estimated pulmonary artery systolic pressure is 36.0 mmHg assuming a   right atrial pressure of 5 mmHg, which is consistent with borderline   pulmonary hypertension.  < end of copied text >    Cardiac MRI shows  There is mid myocardial late gadolinium enhancement predominantly involving the entire septal wall, with similar findings in the mid to apical anterior wall.     < from: Xray Chest 1 View- PORTABLE-Routine (10.29.18 @ 05:47) >  Comparison : Chest radiograph 10/28/2018    Technique/Positioning: Satisfactory. Single portable chest.    Findings:    Support devices: EKG leads.    Cardiac/mediastinum/hilum: Heart size is enlarged. Patient is post AICD.    Lung parenchyma/Pleura: Minimal right basilar opacity. Right costophrenic   angle is excluded from the examination and cannot be evaluated.    Skeleton/soft tissues: Stable.   Impression:      Right basilar opacity.    < end of copied text >    < from: CT Head No Cont (10.27.18 @ 14:09) >    No mass effect or intracranial hemorrhage.     Interval development of a focal slightly hyperdense lesion within the   region of the left posterior temporal lobe. This may be extra-axial   representing a meningioma. Recommend further evaluation with MRI of the brain with and without contrast.    Sequela of chronic traumatic injury in the inferior frontal lobes   bilaterally.      < end of copied text >        PHYSICAL EXAM:  GEN: No acute distress  LUNGS: Clear to auscultation bilaterally   HEART: S1/S2 present. RRR.   ABD: Soft, non-tender, non-distended. Bowel sounds present  EXT: NC/NC/NE/2+PP/SAMUELS/Skin Intact. B/L Groin sites, clean dry, intact, soft  NEURO: AAOX2 non focal, CN 2-12 intact    Intravenous access:   NG tube:   Johnson cathter: Indwelling Urethral Catheter:     Connect To:  Straight Drainage/Gravity    Indication:  Urine Output Monitoring in Critically Ill (10-27-18 @ 09:17) (not performed) [active]

## 2018-10-29 NOTE — DIETITIAN INITIAL EVALUATION ADULT. - OTHER INFO
Pt p/w multiple episodes of AICD firing with primary dx: Vtach. Hospital course complicated by chest pressure 2/2 AICD firing 2/2 episodes of VTach 2/2 cardiomyopathy s/p Arterial Puncture/Cannulation 10/25 and Vt ablation 10/26. Afib. AMS Hallucinations after Vtach ablation--neuro c/s and MRI brain pending. Pulmonary Nodule. COPD. T2DM. Hypothyroidism. DLD. BPH. DVT ppx. Reason for assessment: LOS.

## 2018-10-29 NOTE — DIETITIAN INITIAL EVALUATION ADULT. - PERTINENT LABORATORY DATA
(10/29/18) RBC 2.91, H/H 8.1/25.3, POCT glucose 186, Na 132, serum glucose 155, corrected Ca 8.96, GFR 57, PO4 1.2

## 2018-10-29 NOTE — DIETITIAN INITIAL EVALUATION ADULT. - MD RECOMMEND
Reccs: 1. CHO consistent, Low Na diet. 2. Consider adding daily appetite stimulant medication and MVI. 3. Monitor and replete serum PO4.

## 2018-10-29 NOTE — DIETITIAN INITIAL EVALUATION ADULT. - FACTORS AFF FOOD INTAKE
Denies GI discomfort or abnormalities [facility] 10/28. Denies difficulty chewing/swallowing

## 2018-10-29 NOTE — PROGRESS NOTE ADULT - ASSESSMENT
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 As per patient he saw Dr Santos on Thursday who reprogrammed his device and advised him for V-tach ablation. Patient says he has been refusing ablation for months but now understands that its necessary. He has been having worsening SOB on exertion for the past few months and developed a cough for the past few weeks which his cardiologist informed him was due to fluid in his lungs. Patient says he has an aura like sensation when he knows that his AICD is about to fire and he feel like he has gas travelling up his abdomen and then chest pressure.   AICD interrogation in ER revelead multiple epsiodes of NSVT 52 in 24 hrs, 8 of VT treated with ATPs, 1 VT and VF treated with 35J shock, now S/P Vtach Ablation    #Chest pressure 2/2 AICD firing 2/2 Multiple episodes of VTach 2/2 Non Ischemic cardiomyopathy   -systolic HF EF 20%, now 39%   - 3 episodes of sustained monomorphic Vtach since admission- overdrive paced ATPx3, shocked x2  -s/p VT Ablation 10/26  -electrolytes replete  -Amiodarone 400mg BID  -Mexiletine 150mg Q12  -Lasix 40mg qd  Spironolactone 25mg qd  -Monitor lytes keep K+ above 4 and keep Mg above 2    #Afib   -rate controlled   -Amiodarone 400mg BID  -c/w eliquis 2.5mg BID    #AMS Hallucinations after Vtach ablation  CT head  Interval development of a focal slightly hyperdense lesion within the region of the left posterior temporal lobe. This may be extra-axial representing a meningioma. Recommend further evaluation with MRI of the brain with and without contrast  ICU Delerium?  Neuro consult      #Pulmonary Nodule  Right lower lung field 1.3 cm nodular opacity.   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 As per patient he saw Dr Santos on Thursday who reprogrammed his device and advised him for V-tach ablation. Patient says he has been refusing ablation for months but now understands that its necessary. He has been having worsening SOB on exertion for the past few months and developed a cough for the past few weeks which his cardiologist informed him was due to fluid in his lungs. Patient says he has an aura like sensation when he knows that his AICD is about to fire and he feel like he has gas travelling up his abdomen and then chest pressure.   AICD interrogation in ER revelead multiple epsiodes of NSVT 52 in 24 hrs, 8 of VT treated with ATPs, 1 VT and VF treated with 35J shock, now S/P Vtach Ablation    #Chest pressure 2/2 AICD firing 2/2 Multiple episodes of VTach 2/2 Non Ischemic cardiomyopathy   -systolic HF EF 20%, now 39%   - 3 episodes of sustained monomorphic Vtach since admission- overdrive paced ATPx3, shocked x2  -s/p VT Ablation 10/26  -electrolytes replete  -Amiodarone 400mg BID  -Mexiletine 150mg Q12  -Lasix 40mg qd  Spironolactone 25mg qd  -f/u TSH  -Monitor lytes keep K+ above 4 and keep Mg above 2    #Afib   -rate controlled   -Amiodarone 400mg BID  -c/w eliquis 2.5mg BID    #AMS Hallucinations after Vtach ablation  CT head  Interval development of a focal slightly hyperdense lesion within the region of the left posterior temporal lobe. This may be extra-axial representing a meningioma. Recommend further evaluation with MRI of the brain with and without contrast  ICU Delerium?  Neuro consult      #Pulmonary Nodule  Right lower lung field 1.3 cm nodular opacity.   CT chest outpatient      #COPD  -Duonebs Q6 prn  -c/w montelukast  -stable no wheezing    #DM2  -monitor fingersticks and if >200 start insulin sliding scale    #hypothyroidism  -c/w levothyroxine 50mcg qd    #DLD  -c/w simvastatin 10mg QHS    #BPH  Finasteride 5mg qd  Flomax 0.4mg at bedtime    DVT ppx Eliquis  GI ppx: Protonix    Dispo: As per EP and further intervention   Patient is from home, lives with wife  Needs aggresive PT/OT for deconditioning

## 2018-10-30 LAB
ALBUMIN SERPL ELPH-MCNC: 3 G/DL — LOW (ref 3.5–5.2)
ALP SERPL-CCNC: 62 U/L — SIGNIFICANT CHANGE UP (ref 30–115)
ALT FLD-CCNC: 12 U/L — SIGNIFICANT CHANGE UP (ref 0–41)
ANION GAP SERPL CALC-SCNC: 14 MMOL/L — SIGNIFICANT CHANGE UP (ref 7–14)
AST SERPL-CCNC: 18 U/L — SIGNIFICANT CHANGE UP (ref 0–41)
BASOPHILS # BLD AUTO: 0.05 K/UL — SIGNIFICANT CHANGE UP (ref 0–0.2)
BASOPHILS NFR BLD AUTO: 0.7 % — SIGNIFICANT CHANGE UP (ref 0–1)
BILIRUB SERPL-MCNC: 0.6 MG/DL — SIGNIFICANT CHANGE UP (ref 0.2–1.2)
BUN SERPL-MCNC: 12 MG/DL — SIGNIFICANT CHANGE UP (ref 10–20)
CALCIUM SERPL-MCNC: 8.3 MG/DL — LOW (ref 8.5–10.1)
CHLORIDE SERPL-SCNC: 98 MMOL/L — SIGNIFICANT CHANGE UP (ref 98–110)
CO2 SERPL-SCNC: 27 MMOL/L — SIGNIFICANT CHANGE UP (ref 17–32)
CREAT SERPL-MCNC: 1.2 MG/DL — SIGNIFICANT CHANGE UP (ref 0.7–1.5)
EOSINOPHIL # BLD AUTO: 0.51 K/UL — SIGNIFICANT CHANGE UP (ref 0–0.7)
EOSINOPHIL NFR BLD AUTO: 6.8 % — SIGNIFICANT CHANGE UP (ref 0–8)
GLUCOSE SERPL-MCNC: 140 MG/DL — HIGH (ref 70–99)
HCT VFR BLD CALC: 25.6 % — LOW (ref 42–52)
HGB BLD-MCNC: 8 G/DL — LOW (ref 14–18)
IMM GRANULOCYTES NFR BLD AUTO: 0.8 % — HIGH (ref 0.1–0.3)
LYMPHOCYTES # BLD AUTO: 0.58 K/UL — LOW (ref 1.2–3.4)
LYMPHOCYTES # BLD AUTO: 7.7 % — LOW (ref 20.5–51.1)
MAGNESIUM SERPL-MCNC: 2.2 MG/DL — SIGNIFICANT CHANGE UP (ref 1.8–2.4)
MCHC RBC-ENTMCNC: 26.8 PG — LOW (ref 27–31)
MCHC RBC-ENTMCNC: 31.3 G/DL — LOW (ref 32–37)
MCV RBC AUTO: 85.9 FL — SIGNIFICANT CHANGE UP (ref 80–94)
MONOCYTES # BLD AUTO: 0.85 K/UL — HIGH (ref 0.1–0.6)
MONOCYTES NFR BLD AUTO: 11.3 % — HIGH (ref 1.7–9.3)
NEUTROPHILS # BLD AUTO: 5.46 K/UL — SIGNIFICANT CHANGE UP (ref 1.4–6.5)
NEUTROPHILS NFR BLD AUTO: 72.7 % — SIGNIFICANT CHANGE UP (ref 42.2–75.2)
NRBC # BLD: 0 /100 WBCS — SIGNIFICANT CHANGE UP (ref 0–0)
PHOSPHATE SERPL-MCNC: 2.6 MG/DL — SIGNIFICANT CHANGE UP (ref 2.1–4.9)
PLATELET # BLD AUTO: 160 K/UL — SIGNIFICANT CHANGE UP (ref 130–400)
POTASSIUM SERPL-MCNC: 4.3 MMOL/L — SIGNIFICANT CHANGE UP (ref 3.5–5)
POTASSIUM SERPL-SCNC: 4.3 MMOL/L — SIGNIFICANT CHANGE UP (ref 3.5–5)
PROT SERPL-MCNC: 5.2 G/DL — LOW (ref 6–8)
RBC # BLD: 2.98 M/UL — LOW (ref 4.7–6.1)
RBC # FLD: 15.3 % — HIGH (ref 11.5–14.5)
SODIUM SERPL-SCNC: 139 MMOL/L — SIGNIFICANT CHANGE UP (ref 135–146)
TSH SERPL-MCNC: 2.15 UIU/ML — SIGNIFICANT CHANGE UP (ref 0.27–4.2)
WBC # BLD: 7.51 K/UL — SIGNIFICANT CHANGE UP (ref 4.8–10.8)
WBC # FLD AUTO: 7.51 K/UL — SIGNIFICANT CHANGE UP (ref 4.8–10.8)

## 2018-10-30 RX ORDER — AMIODARONE HYDROCHLORIDE 400 MG/1
1 TABLET ORAL
Qty: 900 | Refills: 0 | Status: DISCONTINUED | OUTPATIENT
Start: 2018-10-30 | End: 2018-10-31

## 2018-10-30 RX ORDER — MIDAZOLAM HYDROCHLORIDE 1 MG/ML
1 INJECTION, SOLUTION INTRAMUSCULAR; INTRAVENOUS ONCE
Qty: 0 | Refills: 0 | Status: DISCONTINUED | OUTPATIENT
Start: 2018-10-30 | End: 2018-10-30

## 2018-10-30 RX ORDER — METOPROLOL TARTRATE 50 MG
25 TABLET ORAL EVERY 8 HOURS
Qty: 0 | Refills: 0 | Status: DISCONTINUED | OUTPATIENT
Start: 2018-10-30 | End: 2018-10-31

## 2018-10-30 RX ORDER — AMIODARONE HYDROCHLORIDE 400 MG/1
150 TABLET ORAL ONCE
Qty: 0 | Refills: 0 | Status: COMPLETED | OUTPATIENT
Start: 2018-10-30 | End: 2018-10-30

## 2018-10-30 RX ADMIN — Medication 600 MILLIGRAM(S): at 05:44

## 2018-10-30 RX ADMIN — AMIODARONE HYDROCHLORIDE 618 MILLIGRAM(S): 400 TABLET ORAL at 08:34

## 2018-10-30 RX ADMIN — FINASTERIDE 5 MILLIGRAM(S): 5 TABLET, FILM COATED ORAL at 12:24

## 2018-10-30 RX ADMIN — APIXABAN 2.5 MILLIGRAM(S): 2.5 TABLET, FILM COATED ORAL at 17:15

## 2018-10-30 RX ADMIN — MEXILETINE HYDROCHLORIDE 150 MILLIGRAM(S): 150 CAPSULE ORAL at 17:16

## 2018-10-30 RX ADMIN — PANTOPRAZOLE SODIUM 40 MILLIGRAM(S): 20 TABLET, DELAYED RELEASE ORAL at 06:27

## 2018-10-30 RX ADMIN — Medication 50 MICROGRAM(S): at 05:43

## 2018-10-30 RX ADMIN — AMIODARONE HYDROCHLORIDE 400 MILLIGRAM(S): 400 TABLET ORAL at 17:15

## 2018-10-30 RX ADMIN — SPIRONOLACTONE 25 MILLIGRAM(S): 25 TABLET, FILM COATED ORAL at 05:43

## 2018-10-30 RX ADMIN — Medication 40 MILLIGRAM(S): at 05:44

## 2018-10-30 RX ADMIN — MIDAZOLAM HYDROCHLORIDE 1 MILLIGRAM(S): 1 INJECTION, SOLUTION INTRAMUSCULAR; INTRAVENOUS at 07:40

## 2018-10-30 RX ADMIN — AMIODARONE HYDROCHLORIDE 400 MILLIGRAM(S): 400 TABLET ORAL at 05:43

## 2018-10-30 RX ADMIN — TAMSULOSIN HYDROCHLORIDE 0.4 MILLIGRAM(S): 0.4 CAPSULE ORAL at 21:09

## 2018-10-30 RX ADMIN — MONTELUKAST 10 MILLIGRAM(S): 4 TABLET, CHEWABLE ORAL at 12:25

## 2018-10-30 RX ADMIN — Medication 25 MILLIGRAM(S): at 21:09

## 2018-10-30 RX ADMIN — Medication 100 MILLIGRAM(S): at 15:12

## 2018-10-30 RX ADMIN — Medication 25 MILLIGRAM(S): at 15:13

## 2018-10-30 RX ADMIN — SENNA PLUS 2 TABLET(S): 8.6 TABLET ORAL at 21:09

## 2018-10-30 RX ADMIN — Medication 100 MILLIGRAM(S): at 05:43

## 2018-10-30 RX ADMIN — MEXILETINE HYDROCHLORIDE 150 MILLIGRAM(S): 150 CAPSULE ORAL at 05:43

## 2018-10-30 RX ADMIN — APIXABAN 2.5 MILLIGRAM(S): 2.5 TABLET, FILM COATED ORAL at 05:43

## 2018-10-30 RX ADMIN — SIMVASTATIN 10 MILLIGRAM(S): 20 TABLET, FILM COATED ORAL at 21:09

## 2018-10-30 RX ADMIN — Medication 600 MILLIGRAM(S): at 17:15

## 2018-10-30 RX ADMIN — Medication 100 MILLIGRAM(S): at 21:09

## 2018-10-30 NOTE — PHYSICAL THERAPY INITIAL EVALUATION ADULT - GENERAL OBSERVATIONS, REHAB EVAL
145-210 om Chart reviewed. Pt. seen out of bed in bedside chair in No apparent distress, +telemetry, IV lock, c/o cough and occasional dizziness

## 2018-10-30 NOTE — PROGRESS NOTE ADULT - SUBJECTIVE AND OBJECTIVE BOX
SUBJECTIVE:    Patient is a 78y old Male who presents with a chief complaint of Multiple incidences of AICD firing (29 Oct 2018 15:06)    Currently admitted to medicine with the primary diagnosis of Ventricular tachycardia s/p Vtach Ablation     Today is hospital day 8d. This morning he is resting in bed and reports feeling palpitations. He had a 30 min run of slow Vtach this AM with bp systolic 80's -110. ATP this am back to sinus. Ep to round and possibly add low dose BB.     PAST MEDICAL & SURGICAL HISTORY  CAD (coronary artery disease)  Ventricular tachycardia  NANDO on CPAP  COPD (chronic obstructive pulmonary disease)  Pacemaker  AICD (automatic cardioverter/defibrillator) present  Hypothyroidism  Atrial fibrillation  Congestive heart disease  Hypercholesteremia  AICD (automatic cardioverter/defibrillator) present  History of laparoscopic cholecystectomy       ALLERGIES:  morphine (Stomach Upset)    MEDICATIONS:  STANDING MEDICATIONS  amiodarone    Tablet 400 milliGRAM(s) Oral every 12 hours  amiodarone Infusion 1 mG/Min IV Continuous <Continuous>  amiodarone Infusion 0.5 mG/Min IV Continuous <Continuous>  apixaban 2.5 milliGRAM(s) Oral every 12 hours  chlorhexidine 4% Liquid 1 Application(s) Topical <User Schedule>  docusate sodium 100 milliGRAM(s) Oral three times a day  finasteride 5 milliGRAM(s) Oral daily  furosemide    Tablet 40 milliGRAM(s) Oral daily  guaiFENesin  milliGRAM(s) Oral every 12 hours  levothyroxine 50 MICROGram(s) Oral daily  mexiletine 150 milliGRAM(s) Oral every 12 hours  montelukast 10 milliGRAM(s) Oral daily  norepinephrine Infusion 0.05 MICROgram(s)/kG/Min IV Continuous <Continuous>  pantoprazole    Tablet 40 milliGRAM(s) Oral before breakfast  senna 2 Tablet(s) Oral at bedtime  simvastatin 10 milliGRAM(s) Oral at bedtime  spironolactone 25 milliGRAM(s) Oral daily  tamsulosin 0.4 milliGRAM(s) Oral at bedtime  vancomycin  IVPB 1250 milliGRAM(s) IV Intermittent once    PRN MEDICATIONS  ALBUTerol/ipratropium for Nebulization 3 milliLiter(s) Nebulizer every 6 hours PRN  ALPRAZolam 0.25 milliGRAM(s) Oral daily PRN    VITALS:   T(F): 96.4  HR: 98  BP: 106/63  RR: 35  SpO2: 98%    LABS:                        8.0    7.51  )-----------( 160      ( 30 Oct 2018 05:31 )             25.6     10-30    139  |  98  |  12  ----------------------------<  140<H>  4.3   |  27  |  1.2    Ca    8.3<L>      30 Oct 2018 05:31  Phos  2.6     10-30  Mg     2.2     10-30    TPro  5.2<L>  /  Alb  3.0<L>  /  TBili  0.6  /  DBili  x   /  AST  18  /  ALT  12  /  AlkPhos  62  10-30      RADIOLOGY:  < from: Transthoracic Echocardiogram (10.30.18 @ 09:12) >  Summary:   1. Left ventricular ejection fraction, by visual estimation, is 40 to   45%.   2. Technically suboptimal study.   3. Moderately decreased global left ventricular systolic function.  4. Left atrial enlargement.   5. Increased LV wall thickness.   6. Spectral Doppler shows restrictive pattern of left ventricular   myocardial filling (Grade III diastolic dysfunction).   7. Small pericardial effusion.   8. Thickening and calcification of the posterior mitral valve leaflet.   9. Mild aortic regurgitation.  10. Aortic root measured at Sinus of Valsalva is dilated.    < end of copied text >      < from: Transthoracic Echocardiogram (10.28.18 @ 08:47) >   1. LV Ejection Fraction by Amaro's Method with a biplane EF of 39 %.   2. Increased LV wall thickness.   3. Mildly increased left ventricular internal cavity size.   4. Normal left atrial size.   5.Normal right atrial size.   6. Small pericardial effusion.   7. Mild to moderate mitral valve regurgitation.   8. Moderate mitral annular calcification.   9. Thickening and calcification of the anterior mitral valve leaflet.  10. Trace tricuspid regurgitation.  11. Trace pulmonic valve regurgitation.    PHYSICIAN INTERPRETATION:  Left Ventricle: The left ventricular internal cavity size is mildly   increased. Left ventricular wall thickness is increased. LV Ejection   Fraction by Amaro's Methodwith a biplane EF of 39 %.  Right Ventricle: The right ventricular size is normal. RV systolic   function is normal.  Left Atrium: Normal left atrial size.  Right Atrium: Normal right atrial size.  Pericardium: A small pericardial effusion is present.  Mitral Valve: Mild to moderate mitral valve regurgitation is seen.   Thickening and calcification of the anterior mitral valve leaflet. There   is moderate mitral annular calcification.  Tricuspid Valve: Trivial tricuspid regurgitation is visualized. The   tricuspid valve is normal.  Aortic Valve: Trivial aortic valve regurgitation is seen. The aortic   valve is trileaflet. No evidence of aortic stenosis.  Pulmonic Valve: Trace pulmonic valve regurgitation. The pulmonic valve is   thickened with good excursion.  Aorta: Aortic root measured at Sinus of Valsalva is normal.  Additional Comments: A pacer wire is visualized in the right atrium and   right ventricle.     < end of copied text >    < from: Transthoracic Echocardiogram (05.14.18 @ 12:53) >  Summary:   1. Left ventricular ejection fraction, by visual estimation, is 20 to 25%.   2. Severely decreased global left ventricular systolic function.   3. Moderate to severe left atrial enlargement.   4. LV Ejection Fraction by Amaro's Method with a biplane EF of 26 %.   5. Mildly increased LV wall thickness.   6. Severely increased left ventricular internal cavity size.   7. Thickening and calcification of the anterior mitral valve leaflet.   8. Estimated pulmonary artery systolic pressure is 36.0 mmHg assuming a   right atrial pressure of 5 mmHg, which is consistent with borderline   pulmonary hypertension.  < end of copied text >    Cardiac MRI shows  There is mid myocardial late gadolinium enhancement predominantly involving the entire septal wall, with similar findings in the mid to apical anterior wall.     < from: Xray Chest 1 View- PORTABLE-Routine (10.30.18 @ 05:04) >  Findings:    Support devices: Unchanged chest wall pacing device.    Cardiac/mediastinum/hilum: Stable enlarged cardiac silhouette.   Atherosclerotic vascular calcifications.    Lung parenchyma/Pleura: Within normal limits.    Skeleton/soft tissues: Unchanged.    Impression:      Stable examination. No radiographic evidence of an acute cardiopulmonary abnormality.    < end of copied text >      PHYSICAL EXAM:  GEN: mildly anxious this AM  LUNGS: Clear to auscultation bilaterally   HEART: S1/S2 present. Tachycardic  ABD: Soft, non-tender, non-distended. Bowel sounds present  EXT: NC/NC/NE/2+PP/SAMUELS/Skin Intact. B/L Groin sites, clean dry, intact, soft  NEURO: AAOX3 non focal, CN 2-12 intact    Intravenous access:   NG tube:   Johnson cathter: SUBJECTIVE:    Patient is a 78y old Male who presents with a chief complaint of Multiple incidences of AICD firing (29 Oct 2018 15:06)    Currently admitted to medicine with the primary diagnosis of Ventricular tachycardia s/p Vtach Ablation     Today is hospital day 8d. This morning he is resting in bed and reports feeling palpitations. He had a 30 min run of slow Vtach this AM with bp systolic 80's -110. ATP this am back to sinus. Ep to round and possibly add low dose BB.     PAST MEDICAL & SURGICAL HISTORY  CAD (coronary artery disease)  Ventricular tachycardia  NANDO on CPAP  COPD (chronic obstructive pulmonary disease)  Pacemaker  AICD (automatic cardioverter/defibrillator) present  Hypothyroidism  Atrial fibrillation  Congestive heart disease  Hypercholesteremia  AICD (automatic cardioverter/defibrillator) present  History of laparoscopic cholecystectomy       ALLERGIES:  morphine (Stomach Upset)    MEDICATIONS:  STANDING MEDICATIONS  amiodarone    Tablet 400 milliGRAM(s) Oral every 12 hours  amiodarone Infusion 1 mG/Min IV Continuous <Continuous>  amiodarone Infusion 0.5 mG/Min IV Continuous <Continuous>  apixaban 2.5 milliGRAM(s) Oral every 12 hours  chlorhexidine 4% Liquid 1 Application(s) Topical <User Schedule>  docusate sodium 100 milliGRAM(s) Oral three times a day  finasteride 5 milliGRAM(s) Oral daily  furosemide    Tablet 40 milliGRAM(s) Oral daily  guaiFENesin  milliGRAM(s) Oral every 12 hours  levothyroxine 50 MICROGram(s) Oral daily  mexiletine 150 milliGRAM(s) Oral every 12 hours  montelukast 10 milliGRAM(s) Oral daily  norepinephrine Infusion 0.05 MICROgram(s)/kG/Min IV Continuous <Continuous>  pantoprazole    Tablet 40 milliGRAM(s) Oral before breakfast  senna 2 Tablet(s) Oral at bedtime  simvastatin 10 milliGRAM(s) Oral at bedtime  spironolactone 25 milliGRAM(s) Oral daily  tamsulosin 0.4 milliGRAM(s) Oral at bedtime  vancomycin  IVPB 1250 milliGRAM(s) IV Intermittent once    PRN MEDICATIONS  ALBUTerol/ipratropium for Nebulization 3 milliLiter(s) Nebulizer every 6 hours PRN  ALPRAZolam 0.25 milliGRAM(s) Oral daily PRN    VITALS:   T(F): 96.4  HR: 98  BP: 106/63  RR: 35  SpO2: 98%    LABS:                        8.0    7.51  )-----------( 160      ( 30 Oct 2018 05:31 )             25.6     10-30    139  |  98  |  12  ----------------------------<  140<H>  4.3   |  27  |  1.2        Ca    8.3<L>      30 Oct 2018 05:31  Phos  2.6     10-30  Mg     2.2     10-30    TPro  5.2<L>  /  Alb  3.0<L>  /  TBili  0.6  /  DBili  x   /  AST  18  /  ALT  12  /  AlkPhos  62  10-30      RADIOLOGY:  < from: Transthoracic Echocardiogram (10.30.18 @ 09:12) >  Summary:   1. Left ventricular ejection fraction, by visual estimation, is 40 to   45%.   2. Technically suboptimal study.   3. Moderately decreased global left ventricular systolic function.  4. Left atrial enlargement.   5. Increased LV wall thickness.   6. Spectral Doppler shows restrictive pattern of left ventricular   myocardial filling (Grade III diastolic dysfunction).   7. Small pericardial effusion.   8. Thickening and calcification of the posterior mitral valve leaflet.   9. Mild aortic regurgitation.  10. Aortic root measured at Sinus of Valsalva is dilated.    < end of copied text >      < from: Transthoracic Echocardiogram (10.28.18 @ 08:47) >   1. LV Ejection Fraction by Amaro's Method with a biplane EF of 39 %.   2. Increased LV wall thickness.   3. Mildly increased left ventricular internal cavity size.   4. Normal left atrial size.   5.Normal right atrial size.   6. Small pericardial effusion.   7. Mild to moderate mitral valve regurgitation.   8. Moderate mitral annular calcification.   9. Thickening and calcification of the anterior mitral valve leaflet.  10. Trace tricuspid regurgitation.  11. Trace pulmonic valve regurgitation.    PHYSICIAN INTERPRETATION:  Left Ventricle: The left ventricular internal cavity size is mildly   increased. Left ventricular wall thickness is increased. LV Ejection   Fraction by Amaro's Methodwith a biplane EF of 39 %.  Right Ventricle: The right ventricular size is normal. RV systolic   function is normal.  Left Atrium: Normal left atrial size.  Right Atrium: Normal right atrial size.  Pericardium: A small pericardial effusion is present.  Mitral Valve: Mild to moderate mitral valve regurgitation is seen.   Thickening and calcification of the anterior mitral valve leaflet. There   is moderate mitral annular calcification.  Tricuspid Valve: Trivial tricuspid regurgitation is visualized. The   tricuspid valve is normal.  Aortic Valve: Trivial aortic valve regurgitation is seen. The aortic   valve is trileaflet. No evidence of aortic stenosis.  Pulmonic Valve: Trace pulmonic valve regurgitation. The pulmonic valve is   thickened with good excursion.  Aorta: Aortic root measured at Sinus of Valsalva is normal.  Additional Comments: A pacer wire is visualized in the right atrium and   right ventricle.     < end of copied text >    < from: Transthoracic Echocardiogram (05.14.18 @ 12:53) >  Summary:   1. Left ventricular ejection fraction, by visual estimation, is 20 to 25%.   2. Severely decreased global left ventricular systolic function.   3. Moderate to severe left atrial enlargement.   4. LV Ejection Fraction by Amaro's Method with a biplane EF of 26 %.   5. Mildly increased LV wall thickness.   6. Severely increased left ventricular internal cavity size.   7. Thickening and calcification of the anterior mitral valve leaflet.   8. Estimated pulmonary artery systolic pressure is 36.0 mmHg assuming a   right atrial pressure of 5 mmHg, which is consistent with borderline   pulmonary hypertension.  < end of copied text >    Cardiac MRI shows  There is mid myocardial late gadolinium enhancement predominantly involving the entire septal wall, with similar findings in the mid to apical anterior wall.     < from: Xray Chest 1 View- PORTABLE-Routine (10.30.18 @ 05:04) >  Findings:    Support devices: Unchanged chest wall pacing device.    Cardiac/mediastinum/hilum: Stable enlarged cardiac silhouette.   Atherosclerotic vascular calcifications.    Lung parenchyma/Pleura: Within normal limits.    Skeleton/soft tissues: Unchanged.    Impression:      Stable examination. No radiographic evidence of an acute cardiopulmonary abnormality.    < end of copied text >      PHYSICAL EXAM:  GEN: mildly anxious this AM  LUNGS: Clear to auscultation bilaterally   HEART: S1/S2 present. Tachycardic  ABD: Soft, non-tender, non-distended. Bowel sounds present  EXT: NC/NC/NE/2+PP/SAMUELS/Skin Intact. B/L Groin sites, clean dry, intact, soft  NEURO: AAOX3 non focal, CN 2-12 intact

## 2018-10-30 NOTE — PROGRESS NOTE ADULT - SUBJECTIVE AND OBJECTIVE BOX
INTERVAL HPI/OVERNIGHT EVENTS: Pt seen and examined with wife at bedside, pt reports periodic episodes of epigastric flutter    MEDICATIONS  (STANDING):  amiodarone    Tablet 400 milliGRAM(s) Oral every 12 hours  amiodarone Infusion 1 mG/Min (33.333 mL/Hr) IV Continuous <Continuous>  amiodarone Infusion 0.5 mG/Min (16.667 mL/Hr) IV Continuous <Continuous>  apixaban 2.5 milliGRAM(s) Oral every 12 hours  chlorhexidine 4% Liquid 1 Application(s) Topical <User Schedule>  docusate sodium 100 milliGRAM(s) Oral three times a day  finasteride 5 milliGRAM(s) Oral daily  furosemide    Tablet 40 milliGRAM(s) Oral daily  guaiFENesin  milliGRAM(s) Oral every 12 hours  levothyroxine 50 MICROGram(s) Oral daily  metoprolol tartrate 25 milliGRAM(s) Oral every 8 hours  mexiletine 150 milliGRAM(s) Oral every 12 hours  montelukast 10 milliGRAM(s) Oral daily  norepinephrine Infusion 0.05 MICROgram(s)/kG/Min (4.041 mL/Hr) IV Continuous <Continuous>  pantoprazole    Tablet 40 milliGRAM(s) Oral before breakfast  senna 2 Tablet(s) Oral at bedtime  simvastatin 10 milliGRAM(s) Oral at bedtime  spironolactone 25 milliGRAM(s) Oral daily  tamsulosin 0.4 milliGRAM(s) Oral at bedtime  vancomycin  IVPB 1250 milliGRAM(s) IV Intermittent once    MEDICATIONS  (PRN):  ALBUTerol/ipratropium for Nebulization 3 milliLiter(s) Nebulizer every 6 hours PRN Shortness of Breath and/or Wheezing  ALPRAZolam 0.25 milliGRAM(s) Oral daily PRN anxiety      GENERAL: NAD  NECK: Supple, No JVD  CHEST/LUNG: right base rales, no rhonchi, wheezing  HEART: Regular rate and rhythm; No murmurs  GI/ABDOMEN: Soft, Nontender, Nondistended; Bowel sounds present  EXTREMITIES:  2+ Peripheral Pulses, No clubbing, cyanosis, or edema, no deformity. No calf tenderness b/l.  SKIN: No rashes or lesions  NERVOUS SYSTEM:  Alert & Oriented X3, no focal deficit     TELE:     ECG:< from: 12 Lead ECG (10.30.18 @ 07:16) >  Ventricular Rate 103 BPM    Atrial Rate 107 BPM    QRS Duration 168 ms    Q-T Interval 460 ms    QTC Calculation(Bezet) 602 ms    R Axis 75 degrees    T Axis 256 degrees    Diagnosis Line Ventricular-paced rhythm with occasional supraventricular complexes  Pacemaker is may not be sensing or capturing appropriately.  Abnormal ECG      < from: Transthoracic Echocardiogram (10.30.18 @ 09:12) >  PROCEDURE DATE:  10/30/2018      INTERPRETATION:  REPORT:    TRANSTHORACIC ECHOCARDIOGRAM REPORT     Patient Name:   GUIDO VILCHIS Accession #: [id number]  Medical Rec #:  [id number]    Height:      68.0 in 172.7 cm  YOB: 1940    Weight:      175.0 lb 79.38 kg  Patient Age:    78 years     BSA:         1.93 m²  Patient Gender: M            BP:          92/56 mmH  Summary:   1. Left ventricular ejection fraction, by visual estimation, is 40 to   45%.   2. Technically suboptimal study.   3. Moderately decreased global left ventricular systolic function.  4. Left atrial enlargement.   5. Increased LV wall thickness.   6. Spectral Doppler shows restrictive pattern of left ventricular   myocardial filling (Grade III diastolic dysfunction).   7. Small pericardial effusion.   8. Thickening and calcification of the posterior mitral valve leaflet.   9. Mild aortic regurgitation.  10. Aortic root measured at Sinus of Valsalva is dilated.    Vital Signs Last 24 Hrs  T(C): 35.8 (30 Oct 2018 12:10), Max: 36.4 (30 Oct 2018 00:00)  T(F): 96.5 (30 Oct 2018 12:10), Max: 97.6 (30 Oct 2018 00:00)  HR: 106 (30 Oct 2018 16:10) (78 - 106)  BP: 109/71 (30 Oct 2018 16:10) (74/52 - 112/65)  BP(mean): 92 (30 Oct 2018 16:10) (59 - 92)  RR: 30 (30 Oct 2018 11:10) (21 - 45)  SpO2: 96% (30 Oct 2018 16:10) (92% - 100%)      LABS:                        8.0    7.51  )-----------( 160      ( 30 Oct 2018 05:31 )             25.6     10-30    139  |  98  |  12  ----------------------------<  140<H>  4.3   |  27  |  1.2    Ca    8.3<L>      30 Oct 2018 05:31  Phos  2.6     10-30  Mg     2.2     10-30    TPro  5.2<L>  /  Alb  3.0<L>  /  TBili  0.6  /  DBili  x   /  AST  18  /  ALT  12  /  AlkPhos  62  10-30    POCT Blood Glucose.: 143 mg/dL (29 Oct 2018 22:06)    I&O's Summary    29 Oct 2018 07:01  -  30 Oct 2018 07:00  --------------------------------------------------------  IN: 563.4 mL / OUT: 1940 mL / NET: -1376.6 mL    30 Oct 2018 07:01  -  30 Oct 2018 17:39  --------------------------------------------------------  IN: 663.3 mL / OUT: 700 mL / NET: -36.7 mL INTERVAL HPI/OVERNIGHT EVENTS: Pt seen and examined with wife at bedside, pt reports periodic episodes of epigastric flutter    MEDICATIONS  (STANDING):  amiodarone    Tablet 400 milliGRAM(s) Oral every 12 hours  amiodarone Infusion 1 mG/Min (33.333 mL/Hr) IV Continuous <Continuous>  amiodarone Infusion 0.5 mG/Min (16.667 mL/Hr) IV Continuous <Continuous>  apixaban 2.5 milliGRAM(s) Oral every 12 hours  chlorhexidine 4% Liquid 1 Application(s) Topical <User Schedule>  docusate sodium 100 milliGRAM(s) Oral three times a day  finasteride 5 milliGRAM(s) Oral daily  furosemide    Tablet 40 milliGRAM(s) Oral daily  guaiFENesin  milliGRAM(s) Oral every 12 hours  levothyroxine 50 MICROGram(s) Oral daily  metoprolol tartrate 25 milliGRAM(s) Oral every 8 hours  mexiletine 150 milliGRAM(s) Oral every 12 hours  montelukast 10 milliGRAM(s) Oral daily  norepinephrine Infusion 0.05 MICROgram(s)/kG/Min (4.041 mL/Hr) IV Continuous <Continuous>  pantoprazole    Tablet 40 milliGRAM(s) Oral before breakfast  senna 2 Tablet(s) Oral at bedtime  simvastatin 10 milliGRAM(s) Oral at bedtime  spironolactone 25 milliGRAM(s) Oral daily  tamsulosin 0.4 milliGRAM(s) Oral at bedtime  vancomycin  IVPB 1250 milliGRAM(s) IV Intermittent once    MEDICATIONS  (PRN):  ALBUTerol/ipratropium for Nebulization 3 milliLiter(s) Nebulizer every 6 hours PRN Shortness of Breath and/or Wheezing  ALPRAZolam 0.25 milliGRAM(s) Oral daily PRN anxiety      GENERAL: NAD  NECK: Supple, No JVD  CHEST/LUNG: right base rales, no rhonchi, wheezing  HEART: Regular rate and rhythm; No murmurs  GI/ABDOMEN: Soft, Nontender, Nondistended; Bowel sounds present  EXTREMITIES:  2+ Peripheral Pulses, No clubbing, cyanosis, or edema, no deformity. No calf tenderness b/l.  SKIN: No rashes or lesions  NERVOUS SYSTEM:  Alert & Oriented X3, no focal deficit     TELE:  Episode of NSVT      ECG:< from: 12 Lead ECG (10.30.18 @ 07:16) >  Ventricular Rate 103 BPM    Atrial Rate 107 BPM    QRS Duration 168 ms    Q-T Interval 460 ms    QTC Calculation(Bezet) 602 ms    R Axis 75 degrees    T Axis 256 degrees    Diagnosis Line Ventricular-paced rhythm with occasional supraventricular complexes  Pacemaker is may not be sensing or capturing appropriately.  Abnormal ECG      < from: Transthoracic Echocardiogram (10.30.18 @ 09:12) >  PROCEDURE DATE:  10/30/2018      INTERPRETATION:  REPORT:    TRANSTHORACIC ECHOCARDIOGRAM REPORT     Patient Name:   GUIDO VILCHIS Accession #: [id number]  Medical Rec #:  [id number]    Height:      68.0 in 172.7 cm  YOB: 1940    Weight:      175.0 lb 79.38 kg  Patient Age:    78 years     BSA:         1.93 m²  Patient Gender: M            BP:          92/56 mmH  Summary:   1. Left ventricular ejection fraction, by visual estimation, is 40 to   45%.   2. Technically suboptimal study.   3. Moderately decreased global left ventricular systolic function.  4. Left atrial enlargement.   5. Increased LV wall thickness.   6. Spectral Doppler shows restrictive pattern of left ventricular   myocardial filling (Grade III diastolic dysfunction).   7. Small pericardial effusion.   8. Thickening and calcification of the posterior mitral valve leaflet.   9. Mild aortic regurgitation.  10. Aortic root measured at Sinus of Valsalva is dilated.    Vital Signs Last 24 Hrs  T(C): 35.8 (30 Oct 2018 12:10), Max: 36.4 (30 Oct 2018 00:00)  T(F): 96.5 (30 Oct 2018 12:10), Max: 97.6 (30 Oct 2018 00:00)  HR: 106 (30 Oct 2018 16:10) (78 - 106)  BP: 109/71 (30 Oct 2018 16:10) (74/52 - 112/65)  BP(mean): 92 (30 Oct 2018 16:10) (59 - 92)  RR: 30 (30 Oct 2018 11:10) (21 - 45)  SpO2: 96% (30 Oct 2018 16:10) (92% - 100%)      LABS:                        8.0    7.51  )-----------( 160      ( 30 Oct 2018 05:31 )             25.6     10-30    139  |  98  |  12  ----------------------------<  140<H>  4.3   |  27  |  1.2    Ca    8.3<L>      30 Oct 2018 05:31  Phos  2.6     10-30  Mg     2.2     10-30    TPro  5.2<L>  /  Alb  3.0<L>  /  TBili  0.6  /  DBili  x   /  AST  18  /  ALT  12  /  AlkPhos  62  10-30    POCT Blood Glucose.: 143 mg/dL (29 Oct 2018 22:06)    I&O's Summary    29 Oct 2018 07:01  -  30 Oct 2018 07:00  --------------------------------------------------------  IN: 563.4 mL / OUT: 1940 mL / NET: -1376.6 mL    30 Oct 2018 07:01  -  30 Oct 2018 17:39  --------------------------------------------------------  IN: 663.3 mL / OUT: 700 mL / NET: -36.7 mL

## 2018-10-30 NOTE — PROGRESS NOTE ADULT - PROBLEM SELECTOR PLAN 1
-device reprogrammed , see report in chart  -continue with amiodoarone and eliquis medication.  - gastric flutter likely d/t underdosing of ppi, stop omeprazole and start pantoprazole 40 mg daily.  -start metoprol 25 mg q12h  -repeat cbc and bmp in am -device reprogrammed , see report in chart  -continue with amiodoarone and eliquis medication.  - gastric flutter likely d/t underdosing of ppi, stop omeprazole and start pantoprazole 40 mg daily.  -start metoprol 25 mg q8h  -repeat cbc and bmp in am

## 2018-10-30 NOTE — PROGRESS NOTE ADULT - ASSESSMENT
78Y M with pmh of Afib on eliquis, CAD with S/CHF s/p AICD for dilated cardiomyopathy , COPD, DM2, DLD and hypothyroidism presented to the ED after having multiple episodes of his AICD firing, pt now s/p VT ablation

## 2018-10-30 NOTE — PROGRESS NOTE ADULT - ASSESSMENT
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 As per patient he saw Dr Santos on Thursday before admission who reprogrammed his device and advised him for V-tach ablation. Patient says he has been refusing ablation for months but now understands that its necessary. He has been having worsening SOB on exertion for the past few months and developed a cough for the past few weeks which his cardiologist informed him was due to fluid in his lungs. Patient says he has an aura like sensation when he knows that his AICD is about to fire and he feel like he has gas travelling up his abdomen and then chest pressure.   AICD interrogation in ER revelead multiple epsiodes of NSVT 52 in 24 hrs, 8 of VT treated with ATPs, 1 VT and VF treated with 35J shock, now S/P Vtach Ablation    #Chest pressure 2/2 AICD firing 2/2 Multiple episodes of VTach 2/2 Non Ischemic cardiomyopathy   -systolic HF EF 20%, now 39%   - 4 episodes of sustained monomorphic Vtach since admission- overdrive paced ATPx3, shocked x2  -s/p VT Ablation 10/26  -electrolytes replete   -Amiodarone 400mg BID  -Mexiletine 150mg Q12  -Lasix 40mg qd  Spironolactone 25mg qd  -TSH WNL  -Monitor lytes keep K+ above 4 and keep Mg above 2  -Consider low dose BB  -Repeat Echo     #Afib    -Amiodarone 400mg BID  -c/w eliquis 2.5mg BID    #AMS Hallucinations after Vtach ablation  CT head  Interval development of a focal slightly hyperdense lesion within the region of the left posterior temporal lobe. This may be extra-axial representing a meningioma. Recommend further evaluation with MRI of the brain with and without contrast  ICU Delerium likely patient is improving  Neuro said as patient is improving likely incidential finding follow up repeat head CT in 6-9 months      #Pulmonary Nodule  Right lower lung field 1.3 cm nodular opacity.   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 As per patient he saw Dr Santos on Thursday before admission who reprogrammed his device and advised him for V-tach ablation. Patient says he has been refusing ablation for months but now understands that its necessary. He has been having worsening SOB on exertion for the past few months and developed a cough for the past few weeks which his cardiologist informed him was due to fluid in his lungs. Patient says he has an aura like sensation when he knows that his AICD is about to fire and he feel like he has gas travelling up his abdomen and then chest pressure.   AICD interrogation in ER revelead multiple epsiodes of NSVT 52 in 24 hrs, 8 of VT treated with ATPs, 1 VT and VF treated with 35J shock, now S/P Vtach Ablation    #Chest pressure 2/2 AICD firing 2/2 Multiple episodes of VTach 2/2 Non Ischemic cardiomyopathy   -systolic HF EF 20%, now 39%   - 4 episodes of sustained monomorphic Vtach since admission- overdrive paced ATPx3, shocked x2  -s/p VT Ablation 10/26  -electrolytes replete   -Amiodarone 400mg BID  -Mexiletine 150mg Q12  -Metoprolol 25mg Q8  -Lasix 40mg qd  Spironolactone 25mg qd  -TSH WNL  -Monitor lytes keep K+ above 4 and keep Mg above 2  -Repeat Echo small pericardial effusion EF 40-45%    #Afib    -Amiodarone 400mg BID  -c/w eliquis 2.5mg BID    #AMS Hallucinations after Vtach ablation  CT head  Interval development of a focal slightly hyperdense lesion within the region of the left posterior temporal lobe. This may be extra-axial representing a meningioma. Recommend further evaluation with MRI of the brain with and without contrast  ICU Delerium likely patient is improving  Neuro said as patient is improving likely incidential finding follow up repeat head CT in 6-9 months      #Pulmonary Nodule  Right lower lung field 1.3 cm nodular opacity.   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 As per patient he saw Dr Santos on Thursday before admission who reprogrammed his device and advised him for V-tach ablation. Patient says he has been refusing ablation for months but now understands that its necessary. He has been having worsening SOB on exertion for the past few months and developed a cough for the past few weeks which his cardiologist informed him was due to fluid in his lungs. Patient says he has an aura like sensation when he knows that his AICD is about to fire and he feel like he has gas travelling up his abdomen and then chest pressure.   AICD interrogation in ER revelead multiple epsiodes of NSVT 52 in 24 hrs, 8 of VT treated with ATPs, 1 VT and VF treated with 35J shock, now S/P Vtach Ablation    #Chest pressure 2/2 AICD firing 2/2 Multiple episodes of VTach 2/2 Non Ischemic cardiomyopathy   -systolic HF EF 20%, now 39%   - 4 episodes of sustained monomorphic Vtach since admission- overdrive paced ATPx3, shocked x2  -s/p VT Ablation 10/26  -electrolytes replete   -Amiodarone 400mg BID  -Mexiletine 150mg Q12  -Metoprolol 25mg Q8  -Lasix 40mg qd  Spironolactone 25mg qd  -TSH WNL  -Monitor lytes keep K+ above 4 and keep Mg above 2  -Repeat Echo small pericardial effusion EF 40-45%    #Afib    -Amiodarone 400mg BID  -c/w eliquis 2.5mg BID    #AMS Hallucinations after Vtach ablation/ reversible toxic metabolic encephalopathy  CT head  Interval development of a focal slightly hyperdense lesion within the region of the left posterior temporal lobe. This may be extra-axial representing a meningioma. Recommend further evaluation with MRI of the brain with and without contrast  ICU Delerium likely patient is improving  Neuro said as patient is improving likely incidential finding follow up repeat head CT in 6-9 months      #Pulmonary Nodule, patient made aware  Right lower lung field 1.3 cm nodular opacity.   CT chest outpatient      #COPD  -Duonebs Q6 prn  -c/w montelukast  -stable no wheezing    #DM2  -monitor fingersticks and if >200 start insulin sliding scale    #hypothyroidism  -c/w levothyroxine 50mcg qd    #DLD  -c/w simvastatin 10mg QHS    #BPH  Finasteride 5mg qd  Flomax 0.4mg at bedtime    DVT ppx Eliquis  GI ppx: Protonix    Dispo: As per EP and further intervention   Patient is from home, lives with wife  Needs aggresive PT/OT for deconditioning

## 2018-10-31 LAB
ALBUMIN SERPL ELPH-MCNC: 3.1 G/DL — LOW (ref 3.5–5.2)
ALP SERPL-CCNC: 67 U/L — SIGNIFICANT CHANGE UP (ref 30–115)
ALT FLD-CCNC: 13 U/L — SIGNIFICANT CHANGE UP (ref 0–41)
ANION GAP SERPL CALC-SCNC: 11 MMOL/L — SIGNIFICANT CHANGE UP (ref 7–14)
ANION GAP SERPL CALC-SCNC: 14 MMOL/L — SIGNIFICANT CHANGE UP (ref 7–14)
AST SERPL-CCNC: 17 U/L — SIGNIFICANT CHANGE UP (ref 0–41)
BASOPHILS # BLD AUTO: 0.05 K/UL — SIGNIFICANT CHANGE UP (ref 0–0.2)
BASOPHILS # BLD AUTO: 0.06 K/UL — SIGNIFICANT CHANGE UP (ref 0–0.2)
BASOPHILS NFR BLD AUTO: 0.6 % — SIGNIFICANT CHANGE UP (ref 0–1)
BASOPHILS NFR BLD AUTO: 0.7 % — SIGNIFICANT CHANGE UP (ref 0–1)
BILIRUB SERPL-MCNC: 0.6 MG/DL — SIGNIFICANT CHANGE UP (ref 0.2–1.2)
BUN SERPL-MCNC: 15 MG/DL — SIGNIFICANT CHANGE UP (ref 10–20)
BUN SERPL-MCNC: 17 MG/DL — SIGNIFICANT CHANGE UP (ref 10–20)
CALCIUM SERPL-MCNC: 8.4 MG/DL — LOW (ref 8.5–10.1)
CALCIUM SERPL-MCNC: 8.8 MG/DL — SIGNIFICANT CHANGE UP (ref 8.5–10.1)
CHLORIDE SERPL-SCNC: 100 MMOL/L — SIGNIFICANT CHANGE UP (ref 98–110)
CHLORIDE SERPL-SCNC: 98 MMOL/L — SIGNIFICANT CHANGE UP (ref 98–110)
CO2 SERPL-SCNC: 27 MMOL/L — SIGNIFICANT CHANGE UP (ref 17–32)
CO2 SERPL-SCNC: 29 MMOL/L — SIGNIFICANT CHANGE UP (ref 17–32)
CREAT SERPL-MCNC: 1.4 MG/DL — SIGNIFICANT CHANGE UP (ref 0.7–1.5)
CREAT SERPL-MCNC: 1.6 MG/DL — HIGH (ref 0.7–1.5)
EOSINOPHIL # BLD AUTO: 0.4 K/UL — SIGNIFICANT CHANGE UP (ref 0–0.7)
EOSINOPHIL # BLD AUTO: 0.49 K/UL — SIGNIFICANT CHANGE UP (ref 0–0.7)
EOSINOPHIL NFR BLD AUTO: 4.6 % — SIGNIFICANT CHANGE UP (ref 0–8)
EOSINOPHIL NFR BLD AUTO: 5.7 % — SIGNIFICANT CHANGE UP (ref 0–8)
GLUCOSE SERPL-MCNC: 125 MG/DL — HIGH (ref 70–99)
GLUCOSE SERPL-MCNC: 157 MG/DL — HIGH (ref 70–99)
HCT VFR BLD CALC: 25.9 % — LOW (ref 42–52)
HCT VFR BLD CALC: 31 % — LOW (ref 42–52)
HGB BLD-MCNC: 8.1 G/DL — LOW (ref 14–18)
HGB BLD-MCNC: 9.7 G/DL — LOW (ref 14–18)
IMM GRANULOCYTES NFR BLD AUTO: 0.7 % — HIGH (ref 0.1–0.3)
IMM GRANULOCYTES NFR BLD AUTO: 0.9 % — HIGH (ref 0.1–0.3)
LYMPHOCYTES # BLD AUTO: 0.78 K/UL — LOW (ref 1.2–3.4)
LYMPHOCYTES # BLD AUTO: 0.87 K/UL — LOW (ref 1.2–3.4)
LYMPHOCYTES # BLD AUTO: 10.2 % — LOW (ref 20.5–51.1)
LYMPHOCYTES # BLD AUTO: 8.9 % — LOW (ref 20.5–51.1)
MAGNESIUM SERPL-MCNC: 2.2 MG/DL — SIGNIFICANT CHANGE UP (ref 1.8–2.4)
MAGNESIUM SERPL-MCNC: 2.2 MG/DL — SIGNIFICANT CHANGE UP (ref 1.8–2.4)
MCHC RBC-ENTMCNC: 26.8 PG — LOW (ref 27–31)
MCHC RBC-ENTMCNC: 26.9 PG — LOW (ref 27–31)
MCHC RBC-ENTMCNC: 31.3 G/DL — LOW (ref 32–37)
MCHC RBC-ENTMCNC: 31.3 G/DL — LOW (ref 32–37)
MCV RBC AUTO: 85.8 FL — SIGNIFICANT CHANGE UP (ref 80–94)
MCV RBC AUTO: 85.9 FL — SIGNIFICANT CHANGE UP (ref 80–94)
MONOCYTES # BLD AUTO: 0.83 K/UL — HIGH (ref 0.1–0.6)
MONOCYTES # BLD AUTO: 0.99 K/UL — HIGH (ref 0.1–0.6)
MONOCYTES NFR BLD AUTO: 11.6 % — HIGH (ref 1.7–9.3)
MONOCYTES NFR BLD AUTO: 9.5 % — HIGH (ref 1.7–9.3)
NEUTROPHILS # BLD AUTO: 6.09 K/UL — SIGNIFICANT CHANGE UP (ref 1.4–6.5)
NEUTROPHILS # BLD AUTO: 6.63 K/UL — HIGH (ref 1.4–6.5)
NEUTROPHILS NFR BLD AUTO: 71.2 % — SIGNIFICANT CHANGE UP (ref 42.2–75.2)
NEUTROPHILS NFR BLD AUTO: 75.4 % — HIGH (ref 42.2–75.2)
NRBC # BLD: 0 /100 WBCS — SIGNIFICANT CHANGE UP (ref 0–0)
NRBC # BLD: 0 /100 WBCS — SIGNIFICANT CHANGE UP (ref 0–0)
PHOSPHATE SERPL-MCNC: 2.6 MG/DL — SIGNIFICANT CHANGE UP (ref 2.1–4.9)
PLATELET # BLD AUTO: 207 K/UL — SIGNIFICANT CHANGE UP (ref 130–400)
PLATELET # BLD AUTO: 227 K/UL — SIGNIFICANT CHANGE UP (ref 130–400)
POTASSIUM SERPL-MCNC: 4.5 MMOL/L — SIGNIFICANT CHANGE UP (ref 3.5–5)
POTASSIUM SERPL-MCNC: 4.7 MMOL/L — SIGNIFICANT CHANGE UP (ref 3.5–5)
POTASSIUM SERPL-SCNC: 4.5 MMOL/L — SIGNIFICANT CHANGE UP (ref 3.5–5)
POTASSIUM SERPL-SCNC: 4.7 MMOL/L — SIGNIFICANT CHANGE UP (ref 3.5–5)
PROT SERPL-MCNC: 5.5 G/DL — LOW (ref 6–8)
RBC # BLD: 3.02 M/UL — LOW (ref 4.7–6.1)
RBC # BLD: 3.61 M/UL — LOW (ref 4.7–6.1)
RBC # FLD: 15 % — HIGH (ref 11.5–14.5)
RBC # FLD: 15.2 % — HIGH (ref 11.5–14.5)
SODIUM SERPL-SCNC: 139 MMOL/L — SIGNIFICANT CHANGE UP (ref 135–146)
SODIUM SERPL-SCNC: 140 MMOL/L — SIGNIFICANT CHANGE UP (ref 135–146)
WBC # BLD: 8.55 K/UL — SIGNIFICANT CHANGE UP (ref 4.8–10.8)
WBC # BLD: 8.78 K/UL — SIGNIFICANT CHANGE UP (ref 4.8–10.8)
WBC # FLD AUTO: 8.55 K/UL — SIGNIFICANT CHANGE UP (ref 4.8–10.8)
WBC # FLD AUTO: 8.78 K/UL — SIGNIFICANT CHANGE UP (ref 4.8–10.8)

## 2018-10-31 RX ORDER — SODIUM CHLORIDE 9 MG/ML
3 INJECTION INTRAMUSCULAR; INTRAVENOUS; SUBCUTANEOUS ONCE
Qty: 0 | Refills: 0 | Status: COMPLETED | OUTPATIENT
Start: 2018-10-31 | End: 2018-10-31

## 2018-10-31 RX ORDER — METOPROLOL TARTRATE 50 MG
25 TABLET ORAL EVERY 12 HOURS
Qty: 0 | Refills: 0 | Status: DISCONTINUED | OUTPATIENT
Start: 2018-10-31 | End: 2018-10-31

## 2018-10-31 RX ORDER — METOPROLOL TARTRATE 50 MG
25 TABLET ORAL EVERY 8 HOURS
Qty: 0 | Refills: 0 | Status: DISCONTINUED | OUTPATIENT
Start: 2018-10-31 | End: 2018-11-01

## 2018-10-31 RX ADMIN — Medication 100 MILLIGRAM(S): at 14:56

## 2018-10-31 RX ADMIN — APIXABAN 2.5 MILLIGRAM(S): 2.5 TABLET, FILM COATED ORAL at 17:25

## 2018-10-31 RX ADMIN — Medication 50 MICROGRAM(S): at 06:13

## 2018-10-31 RX ADMIN — MEXILETINE HYDROCHLORIDE 150 MILLIGRAM(S): 150 CAPSULE ORAL at 17:25

## 2018-10-31 RX ADMIN — PANTOPRAZOLE SODIUM 40 MILLIGRAM(S): 20 TABLET, DELAYED RELEASE ORAL at 06:13

## 2018-10-31 RX ADMIN — TAMSULOSIN HYDROCHLORIDE 0.4 MILLIGRAM(S): 0.4 CAPSULE ORAL at 21:05

## 2018-10-31 RX ADMIN — Medication 25 MILLIGRAM(S): at 21:05

## 2018-10-31 RX ADMIN — Medication 25 MILLIGRAM(S): at 14:56

## 2018-10-31 RX ADMIN — FINASTERIDE 5 MILLIGRAM(S): 5 TABLET, FILM COATED ORAL at 11:05

## 2018-10-31 RX ADMIN — AMIODARONE HYDROCHLORIDE 400 MILLIGRAM(S): 400 TABLET ORAL at 06:13

## 2018-10-31 RX ADMIN — Medication 100 MILLIGRAM(S): at 06:13

## 2018-10-31 RX ADMIN — MONTELUKAST 10 MILLIGRAM(S): 4 TABLET, CHEWABLE ORAL at 11:03

## 2018-10-31 RX ADMIN — Medication 25 MILLIGRAM(S): at 06:13

## 2018-10-31 RX ADMIN — Medication 600 MILLIGRAM(S): at 06:13

## 2018-10-31 RX ADMIN — SPIRONOLACTONE 25 MILLIGRAM(S): 25 TABLET, FILM COATED ORAL at 06:13

## 2018-10-31 RX ADMIN — MEXILETINE HYDROCHLORIDE 150 MILLIGRAM(S): 150 CAPSULE ORAL at 06:13

## 2018-10-31 RX ADMIN — SENNA PLUS 2 TABLET(S): 8.6 TABLET ORAL at 21:05

## 2018-10-31 RX ADMIN — Medication 600 MILLIGRAM(S): at 17:25

## 2018-10-31 RX ADMIN — SIMVASTATIN 10 MILLIGRAM(S): 20 TABLET, FILM COATED ORAL at 21:05

## 2018-10-31 RX ADMIN — Medication 100 MILLIGRAM(S): at 21:05

## 2018-10-31 RX ADMIN — AMIODARONE HYDROCHLORIDE 400 MILLIGRAM(S): 400 TABLET ORAL at 17:25

## 2018-10-31 RX ADMIN — APIXABAN 2.5 MILLIGRAM(S): 2.5 TABLET, FILM COATED ORAL at 06:13

## 2018-10-31 NOTE — CONSULT NOTE ADULT - SUBJECTIVE AND OBJECTIVE BOX
HPI:  78Y M with pmh of Afib on eliquis, CAD with SCHF s/p AICD, COPD, DM2, DLD and hypothyroidism presented to the ED after having multiple episodes of his AICD firing. As per patient he saw Dr Santos on Thursday who reprogrammed his device and advised him for ablation. Patient says he has been refusing ablation for months but now understands that its necessary. He has been having worsening SOB on exertion for the past few months and developed a cough for the past few weeks which his cardiologist informed him was due to fluid n his lungs. Patient says he has an aura like sensation when he knows that his AICD is about to fire and he feel like he has gas travelling up his abdomen and then chest pressure. On Sunday morning he had a terrible attack of the defibrillator firing after which he decided to come to the ED. As per patient he had a pacemaker which was changed to an AICD in January. Patient denies chest pain otherwise, nausea, vomiting or diarrhea. (23 Oct 2018 00:04)      PAST MEDICAL & SURGICAL HISTORY:  CAD (coronary artery disease)  Ventricular tachycardia  NANDO on CPAP  COPD (chronic obstructive pulmonary disease)  Pacemaker  AICD (automatic cardioverter/defibrillator) present  Hypothyroidism  Atrial fibrillation  Congestive heart disease  Hypercholesteremia  AICD (automatic cardioverter/defibrillator) present  History of laparoscopic cholecystectomy      Hospital Course: s/p VT ablation    TODAY'S SUBJECTIVE & REVIEW OF SYMPTOMS:     Constitutional WNL   Cardio WNL   Resp WNL   GI WNL  Heme WNL  Endo WNL  Skin WNL  MSK Weakness  Neuro WNL  Cognitive WNL  Psych WNL      MEDICATIONS  (STANDING):  amiodarone    Tablet 400 milliGRAM(s) Oral every 12 hours  amiodarone Infusion 0.5 mG/Min (16.667 mL/Hr) IV Continuous <Continuous>  apixaban 2.5 milliGRAM(s) Oral every 12 hours  chlorhexidine 4% Liquid 1 Application(s) Topical <User Schedule>  docusate sodium 100 milliGRAM(s) Oral three times a day  finasteride 5 milliGRAM(s) Oral daily  furosemide    Tablet 40 milliGRAM(s) Oral daily  guaiFENesin  milliGRAM(s) Oral every 12 hours  levothyroxine 50 MICROGram(s) Oral daily  metoprolol tartrate 25 milliGRAM(s) Oral every 8 hours  mexiletine 150 milliGRAM(s) Oral every 12 hours  montelukast 10 milliGRAM(s) Oral daily  pantoprazole    Tablet 40 milliGRAM(s) Oral before breakfast  senna 2 Tablet(s) Oral at bedtime  simvastatin 10 milliGRAM(s) Oral at bedtime  spironolactone 25 milliGRAM(s) Oral daily  tamsulosin 0.4 milliGRAM(s) Oral at bedtime  vancomycin  IVPB 1250 milliGRAM(s) IV Intermittent once    MEDICATIONS  (PRN):  ALBUTerol/ipratropium for Nebulization 3 milliLiter(s) Nebulizer every 6 hours PRN Shortness of Breath and/or Wheezing      FAMILY HISTORY:  Family history of acute myocardial infarction (Aunt)      Allergies    morphine (Stomach Upset)    Intolerances        SOCIAL HISTORY:    [  ] Etoh  [  ] Smoking  [  ] Substance abuse     Home Environment:  [  ] Home Alone  [x  ] Lives with Family  [  ] Home Health Aid    Dwelling:  [  ] Apartment  [ x ] Private House  [  ] Adult Home  [  ] Skilled Nursing Facility      [  ] Short Term  [  ] Long Term  [x  ] Stairs       Elevator [  ]    FUNCTIONAL STATUS PTA: (Check all that apply)  Ambulation: [ x  ]Independent    [  ] Dependent     [  ] Non-Ambulatory  Assistive Device: [  ] SA Cane  [  ]  Q Cane  [  ] Walker  [  ]  Wheelchair  ADL : [x  ] Independent  [  ]  Dependent       Vital Signs Last 24 Hrs  T(C): 36.1 (31 Oct 2018 13:15), Max: 36.1 (31 Oct 2018 13:15)  T(F): 97 (31 Oct 2018 13:15), Max: 97 (31 Oct 2018 13:15)  HR: 95 (31 Oct 2018 14:00) (76 - 106)  BP: 96/60 (31 Oct 2018 14:00) (87/53 - 111/61)  BP(mean): 71 (31 Oct 2018 14:00) (64 - 92)  RR: 33 (31 Oct 2018 14:00) (22 - 40)  SpO2: 96% (31 Oct 2018 14:00) (94% - 99%)      PHYSICAL EXAM: Alert & Oriented X3  GENERAL: NAD, well-groomed, well-developed  HEAD:  Atraumatic, Normocephalic  CHEST/LUNG: Clear   HEART: S1S2+  ABDOMEN: Soft, Nontender  EXTREMITIES:  no calf tenderness    NERVOUS SYSTEM:  Cranial Nerves 2-12 intact [  ] Abnormal  [  ]  ROM: WFL all extremities [x  ]  Abnormal [  ]  Motor Strength: WFL all extremities  [x  ]  Abnormal [  ]  Sensation: intact to light touch [ x ] Abnormal [  ]  Reflexes: Symmetric [  ]  Abnormal [  ]    FUNCTIONAL STATUS:  Bed Mobility: Independent [  ]  Supervision [  ]  Needs Assistance [x  ]  N/A [  ]  Transfers: Independent [  ]  Supervision [  ]  Needs Assistance [x  ]  N/A [  ]   Ambulation: Independent [  ]  Supervision [  ]  Needs Assistance [x  ]  N/A [  ]  ADL: Independent [  ] Requires Assistance [  ] N/A [  ]      LABS:                        8.1    8.55  )-----------( 207      ( 31 Oct 2018 05:21 )             25.9     10-31    139  |  98  |  15  ----------------------------<  125<H>  4.5   |  27  |  1.6<H>    Ca    8.4<L>      31 Oct 2018 05:21  Phos  2.6     10-31  Mg     2.2     10-31    TPro  5.5<L>  /  Alb  3.1<L>  /  TBili  0.6  /  DBili  x   /  AST  17  /  ALT  13  /  AlkPhos  67  10-31          RADIOLOGY & ADDITIONAL STUDIES:    Assesment:

## 2018-10-31 NOTE — PROGRESS NOTE ADULT - ASSESSMENT
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 As per patient he saw Dr Santos on Thursday before admission who reprogrammed his device and advised him for V-tach ablation. Patient says he has been refusing ablation for months but now understands that its necessary. He has been having worsening SOB on exertion for the past few months and developed a cough for the past few weeks which his cardiologist informed him was due to fluid in his lungs. Patient says he has an aura like sensation when he knows that his AICD is about to fire and he feel like he has gas travelling up his abdomen and then chest pressure.   AICD interrogation in ER revelead multiple epsiodes of NSVT 52 in 24 hrs, 8 of VT treated with ATPs, 1 VT and VF treated with 35J shock, now S/P Vtach Ablation    #Chest pressure 2/2 AICD firing 2/2 Multiple episodes of VTach 2/2 Non Ischemic cardiomyopathy   -systolic HF EF 20%, 39%, 40-45%  - 4 episodes of sustained monomorphic Vtach since admission- overdrive paced ATPx3, shocked x2  -s/p VT Ablation 10/26  -electrolytes repleted WNL  -Amiodarone 400mg BID  -Mexiletine 150mg Q12  -Metoprolol 25mg Q8  -Lasix 40mg qd  Spironolactone 25mg qd  -TSH WNL  -Monitor lytes keep K+ above 4 and keep Mg above 2  -Repeat Echo small pericardial effusion EF 40-45%    #Afib    -Amiodarone 400mg BID  -c/w eliquis 2.5mg BID    #AMS Hallucinations after Vtach ablation/ reversible toxic metabolic encephalopathy  CT head  Interval development of a focal slightly hyperdense lesion within the region of the left posterior temporal lobe. This may be extra-axial representing a meningioma. Recommend further evaluation with MRI of the brain with and without contrast  ICU Delerium likely patient is improving  Neuro said as patient is improving likely incidential finding follow up repeat head CT in 6-9 months      #Pulmonary Nodule, patient made aware  Right lower lung field 1.3 cm nodular opacity.   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 As per patient he saw Dr Santos on Thursday before admission who reprogrammed his device and advised him for V-tach ablation. Patient says he has been refusing ablation for months but now understands that its necessary. He has been having worsening SOB on exertion for the past few months and developed a cough for the past few weeks which his cardiologist informed him was due to fluid in his lungs. Patient says he has an aura like sensation when he knows that his AICD is about to fire and he feel like he has gas travelling up his abdomen and then chest pressure.   AICD interrogation in ER revelead multiple epsiodes of NSVT 52 in 24 hrs, 8 of VT treated with ATPs, 1 VT and VF treated with 35J shock, now S/P Vtach Ablation    #Chest pressure 2/2 AICD firing 2/2 Multiple episodes of VTach 2/2 Non Ischemic cardiomyopathy   -systolic HF EF 20%, 39%, 40-45%  - 4 episodes of sustained monomorphic Vtach since admission- overdrive paced ATPx3, shocked x2  -s/p VT Ablation 10/26  -electrolytes repleted WNL  -Amiodarone 400mg BID  -Mexiletine 150mg Q12  -Metoprolol 25mg Q8  -Lasix 40mg qd  Spironolactone 25mg qd  -TSH WNL  -Monitor lytes keep K+ above 4 and keep Mg above 2  -Repeat Echo small pericardial effusion EF 40-45%    #Afib    -Amiodarone 400mg BID  -c/w eliquis 2.5mg BID    #Congestion  Saline Nebulizer prn  Avoid all B-agonists (no albuterol, atrovent, xopenex etc)    #AMS Hallucinations after Vtach ablation/ reversible toxic metabolic encephalopathy  CT head  Interval development of a focal slightly hyperdense lesion within the region of the left posterior temporal lobe. This may be extra-axial representing a meningioma. Recommend further evaluation with MRI of the brain with and without contrast  ICU Delerium likely patient is improving  Neuro said as patient is improving likely incidential finding follow up repeat head CT in 6-9 months      #Pulmonary Nodule, patient made aware  Right lower lung field 1.3 cm nodular opacity.   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 As per patient he saw Dr Santos on Thursday before admission who reprogrammed his device and advised him for V-tach ablation. Patient says he has been refusing ablation for months but now understands that its necessary. He has been having worsening SOB on exertion for the past few months and developed a cough for the past few weeks which his cardiologist informed him was due to fluid in his lungs. Patient says he has an aura like sensation when he knows that his AICD is about to fire and he feel like he has gas travelling up his abdomen and then chest pressure.   AICD interrogation in ER revelead multiple epsiodes of NSVT 52 in 24 hrs, 8 of VT treated with ATPs, 1 VT and VF treated with 35J shock, now S/P Vtach Ablation    #Chest pressure 2/2 AICD firing 2/2 Multiple episodes of VTach 2/2 Non Ischemic cardiomyopathy   -systolic HF EF 20%, 39%, 40-45%  - 4 episodes of sustained monomorphic Vtach since admission- overdrive paced ATPx3, shocked x2  -s/p VT Ablation 10/26  -electrolytes repleted WNL  -Amiodarone 400mg BID  -Mexiletine 150mg Q12  -Metoprolol 25mg Q8  -Lasix 40mg qd  Spironolactone 25mg qd  -TSH WNL  -Monitor lytes keep K+ above 4 and keep Mg above 2  -Repeat Echo small pericardial effusion EF 40-45%    #Afib, better control  -Amiodarone 400mg BID  -c/w eliquis 2.5mg BID      #AMS Hallucinations after Vtach ablation/ reversible toxic metabolic encephalopathy  CT head  Interval development of a focal slightly hyperdense lesion within the region of the left posterior temporal lobe. This may be extra-axial representing a meningioma. Recommend further evaluation with MRI of the brain with and without contrast  ICU Delerium likely patient is improving  Neuro said as patient is improving likely incidential finding follow up repeat head CT in 6-9 months      #Pulmonary Nodule, patient made aware  Right lower lung field 1.3 cm nodular opacity.   CT chest outpatient      #COPD  -Duonebs Q6 prn  -c/w montelukast  -stable no wheezing    #DM2  -monitor fingersticks and if >200 start insulin sliding scale    #hypothyroidism  -c/w levothyroxine 50mcg qd    #DLD  -c/w simvastatin 10mg QHS    #BPH  Finasteride 5mg qd  Flomax 0.4mg at bedtime    DVT ppx Eliquis  GI ppx: Protonix    Dispo: As per EP and further intervention   Patient is from home, lives with wife  Needs aggresive PT/OT for deconditioning  Possible D/C in AM

## 2018-10-31 NOTE — CONSULT NOTE ADULT - ASSESSMENT
IMPRESSION: Rehab of gait dysfunction      PRECAUTIONS: [  ] Cardiac  [  ] Respiratory  [  ] Seizures [  ] Contact Isolation  [  ] Droplet Isolation  [  ] Other    Weight Bearing Status:     RECOMMENDATION:    Out of Bed to Chair     DVT/Decubiti Prophylaxis    REHAB PLAN:     [ x  ] Bedside P/T 3-5 times a week   [   ]   Bedside O/T  2-3 times a week             [   ] No Rehab Therapy Indicated                   [   ]  Speech Therapy   Conditioning/ROM                                    ADL  Bed Mobility                                               Conditioning/ROM  Transfers                                                     Bed Mobility  Sitting /Standing Balance                         Transfers                                        Gait Training                                               Sitting/Standing Balance  Stair Training [   ]Applicable                    Home equipment Eval                                                                        Splinting  [   ] Only      GOALS:   ADL   [   ]   Independent                    Transfers  [ x  ] Independent                          Ambulation  [ x  ] Independent     [ x   ] With device                            [   ]  CG                                                         [   ]  CG                                                                  [   ] CG                            [    ] Min A                                                   [   ] Min A                                                              [   ] Min  A          DISCHARGE PLAN:   [   ]  Good candidate for Intensive Rehabilitation/Hospital based                                             Will tolerate 3hrs Intensive Rehab Daily                                       [    ]  Short Term Rehab in Skilled Nursing Facility                                       [  x  ]  Home with Outpatient or  services                                         [    ]  Possible Candidate for Intensive Hospital based Rehab

## 2018-10-31 NOTE — PROGRESS NOTE ADULT - SUBJECTIVE AND OBJECTIVE BOX
SUBJECTIVE:    Patient is a 78y old Male who presents with a chief complaint of Multiple incidences of AICD firing (30 Oct 2018 17:38)    Currently admitted to medicine with the primary diagnosis of recurrent Ventricular tachycardia s/p VT ablation     Today is hospital day 9d. This morning he is resting comfortably in bed and reports no new issues or overnight events. Admits to some cough and phlegm, although afebrile non toxic chest xray clear.    PAST MEDICAL & SURGICAL HISTORY  CAD (coronary artery disease)  Ventricular tachycardia  NANDO on CPAP  COPD (chronic obstructive pulmonary disease)  Pacemaker  AICD (automatic cardioverter/defibrillator) present  Hypothyroidism  Atrial fibrillation  Congestive heart disease  Hypercholesteremia  AICD (automatic cardioverter/defibrillator) present  History of laparoscopic cholecystectomy      ALLERGIES:  morphine (Stomach Upset)    MEDICATIONS:  STANDING MEDICATIONS  amiodarone    Tablet 400 milliGRAM(s) Oral every 12 hours  amiodarone Infusion 1 mG/Min IV Continuous <Continuous>  amiodarone Infusion 0.5 mG/Min IV Continuous <Continuous>  apixaban 2.5 milliGRAM(s) Oral every 12 hours  chlorhexidine 4% Liquid 1 Application(s) Topical <User Schedule>  docusate sodium 100 milliGRAM(s) Oral three times a day  finasteride 5 milliGRAM(s) Oral daily  furosemide    Tablet 40 milliGRAM(s) Oral daily  guaiFENesin  milliGRAM(s) Oral every 12 hours  levothyroxine 50 MICROGram(s) Oral daily  metoprolol tartrate 25 milliGRAM(s) Oral every 12 hours  mexiletine 150 milliGRAM(s) Oral every 12 hours  montelukast 10 milliGRAM(s) Oral daily  norepinephrine Infusion 0.05 MICROgram(s)/kG/Min IV Continuous <Continuous>  pantoprazole    Tablet 40 milliGRAM(s) Oral before breakfast  senna 2 Tablet(s) Oral at bedtime  simvastatin 10 milliGRAM(s) Oral at bedtime  sodium chloride 3%  Inhalation 3 milliLiter(s) Inhalation once  spironolactone 25 milliGRAM(s) Oral daily  tamsulosin 0.4 milliGRAM(s) Oral at bedtime  vancomycin  IVPB 1250 milliGRAM(s) IV Intermittent once    PRN MEDICATIONS  ALBUTerol/ipratropium for Nebulization 3 milliLiter(s) Nebulizer every 6 hours PRN    VITALS:   T(F): 96.6  HR: 95  BP: 108/66  RR: 22  SpO2: 97%    LABS:                        8.1    8.55  )-----------( 207      ( 31 Oct 2018 05:21 )             25.9     10-31    139  |  98  |  15  ----------------------------<  125<H>  4.5   |  27  |  1.6<H>    Ca    8.4<L>      31 Oct 2018 05:21  Phos  2.6     10-31  Mg     2.2     10-31    TPro  5.5<L>  /  Alb  3.1<L>  /  TBili  0.6  /  DBili  x   /  AST  17  /  ALT  13  /  AlkPhos  67  10-31      RADIOLOGY:  < from: Transthoracic Echocardiogram (10.30.18 @ 09:12) >  Summary:   1. Left ventricular ejection fraction, by visual estimation, is 40 to   45%.   2. Technically suboptimal study.   3. Moderately decreased global left ventricular systolic function.  4. Left atrial enlargement.   5. Increased LV wall thickness.   6. Spectral Doppler shows restrictive pattern of left ventricular   myocardial filling (Grade III diastolic dysfunction).   7. Small pericardial effusion.   8. Thickening and calcification of the posterior mitral valve leaflet.   9. Mild aortic regurgitation.  10. Aortic root measured at Sinus of Valsalva is dilated.    < end of copied text >      < from: Transthoracic Echocardiogram (10.28.18 @ 08:47) >   1. LV Ejection Fraction by Amaro's Method with a biplane EF of 39 %.   2. Increased LV wall thickness.   3. Mildly increased left ventricular internal cavity size.   4. Normal left atrial size.   5.Normal right atrial size.   6. Small pericardial effusion.   7. Mild to moderate mitral valve regurgitation.   8. Moderate mitral annular calcification.   9. Thickening and calcification of the anterior mitral valve leaflet.  10. Trace tricuspid regurgitation.  11. Trace pulmonic valve regurgitation.    PHYSICIAN INTERPRETATION:  Left Ventricle: The left ventricular internal cavity size is mildly   increased. Left ventricular wall thickness is increased. LV Ejection   Fraction by Amaro's Methodwith a biplane EF of 39 %.  Right Ventricle: The right ventricular size is normal. RV systolic   function is normal.  Left Atrium: Normal left atrial size.  Right Atrium: Normal right atrial size.  Pericardium: A small pericardial effusion is present.  Mitral Valve: Mild to moderate mitral valve regurgitation is seen.   Thickening and calcification of the anterior mitral valve leaflet. There   is moderate mitral annular calcification.  Tricuspid Valve: Trivial tricuspid regurgitation is visualized. The   tricuspid valve is normal.  Aortic Valve: Trivial aortic valve regurgitation is seen. The aortic   valve is trileaflet. No evidence of aortic stenosis.  Pulmonic Valve: Trace pulmonic valve regurgitation. The pulmonic valve is   thickened with good excursion.  Aorta: Aortic root measured at Sinus of Valsalva is normal.  Additional Comments: A pacer wire is visualized in the right atrium and   right ventricle.     < end of copied text >    < from: Transthoracic Echocardiogram (05.14.18 @ 12:53) >  Summary:   1. Left ventricular ejection fraction, by visual estimation, is 20 to 25%.   2. Severely decreased global left ventricular systolic function.   3. Moderate to severe left atrial enlargement.   4. LV Ejection Fraction by Amaro's Method with a biplane EF of 26 %.   5. Mildly increased LV wall thickness.   6. Severely increased left ventricular internal cavity size.   7. Thickening and calcification of the anterior mitral valve leaflet.   8. Estimated pulmonary artery systolic pressure is 36.0 mmHg assuming a   right atrial pressure of 5 mmHg, which is consistent with borderline   pulmonary hypertension.  < end of copied text >    Cardiac MRI shows  There is mid myocardial late gadolinium enhancement predominantly involving the entire septal wall, with similar findings in the mid to apical anterior wall.     < from: Xray Chest 1 View- PORTABLE-Routine (10.31.18 @ 05:44) >  Findings:    Support devices: Stable left chest wall pacing device.    Cardiac/mediastinum/hilum: Unchanged.    Lung parenchyma/Pleura: Within normal limits.    Skeleton/soft tissues: Unchanged.    Impression:      No radiographic evidence of acute pulmonary disease.    < end of copied text >        PHYSICAL EXAM:  GEN: No acute distress  LUNGS: Clear to auscultation bilaterally   HEART: S1/S2 present. RRR.   ABD: Soft, non-tender, non-distended. Bowel sounds present  B/L Groins, soft, no hematoma, access sites clean, dry, intact  EXT: NC/NC/NE/2+PP/SAMUELS/Skin Intact.   NEURO: AAOX3, non focal back to baseline    Intravenous access:   NG tube:   Johnson cathter:     I&O's Summary    30 Oct 2018 07:01  -  31 Oct 2018 07:00  --------------------------------------------------------  IN: 663.3 mL / OUT: 1360 mL / NET: -696.7 mL SUBJECTIVE:    Patient is a 78y old Male who presents with a chief complaint of Multiple incidences of AICD firing (30 Oct 2018 17:38)    Currently admitted to medicine with the primary diagnosis of recurrent Ventricular tachycardia s/p VT ablation     Today is hospital day 9d. This morning he is resting comfortably in bed and reports no new issues or overnight events. Admits to some cough and phlegm, although afebrile non toxic chest xray clear.    PAST MEDICAL & SURGICAL HISTORY  CAD (coronary artery disease)  Ventricular tachycardia  NANDO on CPAP  COPD (chronic obstructive pulmonary disease)  Pacemaker  AICD (automatic cardioverter/defibrillator) present  Hypothyroidism  Atrial fibrillation  Congestive heart disease  Hypercholesteremia  AICD (automatic cardioverter/defibrillator) present  History of laparoscopic cholecystectomy      ALLERGIES:  morphine (Stomach Upset)    MEDICATIONS:  STANDING MEDICATIONS  amiodarone    Tablet 400 milliGRAM(s) Oral every 12 hours  amiodarone Infusion 1 mG/Min IV Continuous <Continuous>  amiodarone Infusion 0.5 mG/Min IV Continuous <Continuous>  apixaban 2.5 milliGRAM(s) Oral every 12 hours  chlorhexidine 4% Liquid 1 Application(s) Topical <User Schedule>  docusate sodium 100 milliGRAM(s) Oral three times a day  finasteride 5 milliGRAM(s) Oral daily  furosemide    Tablet 40 milliGRAM(s) Oral daily  guaiFENesin  milliGRAM(s) Oral every 12 hours  levothyroxine 50 MICROGram(s) Oral daily  metoprolol tartrate 25 milliGRAM(s) Oral every 12 hours  mexiletine 150 milliGRAM(s) Oral every 12 hours  montelukast 10 milliGRAM(s) Oral daily  norepinephrine Infusion 0.05 MICROgram(s)/kG/Min IV Continuous <Continuous>  pantoprazole    Tablet 40 milliGRAM(s) Oral before breakfast  senna 2 Tablet(s) Oral at bedtime  simvastatin 10 milliGRAM(s) Oral at bedtime  sodium chloride 3%  Inhalation 3 milliLiter(s) Inhalation once  spironolactone 25 milliGRAM(s) Oral daily  tamsulosin 0.4 milliGRAM(s) Oral at bedtime  vancomycin  IVPB 1250 milliGRAM(s) IV Intermittent once    PRN MEDICATIONS  ALBUTerol/ipratropium for Nebulization 3 milliLiter(s) Nebulizer every 6 hours PRN    VITALS:   T(F): 96.6  HR: 95  BP: 108/66  RR: 22  SpO2: 97%    LABS:                        8.1    8.55  )-----------( 207      ( 31 Oct 2018 05:21 )             25.9     10-31    139  |  98  |  15  ----------------------------<  125<H>  4.5   |  27  |  1.6<H>    Creatinine Trend:  1.4 (10-31 @ 17:28)    1.6 (10-31 @ 05:21)    1.2 (10-30 @ 05:31)    1.2 (10-29 @ 04:48)    1.2 (10-29 @ 00:25)    1.3 (10-27 @ 23:00)    1.4 (10-27 @ 09:15)    1.4 (10-27 @ 01:30)        Ca    8.4<L>      31 Oct 2018 05:21  Phos  2.6     10-31  Mg     2.2     10-31    TPro  5.5<L>  /  Alb  3.1<L>  /  TBili  0.6  /  DBili  x   /  AST  17  /  ALT  13  /  AlkPhos  67  10-31      RADIOLOGY:  < from: Transthoracic Echocardiogram (10.30.18 @ 09:12) >  Summary:   1. Left ventricular ejection fraction, by visual estimation, is 40 to   45%.   2. Technically suboptimal study.   3. Moderately decreased global left ventricular systolic function.  4. Left atrial enlargement.   5. Increased LV wall thickness.   6. Spectral Doppler shows restrictive pattern of left ventricular   myocardial filling (Grade III diastolic dysfunction).   7. Small pericardial effusion.   8. Thickening and calcification of the posterior mitral valve leaflet.   9. Mild aortic regurgitation.  10. Aortic root measured at Sinus of Valsalva is dilated.    < end of copied text >      < from: Transthoracic Echocardiogram (10.28.18 @ 08:47) >   1. LV Ejection Fraction by Amaro's Method with a biplane EF of 39 %.   2. Increased LV wall thickness.   3. Mildly increased left ventricular internal cavity size.   4. Normal left atrial size.   5.Normal right atrial size.   6. Small pericardial effusion.   7. Mild to moderate mitral valve regurgitation.   8. Moderate mitral annular calcification.   9. Thickening and calcification of the anterior mitral valve leaflet.  10. Trace tricuspid regurgitation.  11. Trace pulmonic valve regurgitation.    PHYSICIAN INTERPRETATION:  Left Ventricle: The left ventricular internal cavity size is mildly   increased. Left ventricular wall thickness is increased. LV Ejection   Fraction by Amaro's Methodwith a biplane EF of 39 %.  Right Ventricle: The right ventricular size is normal. RV systolic   function is normal.  Left Atrium: Normal left atrial size.  Right Atrium: Normal right atrial size.  Pericardium: A small pericardial effusion is present.  Mitral Valve: Mild to moderate mitral valve regurgitation is seen.   Thickening and calcification of the anterior mitral valve leaflet. There   is moderate mitral annular calcification.  Tricuspid Valve: Trivial tricuspid regurgitation is visualized. The   tricuspid valve is normal.  Aortic Valve: Trivial aortic valve regurgitation is seen. The aortic   valve is trileaflet. No evidence of aortic stenosis.  Pulmonic Valve: Trace pulmonic valve regurgitation. The pulmonic valve is   thickened with good excursion.  Aorta: Aortic root measured at Sinus of Valsalva is normal.  Additional Comments: A pacer wire is visualized in the right atrium and   right ventricle.     < end of copied text >    < from: Transthoracic Echocardiogram (05.14.18 @ 12:53) >  Summary:   1. Left ventricular ejection fraction, by visual estimation, is 20 to 25%.   2. Severely decreased global left ventricular systolic function.   3. Moderate to severe left atrial enlargement.   4. LV Ejection Fraction by Amaro's Method with a biplane EF of 26 %.   5. Mildly increased LV wall thickness.   6. Severely increased left ventricular internal cavity size.   7. Thickening and calcification of the anterior mitral valve leaflet.   8. Estimated pulmonary artery systolic pressure is 36.0 mmHg assuming a   right atrial pressure of 5 mmHg, which is consistent with borderline   pulmonary hypertension.  < end of copied text >    Cardiac MRI shows  There is mid myocardial late gadolinium enhancement predominantly involving the entire septal wall, with similar findings in the mid to apical anterior wall.     < from: Xray Chest 1 View- PORTABLE-Routine (10.31.18 @ 05:44) >  Findings:    Support devices: Stable left chest wall pacing device.    Cardiac/mediastinum/hilum: Unchanged.    Lung parenchyma/Pleura: Within normal limits.    Skeleton/soft tissues: Unchanged.    Impression:      No radiographic evidence of acute pulmonary disease.    < end of copied text >        PHYSICAL EXAM:  GEN: No acute distress  LUNGS: Clear to auscultation bilaterally   HEART: S1/S2 present. RRR.   ABD: Soft, non-tender, non-distended. Bowel sounds present  B/L Groins, soft, no hematoma, access sites clean, dry, intact  EXT: NC/NC/NE/2+PP/SAMUELS/Skin Intact.   NEURO: AAOX3, non focal back to baseline    Intravenous access:   NG tube:   Johnson cathter:     I&O's Summary    30 Oct 2018 07:01  -  31 Oct 2018 07:00  --------------------------------------------------------  IN: 663.3 mL / OUT: 1360 mL / NET: -696.7 mL

## 2018-10-31 NOTE — PROGRESS NOTE ADULT - ATTENDING COMMENTS
patient seen and examined , agree with pgy 1 assesment and plan except as indicated above,   GEN Lying in no acute distress  HEENT Pupils equal and reactive to light and accommodationSupple Neck  PULM Clear to auscultation bilaterally  CVirregular  GI + bowel sounds nontnender  EXT no cyanosis or edema  PSYCH awake alert and oriented x 3  INTEG No Lesions  NEURO SAMUELS  #VT secondary to paroxysmal atrial fib with chronic systolic chf  sp sheat removal , cardio/ep follow up   #Visual hallucination and encephalopathy secondary to unknown etiology - resolved and ct head negative   #. Mild to moderate mitral valve regurgitation.  #. Trace tricuspid regurgitation.  #. Trace pulmonic valve regurgitation.   #COPD, stabole at chornic stable state   #DM2 Glucose, Serum: 169 mg/dL (10.27.18 @ 23:00)  stable  # DLD   # hypothyroidism synthroid   #BPH finasteride and flomax   cw ccu monitoring
Patient seen and examined.   Chart, labs, tele reviewed.   No further VT noted on tele. Patient is paced. Mentally alert and complains of dry cough (though feels he has chest congestion). Also complains of some change to his voice (hoarseness).     Plan  - Continue Amio and Mexiletine  - If patient continues to have difficulty with speech and cough, consider ENT evaluation  - Neuro consult noted and appreciated  - Continue to monitor electrolytes and keep K>4 and Mg>2  - PT eval (possible discharge tomorrow)
Patient is seen and examined independently. I agree with resident note above and plan of care -edited and corrected where applicable.
Patient is seen and examined independently. I agree with resident note above and plan of care -edited and corrected where applicable.  Case discussed with resident assigned.

## 2018-10-31 NOTE — CONSULT NOTE ADULT - REASON FOR ADMISSION
Multiple incidences of AICD firing
Vtach

## 2018-11-01 ENCOUNTER — TRANSCRIPTION ENCOUNTER (OUTPATIENT)
Age: 78
End: 2018-11-01

## 2018-11-01 VITALS — WEIGHT: 175.49 LBS

## 2018-11-01 LAB
ALBUMIN SERPL ELPH-MCNC: 3.1 G/DL — LOW (ref 3.5–5.2)
ALP SERPL-CCNC: 64 U/L — SIGNIFICANT CHANGE UP (ref 30–115)
ALT FLD-CCNC: 13 U/L — SIGNIFICANT CHANGE UP (ref 0–41)
ANION GAP SERPL CALC-SCNC: 12 MMOL/L — SIGNIFICANT CHANGE UP (ref 7–14)
AST SERPL-CCNC: 15 U/L — SIGNIFICANT CHANGE UP (ref 0–41)
BASOPHILS # BLD AUTO: 0.07 K/UL — SIGNIFICANT CHANGE UP (ref 0–0.2)
BASOPHILS NFR BLD AUTO: 0.8 % — SIGNIFICANT CHANGE UP (ref 0–1)
BILIRUB SERPL-MCNC: 0.5 MG/DL — SIGNIFICANT CHANGE UP (ref 0.2–1.2)
BUN SERPL-MCNC: 17 MG/DL — SIGNIFICANT CHANGE UP (ref 10–20)
CALCIUM SERPL-MCNC: 8.4 MG/DL — LOW (ref 8.5–10.1)
CHLORIDE SERPL-SCNC: 101 MMOL/L — SIGNIFICANT CHANGE UP (ref 98–110)
CO2 SERPL-SCNC: 27 MMOL/L — SIGNIFICANT CHANGE UP (ref 17–32)
CREAT SERPL-MCNC: 1.4 MG/DL — SIGNIFICANT CHANGE UP (ref 0.7–1.5)
EOSINOPHIL # BLD AUTO: 0.53 K/UL — SIGNIFICANT CHANGE UP (ref 0–0.7)
EOSINOPHIL NFR BLD AUTO: 6 % — SIGNIFICANT CHANGE UP (ref 0–8)
GLUCOSE SERPL-MCNC: 147 MG/DL — HIGH (ref 70–99)
HCT VFR BLD CALC: 29.1 % — LOW (ref 42–52)
HGB BLD-MCNC: 9.1 G/DL — LOW (ref 14–18)
IMM GRANULOCYTES NFR BLD AUTO: 1 % — HIGH (ref 0.1–0.3)
LYMPHOCYTES # BLD AUTO: 0.94 K/UL — LOW (ref 1.2–3.4)
LYMPHOCYTES # BLD AUTO: 10.6 % — LOW (ref 20.5–51.1)
MAGNESIUM SERPL-MCNC: 2.1 MG/DL — SIGNIFICANT CHANGE UP (ref 1.8–2.4)
MCHC RBC-ENTMCNC: 26.7 PG — LOW (ref 27–31)
MCHC RBC-ENTMCNC: 31.3 G/DL — LOW (ref 32–37)
MCV RBC AUTO: 85.3 FL — SIGNIFICANT CHANGE UP (ref 80–94)
MONOCYTES # BLD AUTO: 0.85 K/UL — HIGH (ref 0.1–0.6)
MONOCYTES NFR BLD AUTO: 9.6 % — HIGH (ref 1.7–9.3)
NEUTROPHILS # BLD AUTO: 6.36 K/UL — SIGNIFICANT CHANGE UP (ref 1.4–6.5)
NEUTROPHILS NFR BLD AUTO: 72 % — SIGNIFICANT CHANGE UP (ref 42.2–75.2)
NRBC # BLD: 0 /100 WBCS — SIGNIFICANT CHANGE UP (ref 0–0)
PHOSPHATE SERPL-MCNC: 3.4 MG/DL — SIGNIFICANT CHANGE UP (ref 2.1–4.9)
PLATELET # BLD AUTO: 237 K/UL — SIGNIFICANT CHANGE UP (ref 130–400)
POTASSIUM SERPL-MCNC: 4.5 MMOL/L — SIGNIFICANT CHANGE UP (ref 3.5–5)
POTASSIUM SERPL-SCNC: 4.5 MMOL/L — SIGNIFICANT CHANGE UP (ref 3.5–5)
PROT SERPL-MCNC: 5.4 G/DL — LOW (ref 6–8)
RBC # BLD: 3.41 M/UL — LOW (ref 4.7–6.1)
RBC # FLD: 15.1 % — HIGH (ref 11.5–14.5)
SODIUM SERPL-SCNC: 140 MMOL/L — SIGNIFICANT CHANGE UP (ref 135–146)
WBC # BLD: 8.84 K/UL — SIGNIFICANT CHANGE UP (ref 4.8–10.8)
WBC # FLD AUTO: 8.84 K/UL — SIGNIFICANT CHANGE UP (ref 4.8–10.8)

## 2018-11-01 RX ORDER — METOPROLOL TARTRATE 50 MG
1 TABLET ORAL
Qty: 90 | Refills: 0
Start: 2018-11-01 | End: 2018-11-30

## 2018-11-01 RX ORDER — SPIRONOLACTONE 25 MG/1
1 TABLET, FILM COATED ORAL
Qty: 30 | Refills: 0
Start: 2018-11-01 | End: 2018-11-30

## 2018-11-01 RX ORDER — AMIODARONE HYDROCHLORIDE 400 MG/1
1 TABLET ORAL
Qty: 0 | Refills: 0 | COMMUNITY

## 2018-11-01 RX ORDER — APIXABAN 2.5 MG/1
1 TABLET, FILM COATED ORAL
Qty: 60 | Refills: 0
Start: 2018-11-01 | End: 2018-11-30

## 2018-11-01 RX ORDER — OMEPRAZOLE 10 MG/1
40 CAPSULE, DELAYED RELEASE ORAL
Qty: 0 | Refills: 0 | COMMUNITY

## 2018-11-01 RX ORDER — LOSARTAN POTASSIUM 100 MG/1
1 TABLET, FILM COATED ORAL
Qty: 0 | Refills: 0 | COMMUNITY

## 2018-11-01 RX ORDER — APIXABAN 2.5 MG/1
1 TABLET, FILM COATED ORAL
Qty: 0 | Refills: 0 | COMMUNITY

## 2018-11-01 RX ORDER — PANTOPRAZOLE SODIUM 20 MG/1
1 TABLET, DELAYED RELEASE ORAL
Qty: 30 | Refills: 0 | OUTPATIENT
Start: 2018-11-01 | End: 2018-11-30

## 2018-11-01 RX ORDER — MEXILETINE HYDROCHLORIDE 150 MG/1
1 CAPSULE ORAL
Qty: 60 | Refills: 0
Start: 2018-11-01 | End: 2018-11-30

## 2018-11-01 RX ORDER — AMIODARONE HYDROCHLORIDE 400 MG/1
2 TABLET ORAL
Qty: 120 | Refills: 0 | OUTPATIENT
Start: 2018-11-01 | End: 2018-11-30

## 2018-11-01 RX ORDER — AMIODARONE HYDROCHLORIDE 400 MG/1
2 TABLET ORAL
Qty: 120 | Refills: 0
Start: 2018-11-01 | End: 2018-11-30

## 2018-11-01 RX ORDER — METOPROLOL TARTRATE 50 MG
1 TABLET ORAL
Qty: 0 | Refills: 0 | COMMUNITY

## 2018-11-01 RX ADMIN — CHLORHEXIDINE GLUCONATE 1 APPLICATION(S): 213 SOLUTION TOPICAL at 06:26

## 2018-11-01 RX ADMIN — Medication 600 MILLIGRAM(S): at 06:25

## 2018-11-01 RX ADMIN — Medication 50 MICROGRAM(S): at 06:24

## 2018-11-01 RX ADMIN — Medication 40 MILLIGRAM(S): at 06:25

## 2018-11-01 RX ADMIN — PANTOPRAZOLE SODIUM 40 MILLIGRAM(S): 20 TABLET, DELAYED RELEASE ORAL at 06:27

## 2018-11-01 RX ADMIN — APIXABAN 2.5 MILLIGRAM(S): 2.5 TABLET, FILM COATED ORAL at 06:25

## 2018-11-01 RX ADMIN — Medication 100 MILLIGRAM(S): at 06:26

## 2018-11-01 RX ADMIN — MEXILETINE HYDROCHLORIDE 150 MILLIGRAM(S): 150 CAPSULE ORAL at 06:25

## 2018-11-01 RX ADMIN — AMIODARONE HYDROCHLORIDE 400 MILLIGRAM(S): 400 TABLET ORAL at 06:25

## 2018-11-01 RX ADMIN — FINASTERIDE 5 MILLIGRAM(S): 5 TABLET, FILM COATED ORAL at 13:55

## 2018-11-01 RX ADMIN — MONTELUKAST 10 MILLIGRAM(S): 4 TABLET, CHEWABLE ORAL at 13:57

## 2018-11-01 NOTE — DISCHARGE NOTE ADULT - CARE PROVIDER_API CALL
Dimas Holt; CHARLES), Cardiac Electrophysiology  [address] [address]  Phone: [phone]  Fax: [phone]    Malcolm Varela (MD), Cardiovascular Disease; Internal Medicine  [address] [place] [address] [address]  [place] [address]  Phone: [phone]  Fax: [phone]    Kingston Solano), Medicine  [address] [place] [address]  Phone: [phone]  Fax: [phone]

## 2018-11-01 NOTE — DISCHARGE NOTE ADULT - HOSPITAL COURSE
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 As per patient he saw Dr Santos on Thursday before admission who reprogrammed his device and advised him for V-tach ablation. Patient says he has been refusing ablation for months but now understands that its necessary. He has been having worsening SOB on exertion for the past few months and developed a cough for the past few weeks which his cardiologist informed him was due to fluid in his lungs. Patient says he has an aura like sensation when he knows that his AICD is about to fire and he feel like he has gas travelling up his abdomen and then chest pressure.   AICD interrogation in ER revelead multiple epsiodes of NSVT 52 in 24 hrs, 8 of VT treated with ATPs, 1 VT and VF treated with 35J shock, now S/P Vtach Ablation    #Chest pressure 2/2 AICD firing 2/2 Multiple episodes of VTach 2/2 Non Ischemic cardiomyopathy   -systolic HF EF 20%, 39%, 40-45%  - 4 episodes of sustained monomorphic Vtach since admission- overdrive paced ATPx3, shocked x2  -s/p VT Ablation 10/26  -electrolytes repleted WNL  -Amiodarone 400mg BID  -Mexiletine 150mg Q12  -Metoprolol 25mg Q8  -Lasix 40mg qd  Spironolactone 25mg qd  -TSH WNL  -Monitor lytes keep K+ above 4 and keep Mg above 2  -Repeat Echo small pericardial effusion EF 40-45%    #Afib, better control  -Amiodarone 400mg BID  -c/w eliquis 2.5mg BID      #AMS Hallucinations after Vtach ablation/ reversible toxic metabolic encephalopathy  CT head  Interval development of a focal slightly hyperdense lesion within the region of the left posterior temporal lobe. This may be extra-axial representing a meningioma. Recommend further evaluation with MRI of the brain with and without contrast  ICU Delerium likely patient is improving  Neuro said as patient is improving likely incidential finding follow up repeat head CT in 6-9 months      #Pulmonary Nodule, patient made aware  Right lower lung field 1.3 cm nodular opacity.   CT chest outpatient      #COPD  -Duonebs Q6 prn  -c/w montelukast  -stable no wheezing    #DM2  -monitor fingersticks and if >200 start insulin sliding scale    #hypothyroidism  -c/w levothyroxine 50mcg qd    #DLD  -c/w simvastatin 10mg QHS    #BPH  Finasteride 5mg qd  Flomax 0.4mg at bedtime    DVT ppx Eliquis  GI ppx: Protonix

## 2018-11-01 NOTE — PROGRESS NOTE ADULT - SUBJECTIVE AND OBJECTIVE BOX
INTERVAL HPI/OVERNIGHT EVENTS:    Patient seen and examined no complaints, no events overnight    MEDICATIONS  (STANDING):  amiodarone    Tablet 400 milliGRAM(s) Oral every 12 hours  apixaban 2.5 milliGRAM(s) Oral every 12 hours  chlorhexidine 4% Liquid 1 Application(s) Topical <User Schedule>  docusate sodium 100 milliGRAM(s) Oral three times a day  finasteride 5 milliGRAM(s) Oral daily  furosemide    Tablet 40 milliGRAM(s) Oral daily  guaiFENesin  milliGRAM(s) Oral every 12 hours  levothyroxine 50 MICROGram(s) Oral daily  metoprolol tartrate 25 milliGRAM(s) Oral every 8 hours  mexiletine 150 milliGRAM(s) Oral every 12 hours  montelukast 10 milliGRAM(s) Oral daily  pantoprazole    Tablet 40 milliGRAM(s) Oral before breakfast  senna 2 Tablet(s) Oral at bedtime  simvastatin 10 milliGRAM(s) Oral at bedtime  spironolactone 25 milliGRAM(s) Oral daily  tamsulosin 0.4 milliGRAM(s) Oral at bedtime  vancomycin  IVPB 1250 milliGRAM(s) IV Intermittent once    MEDICATIONS  (PRN):  ALBUTerol/ipratropium for Nebulization 3 milliLiter(s) Nebulizer every 6 hours PRN Shortness of Breath and/or Wheezing  guaiFENesin    Syrup 200 milliGRAM(s) Oral three times a day PRN Cough    Allergies  morphine (Stomach Upset)    Vital Signs Last 24 Hrs  T(C): 35.9 (01 Nov 2018 04:00), Max: 36.6 (31 Oct 2018 20:00)  T(F): 96.6 (01 Nov 2018 04:00), Max: 97.8 (31 Oct 2018 20:00)  HR: 95 (01 Nov 2018 08:00) (94 - 103)  BP: 97/61 (01 Nov 2018 08:00) (93/55 - 119/71)  BP(mean): 78 (01 Nov 2018 08:00) (68 - 91)  RR: 56 (01 Nov 2018 08:00) (22 - 56)  SpO2: 95% (01 Nov 2018 06:00) (92% - 97%)    10-31-18 @ 07:01  -  11-01-18 @ 07:00  --------------------------------------------------------  IN: 361 mL / OUT: 600 mL / NET: -239 mL      Physical Exam    GENERAL: In no apparent distress, well nourished, and hydrated.  HEART: Regular rate and rhythm; No murmurs, rubs, or gallops.  PULMONARY: Clear to auscultation and perfusion.  No rales, wheezing, or rhonchi bilaterally.  ABDOMEN: Soft, Nontender, Nondistended; Bowel sounds present  EXTREMITIES:  2+ Peripheral Pulses, No clubbing, cyanosis, or edema  NEUROLOGICAL: Grossly nonfocal    LABS:                      9.1    8.84  )-----------( 237      ( 01 Nov 2018 05:17 )             29.1     11-01    140  |  101  |  17  ----------------------------<  147<H>  4.5   |  27  |  1.4    Ca    8.4<L>      01 Nov 2018 05:17  Phos  3.4     11-01  Mg     2.1     11-01    TPro  5.4<L>  /  Alb  3.1<L>  /  TBili  0.5  /  DBili  x   /  AST  15  /  ALT  13  /  AlkPhos  64  11-01

## 2018-11-01 NOTE — DISCHARGE NOTE ADULT - PLAN OF CARE
Management and Follow up with Electrophysiologist/Cardiologist You were admitted for persistent episodes of Ventricular Tachycardia, which was treated with Vtach Ablation and your AICD/Pacemaker was recalibrated. Now stable with no further events for >48 hrs. You will go home on new medications and follow the list above as this is what you were optimized on in the hospital. New Medications include Metoprolol 25mg every 8 hours, Amiodarone 400mg every 12 hours, Spironolactone 25mg daily, and Mexiletine 150mg every 12 hours. Please follow up with Dr. Holt within 7 days of discharge. As well as follow up with your cardiologist and primary care doctor within 7-10 days of discharge. If any further symptoms please return to the emergency room at [facility] for further evaluation. Pulmonary Nodule, patient made aware  Right lower lung field 1.3 cm nodular opacity.   CT chest outpatient Incidental Extraaxial lesion on CT Head CT head  Interval development of a focal slightly hyperdense lesion within the region of the left posterior temporal lobe. This may be extra-axial representing a meningioma. Neuro said as patient is improving likely incidental finding follow up repeat head CT in 6-9 months

## 2018-11-01 NOTE — DISCHARGE NOTE ADULT - CARE PROVIDERS DIRECT ADDRESSES
[email or web address]

## 2018-11-01 NOTE — DISCHARGE NOTE ADULT - MEDICATION SUMMARY - MEDICATIONS TO CHANGE
I will SWITCH the dose or number of times a day I take the medications listed below when I get home from the hospital:    metoprolol tartrate 50 mg oral tablet  -- 1 tab(s) by mouth 2 times a day    amiodarone 200 mg oral tablet  -- 1 tab(s) by mouth once a day

## 2018-11-01 NOTE — DISCHARGE NOTE ADULT - OTHER SIGNIFICANT FINDINGS
< from: Transthoracic Echocardiogram (10.30.18 @ 09:12) >  Summary:   1. Left ventricular ejection fraction, by visual estimation, is 40 to 45%.   2. Technically suboptimal study.   3. Moderately decreased global left ventricular systolic function.  4. Left atrial enlargement.   5. Increased LV wall thickness.   6. Spectral Doppler shows restrictive pattern of left ventricular   myocardial filling (Grade III diastolic dysfunction).   7. Small pericardial effusion.   8. Thickening and calcification of the posterior mitral valve leaflet.   9. Mild aortic regurgitation.  10. Aortic root measured at Sinus of Valsalva is dilated.    < end of copied text >    < from: Transthoracic Echocardiogram (10.30.18 @ 09:12) >  PHYSICIAN INTERPRETATION:  Left Ventricle: The left ventricular internal cavity size is moderately   increased. Left ventricular wall thickness is increased. Global LV   systolic function was moderately decreased. Left ventricular ejection   fraction, by visual estimation, is 40 to 45%. Spectral Doppler shows   restrictive pattern of left ventricular myocardial filling (Grade III   diastolic dysfunction).  Left Atrium: Left atrial enlargement.  Pericardium: A small pericardial effusion is present.  Mitral Valve: Thickening and calcification of the posterior mitral valve   leaflet.  Aortic Valve: Mild aortic valve regurgitation is seen. The aortic valve   is trileaflet. No evidence of aortic stenosis.  Aorta: Aortic root measured at Sinus of Valsalva is dilated.  Venous: The inferior vena cava was normal sized, with respiratory size   variation greater than50%.  Additional Comments: A pacer wire is visualized in the right atrium and   right ventricle.    < end of copied text >

## 2018-11-01 NOTE — DISCHARGE NOTE ADULT - PATIENT PORTAL LINK FT
You can access the [facility] Patient Portal, offered by [facility], by registering with the following website: [email or web address]

## 2018-11-01 NOTE — PROGRESS NOTE ADULT - ASSESSMENT
78Y M with pmh of Afib on eliquis, CAD with S/CHF s/p AICD for dilated cardiomyopathy , COPD, DM2, DLD and hypothyroidism presented to the ED after having multiple episodes of his AICD firing, pt now s/p VT ablation     Plan  - continue amiodarone Eliquis mexiletine and metoprolol  - no events since device reprogrammed  - follow up with Dr Holt in 1 week

## 2018-11-01 NOTE — DISCHARGE NOTE ADULT - CARE PLAN
Principal Discharge DX:	Ventricular tachycardia  Goal:	Management and Follow up with Electrophysiologist/Cardiologist  Assessment and plan of treatment:	You were admitted for persistent episodes of Ventricular Tachycardia, which was treated with Vtach Ablation and your AICD/Pacemaker was recalibrated. Now stable with no further events for >48 hrs. You will go home on new medications and follow the list above as this is what you were optimized on in the hospital. New Medications include Metoprolol 25mg every 8 hours, Amiodarone 400mg every 12 hours, Spironolactone 25mg daily, and Mexiletine 150mg every 12 hours. Please follow up with Dr. Holt within 7 days of discharge. As well as follow up with your cardiologist and primary care doctor within 7-10 days of discharge. If any further symptoms please return to the emergency room at [facility] for further evaluation.  Secondary Diagnosis:	Pulmonary nodule  Assessment and plan of treatment:	Pulmonary Nodule, patient made aware  Right lower lung field 1.3 cm nodular opacity.   CT chest outpatient  Goal:	Incidental Extraaxial lesion on CT Head  Assessment and plan of treatment:	CT head  Interval development of a focal slightly hyperdense lesion within the region of the left posterior temporal lobe. This may be extra-axial representing a meningioma. Neuro said as patient is improving likely incidental finding follow up repeat head CT in 6-9 months

## 2018-11-01 NOTE — DISCHARGE NOTE ADULT - MEDICATION SUMMARY - MEDICATIONS TO STOP TAKING
I will STOP taking the medications listed below when I get home from the hospital:    losartan 100 mg oral tablet  -- 1 tab(s) by mouth once a day    OMEPRAZOLE DR 40 MG CAPSULE  -- 40  by mouth once a day

## 2018-11-01 NOTE — PROGRESS NOTE ADULT - REASON FOR ADMISSION
Multiple incidences of AICD firing

## 2018-11-01 NOTE — DISCHARGE NOTE ADULT - MEDICATION SUMMARY - MEDICATIONS TO TAKE
I will START or STAY ON the medications listed below when I get home from the hospital:    finasteride 5 mg oral tablet  -- 1 tab(s) by mouth once a day  -- Indication: For BPH    spironolactone 25 mg oral tablet  -- 1 tab(s) by mouth once a day  -- Indication: For Diuretic    tamsulosin 0.4 mg oral capsule  -- 1 cap(s) by mouth once a day  -- Indication: For BPH    mexiletine 150 mg oral capsule  -- 1 cap(s) by mouth 2 times a day   -- It is very important that you take or use this exactly as directed.  Do not skip doses or discontinue unless directed by your doctor.  May cause drowsiness or dizziness.  Take with food or milk.    -- Indication: For Anti Arrythmic    amiodarone 200 mg oral tablet  -- 2 tab(s) by mouth every 12 hours   -- Indication: For Anti Arrythmic    Eliquis 2.5 mg oral tablet  -- 1 tab(s) by mouth 2 times a day  -- Indication: For Blood thinner    glyBURIDE 5 mg oral tablet  -- 1 tab(s) by mouth once a day  -- Indication: For Diabetes    pravastatin 20 mg oral tablet  -- 1 tab(s) by mouth once a day  -- Indication: For High Cholesterol    ALPRAZolam 0.25 mg oral tablet  -- 1 tab(s) by mouth once a day, As Needed  -- Indication: For Anxiety    metoprolol tartrate 25 mg oral tablet  -- 1 tab(s) by mouth every 8 hours  -- Indication: For Heart Rate Control    furosemide 40 mg oral tablet  -- 1 tab(s) by mouth once a day   -- Avoid prolonged or excessive exposure to direct and/or artificial sunlight while taking this medication.  It is very important that you take or use this exactly as directed.  Do not skip doses or discontinue unless directed by your doctor.  It may be advisable to drink a full glass orange juice or eat a banana daily while taking this medication.    -- Indication: For Diuretic    montelukast 10 mg oral tablet  -- 1 tab(s) by mouth once a day  -- Indication: For Asthma/COPD    pantoprazole 40 mg oral delayed release tablet  -- 1 tab(s) by mouth once a day (before a meal)  -- Indication: For Gastritis    levothyroxine 50 mcg (0.05 mg) oral capsule  -- 1 cap(s) by mouth once a day  -- Indication: For Hypothyroid

## 2018-11-15 ENCOUNTER — APPOINTMENT (OUTPATIENT)
Dept: CARDIOLOGY | Facility: CLINIC | Age: 78
End: 2018-11-15

## 2018-11-15 VITALS — BODY MASS INDEX: 26.73 KG/M2 | DIASTOLIC BLOOD PRESSURE: 80 MMHG | WEIGHT: 181 LBS | SYSTOLIC BLOOD PRESSURE: 126 MMHG

## 2018-11-15 RX ORDER — CICLOPIROX 80 MG/ML
8 SOLUTION TOPICAL
Qty: 7 | Refills: 0 | Status: COMPLETED | COMMUNITY
Start: 2017-09-13 | End: 2018-11-15

## 2018-11-15 RX ORDER — OMEPRAZOLE 40 MG/1
40 CAPSULE, DELAYED RELEASE ORAL
Qty: 30 | Refills: 0 | Status: COMPLETED | COMMUNITY
Start: 2018-04-02 | End: 2018-11-15

## 2018-11-15 RX ORDER — LACTULOSE 10 G/15ML
10 SOLUTION ORAL
Qty: 500 | Refills: 0 | Status: COMPLETED | COMMUNITY
Start: 2018-04-09 | End: 2018-11-15

## 2018-11-15 RX ORDER — SULFAMETHOXAZOLE AND TRIMETHOPRIM 800; 160 MG/1; MG/1
800-160 TABLET ORAL
Qty: 20 | Refills: 0 | Status: COMPLETED | COMMUNITY
Start: 2017-06-21 | End: 2018-11-15

## 2018-11-29 ENCOUNTER — APPOINTMENT (OUTPATIENT)
Dept: CARDIOLOGY | Facility: CLINIC | Age: 78
End: 2018-11-29

## 2018-11-30 ENCOUNTER — APPOINTMENT (OUTPATIENT)
Dept: CARDIOLOGY | Facility: CLINIC | Age: 78
End: 2018-11-30

## 2018-12-01 ENCOUNTER — OUTPATIENT (OUTPATIENT)
Dept: OUTPATIENT SERVICES | Facility: HOSPITAL | Age: 78
LOS: 1 days | Discharge: HOME | End: 2018-12-01

## 2018-12-01 DIAGNOSIS — J44.9 CHRONIC OBSTRUCTIVE PULMONARY DISEASE, UNSPECIFIED: ICD-10-CM

## 2018-12-01 DIAGNOSIS — Z95.810 PRESENCE OF AUTOMATIC (IMPLANTABLE) CARDIAC DEFIBRILLATOR: Chronic | ICD-10-CM

## 2018-12-01 DIAGNOSIS — Z98.890 OTHER SPECIFIED POSTPROCEDURAL STATES: Chronic | ICD-10-CM

## 2018-12-22 ENCOUNTER — OUTPATIENT (OUTPATIENT)
Dept: OUTPATIENT SERVICES | Facility: HOSPITAL | Age: 78
LOS: 1 days | Discharge: HOME | End: 2018-12-22

## 2018-12-22 DIAGNOSIS — Z95.810 PRESENCE OF AUTOMATIC (IMPLANTABLE) CARDIAC DEFIBRILLATOR: Chronic | ICD-10-CM

## 2018-12-22 DIAGNOSIS — Z98.890 OTHER SPECIFIED POSTPROCEDURAL STATES: Chronic | ICD-10-CM

## 2018-12-22 DIAGNOSIS — T46.2X1A POISONING BY OTHER ANTIDYSRHYTHMIC DRUGS, ACCIDENTAL (UNINTENTIONAL), INITIAL ENCOUNTER: ICD-10-CM

## 2018-12-26 NOTE — HISTORY OF PRESENT ILLNESS
[Palpitations] : no palpitations [SOB] : no dyspnea [Syncope] : no syncope [ICD Shocks] : no recent ICD shocks [Erythema at Site] : no erythema at device site [de-identified] : Patient s/p VT storm and VT ablation

## 2018-12-26 NOTE — CHART NOTE - NSCHARTNOTEFT_GEN_A_CORE
Noncardiac Chest Pain    Based on your visit today, the health care provider doesn’t know what is causing your chest pain. In most cases, people who come to the emergency department with chest pain don’t have a problem with their heart. Instead, the pain is caused by other conditions. These may be problems with the lungs, muscles, bones, digestive tract, nerves, or mental health.  Lung problems  · Inflammation around the lungs (pleurisy)  · Collapsed lung (pneumothorax)  · Fluid around the lungs (pleural effusion)  · Lung cancer. This is a rare cause of chest pain.  Muscle or bone problems  · Inflamed cartilage between the ribs (pleurisy)  · Fibromyalgia  · Rheumatoid arthritis  Digestive system problems  · Reflux  · Stomach ulcer  · Spasms of the esophagus  · Gall stones  · Gallbladder inflammation  Mental health conditions  · Panic or anxiety attacks  · Emotional distress  Your condition doesn’t seem serious and your pain doesn’t appear to be coming from your heart. But sometimes the signs of a serious problem take more time to appear. Watch for the warning signs listed below.  Home care  Follow these guidelines when caring for yourself at home:  · Rest today and avoid strenuous activity.  · Take any prescribed medicine as directed.  Follow-up care  Follow up with your health care provider, or as advised, if you don’t start to feel better within 24 hours.  When to seek medical advice  Call your health care provider right away if any of these occur:  · A change in the type of pain. Call if it feels different, becomes more serious, lasts longer, or begins to spread into your shoulder, arm, neck, jaw, or back.  · Shortness of breath  · You feel more pain when you breathe  · Cough with dark-colored mucus or blood  · Weakness, dizziness, or fainting  · Fever of 100.4ºF (38ºC) or higher, or as directed by your health care provider  · Swelling, pain, or redness in one leg     © 8314-0603 [employer] 780  Patient not available for evaluation, d/c by CCU/Cardio-EP [place] [address]. All rights reserved. This information is not intended as a substitute for professional medical care. Always follow your healthcare professional's instructions.

## 2018-12-26 NOTE — ASSESSMENT
[FreeTextEntry1] : VT - no epides after ablaiton\par - cont amio\par - cont BB\par \par AF - cont AC

## 2018-12-26 NOTE — PROCEDURE
[No] : not [CRT-D] : Cardiac resynchronization therapy defibrillator [DDD] : DDD [Voltage: ___ volts] : Voltage was [unfilled] volts [Charge Time: ___ sec] : charge time was [unfilled] seconds [Longevity: ___ months] : The estimated remaining battery life is [unfilled] months [Sensing Amplitude ___mv] : sensing amplitude was [unfilled] mv [Lead Imp:  ___ohms] : lead impedance was [unfilled] ohms [___V @] : [unfilled] V [___ ms] : [unfilled] ms [Asense-Vpace ___ %] : Asense-Vpace [unfilled]% [Apace-Vpace ___ %] : Apace-Vpace [unfilled]% [de-identified] : Kraig Mancia CRTD [de-identified] : Medtronic [de-identified] : [id number] [de-identified] : 5/30/18 [de-identified] :  [de-identified] : optivil ok\par Rate  changed to  90bpm\par no  episodes

## 2018-12-27 ENCOUNTER — OUTPATIENT (OUTPATIENT)
Dept: OUTPATIENT SERVICES | Facility: HOSPITAL | Age: 78
LOS: 1 days | Discharge: HOME | End: 2018-12-27

## 2018-12-27 ENCOUNTER — OTHER (OUTPATIENT)
Age: 78
End: 2018-12-27

## 2018-12-27 ENCOUNTER — APPOINTMENT (OUTPATIENT)
Dept: CARDIOLOGY | Facility: CLINIC | Age: 78
End: 2018-12-27

## 2018-12-27 DIAGNOSIS — Z95.810 PRESENCE OF AUTOMATIC (IMPLANTABLE) CARDIAC DEFIBRILLATOR: Chronic | ICD-10-CM

## 2018-12-27 DIAGNOSIS — I47.2 VENTRICULAR TACHYCARDIA: ICD-10-CM

## 2018-12-27 DIAGNOSIS — Z98.890 OTHER SPECIFIED POSTPROCEDURAL STATES: Chronic | ICD-10-CM

## 2018-12-27 NOTE — PROCEDURE
[No] : not [CRT-D] : Cardiac resynchronization therapy defibrillator [DDD] : DDD [Voltage: ___ volts] : Voltage was [unfilled] volts [Charge Time: ___ sec] : charge time was [unfilled] seconds [Longevity: ___ months] : The estimated remaining battery life is [unfilled] months [Sensing Amplitude ___mv] : sensing amplitude was [unfilled] mv [Lead Imp:  ___ohms] : lead impedance was [unfilled] ohms [___V @] : [unfilled] V [___ ms] : [unfilled] ms [Asense-Vpace ___ %] : Asense-Vpace [unfilled]% [Apace-Vpace ___ %] : Apace-Vpace [unfilled]% [de-identified] : Medtronic [de-identified] : Kraig Mancia CRTD [de-identified] : [id number] [de-identified] : 5/30/18 [de-identified] :  [de-identified] : No episodes\par Optivol ok

## 2018-12-27 NOTE — ASSESSMENT
[FreeTextEntry1] : VT - no episodes after ablaiton\par - cont amio and decrease to 200 mg daily\par - cont BB\par \par AF - cont AC

## 2018-12-27 NOTE — HISTORY OF PRESENT ILLNESS
[Palpitations] : no palpitations [SOB] : no dyspnea [Syncope] : no syncope [ICD Shocks] : no recent ICD shocks [Erythema at Site] : no erythema at device site [de-identified] : Patient s/p VT storm and VT ablation\par \par No VT . :Pt lost some weight. CT chest review.

## 2018-12-30 ENCOUNTER — INPATIENT (INPATIENT)
Facility: HOSPITAL | Age: 78
LOS: 9 days | Discharge: NURSING, REHAB OR LONG-TERM CARE | End: 2019-01-09
Attending: INTERNAL MEDICINE | Admitting: INTERNAL MEDICINE
Payer: MEDICARE

## 2018-12-30 VITALS
OXYGEN SATURATION: 88 % | DIASTOLIC BLOOD PRESSURE: 50 MMHG | RESPIRATION RATE: 20 BRPM | TEMPERATURE: 91 F | HEART RATE: 88 BPM | SYSTOLIC BLOOD PRESSURE: 77 MMHG

## 2018-12-30 DIAGNOSIS — Z95.810 PRESENCE OF AUTOMATIC (IMPLANTABLE) CARDIAC DEFIBRILLATOR: Chronic | ICD-10-CM

## 2018-12-30 DIAGNOSIS — Z98.890 OTHER SPECIFIED POSTPROCEDURAL STATES: Chronic | ICD-10-CM

## 2018-12-30 LAB
ALBUMIN SERPL ELPH-MCNC: 3.5 G/DL — SIGNIFICANT CHANGE UP (ref 3.5–5.2)
ALP SERPL-CCNC: 77 U/L — SIGNIFICANT CHANGE UP (ref 30–115)
ALT FLD-CCNC: 30 U/L — SIGNIFICANT CHANGE UP (ref 0–41)
ANION GAP SERPL CALC-SCNC: 18 MMOL/L — HIGH (ref 7–14)
APTT BLD: 25.1 SEC — LOW (ref 27–39.2)
APTT BLD: 26.3 SEC — LOW (ref 27–39.2)
APTT BLD: 27.7 SEC — SIGNIFICANT CHANGE UP (ref 27–39.2)
AST SERPL-CCNC: 24 U/L — SIGNIFICANT CHANGE UP (ref 0–41)
BASE EXCESS BLDA CALC-SCNC: -4.4 MMOL/L — LOW (ref -2–2)
BASE EXCESS BLDA CALC-SCNC: -4.8 MMOL/L — LOW (ref -2–2)
BASOPHILS # BLD AUTO: 0.07 K/UL — SIGNIFICANT CHANGE UP (ref 0–0.2)
BASOPHILS NFR BLD AUTO: 0.6 % — SIGNIFICANT CHANGE UP (ref 0–1)
BILIRUB SERPL-MCNC: 0.3 MG/DL — SIGNIFICANT CHANGE UP (ref 0.2–1.2)
BUN SERPL-MCNC: 42 MG/DL — HIGH (ref 10–20)
BUN SERPL-MCNC: 43 MG/DL — HIGH (ref 10–20)
BUN SERPL-MCNC: 44 MG/DL — HIGH (ref 10–20)
CALCIUM SERPL-MCNC: 7.5 MG/DL — LOW (ref 8.5–10.1)
CALCIUM SERPL-MCNC: 7.9 MG/DL — LOW (ref 8.5–10.1)
CALCIUM SERPL-MCNC: 9 MG/DL — SIGNIFICANT CHANGE UP (ref 8.5–10.1)
CHLORIDE SERPL-SCNC: 96 MMOL/L — LOW (ref 98–110)
CHLORIDE SERPL-SCNC: 96 MMOL/L — LOW (ref 98–110)
CHLORIDE SERPL-SCNC: 99 MMOL/L — SIGNIFICANT CHANGE UP (ref 98–110)
CK MB CFR SERPL CALC: 4.1 NG/ML — SIGNIFICANT CHANGE UP (ref 0.6–6.3)
CK SERPL-CCNC: 83 U/L — SIGNIFICANT CHANGE UP (ref 0–225)
CO2 SERPL-SCNC: 19 MMOL/L — SIGNIFICANT CHANGE UP (ref 17–32)
CO2 SERPL-SCNC: 19 MMOL/L — SIGNIFICANT CHANGE UP (ref 17–32)
CO2 SERPL-SCNC: 21 MMOL/L — SIGNIFICANT CHANGE UP (ref 17–32)
CREAT SERPL-MCNC: 2.5 MG/DL — HIGH (ref 0.7–1.5)
CREAT SERPL-MCNC: 2.6 MG/DL — HIGH (ref 0.7–1.5)
CREAT SERPL-MCNC: 2.6 MG/DL — HIGH (ref 0.7–1.5)
EOSINOPHIL # BLD AUTO: 0.18 K/UL — SIGNIFICANT CHANGE UP (ref 0–0.7)
EOSINOPHIL NFR BLD AUTO: 1.6 % — SIGNIFICANT CHANGE UP (ref 0–8)
GAS PNL BLDA: SIGNIFICANT CHANGE UP
GAS PNL BLDA: SIGNIFICANT CHANGE UP
GLUCOSE BLDC GLUCOMTR-MCNC: 174 MG/DL — HIGH (ref 70–99)
GLUCOSE BLDC GLUCOMTR-MCNC: 209 MG/DL — HIGH (ref 70–99)
GLUCOSE BLDC GLUCOMTR-MCNC: 254 MG/DL — HIGH (ref 70–99)
GLUCOSE BLDC GLUCOMTR-MCNC: 320 MG/DL — HIGH (ref 70–99)
GLUCOSE BLDC GLUCOMTR-MCNC: 422 MG/DL — HIGH (ref 70–99)
GLUCOSE BLDC GLUCOMTR-MCNC: 443 MG/DL — HIGH (ref 70–99)
GLUCOSE SERPL-MCNC: 283 MG/DL — HIGH (ref 70–99)
GLUCOSE SERPL-MCNC: 308 MG/DL — HIGH (ref 70–99)
GLUCOSE SERPL-MCNC: 447 MG/DL — HIGH (ref 70–99)
HCO3 BLDA-SCNC: 20 MMOL/L — LOW (ref 23–27)
HCO3 BLDA-SCNC: 20 MMOL/L — LOW (ref 23–27)
HCT VFR BLD CALC: 28.1 % — LOW (ref 42–52)
HCT VFR BLD CALC: 33.5 % — LOW (ref 42–52)
HCT VFR BLD CALC: 36.7 % — LOW (ref 42–52)
HGB BLD-MCNC: 10 G/DL — LOW (ref 14–18)
HGB BLD-MCNC: 11.5 G/DL — LOW (ref 14–18)
HGB BLD-MCNC: 11.7 G/DL — LOW (ref 14–18)
HOROWITZ INDEX BLDA+IHG-RTO: 40 — SIGNIFICANT CHANGE UP
HOROWITZ INDEX BLDA+IHG-RTO: 40 — SIGNIFICANT CHANGE UP
IMM GRANULOCYTES NFR BLD AUTO: 0.7 % — HIGH (ref 0.1–0.3)
INR BLD: 1.15 RATIO — SIGNIFICANT CHANGE UP (ref 0.65–1.3)
INR BLD: 1.21 RATIO — SIGNIFICANT CHANGE UP (ref 0.65–1.3)
INR BLD: 1.24 RATIO — SIGNIFICANT CHANGE UP (ref 0.65–1.3)
LACTATE SERPL-SCNC: 1.5 MMOL/L — SIGNIFICANT CHANGE UP (ref 0.5–2.2)
LIDOCAIN IGE QN: 24 U/L — SIGNIFICANT CHANGE UP (ref 7–60)
LYMPHOCYTES # BLD AUTO: 0.79 K/UL — LOW (ref 1.2–3.4)
LYMPHOCYTES # BLD AUTO: 7.2 % — LOW (ref 20.5–51.1)
MAGNESIUM SERPL-MCNC: 1.8 MG/DL — SIGNIFICANT CHANGE UP (ref 1.8–2.4)
MCHC RBC-ENTMCNC: 27.4 PG — SIGNIFICANT CHANGE UP (ref 27–31)
MCHC RBC-ENTMCNC: 28.9 PG — SIGNIFICANT CHANGE UP (ref 27–31)
MCHC RBC-ENTMCNC: 29.7 PG — SIGNIFICANT CHANGE UP (ref 27–31)
MCHC RBC-ENTMCNC: 31.9 G/DL — LOW (ref 32–37)
MCHC RBC-ENTMCNC: 34.3 G/DL — SIGNIFICANT CHANGE UP (ref 32–37)
MCHC RBC-ENTMCNC: 35.6 G/DL — SIGNIFICANT CHANGE UP (ref 32–37)
MCV RBC AUTO: 83.4 FL — SIGNIFICANT CHANGE UP (ref 80–94)
MCV RBC AUTO: 84.2 FL — SIGNIFICANT CHANGE UP (ref 80–94)
MCV RBC AUTO: 85.9 FL — SIGNIFICANT CHANGE UP (ref 80–94)
MONOCYTES # BLD AUTO: 0.57 K/UL — SIGNIFICANT CHANGE UP (ref 0.1–0.6)
MONOCYTES NFR BLD AUTO: 5.2 % — SIGNIFICANT CHANGE UP (ref 1.7–9.3)
NEUTROPHILS # BLD AUTO: 9.22 K/UL — HIGH (ref 1.4–6.5)
NEUTROPHILS NFR BLD AUTO: 84.7 % — HIGH (ref 42.2–75.2)
NRBC # BLD: 0 /100 WBCS — SIGNIFICANT CHANGE UP (ref 0–0)
NRBC # BLD: 0 /100 WBCS — SIGNIFICANT CHANGE UP (ref 0–0)
PCO2 BLDA: 35 MMHG — LOW (ref 38–42)
PCO2 BLDA: 35 MMHG — LOW (ref 38–42)
PH BLDA: 7.36 — LOW (ref 7.38–7.42)
PH BLDA: 7.37 — LOW (ref 7.38–7.42)
PHOSPHATE SERPL-MCNC: 7 MG/DL — HIGH (ref 2.1–4.9)
PLATELET # BLD AUTO: 150 K/UL — SIGNIFICANT CHANGE UP (ref 130–400)
PLATELET # BLD AUTO: 160 K/UL — SIGNIFICANT CHANGE UP (ref 130–400)
PLATELET # BLD AUTO: 280 K/UL — SIGNIFICANT CHANGE UP (ref 130–400)
PO2 BLDA: 128 MMHG — HIGH (ref 78–95)
PO2 BLDA: 137 MMHG — HIGH (ref 78–95)
POTASSIUM SERPL-MCNC: 4.9 MMOL/L — SIGNIFICANT CHANGE UP (ref 3.5–5)
POTASSIUM SERPL-MCNC: 5.3 MMOL/L — HIGH (ref 3.5–5)
POTASSIUM SERPL-MCNC: 5.9 MMOL/L — HIGH (ref 3.5–5)
POTASSIUM SERPL-SCNC: 4.9 MMOL/L — SIGNIFICANT CHANGE UP (ref 3.5–5)
POTASSIUM SERPL-SCNC: 5.3 MMOL/L — HIGH (ref 3.5–5)
POTASSIUM SERPL-SCNC: 5.9 MMOL/L — HIGH (ref 3.5–5)
PROT SERPL-MCNC: 6.1 G/DL — SIGNIFICANT CHANGE UP (ref 6–8)
PROTHROM AB SERPL-ACNC: 13.2 SEC — HIGH (ref 9.95–12.87)
PROTHROM AB SERPL-ACNC: 13.9 SEC — HIGH (ref 9.95–12.87)
PROTHROM AB SERPL-ACNC: 14.2 SEC — HIGH (ref 9.95–12.87)
RBC # BLD: 3.37 M/UL — LOW (ref 4.7–6.1)
RBC # BLD: 3.98 M/UL — LOW (ref 4.7–6.1)
RBC # BLD: 4.27 M/UL — LOW (ref 4.7–6.1)
RBC # FLD: 14.6 % — HIGH (ref 11.5–14.5)
RBC # FLD: 14.8 % — HIGH (ref 11.5–14.5)
RBC # FLD: 15.6 % — HIGH (ref 11.5–14.5)
SAO2 % BLDA: 100 % — HIGH (ref 94–98)
SAO2 % BLDA: 99 % — HIGH (ref 94–98)
SODIUM SERPL-SCNC: 133 MMOL/L — LOW (ref 135–146)
SODIUM SERPL-SCNC: 135 MMOL/L — SIGNIFICANT CHANGE UP (ref 135–146)
SODIUM SERPL-SCNC: 136 MMOL/L — SIGNIFICANT CHANGE UP (ref 135–146)
TROPONIN T SERPL-MCNC: 0.03 NG/ML — CRITICAL HIGH
TROPONIN T SERPL-MCNC: 0.03 NG/ML — CRITICAL HIGH
TROPONIN T SERPL-MCNC: 0.04 NG/ML — CRITICAL HIGH
TYPE + AB SCN PNL BLD: SIGNIFICANT CHANGE UP
WBC # BLD: 10.04 K/UL — SIGNIFICANT CHANGE UP (ref 4.8–10.8)
WBC # BLD: 10.07 K/UL — SIGNIFICANT CHANGE UP (ref 4.8–10.8)
WBC # BLD: 10.91 K/UL — HIGH (ref 4.8–10.8)
WBC # FLD AUTO: 10.04 K/UL — SIGNIFICANT CHANGE UP (ref 4.8–10.8)
WBC # FLD AUTO: 10.07 K/UL — SIGNIFICANT CHANGE UP (ref 4.8–10.8)
WBC # FLD AUTO: 10.91 K/UL — HIGH (ref 4.8–10.8)

## 2018-12-30 PROCEDURE — 99291 CRITICAL CARE FIRST HOUR: CPT

## 2018-12-30 RX ORDER — TRANEXAMIC ACID 100 MG/ML
1000 INJECTION, SOLUTION INTRAVENOUS ONCE
Qty: 0 | Refills: 0 | Status: COMPLETED | OUTPATIENT
Start: 2018-12-30 | End: 2018-12-30

## 2018-12-30 RX ORDER — CEFOTETAN DISODIUM 1 G
1 VIAL (EA) INJECTION ONCE
Qty: 0 | Refills: 0 | Status: COMPLETED | OUTPATIENT
Start: 2018-12-30 | End: 2018-12-30

## 2018-12-30 RX ORDER — SODIUM CHLORIDE 9 MG/ML
250 INJECTION INTRAMUSCULAR; INTRAVENOUS; SUBCUTANEOUS ONCE
Qty: 0 | Refills: 0 | Status: COMPLETED | OUTPATIENT
Start: 2018-12-30 | End: 2018-12-30

## 2018-12-30 RX ORDER — TAMSULOSIN HYDROCHLORIDE 0.4 MG/1
0.4 CAPSULE ORAL AT BEDTIME
Qty: 0 | Refills: 0 | Status: DISCONTINUED | OUTPATIENT
Start: 2018-12-30 | End: 2018-12-30

## 2018-12-30 RX ORDER — CEFOTETAN DISODIUM 1 G
1 VIAL (EA) INJECTION EVERY 12 HOURS
Qty: 0 | Refills: 0 | Status: DISCONTINUED | OUTPATIENT
Start: 2018-12-31 | End: 2018-12-31

## 2018-12-30 RX ORDER — DEXTROSE 50 % IN WATER 50 %
15 SYRINGE (ML) INTRAVENOUS ONCE
Qty: 0 | Refills: 0 | Status: DISCONTINUED | OUTPATIENT
Start: 2018-12-30 | End: 2019-01-01

## 2018-12-30 RX ORDER — FINASTERIDE 5 MG/1
5 TABLET, FILM COATED ORAL DAILY
Qty: 0 | Refills: 0 | Status: DISCONTINUED | OUTPATIENT
Start: 2018-12-30 | End: 2019-01-09

## 2018-12-30 RX ORDER — PANTOPRAZOLE SODIUM 20 MG/1
40 TABLET, DELAYED RELEASE ORAL DAILY
Qty: 0 | Refills: 0 | Status: DISCONTINUED | OUTPATIENT
Start: 2018-12-30 | End: 2019-01-01

## 2018-12-30 RX ORDER — DEXTROSE 50 % IN WATER 50 %
12.5 SYRINGE (ML) INTRAVENOUS ONCE
Qty: 0 | Refills: 0 | Status: DISCONTINUED | OUTPATIENT
Start: 2018-12-30 | End: 2019-01-01

## 2018-12-30 RX ORDER — INSULIN HUMAN 100 [IU]/ML
INJECTION, SOLUTION SUBCUTANEOUS EVERY 4 HOURS
Qty: 0 | Refills: 0 | Status: DISCONTINUED | OUTPATIENT
Start: 2018-12-30 | End: 2018-12-30

## 2018-12-30 RX ORDER — MONTELUKAST 4 MG/1
10 TABLET, CHEWABLE ORAL DAILY
Qty: 0 | Refills: 0 | Status: DISCONTINUED | OUTPATIENT
Start: 2018-12-30 | End: 2019-01-01

## 2018-12-30 RX ORDER — SODIUM CHLORIDE 9 MG/ML
500 INJECTION INTRAMUSCULAR; INTRAVENOUS; SUBCUTANEOUS ONCE
Qty: 0 | Refills: 0 | Status: COMPLETED | OUTPATIENT
Start: 2018-12-30 | End: 2018-12-30

## 2018-12-30 RX ORDER — CHLORHEXIDINE GLUCONATE 213 G/1000ML
15 SOLUTION TOPICAL
Qty: 0 | Refills: 0 | Status: DISCONTINUED | OUTPATIENT
Start: 2018-12-30 | End: 2019-01-01

## 2018-12-30 RX ORDER — GLUCAGON INJECTION, SOLUTION 0.5 MG/.1ML
1 INJECTION, SOLUTION SUBCUTANEOUS ONCE
Qty: 0 | Refills: 0 | Status: DISCONTINUED | OUTPATIENT
Start: 2018-12-30 | End: 2019-01-01

## 2018-12-30 RX ORDER — DEXTROSE 50 % IN WATER 50 %
25 SYRINGE (ML) INTRAVENOUS ONCE
Qty: 0 | Refills: 0 | Status: DISCONTINUED | OUTPATIENT
Start: 2018-12-30 | End: 2019-01-01

## 2018-12-30 RX ORDER — LEVOTHYROXINE SODIUM 125 MCG
50 TABLET ORAL DAILY
Qty: 0 | Refills: 0 | Status: DISCONTINUED | OUTPATIENT
Start: 2018-12-30 | End: 2019-01-09

## 2018-12-30 RX ORDER — DEXMEDETOMIDINE HYDROCHLORIDE IN 0.9% SODIUM CHLORIDE 4 UG/ML
0.2 INJECTION INTRAVENOUS
Qty: 200 | Refills: 0 | Status: DISCONTINUED | OUTPATIENT
Start: 2018-12-30 | End: 2019-01-01

## 2018-12-30 RX ORDER — ATORVASTATIN CALCIUM 80 MG/1
10 TABLET, FILM COATED ORAL AT BEDTIME
Qty: 0 | Refills: 0 | Status: DISCONTINUED | OUTPATIENT
Start: 2018-12-30 | End: 2019-01-09

## 2018-12-30 RX ORDER — INSULIN HUMAN 100 [IU]/ML
2 INJECTION, SOLUTION SUBCUTANEOUS
Qty: 100 | Refills: 0 | Status: DISCONTINUED | OUTPATIENT
Start: 2018-12-30 | End: 2019-01-02

## 2018-12-30 RX ORDER — PANTOPRAZOLE SODIUM 20 MG/1
40 TABLET, DELAYED RELEASE ORAL
Qty: 0 | Refills: 0 | Status: DISCONTINUED | OUTPATIENT
Start: 2018-12-30 | End: 2018-12-30

## 2018-12-30 RX ORDER — CHLORHEXIDINE GLUCONATE 213 G/1000ML
1 SOLUTION TOPICAL
Qty: 0 | Refills: 0 | Status: DISCONTINUED | OUTPATIENT
Start: 2018-12-30 | End: 2019-01-01

## 2018-12-30 RX ORDER — SODIUM CHLORIDE 9 MG/ML
3 INJECTION INTRAMUSCULAR; INTRAVENOUS; SUBCUTANEOUS ONCE
Qty: 0 | Refills: 0 | Status: COMPLETED | OUTPATIENT
Start: 2018-12-30 | End: 2018-12-30

## 2018-12-30 RX ORDER — ONDANSETRON 8 MG/1
4 TABLET, FILM COATED ORAL ONCE
Qty: 0 | Refills: 0 | Status: COMPLETED | OUTPATIENT
Start: 2018-12-30 | End: 2018-12-30

## 2018-12-30 RX ORDER — VASOPRESSIN 20 [USP'U]/ML
0.04 INJECTION INTRAVENOUS
Qty: 100 | Refills: 0 | Status: DISCONTINUED | OUTPATIENT
Start: 2018-12-30 | End: 2018-12-31

## 2018-12-30 RX ORDER — FENTANYL CITRATE 50 UG/ML
100 INJECTION INTRAVENOUS ONCE
Qty: 0 | Refills: 0 | Status: DISCONTINUED | OUTPATIENT
Start: 2018-12-30 | End: 2018-12-30

## 2018-12-30 RX ORDER — CEFOTETAN DISODIUM 1 G
VIAL (EA) INJECTION
Qty: 0 | Refills: 0 | Status: DISCONTINUED | OUTPATIENT
Start: 2018-12-30 | End: 2018-12-31

## 2018-12-30 RX ORDER — SODIUM CHLORIDE 9 MG/ML
250 INJECTION INTRAMUSCULAR; INTRAVENOUS; SUBCUTANEOUS ONCE
Qty: 0 | Refills: 0 | Status: DISCONTINUED | OUTPATIENT
Start: 2018-12-30 | End: 2018-12-31

## 2018-12-30 RX ORDER — SODIUM CHLORIDE 9 MG/ML
1000 INJECTION, SOLUTION INTRAVENOUS
Qty: 0 | Refills: 0 | Status: DISCONTINUED | OUTPATIENT
Start: 2018-12-30 | End: 2019-01-01

## 2018-12-30 RX ORDER — SODIUM CHLORIDE 9 MG/ML
1000 INJECTION, SOLUTION INTRAVENOUS ONCE
Qty: 0 | Refills: 0 | Status: COMPLETED | OUTPATIENT
Start: 2018-12-30 | End: 2018-12-30

## 2018-12-30 RX ORDER — NOREPINEPHRINE BITARTRATE/D5W 8 MG/250ML
0.05 PLASTIC BAG, INJECTION (ML) INTRAVENOUS
Qty: 8 | Refills: 0 | Status: DISCONTINUED | OUTPATIENT
Start: 2018-12-30 | End: 2018-12-31

## 2018-12-30 RX ORDER — FENTANYL CITRATE 50 UG/ML
50 INJECTION INTRAVENOUS ONCE
Qty: 0 | Refills: 0 | Status: DISCONTINUED | OUTPATIENT
Start: 2018-12-30 | End: 2018-12-30

## 2018-12-30 RX ORDER — INSULIN LISPRO 100/ML
VIAL (ML) SUBCUTANEOUS
Qty: 0 | Refills: 0 | Status: DISCONTINUED | OUTPATIENT
Start: 2018-12-30 | End: 2018-12-30

## 2018-12-30 RX ADMIN — CHLORHEXIDINE GLUCONATE 15 MILLILITER(S): 213 SOLUTION TOPICAL at 18:30

## 2018-12-30 RX ADMIN — ONDANSETRON 4 MILLIGRAM(S): 8 TABLET, FILM COATED ORAL at 10:00

## 2018-12-30 RX ADMIN — Medication 100 GRAM(S): at 18:28

## 2018-12-30 RX ADMIN — Medication 6.56 MICROGRAM(S)/KG/MIN: at 06:53

## 2018-12-30 RX ADMIN — VASOPRESSIN 2.4 UNIT(S)/MIN: 20 INJECTION INTRAVENOUS at 21:38

## 2018-12-30 RX ADMIN — INSULIN HUMAN 2 UNIT(S)/HR: 100 INJECTION, SOLUTION SUBCUTANEOUS at 21:37

## 2018-12-30 RX ADMIN — SODIUM CHLORIDE 1500 MILLILITER(S): 9 INJECTION INTRAMUSCULAR; INTRAVENOUS; SUBCUTANEOUS at 17:25

## 2018-12-30 RX ADMIN — TRANEXAMIC ACID 220 MILLIGRAM(S): 100 INJECTION, SOLUTION INTRAVENOUS at 10:00

## 2018-12-30 RX ADMIN — SODIUM CHLORIDE 1000 MILLILITER(S): 9 INJECTION INTRAMUSCULAR; INTRAVENOUS; SUBCUTANEOUS at 21:55

## 2018-12-30 RX ADMIN — SODIUM CHLORIDE 3 MILLILITER(S): 9 INJECTION INTRAMUSCULAR; INTRAVENOUS; SUBCUTANEOUS at 05:56

## 2018-12-30 RX ADMIN — ONDANSETRON 4 MILLIGRAM(S): 8 TABLET, FILM COATED ORAL at 06:54

## 2018-12-30 RX ADMIN — SODIUM CHLORIDE 3000 MILLILITER(S): 9 INJECTION INTRAMUSCULAR; INTRAVENOUS; SUBCUTANEOUS at 21:56

## 2018-12-30 RX ADMIN — FENTANYL CITRATE 100 MICROGRAM(S): 50 INJECTION INTRAVENOUS at 06:00

## 2018-12-30 RX ADMIN — FENTANYL CITRATE 50 MICROGRAM(S): 50 INJECTION INTRAVENOUS at 09:55

## 2018-12-30 RX ADMIN — SODIUM CHLORIDE 1000 MILLILITER(S): 9 INJECTION, SOLUTION INTRAVENOUS at 05:40

## 2018-12-30 RX ADMIN — ONDANSETRON 4 MILLIGRAM(S): 8 TABLET, FILM COATED ORAL at 07:24

## 2018-12-30 RX ADMIN — DEXMEDETOMIDINE HYDROCHLORIDE IN 0.9% SODIUM CHLORIDE 3.63 MICROGRAM(S)/KG/HR: 4 INJECTION INTRAVENOUS at 17:25

## 2018-12-30 RX ADMIN — Medication 6.56 MICROGRAM(S)/KG/MIN: at 21:37

## 2018-12-30 RX ADMIN — FENTANYL CITRATE 100 MICROGRAM(S): 50 INJECTION INTRAVENOUS at 06:15

## 2018-12-30 NOTE — CONSULT NOTE ADULT - ASSESSMENT
ASSESSMENT:  78y Male with splenic laceration and hemorrhagic shock    PLAN:   Neurologic: Sedate with Precedex.   Respiratory: Intubated. Rpt ABG. CXR.  Cardiovascular: Wean off Levophed. EKG, Echo.  Gastrointestinal/Nutrition: NPO, IVF, PPI, Bladder pressures  Renal/Genitourinary: Johnson catheter in place. Replace electrolytes PRN  Hematologic: Rpt CBC. Now s/p 8u PRBC, 3 FFP, 1 plt  Infectious Disease: Cefotetan, vaccinate prior to discharge  Lines/Tubes: TLC, B/L peripheral IV, Johnson cath  Endocrine: FSG  Disposition: SICU

## 2018-12-30 NOTE — ED ADULT TRIAGE NOTE - CHIEF COMPLAINT QUOTE
Pt presented to ed with c.o abdominal pain radiating to left abdomen. denies chest pain. Pt is hypotensive and hypothermic as per initial assessment. See vital signs. Pt sent to crit.

## 2018-12-30 NOTE — ED PROVIDER NOTE - PROGRESS NOTE DETAILS
2 large bore iv placed. Manual BP 70/40. CT expedited. Discussed with radiology resident. Possible subcapsular splenic hematoma. Surgery aware. Will see shortly. Pt responding to IVF and levophed. H/H reassuring. rectal temp 96.6 rectal. Will trend H/H. Awaiting rad resident prelim. JSRADHATEIN: 2 large bore iv placed. Manual BP 70/40. CT expedited. Discussed with radiology resident. Possible subcapsular splenic hematoma. Surgery aware. Will see shortly. Pt responding to IVF and levophed. H/H reassuring. rectal temp 96.6 rectal. Will trend H/H. Awaiting rad resident prelim. SNEHA: Discussed with rad resident. Subcapsular hematoma with peritoneal layering. No active extrav on CTA. Surgery notified of repeat discussion and findings. Will see urgently in ED. family now endorsing fall 4 days ago but no pain until last night. SNEHA: Surgery at bedside. 2 units uncrossed blood ordered. Will start tx and titrate off levophed. Spoke with Kelly, radiology resident who states that IR attending has been in contact with surgery regarding this pt. PT ADMITTED TO SICU. PT NOTED TO BE HYPOTENSIVE 60/30. CODE FUSION ACTIVATED. DR. LANDAU AND DR. CHRISTENSEN AT BEDSIDE, HAD CONVERSATION WITH SON AND PATIENT. PT TO GO TO IR. PT BEING RESUSCITATED WITH BVLOOD PRODUCTS. TXA GIVEN. ARTERIAL LINE PLACED. HEMODYNAMICS IMPROVED. PT TO BE INTUBATED BY ANESTHESIA AND THEN TO IR. DR. CHRISTENSEN AWARE CT HEAD AND C SPINE WILL BE DELAYED AND AMENABLE TO PLAN.

## 2018-12-30 NOTE — H&P ADULT - HISTORY OF PRESENT ILLNESS
78 year old male pmhx as below, significant for A.fib on Eliquis, s/p fall 2 days ago presents for left side abdominal pain that began last night around 9pm. Patient was evaluated by the ED, was hypotensive and was started on levophed, he was taken for CTA of the chest/abdomen which was significant for a splenic laceration/subcapsular hematoma. Trauma was consulted at this time when the patient mentioned his fall 2 days prior. 78 year old male pmhx as below, significant for A.fib on Eliquis, s/p fall 2 days ago presents for left side abdominal pain that began last night around 9pm. Patient was evaluated by the ED, was hypotensive and was started on levophed, he was taken for CTA of the chest/abdomen which was significant for a splenic laceration/subcapsular hematoma. Trauma was consulted at this time when the patient mentioned his fall 2 days prior. Patient was seen and examined. Patient complaining of lower left side abdominal pain and left shoulder pain. He states he fell 2 days ago but felt fine until last night around 9pm when he developed some abdominal pain, his last meal was yesterday around 5pm and the last time he took medications (Eliquis) was yesterday morning.  SBP at this time on levophed was 100-110 on the monitor, blood products were ordered. A central line was being placed by the ED in the right IJ. IR was consulted. 78 year old male pmhx as below, significant for A.fib on Eliquis, s/p fall 2 days ago presents for left side abdominal pain that began last night around 9pm. Patient was evaluated by the ED, was hypotensive and was started on levophed, he was taken for CTA of the chest/abdomen which was significant for a splenic laceration/subcapsular hematoma. Trauma was consulted at this time when the patient mentioned his fall 2 days prior. Patient was seen and examined. SBP at this time on levophed was 100-110 on the monitor. Patient complaining of lower left side abdominal pain and left shoulder pain. He states he fell 2 days ago but felt fine until last night around 9pm when he developed some abdominal pain, his last meal was yesterday around 5pm and the last time he took medications (Eliquis) was yesterday morning.

## 2018-12-30 NOTE — ED ADULT NURSE NOTE - OBJECTIVE STATEMENT
Pt came c/o left upper abdominal pain started last night, pt stated he fell yesterday, on Eliquis, did not go to the hospital and developed abdominal pain today. Pt is hypotensive in ER, weak peripheral pulses, denies any N/V/D. Also per son, pt lost about 30 LBS in 2 months, do not have appetite and has low PO intake.

## 2018-12-30 NOTE — ED PROVIDER NOTE - OBJECTIVE STATEMENT
78M pmh afib s/p ablation, s/p AICD/pacemaker for cardiomyopathy p/w LLQ abd pain radiating to L flank that started acutely last night around 9:30pm. Per family pt has had no appetite for past month, has lost 30lbs. Deny f/c, n/v/d, hx of cancer, hx of trauma. 78M pmh afib s/p ablation on eliquis, s/p AICD/pacemaker for cardiomyopathy, hypothyroid, HLD, CAD, CKD p/w LLQ abd pain radiating to L flank that started acutely last night around 9:30pm. Per family pt has had no appetite for past month, has lost 30lbs. Deny f/c, n/v/d, hx of cancer, hx of trauma. 78M pmh afib s/p ablation on eliquis, s/p AICD/pacemaker for cardiomyopathy/CHF, hypothyroid, HLD, CAD, CKD p/w LLQ abd pain radiating to L flank that started acutely last night around 9:30pm. Per family pt has had no appetite for past month, has lost 30lbs. Deny f/c, n/v/d, hx of cancer, hx of trauma.

## 2018-12-30 NOTE — ED PROVIDER NOTE - MEDICAL DECISION MAKING DETAILS
Pt accepted in stable condition from Dr. Mckeon. Plan was to admit to SICU, IR was consulted. Central line placed, PRBC infusion begun with plan to ween from pressor. Pt remained awake alert, NL mentation. Later called to pt bedside because of hypoTN. Mentation remained same. Jacek Morales and Sanju Coy present. IR attending steven. Pt admitted to SICU, plan to cont blood products, hemodynamic monitoring and support, IR vs surgical management.

## 2018-12-30 NOTE — ED PROVIDER NOTE - CARE PLAN
Principal Discharge DX:	Splenic laceration, initial encounter  Secondary Diagnosis:	Elevated troponin  Secondary Diagnosis:	Generalized abdominal pain  Secondary Diagnosis:	Hypotension

## 2018-12-30 NOTE — AIRWAY PLACEMENT NOTE ADULT - POST AIRWAY PLACEMENT ASSESSMENT:
positive end tidal CO2 noted/CXR pending/skin color improved/breath sounds equal/chest excursion noted/breath sounds bilateral

## 2018-12-30 NOTE — H&P ADULT - ATTENDING COMMENTS
The patient has splenic bleed post fall a few days ago, and is on Eliquis.  He was DNR/DNI due to a multitude of medical problems.    I discussed the prognosis of a trauma laparotomy and splenectomy in a patient on Eliquis with the son and we all agree that this is not the best course of action.  IR has agreed to try to embolize the spleen now.    Intubating electively prior to the moving to IR.

## 2018-12-30 NOTE — ED PROVIDER NOTE - PHYSICAL EXAMINATION
General: AOx4, toxic appearing  Skin: Warm and dry, no acute rash.   Head:  Normocephalic, atraumatic.   EENT: PERRL/EOMI, conjunctiva and sclera clear. MM moist, no nasal discharge.   Neck: Supple nt, no meningeal signs.   Heart Tachy, s1s2 nl, no rub/murmur.   Lungs- No retractions, BS equal, CTAB.   Abdomen: distended, diffusely ttp localizing to L  Extremities- moves all, +equal distal pulses, brisk cap refill. No LE edema, calves nttp b/l.  Neuro: Sensation wnl, CN 2-12 grossly intact. +5/5 muscle strength throughout.  Psych: Cooperative, appropriate General: AOx4, toxic appearing, lethargic  Skin: Warm and dry, no acute rash.   Head:  Normocephalic, atraumatic.   EENT: PERRL/EOMI, conjunctiva and sclera clear. MM moist, no nasal discharge.   Neck: Supple nt, no meningeal signs.   Heart Tachy, s1s2 nl, no rub/murmur.   Lungs- No retractions, BS equal, CTAB.   Abdomen: distended, diffusely ttp localizing to L side w/ r/g  Extremities- moves all, +equal distal pulses, brisk cap refill. No LE edema, calves nttp b/l.  Neuro: Sensation wnl, CN 2-12 grossly intact. +5/5 muscle strength throughout.  Psych: Cooperative, appropriate

## 2018-12-30 NOTE — ED PROVIDER NOTE - ATTENDING CONTRIBUTION TO CARE
8Y M with pmh of Afib on eliquis, non ischemic CMP s/p AICD COPD not on home 02, DM2, DLD and hypothyroidism presenting with sudden onset diffuse abdominal pain that began at 930pm last night. Denies fever and chills. Found at triage to be hypotensive 70/40.   AAOX3, severe distress 2.2 pain. NCAT, no pallor or icterus, MMM, Neck Supple, Hr regular rate and rhtym. Prolonged expiratory phase. No wheezes appreciated. Unlabored breathing. ABD S/diffusely tender with guarding/ND +BS, EXT warm, No Edema, Distal Pulses Intact, No Rash,  MAEx4, Sensation to soft touch grossly intact, normal strength/tone.   Abdominal pain and hypotension. Consider SBO vs mesenteric ischemia vs  AoD -   2 large bore IV/IVF/LABS/type and cross/cardiac monitor. CT dissection study, analgesia prn.

## 2018-12-30 NOTE — ED PROCEDURE NOTE - CPROC ED INFUS LINE DETAIL1
The location was identified, and the area was draped and prepped./The guidewire was recovered./The catheter was placed using sterile technique./Ultrasound guidance was used during placement./All lumen(s) aspirated and flushed without difficulty.

## 2018-12-30 NOTE — PROVIDER CONTACT NOTE (OTHER) - RECOMMENDATIONS
Levophed drip increased to 0.47mcg as ordered.
Precedex drip held, B/L wrist restraints dc'd, Bolus 500cc given, cheetah ordered

## 2018-12-30 NOTE — H&P ADULT - ASSESSMENT
78 year old male on Eliquis s/p fall 2 days ago presenting with left side abdominal pain. Trauma was called after patient had a CTA Chest/Abdomen done revealing a splenic laceration/subcapsular hematoma, the patient was reportedly hypotensive prior to this to SBP 70s and was started on levophed. When trauma team arrived to evaluate the patient his SBP was 100-110 with levophed running. Blood products were started and the patient was weaned off levophed. Patient was transiently responsive to transfusions. A central line was placed in right IJ and an arterial line was placed in left wrist. He was intubated in the ED, when his BP remained stable (SBP ) he was taken to IR.     Patient had a known DNR/DNI that was discussed with him and his son (Jai at bedside). A MOLST form was filled out at this time. The patient and his son expressed understanding of the situation and the need for IR intervention considering Eliquis and rescinded the DNR/DNI for procedures. IR attending Dr. Landau and trauma attending Dr. Morales were present at this time. Senior Trauma Resident Note  primary evaluation:   Airway intact  Bilateral Breath Sounds  Palpable pulses in 4 ext  GCS 15, PERRL, SAMUELS  abdominal tenderness on exam  Ct findings as above    78 year old male on Eliquis s/p fall 2 days ago presenting with left side abdominal pain. Trauma was called after patient had a CTA Chest/Abdomen done revealing a splenic laceration/subcapsular hematoma, the patient was reportedly hypotensive prior to this to SBP 70s and was started on levophed. When trauma team arrived to evaluate the patient his SBP was 100-110 with levophed running. Blood products were started and the patient was weaned off levophed. Patient was transiently responsive to transfusions. A central line was placed in right IJ and an arterial line was placed in left wrist. He was intubated in the ED, continued to transfuse in ED 7 PRBCs & 3 FFPs & 1 Platlet, BP was stable and was transferred to IR.   situation was discussed with son who elected to go ahead with IR and denied immediate operative intervention at this point after explaining to him the high risk for such a procedure.    Patient had a known DNR/DNI that was discussed with him and his son (Jai at bedside). A MOLST form was filled out at this time. The patient and his son expressed understanding of the situation and the need for IR intervention considering Eliquis and rescinded the DNR/DNI for procedures. IR attending Dr. Landau and trauma attending Dr. Morales were present at this time.

## 2018-12-30 NOTE — ED ADULT NURSE NOTE - NSIMPLEMENTINTERV_GEN_ALL_ED
Implemented All Fall with Harm Risk Interventions:  [place] to call system. Call bell, personal items and telephone within reach. Instruct patient to call for assistance. Room bathroom lighting operational. Non-slip footwear when patient is off stretcher. Physically safe environment: no spills, clutter or unnecessary equipment. Stretcher in lowest position, wheels locked, appropriate side rails in place. Provide visual cue, wrist band, yellow gown, etc. Monitor gait and stability. Monitor for mental status changes and reorient to person, place, and time. Review medications for side effects contributing to fall risk. Reinforce activity limits and safety measures with patient and family. Provide visual clues: red socks.

## 2018-12-30 NOTE — ED PROVIDER NOTE - CRITICAL CARE PROVIDED
conducted a detailed discussion of DNR status/interpretation of diagnostic studies/additional history taking/direct patient care (not related to procedure)/consultation with other physicians/documentation

## 2018-12-30 NOTE — CONSULT NOTE ADULT - SUBJECTIVE AND OBJECTIVE BOX
SICU Consultation Note  =====================================================  HPI: 78y Male  HPI:  78 year old male pmhx as below, significant for A-fib on Eliquis, s/p fall 2 days ago presents for left side abdominal pain that began last night around 9pm. Patient was evaluated by the ED, was hypotensive and was started on levophed, he was taken for CTA of the chest/abdomen which was significant for a splenic laceration/subcapsular hematoma. Trauma was consulted at this time when the patient mentioned his fall 2 days prior. Patient was seen and examined. SBP at this time on levophed was 100-110 on the monitor. Patient complaining of lower left side abdominal pain and left shoulder pain. He states he fell 2 days ago but felt fine until last night around 9pm when he developed some abdominal pain, his last meal was yesterday around 5pm and the last time he took medications (Eliquis) was yesterday morning. (30 Dec 2018 08:48)    PAST MEDICAL & SURGICAL HISTORY:  CAD (coronary artery disease)  Ventricular tachycardia  NANDO on CPAP  COPD (chronic obstructive pulmonary disease)  Pacemaker  AICD (automatic cardioverter/defibrillator) present  Hypothyroidism  Atrial fibrillation  Congestive heart disease  Hypercholesteremia  AICD (automatic cardioverter/defibrillator) present  History of laparoscopic cholecystectomy    Home Meds: Home Medications:  ALPRAZolam 0.25 mg oral tablet: 1 tab(s) orally once a day, As Needed (23 Oct 2018 00:22)  finasteride 5 mg oral tablet: 1 tab(s) orally once a day (23 Oct 2018 00:22)  glyBURIDE 5 mg oral tablet: 1 tab(s) orally once a day (23 Oct 2018 00:22)  levothyroxine 50 mcg (0.05 mg) oral capsule: 1 cap(s) orally once a day (23 Oct 2018 00:22)  montelukast 10 mg oral tablet: 1 tab(s) orally once a day (23 Oct 2018 00:22)  pravastatin 20 mg oral tablet: 1 tab(s) orally once a day (23 Oct 2018 00:22)  tamsulosin 0.4 mg oral capsule: 1 cap(s) orally once a day (23 Oct 2018 00:22)    Allergies: Allergies    morphine (Stomach Upset)    Intolerances      Soc:   Advanced Directives: Presumed Full Code     ROS:    REVIEW OF SYSTEMS    [ ] A ten-point review of systems was otherwise negative except as noted.  [ ] Due to altered mental status/intubation, subjective information were not able to be obtained from the patient. History was obtained, to the extent possible, from review of the chart and collateral sources of information.      CURRENT MEDICATIONS:   --------------------------------------------------------------------------------------  Neurologic Medications    Respiratory Medications  montelukast 10 milliGRAM(s) Oral daily    Cardiovascular Medications  norepinephrine Infusion 0.05 MICROgram(s)/kG/Min IV Continuous <Continuous>  tamsulosin 0.4 milliGRAM(s) Oral at bedtime    Gastrointestinal Medications  dextrose 5%. 1000 milliLiter(s) IV Continuous <Continuous>  pantoprazole    Tablet 40 milliGRAM(s) Oral before breakfast    Genitourinary Medications    Hematologic/Oncologic Medications    Antimicrobial/Immunologic Medications    Endocrine/Metabolic Medications  atorvastatin 10 milliGRAM(s) Oral at bedtime  dextrose 40% Gel 15 Gram(s) Oral once PRN Blood Glucose LESS THAN 70 milliGRAM(s)/deciliter  dextrose 50% Injectable 12.5 Gram(s) IV Push once  dextrose 50% Injectable 25 Gram(s) IV Push once  dextrose 50% Injectable 25 Gram(s) IV Push once  finasteride 5 milliGRAM(s) Oral daily  glucagon  Injectable 1 milliGRAM(s) IntraMuscular once PRN Glucose LESS THAN 70 milligrams/deciliter  insulin lispro (HumaLOG) corrective regimen sliding scale   SubCutaneous three times a day before meals  levothyroxine 50 MICROGram(s) Oral daily    Topical/Other Medications    --------------------------------------------------------------------------------------    VITAL SIGNS, INS/OUTS (last 24 hours):  --------------------------------------------------------------------------------------  ICU Vital Signs Last 24 Hrs  T(C): 35.9 (30 Dec 2018 05:03), Max: 35.9 (30 Dec 2018 05:03)  T(F): 96.6 (30 Dec 2018 05:03), Max: 96.6 (30 Dec 2018 05:03)  HR: 88 (30 Dec 2018 13:50) (80 - 96)  BP: 119/73 (30 Dec 2018 13:40) (59/45 - 150/98)  BP(mean): 90 (30 Dec 2018 13:40) (50 - 96)  ABP: --  ABP(mean): --  RR: 47 (30 Dec 2018 13:50) (18 - 47)  SpO2: 100% (30 Dec 2018 13:50) (88% - 100%)    I&O's Summary    --------------------------------------------------------------------------------------    EXAM:  General/Neuro  RASS: 0  GCS: 15  Exam: Normal, NAD, alert, oriented x 3, no focal deficits. PERRLA      Respiratory  Exam: Lungs clear to auscultation, Normal expansion/effort.    [] Tracheostomy   [] Intubated  Mechanical Ventilation: Mode: AC/ CMV (Assist Control/ Continuous Mandatory Ventilation)  RR (machine): 16  TV (machine): 450  FiO2: 100  PEEP: 5  ITime: 0.88  MAP: 9  PIP: 20      Cardiovascular  Exam: S1, S2.  Regular rate and rhythm.  Peripheral edema   Cardiac Rhythm: Normal Sinus Rhythm  ECHO:     GI  Exam: Abdomen distended, appropriate tender. No guarding or rigidity     Current Diet: NPO      Tubes/Lines/Drains    [x] Peripheral IV  [] Central Venous Line     	[] R	[] L	[] IJ	[] Fem	[] SC        Type:	    Date Placed:   [] Arterial Line		[] R	[] L	[] Fem	[] Rad	[] Ax	Date Placed:   [] PICC:         	[] Midline		[] Mediport           [] Urinary Catheter		Date Placed:     Extremities  Exam: Extremities warm, pink, well-perfused.        Derm:  Exam: Good skin turgor, no skin breakdown.      :   Exam: Johnson catheter in place.     LABS  --------------------------------------------------------------------------------------  Labs:  CAPILLARY BLOOD GLUCOSE                              11.5   10.04 )-----------( x        ( 30 Dec 2018 13:42 )             33.5       Auto Neutrophil %: 84.7 % (12-30-18 @ 05:24)  Auto Immature Granulocyte %: 0.7 % (12-30-18 @ 05:24)    12-30    135  |  96<L>  |  44<H>  ----------------------------<  283<H>  5.3<H>   |  21  |  2.6<H>      Calcium, Total Serum: 9.0 mg/dL (12-30-18 @ 05:24)      LFTs:             6.1  | 0.3  | 24       ------------------[77      ( 30 Dec 2018 05:24 )  3.5  | x    | 30          Lipase:24     Amylase:x         Blood Gas Arterial, Lactate: 1.9 mmoL/L (12-30-18 @ 14:11)  Blood Gas Arterial, Lactate: 2.2 mmoL/L (12-30-18 @ 11:06)  Lactate, Blood: 1.5 mmol/L (12-30-18 @ 05:24)    ABG - ( 30 Dec 2018 14:11 )  pH: 7.37  /  pCO2: 35    /  pO2: 137   / HCO3: 20    / Base Excess: -4.4  /  SaO2: 100             ABG - ( 30 Dec 2018 11:06 )  pH: 7.29  /  pCO2: 36    /  pO2: 462   / HCO3: 18    / Base Excess: -8.3  /  SaO2: 100               Coags:     14.20  ----< 1.24    ( 30 Dec 2018 13:42 )     27.7        CARDIAC MARKERS ( 30 Dec 2018 13:42 )  x     / x     / x     / x     / 4.1 ng/mL  CARDIAC MARKERS ( 30 Dec 2018 05:24 )  x     / 0.03 ng/mL / x     / x     / x                --------------------------------------------------------------------------------------    OTHER LABS    IMAGING RESULTS      ASSESSMENT:  78y Male     PLAN:   Neurologic:   Respiratory:   Cardiovascular:   Gastrointestinal/Nutrition:   Renal/Genitourinary:   Hematologic:   Infectious Disease:   Lines/Tubes:  Endocrine:   Disposition:     --------------------------------------------------------------------------------------    Critical Care Diagnoses: SICU Consultation Note  =====================================================  HPI: 78y Male  HPI:  78 year old male pmhx as below, significant for A-fib on Eliquis, s/p fall 2 days ago presents for left side abdominal pain that began last night around 9pm. Patient was evaluated by the ED, was hypotensive and was started on levophed, he was taken for CTA of the chest/abdomen which was significant for a splenic laceration/subcapsular hematoma. Trauma was consulted at this time when the patient mentioned his fall 2 days prior. Patient was seen and examined. SBP at this time on levophed was 100-110 on the monitor. Patient complaining of lower left side abdominal pain and left shoulder pain. He states he fell 2 days ago but felt fine until last night around 9pm when he developed some abdominal pain, his last meal was yesterday around 5pm and the last time he took medications (Eliquis) was yesterday morning. (30 Dec 2018 08:48)    PAST MEDICAL & SURGICAL HISTORY:  CAD (coronary artery disease)  Ventricular tachycardia  NANDO on CPAP  COPD (chronic obstructive pulmonary disease)  Pacemaker  AICD (automatic cardioverter/defibrillator) present  Hypothyroidism  Atrial fibrillation  Congestive heart disease  Hypercholesteremia  AICD (automatic cardioverter/defibrillator) present  History of laparoscopic cholecystectomy    Home Meds: Home Medications:  ALPRAZolam 0.25 mg oral tablet: 1 tab(s) orally once a day, As Needed (23 Oct 2018 00:22)  finasteride 5 mg oral tablet: 1 tab(s) orally once a day (23 Oct 2018 00:22)  glyBURIDE 5 mg oral tablet: 1 tab(s) orally once a day (23 Oct 2018 00:22)  levothyroxine 50 mcg (0.05 mg) oral capsule: 1 cap(s) orally once a day (23 Oct 2018 00:22)  montelukast 10 mg oral tablet: 1 tab(s) orally once a day (23 Oct 2018 00:22)  pravastatin 20 mg oral tablet: 1 tab(s) orally once a day (23 Oct 2018 00:22)  tamsulosin 0.4 mg oral capsule: 1 cap(s) orally once a day (23 Oct 2018 00:22)    Allergies: Allergies    morphine (Stomach Upset)    Intolerances      Soc:   Advanced Directives: Presumed Full Code     ROS:    REVIEW OF SYSTEMS    [ ] A ten-point review of systems was otherwise negative except as noted.  [ ] Due to altered mental status/intubation, subjective information were not able to be obtained from the patient. History was obtained, to the extent possible, from review of the chart and collateral sources of information.      CURRENT MEDICATIONS:   --------------------------------------------------------------------------------------  Neurologic Medications    Respiratory Medications  montelukast 10 milliGRAM(s) Oral daily    Cardiovascular Medications  norepinephrine Infusion 0.05 MICROgram(s)/kG/Min IV Continuous <Continuous>  tamsulosin 0.4 milliGRAM(s) Oral at bedtime    Gastrointestinal Medications  dextrose 5%. 1000 milliLiter(s) IV Continuous <Continuous>  pantoprazole    Tablet 40 milliGRAM(s) Oral before breakfast    Genitourinary Medications    Hematologic/Oncologic Medications    Antimicrobial/Immunologic Medications    Endocrine/Metabolic Medications  atorvastatin 10 milliGRAM(s) Oral at bedtime  dextrose 40% Gel 15 Gram(s) Oral once PRN Blood Glucose LESS THAN 70 milliGRAM(s)/deciliter  dextrose 50% Injectable 12.5 Gram(s) IV Push once  dextrose 50% Injectable 25 Gram(s) IV Push once  dextrose 50% Injectable 25 Gram(s) IV Push once  finasteride 5 milliGRAM(s) Oral daily  glucagon  Injectable 1 milliGRAM(s) IntraMuscular once PRN Glucose LESS THAN 70 milligrams/deciliter  insulin lispro (HumaLOG) corrective regimen sliding scale   SubCutaneous three times a day before meals  levothyroxine 50 MICROGram(s) Oral daily    Topical/Other Medications    --------------------------------------------------------------------------------------    VITAL SIGNS, INS/OUTS (last 24 hours):  --------------------------------------------------------------------------------------  ICU Vital Signs Last 24 Hrs  T(C): 35.9 (30 Dec 2018 05:03), Max: 35.9 (30 Dec 2018 05:03)  T(F): 96.6 (30 Dec 2018 05:03), Max: 96.6 (30 Dec 2018 05:03)  HR: 88 (30 Dec 2018 13:50) (80 - 96)  BP: 119/73 (30 Dec 2018 13:40) (59/45 - 150/98)  BP(mean): 90 (30 Dec 2018 13:40) (50 - 96)  ABP: --  ABP(mean): --  RR: 47 (30 Dec 2018 13:50) (18 - 47)  SpO2: 100% (30 Dec 2018 13:50) (88% - 100%)    I&O's Summary    --------------------------------------------------------------------------------------    EXAM:  General/Neuro  RASS: 0  GCS: 15  Exam: Normal, NAD, alert, oriented x 3, no focal deficits. PERRLA      Respiratory  Exam: Lungs clear to auscultation, Normal expansion/effort.    [] Tracheostomy   [] Intubated  Mechanical Ventilation: Mode: AC/ CMV (Assist Control/ Continuous Mandatory Ventilation)  RR (machine): 16  TV (machine): 450  FiO2: 100  PEEP: 5  ITime: 0.88  MAP: 9  PIP: 20      Cardiovascular  Exam: S1, S2.  Regular rate and rhythm.  Peripheral edema   Cardiac Rhythm: Normal Sinus Rhythm  ECHO:     GI  Exam: Abdomen distended, appropriate tender. No guarding or rigidity     Current Diet: NPO      Tubes/Lines/Drains    [x] Peripheral IV  [] Central Venous Line     	[] R	[] L	[] IJ	[] Fem	[] SC        Type:	    Date Placed:   [] Arterial Line		[] R	[] L	[] Fem	[] Rad	[] Ax	Date Placed:   [] PICC:         	[] Midline		[] Mediport           [] Urinary Catheter		Date Placed:     Extremities  Exam: Extremities warm, pink, well-perfused.        Derm:  Exam: Good skin turgor, no skin breakdown.      :   Exam: Johnson catheter in place.     LABS  --------------------------------------------------------------------------------------  Labs:  CAPILLARY BLOOD GLUCOSE                              11.5   10.04 )-----------( x        ( 30 Dec 2018 13:42 )             33.5       Auto Neutrophil %: 84.7 % (12-30-18 @ 05:24)  Auto Immature Granulocyte %: 0.7 % (12-30-18 @ 05:24)    12-30    135  |  96<L>  |  44<H>  ----------------------------<  283<H>  5.3<H>   |  21  |  2.6<H>      Calcium, Total Serum: 9.0 mg/dL (12-30-18 @ 05:24)      LFTs:             6.1  | 0.3  | 24       ------------------[77      ( 30 Dec 2018 05:24 )  3.5  | x    | 30          Lipase:24     Amylase:x         Blood Gas Arterial, Lactate: 1.9 mmoL/L (12-30-18 @ 14:11)  Blood Gas Arterial, Lactate: 2.2 mmoL/L (12-30-18 @ 11:06)  Lactate, Blood: 1.5 mmol/L (12-30-18 @ 05:24)    ABG - ( 30 Dec 2018 14:11 )  pH: 7.37  /  pCO2: 35    /  pO2: 137   / HCO3: 20    / Base Excess: -4.4  /  SaO2: 100             ABG - ( 30 Dec 2018 11:06 )  pH: 7.29  /  pCO2: 36    /  pO2: 462   / HCO3: 18    / Base Excess: -8.3  /  SaO2: 100               Coags:     14.20  ----< 1.24    ( 30 Dec 2018 13:42 )     27.7        CARDIAC MARKERS ( 30 Dec 2018 13:42 )  x     / x     / x     / x     / 4.1 ng/mL  CARDIAC MARKERS ( 30 Dec 2018 05:24 )  x     / 0.03 ng/mL / x     / x     / x                --------------------------------------------------------------------------------------    OTHER LABS    IMAGING RESULTS    --------------------------------------------------------------------------------------    Critical Care Diagnoses:

## 2018-12-30 NOTE — H&P ADULT - NSHPPHYSICALEXAM_GEN_ALL_CORE
Vital Signs Last 24 Hrs  T(F): 96.6 (30 Dec 2018 05:03), Max: 96.6 (30 Dec 2018 05:03)  HR: 88 (30 Dec 2018 11:00) (80 - 96)  BP: 88/54 (30 Dec 2018 11:00) (59/45 - 150/98)  BP(mean): 75 (30 Dec 2018 06:25) (50 - 75)  RR: 18 (30 Dec 2018 07:30) (18 - 20)  SpO2: 100% (30 Dec 2018 10:30) (88% - 100%)    Physical Exam:  General: NAD  HEENT: Normocephalic, atraumatic, EOMI, PEERLA. no scalp lacerations   Neck: Soft, midline trachea. no cspine tenderness  Chest: No chest wall tenderness. or subq  emphysema   Cardiac: S1, S2, RRR  Respiratory: Bilateral breath sounds, clear and equal bilaterally  Abdomen: Soft, non-distended, no rebound, mild tenderness LLQ  Groin: Normal appearing, pelvis stable   Ext: palp radial b/l UE, b/l DP palp in Lower Extrem.   Back: no TTP, no palpable runoff/stepoff/deformity  Rectal: No walter blood, YOLIS with good tone

## 2018-12-31 DIAGNOSIS — Z02.9 ENCOUNTER FOR ADMINISTRATIVE EXAMINATIONS, UNSPECIFIED: ICD-10-CM

## 2018-12-31 LAB
ANION GAP SERPL CALC-SCNC: 16 MMOL/L — HIGH (ref 7–14)
APTT BLD: 23.1 SEC — CRITICAL LOW (ref 27–39.2)
BASE EXCESS BLDA CALC-SCNC: -5 MMOL/L — LOW (ref -2–2)
BASOPHILS # BLD AUTO: 0.02 K/UL — SIGNIFICANT CHANGE UP (ref 0–0.2)
BASOPHILS # BLD AUTO: 0.03 K/UL — SIGNIFICANT CHANGE UP (ref 0–0.2)
BASOPHILS NFR BLD AUTO: 0.1 % — SIGNIFICANT CHANGE UP (ref 0–1)
BASOPHILS NFR BLD AUTO: 0.3 % — SIGNIFICANT CHANGE UP (ref 0–1)
BUN SERPL-MCNC: 43 MG/DL — HIGH (ref 10–20)
CALCIUM SERPL-MCNC: 7.6 MG/DL — LOW (ref 8.5–10.1)
CHLORIDE SERPL-SCNC: 102 MMOL/L — SIGNIFICANT CHANGE UP (ref 98–110)
CK MB CFR SERPL CALC: 4.1 NG/ML — SIGNIFICANT CHANGE UP (ref 0.6–6.3)
CO2 SERPL-SCNC: 17 MMOL/L — SIGNIFICANT CHANGE UP (ref 17–32)
CREAT SERPL-MCNC: 2.6 MG/DL — HIGH (ref 0.7–1.5)
EOSINOPHIL # BLD AUTO: 0 K/UL — SIGNIFICANT CHANGE UP (ref 0–0.7)
EOSINOPHIL # BLD AUTO: 0.01 K/UL — SIGNIFICANT CHANGE UP (ref 0–0.7)
EOSINOPHIL NFR BLD AUTO: 0 % — SIGNIFICANT CHANGE UP (ref 0–8)
EOSINOPHIL NFR BLD AUTO: 0.1 % — SIGNIFICANT CHANGE UP (ref 0–8)
GAS PNL BLDA: SIGNIFICANT CHANGE UP
GAS PNL BLDA: SIGNIFICANT CHANGE UP
GLUCOSE BLDC GLUCOMTR-MCNC: 122 MG/DL — HIGH (ref 70–99)
GLUCOSE BLDC GLUCOMTR-MCNC: 128 MG/DL — HIGH (ref 70–99)
GLUCOSE BLDC GLUCOMTR-MCNC: 130 MG/DL — HIGH (ref 70–99)
GLUCOSE BLDC GLUCOMTR-MCNC: 138 MG/DL — HIGH (ref 70–99)
GLUCOSE BLDC GLUCOMTR-MCNC: 150 MG/DL — HIGH (ref 70–99)
GLUCOSE BLDC GLUCOMTR-MCNC: 160 MG/DL — HIGH (ref 70–99)
GLUCOSE BLDC GLUCOMTR-MCNC: 165 MG/DL — HIGH (ref 70–99)
GLUCOSE BLDC GLUCOMTR-MCNC: 173 MG/DL — HIGH (ref 70–99)
GLUCOSE BLDC GLUCOMTR-MCNC: 205 MG/DL — HIGH (ref 70–99)
GLUCOSE BLDC GLUCOMTR-MCNC: 97 MG/DL — SIGNIFICANT CHANGE UP (ref 70–99)
GLUCOSE SERPL-MCNC: 105 MG/DL — HIGH (ref 70–99)
HCO3 BLDA-SCNC: 19 MMOL/L — LOW (ref 23–27)
HCT VFR BLD CALC: 25.5 % — LOW (ref 42–52)
HCT VFR BLD CALC: 28.8 % — LOW (ref 42–52)
HGB BLD-MCNC: 10.3 G/DL — LOW (ref 14–18)
HGB BLD-MCNC: 9 G/DL — LOW (ref 14–18)
IMM GRANULOCYTES NFR BLD AUTO: 1.1 % — HIGH (ref 0.1–0.3)
IMM GRANULOCYTES NFR BLD AUTO: 1.3 % — HIGH (ref 0.1–0.3)
INR BLD: 1.08 RATIO — SIGNIFICANT CHANGE UP (ref 0.65–1.3)
LACTATE SERPL-SCNC: 2.5 MMOL/L — HIGH (ref 0.5–2.2)
LYMPHOCYTES # BLD AUTO: 0.73 K/UL — LOW (ref 1.2–3.4)
LYMPHOCYTES # BLD AUTO: 0.86 K/UL — LOW (ref 1.2–3.4)
LYMPHOCYTES # BLD AUTO: 6.2 % — LOW (ref 20.5–51.1)
LYMPHOCYTES # BLD AUTO: 6.6 % — LOW (ref 20.5–51.1)
MCHC RBC-ENTMCNC: 29.6 PG — SIGNIFICANT CHANGE UP (ref 27–31)
MCHC RBC-ENTMCNC: 29.9 PG — SIGNIFICANT CHANGE UP (ref 27–31)
MCHC RBC-ENTMCNC: 35.3 G/DL — SIGNIFICANT CHANGE UP (ref 32–37)
MCHC RBC-ENTMCNC: 35.8 G/DL — SIGNIFICANT CHANGE UP (ref 32–37)
MCV RBC AUTO: 83.5 FL — SIGNIFICANT CHANGE UP (ref 80–94)
MCV RBC AUTO: 83.9 FL — SIGNIFICANT CHANGE UP (ref 80–94)
MONOCYTES # BLD AUTO: 1.52 K/UL — HIGH (ref 0.1–0.6)
MONOCYTES # BLD AUTO: 1.62 K/UL — HIGH (ref 0.1–0.6)
MONOCYTES NFR BLD AUTO: 10.9 % — HIGH (ref 1.7–9.3)
MONOCYTES NFR BLD AUTO: 14.6 % — HIGH (ref 1.7–9.3)
NEUTROPHILS # BLD AUTO: 11.36 K/UL — HIGH (ref 1.4–6.5)
NEUTROPHILS # BLD AUTO: 8.54 K/UL — HIGH (ref 1.4–6.5)
NEUTROPHILS NFR BLD AUTO: 77.1 % — HIGH (ref 42.2–75.2)
NEUTROPHILS NFR BLD AUTO: 81.7 % — HIGH (ref 42.2–75.2)
NRBC # BLD: 0 /100 WBCS — SIGNIFICANT CHANGE UP (ref 0–0)
NRBC # BLD: 0 /100 WBCS — SIGNIFICANT CHANGE UP (ref 0–0)
NT-PROBNP SERPL-SCNC: 5096 PG/ML — HIGH (ref 0–300)
PCO2 BLDA: 31 MMHG — LOW (ref 38–42)
PH BLDA: 7.4 — SIGNIFICANT CHANGE UP (ref 7.38–7.42)
PLATELET # BLD AUTO: 157 K/UL — SIGNIFICANT CHANGE UP (ref 130–400)
PLATELET # BLD AUTO: 161 K/UL — SIGNIFICANT CHANGE UP (ref 130–400)
PO2 BLDA: 114 MMHG — HIGH (ref 78–95)
POTASSIUM SERPL-MCNC: 4.8 MMOL/L — SIGNIFICANT CHANGE UP (ref 3.5–5)
POTASSIUM SERPL-SCNC: 4.8 MMOL/L — SIGNIFICANT CHANGE UP (ref 3.5–5)
PROTHROM AB SERPL-ACNC: 12.4 SEC — SIGNIFICANT CHANGE UP (ref 9.95–12.87)
RBC # BLD: 3.04 M/UL — LOW (ref 4.7–6.1)
RBC # BLD: 3.45 M/UL — LOW (ref 4.7–6.1)
RBC # FLD: 15.1 % — HIGH (ref 11.5–14.5)
RBC # FLD: 15.8 % — HIGH (ref 11.5–14.5)
SAO2 % BLDA: 98 % — SIGNIFICANT CHANGE UP (ref 94–98)
SODIUM SERPL-SCNC: 135 MMOL/L — SIGNIFICANT CHANGE UP (ref 135–146)
TROPONIN T SERPL-MCNC: 0.04 NG/ML — CRITICAL HIGH
TROPONIN T SERPL-MCNC: 0.05 NG/ML — CRITICAL HIGH
WBC # BLD: 11.07 K/UL — HIGH (ref 4.8–10.8)
WBC # BLD: 13.92 K/UL — HIGH (ref 4.8–10.8)
WBC # FLD AUTO: 11.07 K/UL — HIGH (ref 4.8–10.8)
WBC # FLD AUTO: 13.92 K/UL — HIGH (ref 4.8–10.8)

## 2018-12-31 PROCEDURE — 99291 CRITICAL CARE FIRST HOUR: CPT

## 2018-12-31 RX ORDER — DEXTROSE 50 % IN WATER 50 %
50 SYRINGE (ML) INTRAVENOUS ONCE
Qty: 0 | Refills: 0 | Status: DISCONTINUED | OUTPATIENT
Start: 2018-12-31 | End: 2019-01-01

## 2018-12-31 RX ORDER — INSULIN HUMAN 100 [IU]/ML
10 INJECTION, SOLUTION SUBCUTANEOUS ONCE
Qty: 0 | Refills: 0 | Status: DISCONTINUED | OUTPATIENT
Start: 2018-12-31 | End: 2018-12-31

## 2018-12-31 RX ORDER — VASOPRESSIN 20 [USP'U]/ML
0.04 INJECTION INTRAVENOUS
Qty: 100 | Refills: 0 | Status: DISCONTINUED | OUTPATIENT
Start: 2018-12-31 | End: 2019-01-07

## 2018-12-31 RX ORDER — NOREPINEPHRINE BITARTRATE/D5W 8 MG/250ML
0.04 PLASTIC BAG, INJECTION (ML) INTRAVENOUS
Qty: 8 | Refills: 0 | Status: DISCONTINUED | OUTPATIENT
Start: 2018-12-31 | End: 2019-01-02

## 2018-12-31 RX ORDER — NOREPINEPHRINE BITARTRATE/D5W 8 MG/250ML
0.04 PLASTIC BAG, INJECTION (ML) INTRAVENOUS
Qty: 8 | Refills: 0 | Status: DISCONTINUED | OUTPATIENT
Start: 2018-12-31 | End: 2018-12-31

## 2018-12-31 RX ORDER — CALCIUM GLUCONATE 100 MG/ML
1 VIAL (ML) INTRAVENOUS ONCE
Qty: 0 | Refills: 0 | Status: COMPLETED | OUTPATIENT
Start: 2018-12-31 | End: 2018-12-31

## 2018-12-31 RX ADMIN — PANTOPRAZOLE SODIUM 40 MILLIGRAM(S): 20 TABLET, DELAYED RELEASE ORAL at 12:10

## 2018-12-31 RX ADMIN — Medication 100 GRAM(S): at 06:02

## 2018-12-31 RX ADMIN — CHLORHEXIDINE GLUCONATE 15 MILLILITER(S): 213 SOLUTION TOPICAL at 17:49

## 2018-12-31 RX ADMIN — Medication 200 GRAM(S): at 00:44

## 2018-12-31 RX ADMIN — CHLORHEXIDINE GLUCONATE 15 MILLILITER(S): 213 SOLUTION TOPICAL at 06:02

## 2018-12-31 RX ADMIN — MONTELUKAST 10 MILLIGRAM(S): 4 TABLET, CHEWABLE ORAL at 12:11

## 2018-12-31 RX ADMIN — FINASTERIDE 5 MILLIGRAM(S): 5 TABLET, FILM COATED ORAL at 14:25

## 2018-12-31 RX ADMIN — CHLORHEXIDINE GLUCONATE 1 APPLICATION(S): 213 SOLUTION TOPICAL at 06:02

## 2018-12-31 RX ADMIN — ATORVASTATIN CALCIUM 10 MILLIGRAM(S): 80 TABLET, FILM COATED ORAL at 21:38

## 2018-12-31 RX ADMIN — Medication 50 MICROGRAM(S): at 06:02

## 2018-12-31 NOTE — PROGRESS NOTE ADULT - ASSESSMENT
ASSESSMENT:  78y Male with splenic laceration and hemorrhagic shock    PLAN:   Neurologic: Sedate with Precedex.   Respiratory: Intubated. Q4 ABG. AM CXR.  Cardiovascular: Wean off Levophed. Echo.  Gastrointestinal/Nutrition: NPO, IVF, PPI, Bladder pressures Q8  Renal/Genitourinary: Johnson catheter in place. Replace electrolytes PRN  Hematologic: Rpt CBC Q8. Stable at 10. Now s/p 8u PRBC, 3 FFP, 1 plt  Infectious Disease: Cefotetan, vaccinate prior to discharge  Lines/Tubes: TLC, B/L peripheral IV, Johnson cath  Endocrine: FSG  Disposition: SICU ASSESSMENT:  78y Male with splenic laceration and hemorrhagic shock    PLAN:   Neurologic: Sedate with Precedex.   Respiratory: Intubated. Q4 ABG. AM CXR.  Cardiovascular: Wean off Levophed. Echo: ef 25% ; troponin increasing will trend,  Gastrointestinal/Nutrition: NPO, IVF, PPI, Bladder pressures Q8  Renal/Genitourinary: Johnson catheter in place. Replace electrolytes PRN  Hematologic: Rpt CBC Q8. Stable at 10. Now s/p 8u PRBC, 3 FFP, 1 plt  Infectious Disease: vaccinate prior to discharge  Lines/Tubes: TLC, B/L peripheral IV, Johnson cath  Endocrine: FSG  Disposition: SICU

## 2018-12-31 NOTE — PROGRESS NOTE ADULT - SUBJECTIVE AND OBJECTIVE BOX
Interventional Radiology Follow- Up Note      78y Male s/p splenic artery embolization on 12/30/18 in Interventional Radiology with Dr. Landau.        O/N:     Still intubated and sedated on Precedex. Pressor support has decreased.    Vitals: T(F): 98.2 (12-31-18 @ 12:00), Max: 98.4 (12-31-18 @ 08:00)  HR: 98 (12-31-18 @ 14:00) (88 - 106)  BP: 94/65 (12-31-18 @ 13:30) (57/44 - 140/89)  RR: 20 (12-31-18 @ 14:00) (8 - 40)  SpO2: 100% (12-31-18 @ 14:00) (64% - 100%)  Wt(kg): --    LABS:                        9.0    13.92 )-----------( 161      ( 31 Dec 2018 11:00 )             25.5     12-31    135  |  102  |  43<H>  ----------------------------<  105<H>  4.8   |  17  |  2.6<H>    Ca    7.6<L>      31 Dec 2018 00:34  Phos  7.0     12-30  Mg     1.8     12-30    TPro  6.1  /  Alb  3.5  /  TBili  0.3  /  DBili  x   /  AST  24  /  ALT  30  /  AlkPhos  77  12-30    PT/INR - ( 31 Dec 2018 11:00 )   PT: 12.40 sec;   INR: 1.08 ratio         PTT - ( 31 Dec 2018 11:00 )  PTT:23.1 sec    PHYSICAL EXAM:  General: Intubated and sedated  CV: regular rate and rhythm  Lung: breath sounds b/l  Abdomen: soft, non-tender  Extremities: clean right groin dressing, no hematoma    Impression: 78y Male admitted with splenic laceration and hemodynamic instability requiring multiple PRBC transfusions and pressor support s/p coil embolization of splenic artery.    Plan:  - primary care per SICU  - cont to trend H/H and wean pressor support  - cont IV abx     Please call Interventional Radiology [phone] with any questions, concerns, or issues regarding above.

## 2018-12-31 NOTE — PROGRESS NOTE ADULT - SUBJECTIVE AND OBJECTIVE BOX
GUIDO VILCHIS  [id number]  78y Male    Indication for ICU admission:  Admit Date:18  ICU Date:   OR Date: IR embolization     morphine (Stomach Upset)    PAST MEDICAL & SURGICAL HISTORY:  CAD (coronary artery disease)  Ventricular tachycardia  NANDO on CPAP  COPD (chronic obstructive pulmonary disease)  Pacemaker  AICD (automatic cardioverter/defibrillator) present  Hypothyroidism  Atrial fibrillation  Congestive heart disease  Hypercholesteremia  AICD (automatic cardioverter/defibrillator) present  History of laparoscopic cholecystectomy    Home Medications:  ALPRAZolam 0.25 mg oral tablet: 1 tab(s) orally once a day, As Needed (23 Oct 2018 00:22)  finasteride 5 mg oral tablet: 1 tab(s) orally once a day (23 Oct 2018 00:22)  glyBURIDE 5 mg oral tablet: 1 tab(s) orally once a day (23 Oct 2018 00:)  levothyroxine 50 mcg (0.05 mg) oral capsule: 1 cap(s) orally once a day (23 Oct 2018 00:22)  montelukast 10 mg oral tablet: 1 tab(s) orally once a day (23 Oct 2018 00:22)  pravastatin 20 mg oral tablet: 1 tab(s) orally once a day (23 Oct 2018 00:22)  tamsulosin 0.4 mg oral capsule: 1 cap(s) orally once a day (23 Oct 2018 00:22)        24HRS EVENT:  78 year old male pmhx as below, significant for A-fib on Eliquis, s/p fall 2 days ago presents for left side abdominal pain that began last night around 9pm. Patient was evaluated by the ED, was hypotensive and was started on levophed, he was taken for CTA of the chest/abdomen which was significant for a splenic laceration/subcapsular hematoma. Trauma was consulted at this time when the patient mentioned his fall 2 days prior. Patient was seen and examined. SBP at this time on levophed was 100-110 on the monitor. Patient complaining of lower left side abdominal pain and left shoulder pain. He states he fell 2 days ago but felt fine until last night around 9pm when he developed some abdominal pain, his last meal was yesterday around 5pm and the last time he took medications (Eliquis) was yesterday morning            DVT PTX:     GI PTX:    ***Tubes/Lines/Drains  ***  Peripheral IV  Central Venous Line     	Date   Arterial Line		                Date   [] PICC:         	[] Midline		[] Mediport             Urinary Catheter		Indication: Strict I&O    Date Placed:       REVIEW OF SYSTEMS    [x] A ten-point review of systems was otherwise negative except as noted.  [ ] Due to altered mental status/intubation, subjective information were not able to be obtained from the patient. History was obtained, to the extent possible, from review of the chart and collateral sources of information.      Daily Height in cm: 165.1 (30 Dec 2018 15:00)    Daily Weight in k.6 (30 Dec 2018 15:00)        CURRENT MEDS:  Neurologic Medications  dexmedetomidine Infusion 0.2 MICROgram(s)/kG/Hr IV Continuous <Continuous>    Respiratory Medications  montelukast 10 milliGRAM(s) Oral daily    Cardiovascular Medications  norepinephrine Infusion 0.05 MICROgram(s)/kG/Min IV Continuous <Continuous>    Gastrointestinal Medications  dextrose 5%. 1000 milliLiter(s) IV Continuous <Continuous>  pantoprazole  Injectable 40 milliGRAM(s) IV Push daily  sodium chloride 0.9% Bolus 250 milliLiter(s) IV Bolus once  sodium chloride 0.9% Bolus 250 milliLiter(s) IV Bolus once    Genitourinary Medications    Hematologic/Oncologic Medications    Antimicrobial/Immunologic Medications  cefoTEtan  IVPB      cefoTEtan  IVPB 1 Gram(s) IV Intermittent every 12 hours    Endocrine/Metabolic Medications  atorvastatin 10 milliGRAM(s) Oral at bedtime  dextrose 40% Gel 15 Gram(s) Oral once PRN Blood Glucose LESS THAN 70 milliGRAM(s)/deciliter  dextrose 50% Injectable 12.5 Gram(s) IV Push once  dextrose 50% Injectable 25 Gram(s) IV Push once  dextrose 50% Injectable 25 Gram(s) IV Push once  dextrose 50% Injectable 50 milliLiter(s) IV Push once  finasteride 5 milliGRAM(s) Oral daily  glucagon  Injectable 1 milliGRAM(s) IntraMuscular once PRN Glucose LESS THAN 70 milligrams/deciliter  insulin Infusion 2 Unit(s)/Hr IV Continuous <Continuous>  insulin regular  human recombinant. 10 Unit(s) IV Push once  levothyroxine 50 MICROGram(s) Oral daily  vasopressin Infusion 0.04 Unit(s)/Min IV Continuous <Continuous>    Topical/Other Medications  chlorhexidine 0.12% Liquid 15 milliLiter(s) Oral Mucosa two times a day  chlorhexidine 4% Liquid 1 Application(s) Topical <User Schedule>      ICU Vital Signs Last 24 Hrs  T(C): 36.1 (31 Dec 2018 00:00), Max: 36.6 (30 Dec 2018 14:00)  T(F): 97 (31 Dec 2018 00:00), Max: 97.8 (30 Dec 2018 14:00)  HR: 102 (31 Dec 2018 01:30) (80 - 104)  BP: 87/57 (31 Dec 2018 01:15) (57/44 - 150/98)  BP(mean): 69 (31 Dec 2018 01:15) (37 - 105)  ABP: 94/60 (31 Dec 2018 01:30) (50/40 - 148/88)  ABP(mean): 70 (31 Dec 2018 01:30) (44 - 104)  RR: 26 (31 Dec 2018 01:30) (8 - 47)  SpO2: 88% (31 Dec 2018 01:30) (88% - 100%)      Adult Advanced Hemodynamics Last 24 Hrs  CVP(mm Hg): --  CVP(cm H2O): --  CO: --  CI: --  PA: --  PA(mean): --  PCWP: --  SVR: --  SVRI: --  PVR: --  PVRI: --    Mode: AC/ CMV (Assist Control/ Continuous Mandatory Ventilation)  RR (machine): 16  TV (machine): 450  FiO2: 40  PEEP: 5  ITime: 1  MAP: 11  PIP: 29    ABG - ( 31 Dec 2018 00:40 )  pH, Arterial: 7.38  pH, Blood: x     /  pCO2: 33    /  pO2: 114   / HCO3: 20    / Base Excess: -4.7  /  SaO2: 99                  I&O's Summary    30 Dec 2018 07:  -  31 Dec 2018 01:57  --------------------------------------------------------  IN: 3907.2 mL / OUT: 265 mL / NET: 3642.2 mL      I&O's Detail    30 Dec 2018 07:  -  31 Dec 2018 01:57  --------------------------------------------------------  IN:    dexmedetomidine Infusion: 53 mL    insulin Infusion: 24 mL    IV PiggyBack: 50 mL    norepinephrine Infusion: 552.6 mL    Packed Red Blood Cells: 1200 mL    Plasma: 756 mL    Sodium Chloride 0.9% IV Bolus: 1250 mL    vasopressin Infusion: 21.6 mL  Total IN: 3907.2 mL    OUT:    Indwelling Catheter - Urethral: 265 mL  Total OUT: 265 mL    Total NET: 3642.2 mL          PHYSICAL EXAM:    General/Neuro  RASS: -1            GCS: 11T         Deficits: alert & oriented x 3, no focal deficits prior to extubation  Pupils: PERRLA,    Lungs: clear to auscultation, Normal expansion/effort.     Cardiovascular : S1, S2.  Regular rate and rhythm.    Cardiac Rhythm: Normal Sinus Rhythm, occasional PVC's    GI: Abdomen soft, Non-tender, Distended. Bladder pressure of 11. No guarding or rigidity.       Extremities: Extremities warm, pink, well-perfused.     Derm: Good skin turgor, no skin breakdown.      : Johnson catheter in place.      CXR:     LABS:  CAPILLARY BLOOD GLUCOSE      POCT Blood Glucose.: 122 mg/dL (31 Dec 2018 00:03)  POCT Blood Glucose.: 174 mg/dL (30 Dec 2018 22:42)  POCT Blood Glucose.: 209 mg/dL (30 Dec 2018 21:49)  POCT Blood Glucose.: 254 mg/dL (30 Dec 2018 20:48)  POCT Blood Glucose.: 320 mg/dL (30 Dec 2018 19:42)  POCT Blood Glucose.: 422 mg/dL (30 Dec 2018 18:20)  POCT Blood Glucose.: 443 mg/dL (30 Dec 2018 18:16)                          10.3   11.07 )-----------( 157      ( 31 Dec 2018 00:34 )             28.8       12-31    135  |  102  |  43<H>  ----------------------------<  105<H>  4.8   |  17  |  2.6<H>    Ca    7.6<L>      31 Dec 2018 00:34  Phos  7.0     12-30  Mg     1.8     12-30    TPro  6.1  /  Alb  3.5  /  TBili  0.3  /  DBili  x   /  AST  24  /  ALT  30  /  AlkPhos  77  12-30      PT/INR - ( 30 Dec 2018 23:10 )   PT: 13.90 sec;   INR: 1.21 ratio         PTT - ( 30 Dec 2018 23:10 )  PTT:25.1 sec  CARDIAC MARKERS ( 31 Dec 2018 00:34 )  x     / 0.05 ng/mL / x     / x     / 4.1 ng/mL  CARDIAC MARKERS ( 30 Dec 2018 23:15 )  x     / x     / 83 U/L / x     / x      CARDIAC MARKERS ( 30 Dec 2018 18:52 )  x     / 0.04 ng/mL / x     / x     / x      CARDIAC MARKERS ( 30 Dec 2018 13:42 )  x     / 0.03 ng/mL / x     / x     / 4.1 ng/mL  CARDIAC MARKERS ( 30 Dec 2018 05:24 )  x     / 0.03 ng/mL / x     / x     / x

## 2019-01-01 LAB
ANION GAP SERPL CALC-SCNC: 16 MMOL/L — HIGH (ref 7–14)
APTT BLD: 24.4 SEC — LOW (ref 27–39.2)
BASE EXCESS BLDA CALC-SCNC: -1.5 MMOL/L — SIGNIFICANT CHANGE UP (ref -2–2)
BUN SERPL-MCNC: 47 MG/DL — HIGH (ref 10–20)
CALCIUM SERPL-MCNC: 8 MG/DL — LOW (ref 8.5–10.1)
CHLORIDE SERPL-SCNC: 103 MMOL/L — SIGNIFICANT CHANGE UP (ref 98–110)
CK MB CFR SERPL CALC: 2.5 NG/ML — SIGNIFICANT CHANGE UP (ref 0.6–6.3)
CO2 SERPL-SCNC: 20 MMOL/L — SIGNIFICANT CHANGE UP (ref 17–32)
CREAT SERPL-MCNC: 2.7 MG/DL — HIGH (ref 0.7–1.5)
GAS PNL BLDA: SIGNIFICANT CHANGE UP
GLUCOSE BLDC GLUCOMTR-MCNC: 115 MG/DL — HIGH (ref 70–99)
GLUCOSE BLDC GLUCOMTR-MCNC: 126 MG/DL — HIGH (ref 70–99)
GLUCOSE BLDC GLUCOMTR-MCNC: 143 MG/DL — HIGH (ref 70–99)
GLUCOSE BLDC GLUCOMTR-MCNC: 148 MG/DL — HIGH (ref 70–99)
GLUCOSE BLDC GLUCOMTR-MCNC: 154 MG/DL — HIGH (ref 70–99)
GLUCOSE BLDC GLUCOMTR-MCNC: 182 MG/DL — HIGH (ref 70–99)
GLUCOSE BLDC GLUCOMTR-MCNC: 60 MG/DL — LOW (ref 70–99)
GLUCOSE BLDC GLUCOMTR-MCNC: 78 MG/DL — SIGNIFICANT CHANGE UP (ref 70–99)
GLUCOSE SERPL-MCNC: 116 MG/DL — HIGH (ref 70–99)
HCO3 BLDA-SCNC: 23 MMOL/L — SIGNIFICANT CHANGE UP (ref 23–27)
HCT VFR BLD CALC: 23.8 % — LOW (ref 42–52)
HGB BLD-MCNC: 8.2 G/DL — LOW (ref 14–18)
HOROWITZ INDEX BLDA+IHG-RTO: 40 — SIGNIFICANT CHANGE UP
INR BLD: 1.14 RATIO — SIGNIFICANT CHANGE UP (ref 0.65–1.3)
MAGNESIUM SERPL-MCNC: 1.8 MG/DL — SIGNIFICANT CHANGE UP (ref 1.8–2.4)
MCHC RBC-ENTMCNC: 29.3 PG — SIGNIFICANT CHANGE UP (ref 27–31)
MCHC RBC-ENTMCNC: 34.5 G/DL — SIGNIFICANT CHANGE UP (ref 32–37)
MCV RBC AUTO: 85 FL — SIGNIFICANT CHANGE UP (ref 80–94)
NRBC # BLD: 0 /100 WBCS — SIGNIFICANT CHANGE UP (ref 0–0)
PCO2 BLDA: 35 MMHG — LOW (ref 38–42)
PH BLDA: 7.42 — SIGNIFICANT CHANGE UP (ref 7.38–7.42)
PHOSPHATE SERPL-MCNC: 5.1 MG/DL — HIGH (ref 2.1–4.9)
PLATELET # BLD AUTO: 181 K/UL — SIGNIFICANT CHANGE UP (ref 130–400)
PO2 BLDA: 88 MMHG — SIGNIFICANT CHANGE UP (ref 78–95)
POTASSIUM SERPL-MCNC: 5.1 MMOL/L — HIGH (ref 3.5–5)
POTASSIUM SERPL-SCNC: 5.1 MMOL/L — HIGH (ref 3.5–5)
PROTHROM AB SERPL-ACNC: 13.1 SEC — HIGH (ref 9.95–12.87)
RBC # BLD: 2.8 M/UL — LOW (ref 4.7–6.1)
RBC # FLD: 15.9 % — HIGH (ref 11.5–14.5)
SAO2 % BLDA: 98 % — SIGNIFICANT CHANGE UP (ref 94–98)
SODIUM SERPL-SCNC: 139 MMOL/L — SIGNIFICANT CHANGE UP (ref 135–146)
TROPONIN T SERPL-MCNC: 0.02 NG/ML — HIGH
WBC # BLD: 13.55 K/UL — HIGH (ref 4.8–10.8)
WBC # FLD AUTO: 13.55 K/UL — HIGH (ref 4.8–10.8)

## 2019-01-01 PROCEDURE — 99291 CRITICAL CARE FIRST HOUR: CPT

## 2019-01-01 RX ORDER — TAMSULOSIN HYDROCHLORIDE 0.4 MG/1
0.4 CAPSULE ORAL AT BEDTIME
Qty: 0 | Refills: 0 | Status: DISCONTINUED | OUTPATIENT
Start: 2019-01-01 | End: 2019-01-09

## 2019-01-01 RX ORDER — MAGNESIUM SULFATE 500 MG/ML
2 VIAL (ML) INJECTION ONCE
Qty: 0 | Refills: 0 | Status: COMPLETED | OUTPATIENT
Start: 2019-01-01 | End: 2019-01-01

## 2019-01-01 RX ORDER — MONTELUKAST 4 MG/1
10 TABLET, CHEWABLE ORAL AT BEDTIME
Qty: 0 | Refills: 0 | Status: DISCONTINUED | OUTPATIENT
Start: 2019-01-01 | End: 2019-01-09

## 2019-01-01 RX ORDER — AMIODARONE HYDROCHLORIDE 400 MG/1
400 TABLET ORAL EVERY 12 HOURS
Qty: 0 | Refills: 0 | Status: DISCONTINUED | OUTPATIENT
Start: 2019-01-01 | End: 2019-01-09

## 2019-01-01 RX ORDER — PANTOPRAZOLE SODIUM 20 MG/1
40 TABLET, DELAYED RELEASE ORAL
Qty: 0 | Refills: 0 | Status: DISCONTINUED | OUTPATIENT
Start: 2019-01-01 | End: 2019-01-09

## 2019-01-01 RX ADMIN — Medication 50 MICROGRAM(S): at 06:20

## 2019-01-01 RX ADMIN — AMIODARONE HYDROCHLORIDE 400 MILLIGRAM(S): 400 TABLET ORAL at 22:17

## 2019-01-01 RX ADMIN — PANTOPRAZOLE SODIUM 40 MILLIGRAM(S): 20 TABLET, DELAYED RELEASE ORAL at 12:13

## 2019-01-01 RX ADMIN — CHLORHEXIDINE GLUCONATE 1 APPLICATION(S): 213 SOLUTION TOPICAL at 06:20

## 2019-01-01 RX ADMIN — AMIODARONE HYDROCHLORIDE 400 MILLIGRAM(S): 400 TABLET ORAL at 12:13

## 2019-01-01 RX ADMIN — TAMSULOSIN HYDROCHLORIDE 0.4 MILLIGRAM(S): 0.4 CAPSULE ORAL at 22:17

## 2019-01-01 RX ADMIN — MONTELUKAST 10 MILLIGRAM(S): 4 TABLET, CHEWABLE ORAL at 12:13

## 2019-01-01 RX ADMIN — ATORVASTATIN CALCIUM 10 MILLIGRAM(S): 80 TABLET, FILM COATED ORAL at 22:17

## 2019-01-01 RX ADMIN — CHLORHEXIDINE GLUCONATE 15 MILLILITER(S): 213 SOLUTION TOPICAL at 06:20

## 2019-01-01 RX ADMIN — PANTOPRAZOLE SODIUM 40 MILLIGRAM(S): 20 TABLET, DELAYED RELEASE ORAL at 22:17

## 2019-01-01 RX ADMIN — Medication 25 GRAM(S): at 03:40

## 2019-01-01 RX ADMIN — FINASTERIDE 5 MILLIGRAM(S): 5 TABLET, FILM COATED ORAL at 12:13

## 2019-01-01 NOTE — PROGRESS NOTE ADULT - SUBJECTIVE AND OBJECTIVE BOX
GUIDO VILCHIS  [id number]  78y Male    Indication for ICU admission:  Admit Date:18  ICU Date:   OR Date: IR embolization     morphine (Stomach Upset)    PAST MEDICAL & SURGICAL HISTORY:  CAD (coronary artery disease)  Ventricular tachycardia  NANDO on CPAP  COPD (chronic obstructive pulmonary disease)  Pacemaker  AICD (automatic cardioverter/defibrillator) present  Hypothyroidism  Atrial fibrillation  Congestive heart disease  Hypercholesteremia  AICD (automatic cardioverter/defibrillator) present  History of laparoscopic cholecystectomy    Home Medications:  ALPRAZolam 0.25 mg oral tablet: 1 tab(s) orally once a day, As Needed (23 Oct 2018 00:22)  finasteride 5 mg oral tablet: 1 tab(s) orally once a day (23 Oct 2018 00:22)  glyBURIDE 5 mg oral tablet: 1 tab(s) orally once a day (23 Oct 2018 00:22)  levothyroxine 50 mcg (0.05 mg) oral capsule: 1 cap(s) orally once a day (23 Oct 2018 00:22)  montelukast 10 mg oral tablet: 1 tab(s) orally once a day (23 Oct 2018 00:22)  pravastatin 20 mg oral tablet: 1 tab(s) orally once a day (23 Oct 2018 00:22)  tamsulosin 0.4 mg oral capsule: 1 cap(s) orally once a day (23 Oct 2018 00:22)        24HRS EVENT:  admitted  2 days s/p fall onto his left side, presented for left side abdominal pain.  Patient was started on levophed for hypotension.  CTA of the chest/abdomen which was significant for a splenic laceration/subcapsular hematoma. Patint takes eloquies for Afib.    Patient was taken to IR wher he underwent celiac angiography and embolization of the splenic artery.  2/2 hypotension an MTP was initiated and 8 units PRBC, 3 units FFP and 1 unit platelets were administered.   Over the next 24 hrs:  Neuro- sedated Precedex  Pulm- intubated, PSV 12/5 40%  CV- Continued pressor requirement. Levophed and vasopressin- Tolerating SBP 80's   2d Echo: 1. LV Ejection Fraction by Amaro's Method with a biplane EF of 25 %.   2. Severely decreased global left ventricular systolic function.   3. Mild mitral regurgitation.   4. Mild aortic regurgitation.  GI- NPO, NGT to lcws.  Abdomen distended, bladder pressures discontinues. Lactic acidosis improved (0.9)  Renal-DUYEN creat 2.7 from baseline 1.2-1.4.   UO 10-30 ml/hr, increasing overnight. Hypomagnesemia- repleting  Heme- S/P MTP hgb slowly downtrending 11>10>9,, has active T&S  HSQ on hold due to recent active hemmorhage- acute blood loss anemai  ID- willl need post splenectomy vaccines prior to d/c  MSK- bedrest  Endo- Hypothyroidism- on home synthroid    Code status- DNR    DVT PPX- on hold  GI PPX- protonix    Lines_-Kirti   Johnson-     ROS:  due to intubation and sedation a full 9 point ROS is not able to be obtained        DVT PTX:     GI PTX:    ***Tubes/Lines/Drains  ***  Peripheral IV  Central Venous Line     	Date   Arterial Line		                Date   [] PICC:         	[] Midline		[] Mediport             Urinary Catheter		Indication: Strict I&O    Date Placed:       REVIEW OF SYSTEMS    [x] A ten-point review of systems was otherwise negative except as noted.  [ ] Due to altered mental status/intubation, subjective information were not able to be obtained from the patient. History was obtained, to the extent possible, from review of the chart and collateral sources of information.    Daily     Daily Weight in k (2019 05:45)    Diet, NPO (18 @ 22:32)      CURRENT MEDS:  Neurologic Medications  dexmedetomidine Infusion 0.2 MICROgram(s)/kG/Hr IV Continuous <Continuous>    Respiratory Medications  montelukast 10 milliGRAM(s) Oral daily    Cardiovascular Medications  norepinephrine Infusion 0.043 MICROgram(s)/kG/Min IV Continuous <Continuous>    Gastrointestinal Medications  dextrose 5%. 1000 milliLiter(s) IV Continuous <Continuous>  pantoprazole  Injectable 40 milliGRAM(s) IV Push daily    Genitourinary Medications    Hematologic/Oncologic Medications    Antimicrobial/Immunologic Medications    Endocrine/Metabolic Medications  atorvastatin 10 milliGRAM(s) Oral at bedtime  dextrose 40% Gel 15 Gram(s) Oral once PRN Blood Glucose LESS THAN 70 milliGRAM(s)/deciliter  dextrose 50% Injectable 12.5 Gram(s) IV Push once  dextrose 50% Injectable 25 Gram(s) IV Push once  dextrose 50% Injectable 25 Gram(s) IV Push once  dextrose 50% Injectable 50 milliLiter(s) IV Push once  finasteride 5 milliGRAM(s) Oral daily  glucagon  Injectable 1 milliGRAM(s) IntraMuscular once PRN Glucose LESS THAN 70 milligrams/deciliter  insulin Infusion 2 Unit(s)/Hr IV Continuous <Continuous>  levothyroxine 50 MICROGram(s) Oral daily  vasopressin Infusion 0.04 Unit(s)/Min IV Continuous <Continuous>    Topical/Other Medications  chlorhexidine 0.12% Liquid 15 milliLiter(s) Oral Mucosa two times a day  chlorhexidine 4% Liquid 1 Application(s) Topical <User Schedule>      ICU Vital Signs Last 24 Hrs  T(C): 36.8 (2019 08:00), Max: 36.9 (31 Dec 2018 20:00)  T(F): 98.3 (2019 08:00), Max: 98.5 (31 Dec 2018 20:00)  HR: 88 (2019 08:15) (88 - 102)  BP: 136/81 (31 Dec 2018 15:30) (94/65 - 136/81)  BP(mean): 95 (31 Dec 2018 15:30) (47 - 95)  ABP: 82/74 (2019 08:15) (62/44 - 156/74)  ABP(mean): 78 (2019 08:15) (48 - 110)  RR: 17 (2019 08:15) (17 - 36)  SpO2: 100% (2019 08:15) (99% - 100%)      Adult Advanced Hemodynamics Last 24 Hrs  CVP(mm Hg): --  CVP(cm H2O): --  CO: 4.4 (2019 08:00) (3.4 - 4.6)  CI: 2.2 (2019 08:00) (1.9 - 2.4)  PA: --  PA(mean): --  PCWP: --  SVR: 1755 (2019 08:00) (1755 - 1755)  SVRI: 3317 (2019 08:00) (3317 - 3317)  PVR: --  PVRI: --    Mode: PS (Pressure Support)/ Spontaneous  FiO2: 40  PEEP: 5  PS: 12    ABG - ( 2019 03:40 )  pH, Arterial: 7.41  pH, Blood: x     /  pCO2: 34    /  pO2: 87    / HCO3: 22    / Base Excess: -2.5  /  SaO2: 96                  I&O's Summary    31 Dec 2018 07:  -  2019 07:00  --------------------------------------------------------  IN: 1075.7 mL / OUT: 435 mL / NET: 640.7 mL      I&O's Detail    31 Dec 2018 07:  -  2019 07:00  --------------------------------------------------------  IN:    dexmedetomidine Infusion: 316.9 mL    Enteral Tube Flush: 40 mL    insulin Infusion: 17 mL    norepinephrine Infusion: 100 mL    norepinephrine Infusion: 147 mL    norepinephrine Infusion: 402 mL    vasopressin Infusion: 28.8 mL    vasopressin Infusion: 24 mL  Total IN: 1075.7 mL    OUT:    Indwelling Catheter - Urethral: 435 mL  Total OUT: 435 mL    Total NET: 640.7 mL      LABS:  CAPILLARY BLOOD GLUCOSE      POCT Blood Glucose.: 126 mg/dL (2019 00:51)  POCT Blood Glucose.: 130 mg/dL (31 Dec 2018 23:11)  POCT Blood Glucose.: 150 mg/dL (31 Dec 2018 19:47)  POCT Blood Glucose.: 165 mg/dL (31 Dec 2018 16:23)  POCT Blood Glucose.: 205 mg/dL (31 Dec 2018 11:13)  POCT Blood Glucose.: 138 mg/dL (31 Dec 2018 08:44)                          8.2    13.55 )-----------( 181      ( 31 Dec 2018 23:30 )             23.8           139  |  103  |  47<H>  ----------------------------<  116<H>  5.1<H>   |  20  |  2.7<H>    Ca    8.0<L>      31 Dec 2018 23:30  Phos  5.1       Mg     1.8             PT/INR - ( 31 Dec 2018 23:30 )   PT: 13.10 sec;   INR: 1.14 ratio         PTT - ( 31 Dec 2018 23:30 )  PTT:24.4 sec  CARDIAC MARKERS ( 2019 05:00 )  x     / 0.02 ng/mL / x     / x     / 2.5 ng/mL  CARDIAC MARKERS ( 31 Dec 2018 11:00 )  x     / 0.04 ng/mL / x     / x     / x      CARDIAC MARKERS ( 31 Dec 2018 00:34 )  x     / 0.05 ng/mL / x     / x     / 4.1 ng/mL  CARDIAC MARKERS ( 30 Dec 2018 23:15 )  x     / x     / 83 U/L / x     / x      CARDIAC MARKERS ( 30 Dec 2018 18:52 )  x     / 0.04 ng/mL / x     / x     / x      CARDIAC MARKERS ( 30 Dec 2018 13:42 )  x     / 0.03 ng/mL / x     / x     / 4.1 ng/mL

## 2019-01-01 NOTE — PROGRESS NOTE ADULT - ASSESSMENT
ASSESSMENT:  78y Male with splenic laceration and hemorrhagic shock    PLAN:    Neurologic: Sedate with Precedex.    Neurologic Medications  dexmedetomidine Infusion 0.2 MICROgram(s)/kG/Hr IV Continuous <Continuous>    Respiratory: Intubated, PSV 12/5 40%Q4 ABG. AM CXR.  RR: 17 (19 @ 08:15) (17 - 36)  SpO2: 100% (19 @ 08:15) (99% - 100%)  Mode: PS (Pressure Support)/ Spontaneous, FiO2: 40, PEEP: 5, PS: 12  ( 2019 03:40 ) pH: 7.41  /  pCO2: 34    /  pO2: 87    / HCO3: 22    / Base Excess: -2.5  /  SaO2: 96      ( 31 Dec 2018 12:52 ) pH: 7.40  /  pCO2: 31    /  pO2: 114   / HCO3: 19    / Base Excess: -5.0  /  SaO2: 98      ( 31 Dec 2018 04:24 ) pH: 7.37  /  pCO2: 33    /  pO2: 93    / HCO3: 19    / Base Excess: -5.5  /  SaO2: 98        Respiratory Medications  montelukast 10 milliGRAM(s) Oral daily    Cardiovascular: Continued pressor requirement. Levophed and vasopressin- Tolerating SBP 80's   2d Echo: 1. LV Ejection Fraction by Amaro's Method with a biplane EF of 25 %.   2. Severely decreased global left ventricular systolic function.   3. Mild mitral regurgitation.   4. Mild aortic regurgitation.    HR: 88 (19 @ 08:15) (88 - 102)  BP: 136/81 (18 @ 15:30) (94/65 - 136/81)  BP(mean): 95 (18 @ 15:30) (47 - 95)  ABP: 82/74 (19 @ 08:15) (62/44 - 156/74)  ABP(mean): 78 (19 @ 08:15) (48 - 110)    Cardiovascular Medications  norepinephrine Infusion 0.043 MICROgram(s)/kG/Min IV Continuous <Continuous>  vasopressin Infusion 0.04 Unit(s)/Min IV Continuous <Continuous>    EKG:   Troponin:   0.02 (19 @ 05:00)  0.04 (18 @ 11:00)  0.05 (18 @ 00:34)    BNP:  5096 (18 @ 00:34)    Gastrointestinal/Nutrition: NPO, NGT to lcws.  Abdomen distended, bladder pressures discontinues. Lactic acidosis improved (0.9)    Gastrointestinal Medications  pantoprazole  Injectable 40 milliGRAM(s) IV Push daily    Height (cm): 165.1 (18 @ 15:00)  Weight (kg): 81.6 (18 @ 15:00)  BMI (kg/m2): 29.9 (18 @ 15:00)    Renal/Genitourinary: -DUYEN creat 2.7 from baseline 1.2-1.4. UO 10-30 ml/hr, increasing overnight. Hypomagnesemia- repleting  -Strict I&O  -Replete electrolytes PRN    BUN/Creat:   47/2.7 (18 @ 23:30)  43/2.6 (18 @ 00:34)  43/2.5 (18 @ 23:10)    Na:  139 (18 @ 23:30)  135 (18 @ 00:34)  133 (18 @ 23:10)    K:  5.1 (18 @ 23:30)  4.8 (18 @ 00:34)  5.9 (18 @ 23:10)    M.8 (18 @ 23:30)  1.8 (18 @ 23:10)    Phos:   5.1 (18 @ 23:30)  7.0 (18 @ 23:10)    Hematologic:  S/P MTP hgb slowly downtrending 11>10>9,, has active T&S, HSQ on hold due to recent active hemorrhage acute blood loss anemia    Hb:  8.2 (18 @ 23:30)  9.0 (18 @ 11:00)  10.3 (18 @ 00:34)    Plt:  181 (18 @ 23:30)  161 (18 @ 11:00)    INR:  1.14 (18 @ 23:30)  1.08 (18 @ 11:00)    PTT:  24.4 (18 @ 23:30)  23.1 (18 @ 11:00)    Lactate (ABG):  0.7 (19 @ 03:40)  0.9 (18 @ 12:52)    Lactate (Blood):  2.5 (18 @ 00:34)      Infectious Disease: 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  T(F): 98.3 (19 @ 08:00), Max: 98.5 (18 @ 20:00)  WBC: 13.55 (18 @ 23:30)  13.92 (18 @ 11:00)    Endocrine: Hypothyroidism- on home synthroid    Endocrine/Metabolic Medications  atorvastatin 10 milliGRAM(s) Oral at bedtime  finasteride 5 milliGRAM(s) Oral daily  glucagon  Injectable 1 milliGRAM(s) IntraMuscular once PRN Glucose LESS THAN 70 milligrams/deciliter  insulin Infusion 2 Unit(s)/Hr IV Continuous <Continuous>  levothyroxine 50 MICROGram(s) Oral daily  vasopressin Infusion 0.04 Unit(s)/Min IV Continuous <Continuous>    Glucose:  126 (19 @ 00:51)  130 (18 @ 23:11)  150 (18 @ 19:47)  165 (18 @ 16:23)    Musculoskeletal: Increase Activity as Tolerated.     Lines/Tubes: TLC, B/L peripheral IV, Johnson cath    Disposition: Continue ICU ASSESSMENT:  78y Male with splenic laceration and hemorrhagic shock    PLAN:    Neurologic: Sedate with Precedex.    Neurologic Medications  dexmedetomidine Infusion 0.2 MICROgram(s)/kG/Hr IV Continuous <Continuous>    Respiratory: Intubated, PSV 12/5 40%Q4 ABG. AM CXR.  RR: 17 (19 @ 08:15) (17 - 36)  SpO2: 100% (19 @ 08:15) (99% - 100%)  Mode: PS (Pressure Support)/ Spontaneous, FiO2: 40, PEEP: 5, PS: 12  ( 2019 03:40 ) pH: 7.41  /  pCO2: 34    /  pO2: 87    / HCO3: 22    / Base Excess: -2.5  /  SaO2: 96      ( 31 Dec 2018 12:52 ) pH: 7.40  /  pCO2: 31    /  pO2: 114   / HCO3: 19    / Base Excess: -5.0  /  SaO2: 98      ( 31 Dec 2018 04:24 ) pH: 7.37  /  pCO2: 33    /  pO2: 93    / HCO3: 19    / Base Excess: -5.5  /  SaO2: 98        Respiratory Medications  montelukast 10 milliGRAM(s) Oral daily    Cardiovascular: Continued pressor requirement. Levophed 0.065 (5.3) and vasopressin 0.04- Tolerating SBP 80's   2d Echo: 1. LV Ejection Fraction by Amaro's Method with a biplane EF of 25 %.   2. Severely decreased global left ventricular systolic function.   3. Mild mitral regurgitation.   4. Mild aortic regurgitation.    HR: 88 (19 @ 08:15) (88 - 102)  BP: 136/81 (18 @ 15:30) (94/65 - 136/81)  BP(mean): 95 (18 @ 15:30) (47 - 95)  ABP: 82/74 (19 @ 08:15) (62/44 - 156/74)  ABP(mean): 78 (19 @ 08:15) (48 - 110)    Cardiovascular Medications  norepinephrine Infusion 0.043 MICROgram(s)/kG/Min IV Continuous <Continuous>  vasopressin Infusion 0.04 Unit(s)/Min IV Continuous <Continuous>    EKG:   Troponin:   0.02 (19 @ 05:00)  0.04 (18 @ 11:00)  0.05 (18 @ 00:34)    BNP:  5096 (18 @ 00:34)    Gastrointestinal/Nutrition: NPO, NGT to lcws.  Abdomen distended, bladder pressures discontinues. Lactic acidosis improved (0.9)    Gastrointestinal Medications  pantoprazole  Injectable 40 milliGRAM(s) IV Push daily    Height (cm): 165.1 (18 @ 15:00)  Weight (kg): 81.6 (18 @ 15:00)  BMI (kg/m2): 29.9 (18 @ 15:00)    Renal/Genitourinary: -DUYEN creat 2.7 from baseline 1.2-1.4. UO 10-30 ml/hr, increasing overnight. Hypomagnesemia- repleting  -Strict I&O  -Replete electrolytes PRN    BUN/Creat:   47/2.7 (18 @ 23:30)  43/2.6 (18 @ 00:34)  43/2.5 (18 @ 23:10)    Na:  139 (18 @ 23:30)  135 (18 @ 00:34)  133 (18 @ 23:10)    K:  5.1 (18 @ 23:30)  4.8 (18 @ 00:34)  5.9 (18 @ 23:10)    M.8 (18 @ 23:30)  1.8 (18 @ 23:10)    Phos:   5.1 (18 @ 23:30)  7.0 (18 @ 23:10)    Hematologic:  S/P MTP hgb slowly downtrending 11>10>9,, has active T&S, HSQ on hold due to recent active hemorrhage acute blood loss anemia    Hb:  8.2 (18 @ 23:30)  9.0 (18 @ 11:00)  10.3 (18 @ 00:34)    Plt:  181 (18 @ 23:30)  161 (12-31-18 @ 11:00)    INR:  1.14 (18 @ 23:30)  1.08 (18 @ 11:00)    PTT:  24.4 (18 @ 23:30)  23.1 (18 @ 11:00)    Lactate (ABG):  0.7 (19 @ 03:40)  0.9 (18 @ 12:52)    Lactate (Blood):  2.5 (18 @ 00:34)      Infectious Disease: 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  T(F): 98.3 (19 @ 08:00), Max: 98.5 (18 @ 20:00)  WBC: 13.55 (18 @ 23:30)  13.92 (18 @ 11:00)    Endocrine: Hypothyroidism- on home synthroid    Endocrine/Metabolic Medications  atorvastatin 10 milliGRAM(s) Oral at bedtime  finasteride 5 milliGRAM(s) Oral daily  glucagon  Injectable 1 milliGRAM(s) IntraMuscular once PRN Glucose LESS THAN 70 milligrams/deciliter  insulin Infusion 2 Unit(s)/Hr IV Continuous <Continuous>  levothyroxine 50 MICROGram(s) Oral daily  vasopressin Infusion 0.04 Unit(s)/Min IV Continuous <Continuous>    Glucose:  126 (19 @ 00:51)  130 (18 @ 23:11)  150 (18 @ 19:47)  165 (18 @ 16:23)    Musculoskeletal: Increase Activity as Tolerated.     Lines/Tubes: TLC, B/L peripheral IV, Johnson cath    Disposition: Continue ICU ASSESSMENT:  78y Male with splenic laceration and hemorrhagic shock    PLAN:    Neurologic: Sedate with Precedex.    Neurologic Medications  dexmedetomidine Infusion 0.2 MICROgram(s)/kG/Hr IV Continuous <Continuous>    Respiratory: Intubated, PSV 12/5 40%Q4 ABG. AM CXR.  RR: 17 (19 @ 08:15) (17 - 36)  SpO2: 100% (19 @ 08:15) (99% - 100%)  Mode: PS (Pressure Support)/ Spontaneous, FiO2: 40, PEEP: 5, PS: 12  ( 2019 03:40 ) pH: 7.41  /  pCO2: 34    /  pO2: 87    / HCO3: 22    / Base Excess: -2.5  /  SaO2: 96      ( 31 Dec 2018 12:52 ) pH: 7.40  /  pCO2: 31    /  pO2: 114   / HCO3: 19    / Base Excess: -5.0  /  SaO2: 98      ( 31 Dec 2018 04:24 ) pH: 7.37  /  pCO2: 33    /  pO2: 93    / HCO3: 19    / Base Excess: -5.5  /  SaO2: 98        Respiratory Medications  montelukast 10 milliGRAM(s) Oral daily    Cardiovascular: Continued pressor requirement. Levophed 0.065 (5.3) and vasopressin 0.04- Tolerating SBP 80's   2d Echo: 1. LV Ejection Fraction by Amaro's Method with a biplane EF of 25 %.   2. Severely decreased global left ventricular systolic function.   3. Mild mitral regurgitation.   4. Mild aortic regurgitation.    HR: 88 (19 @ 08:15) (88 - 102)  BP: 136/81 (18 @ 15:30) (94/65 - 136/81)  BP(mean): 95 (18 @ 15:30) (47 - 95)  ABP: 82/74 (19 @ 08:15) (62/44 - 156/74)  ABP(mean): 78 (19 @ 08:15) (48 - 110)    Cardiovascular Medications  norepinephrine Infusion 0.043 MICROgram(s)/kG/Min IV Continuous <Continuous>  vasopressin Infusion 0.04 Unit(s)/Min IV Continuous <Continuous>    EKG:   Troponin:   0.02 (19 @ 05:00)  0.04 (18 @ 11:00)  0.05 (18 @ 00:34)    BNP:  5096 (18 @ 00:34)    Gastrointestinal/Nutrition: NPO, NGT to lcws. soft Abdomen distended, low bladder pressures . Lactic acidosis improved (0.9)    Gastrointestinal Medications  pantoprazole  Injectable 40 milliGRAM(s) IV Push daily    Height (cm): 165.1 (18 @ 15:00)  Weight (kg): 81.6 (18 @ 15:00)  BMI (kg/m2): 29.9 (18 @ 15:00)    Renal/Genitourinary: -DUYEN creat 2.7 from baseline 1.2-1.4. UO 10-30 ml/hr, increasing overnight. Hypomagnesemia- repleting  -Strict I&O  -Replete electrolytes PRN    BUN/Creat:   47/2.7 (18 @ 23:30)  43/2.6 (18 @ 00:34)  43/2.5 (18 @ 23:10)    Na:  139 (18 @ 23:30)  135 (18 @ 00:34)  133 (18 @ 23:10)    K:  5.1 (18 @ 23:30)  4.8 (18 @ 00:34)  5.9 (18 @ 23:10)    M.8 (18 @ 23:30)  1.8 (18 @ 23:10)    Phos:   5.1 (18 @ 23:30)  7.0 (18 @ 23:10)    Hematologic:  S/P MTP hgb slowly downtrending 11>10>9,, has active T&S, HSQ on hold due to recent active hemorrhage acute blood loss anemia    Hb:  8.2 (18 @ 23:30)  9.0 (18 @ 11:00)  10.3 (18 @ 00:34)    Plt:  181 (18 @ 23:30)  161 (12-31-18 @ 11:00)    INR:  1.14 (18 @ 23:30)  1.08 (18 @ 11:00)    PTT:  24.4 (18 @ 23:30)  23.1 (18 @ 11:00)    Lactate (ABG):  0.7 (19 @ 03:40)  0.9 (18 @ 12:52)    Lactate (Blood):  2.5 (18 @ 00:34)      Infectious Disease: 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  T(F): 98.3 (19 @ 08:00), Max: 98.5 (18 @ 20:00)  WBC: 13.55 (18 @ 23:30)  13.92 (18 @ 11:00)    Endocrine: Hypothyroidism- on home synthroid    Endocrine/Metabolic Medications  atorvastatin 10 milliGRAM(s) Oral at bedtime  finasteride 5 milliGRAM(s) Oral daily  glucagon  Injectable 1 milliGRAM(s) IntraMuscular once PRN Glucose LESS THAN 70 milligrams/deciliter  insulin Infusion 2 Unit(s)/Hr IV Continuous <Continuous>  levothyroxine 50 MICROGram(s) Oral daily  vasopressin Infusion 0.04 Unit(s)/Min IV Continuous <Continuous>    Glucose:  126 (19 @ 00:51)  130 (18 @ 23:11)  150 (18 @ 19:47)  165 (18 @ 16:23)    Musculoskeletal: Increase Activity as Tolerated.     Lines/Tubes: TLC, B/L peripheral IV, Johnson cath    Disposition: Continue ICU ASSESSMENT:  78y Male with splenic laceration and hemorrhagic shock    PLAN:    Neurologic: Alert Following commands, Sedate with Precedex.    Neurologic Medications  dexmedetomidine Infusion 0.2 MICROgram(s)/kG/Hr IV Continuous <Continuous>    Respiratory: Intubated, PSV 12/5 40%Q4 ABG. AM CXR.  RR: 17 (19 @ 08:15) (17 - 36)  SpO2: 100% (19 @ 08:15) (99% - 100%)  Mode: PS (Pressure Support)/ Spontaneous, FiO2: 40, PEEP: 5, PS: 12  ( 2019 03:40 ) pH: 7.41  /  pCO2: 34    /  pO2: 87    / HCO3: 22    / Base Excess: -2.5  /  SaO2: 96      ( 31 Dec 2018 12:52 ) pH: 7.40  /  pCO2: 31    /  pO2: 114   / HCO3: 19    / Base Excess: -5.0  /  SaO2: 98      ( 31 Dec 2018 04:24 ) pH: 7.37  /  pCO2: 33    /  pO2: 93    / HCO3: 19    / Base Excess: -5.5  /  SaO2: 98        Respiratory Medications  montelukast 10 milliGRAM(s) Oral daily    Cardiovascular: Continued pressor requirement. Levophed 0.065 (5.3) and vasopressin 0.04- Tolerating SBP 80's   2d Echo: 1. LV Ejection Fraction by Amaro's Method with a biplane EF of 25 %.   2. Severely decreased global left ventricular systolic function.   3. Mild mitral regurgitation.   4. Mild aortic regurgitation.    HR: 88 (19 @ 08:15) (88 - 102)  BP: 136/81 (18 @ 15:30) (94/65 - 136/81)  BP(mean): 95 (18 @ 15:30) (47 - 95)  ABP: 82/74 (19 @ 08:15) (62/44 - 156/74)  ABP(mean): 78 (19 @ 08:15) (48 - 110)    Cardiovascular Medications  norepinephrine Infusion 0.043 MICROgram(s)/kG/Min IV Continuous <Continuous>  vasopressin Infusion 0.04 Unit(s)/Min IV Continuous <Continuous>    EKG:   Troponin:   0.02 (19 @ 05:00)  0.04 (18 @ 11:00)  0.05 (18 @ 00:34)    BNP:  5096 (18 @ 00:34)    Gastrointestinal/Nutrition: NPO, NGT to lcws. soft Abdomen distended, low bladder pressures . Lactic acidosis improved (0.9)    Gastrointestinal Medications  pantoprazole  Injectable 40 milliGRAM(s) IV Push daily    Height (cm): 165.1 (18 @ 15:00)  Weight (kg): 81.6 (18 @ 15:00)  BMI (kg/m2): 29.9 (18 @ 15:00)    Renal/Genitourinary: -DUYEN creat 2.7 from baseline 1.2-1.4. UO 15-30 ml/hr, increasing overnight. Hypomagnesemia- repleting  -Strict I&O  -Replete electrolytes PRN    BUN/Creat:   47/2.7 (18 @ 23:30)  43/2.6 (18 @ 00:34)  43/2.5 (18 @ 23:10)    Na:  139 (18 @ 23:30)  135 (18 @ 00:34)  133 (18 @ 23:10)    K:  5.1 (18 @ 23:30)  4.8 (18 @ 00:34)  5.9 (18 @ 23:10)    M.8 (18 @ 23:30)  1.8 (18 @ 23:10)    Phos:   5.1 (18 @ 23:30)  7.0 (18 @ 23:10)    Hematologic:  S/P MTP hgb slowly downtrending 11>10>9,, has active T&S, HSQ on hold due to recent active hemorrhage acute blood loss anemia    Hb:  8.2 (18 @ 23:30)  9.0 (18 @ 11:00)  10.3 (18 @ 00:34)    Plt:  181 (18 @ 23:30)  161 (18 @ 11:00)    INR:  1.14 (18 @ 23:30)  1.08 (18 @ 11:00)    PTT:  24.4 (18 @ 23:30)  23.1 (18 @ 11:00)    Lactate (ABG):  0.7 (19 @ 03:40)  0.9 (18 @ 12:52)    Lactate (Blood):  2.5 (18 @ 00:34)    Infectious Disease: 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 On insulin Drip wean off.    Endocrine/Metabolic Medications  atorvastatin 10 milliGRAM(s) Oral at bedtime  finasteride 5 milliGRAM(s) Oral daily  glucagon  Injectable 1 milliGRAM(s) IntraMuscular once PRN Glucose LESS THAN 70 milligrams/deciliter  insulin Infusion 2 Unit(s)/Hr IV Continuous <Continuous>  levothyroxine 50 MICROGram(s) Oral daily  vasopressin Infusion 0.04 Unit(s)/Min IV Continuous <Continuous>    Glucose:  126 (19 @ 00:51)  130 (18 @ 23:11)  150 (18 @ 19:47)  165 (18 @ 16:23)    Musculoskeletal: Increase Activity as Tolerated.     Lines/Tubes: TLC, B/L peripheral IV, Johnson cath    Disposition: Continue ICU ASSESSMENT:  78y Male with splenic laceration and hemorrhagic shock    PLAN:    Neurologic: Alert Following commands, Sedate with Precedex.    Neurologic Medications  dexmedetomidine Infusion 0.2 MICROgram(s)/kG/Hr IV Continuous <Continuous>    Respiratory: Intubated, PSV 12/5 40%Q4 ABG. AM CXR.  RR: 17 (19 @ 08:15) (17 - 36)  SpO2: 100% (19 @ 08:15) (99% - 100%)  Mode: PS (Pressure Support)/ Spontaneous, FiO2: 40, PEEP: 5, PS: 12  ( 2019 03:40 ) pH: 7.41  /  pCO2: 34    /  pO2: 87    / HCO3: 22    / Base Excess: -2.5  /  SaO2: 96      ( 31 Dec 2018 12:52 ) pH: 7.40  /  pCO2: 31    /  pO2: 114   / HCO3: 19    / Base Excess: -5.0  /  SaO2: 98      ( 31 Dec 2018 04:24 ) pH: 7.37  /  pCO2: 33    /  pO2: 93    / HCO3: 19    / Base Excess: -5.5  /  SaO2: 98        Respiratory Medications  montelukast 10 milliGRAM(s) Oral daily    Cardiovascular: Continued pressor requirement. Levophed 0.065 (5.3) and vasopressin 0.04- Tolerating SBP 80's   2d Echo: 1. LV Ejection Fraction by Amaro's Method with a biplane EF of 25 %.   2. Severely decreased global left ventricular systolic function.   3. Mild mitral regurgitation.   4. Mild aortic regurgitation.    HR: 88 (19 @ 08:15) (88 - 102)  BP: 136/81 (18 @ 15:30) (94/65 - 136/81)  BP(mean): 95 (18 @ 15:30) (47 - 95)  ABP: 82/74 (19 @ 08:15) (62/44 - 156/74)  ABP(mean): 78 (19 @ 08:15) (48 - 110)    Cardiovascular Medications  norepinephrine Infusion 0.043 MICROgram(s)/kG/Min IV Continuous <Continuous>  vasopressin Infusion 0.04 Unit(s)/Min IV Continuous <Continuous>    EKG:   Troponin:   0.02 (19 @ 05:00)  0.04 (18 @ 11:00)  0.05 (18 @ 00:34)    BNP:  5096 (18 @ 00:34)    Gastrointestinal/Nutrition: NPO, NGT to lcws. soft Abdomen distended, low bladder pressures . Lactic acidosis improved (0.9)    Gastrointestinal Medications  pantoprazole  Injectable 40 milliGRAM(s) IV Push daily    Height (cm): 165.1 (18 @ 15:00)  Weight (kg): 81.6 (18 @ 15:00)  BMI (kg/m2): 29.9 (18 @ 15:00)    Renal/Genitourinary: -DUYEN creat 2.7 from baseline 1.2-1.4. UO 15-30 ml/hr, increasing overnight. Hypomagnesemia- repleting  -Strict I&O  -Replete electrolytes PRN    BUN/Creat:   47/2.7 (18 @ 23:30)  43/2.6 (18 @ 00:34)  43/2.5 (18 @ 23:10)    Na:  139 (18 @ 23:30)  135 (18 @ 00:34)  133 (18 @ 23:10)    K:  5.1 (18 @ 23:30)  4.8 (18 @ 00:34)  5.9 (18 @ 23:10)    M.8 (18 @ 23:30)  1.8 (18 @ 23:10)    Phos:   5.1 (18 @ 23:30)  7.0 (18 @ 23:10)    Hematologic:  S/P MTP hgb slowly downtrending 11>10>9,, has active T&S, HSQ on hold due to recent active hemorrhage acute blood loss anemia    Hb:  8.2 (18 @ 23:30)  9.0 (18 @ 11:00)  10.3 (18 @ 00:34)    Plt:  181 (18 @ 23:30)  161 (18 @ 11:00)    INR:  1.14 (18 @ 23:30)  1.08 (18 @ 11:00)    PTT:  24.4 (18 @ 23:30)  23.1 (18 @ 11:00)    Lactate (ABG):  0.7 (19 @ 03:40)  0.9 (18 @ 12:52)    Lactate (Blood):  2.5 (18 @ 00:34)    Infectious Disease: 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 Hx of DM, takes glyBURIDE 5 mg at home,  On insulin Drip wean off and start ISS.    Endocrine/Metabolic Medications  atorvastatin 10 milliGRAM(s) Oral at bedtime  finasteride 5 milliGRAM(s) Oral daily  glucagon  Injectable 1 milliGRAM(s) IntraMuscular once PRN Glucose LESS THAN 70 milligrams/deciliter  insulin Infusion 2 Unit(s)/Hr IV Continuous <Continuous>  levothyroxine 50 MICROGram(s) Oral daily  vasopressin Infusion 0.04 Unit(s)/Min IV Continuous <Continuous>    Glucose:  126 (19 @ 00:51)  130 (18 @ 23:11)  150 (18 @ 19:47)  165 (18 @ 16:23)    Musculoskeletal: Increase Activity as Tolerated.     Lines/Tubes: TLC, B/L peripheral IV, Johnson cath    Disposition: Continue ICU ASSESSMENT:  78y Male with splenic laceration and hemorrhagic shock    PLAN:    Neurologic: Alert Following commands, Sedate with Precedex.    Neurologic Medications  dexmedetomidine Infusion 0.2 MICROgram(s)/kG/Hr IV Continuous <Continuous>    Respiratory: Intubated, PSV 12/5 40%Q4 ABG. AM CXR.  RR: 17 (19 @ 08:15) (17 - 36)  SpO2: 100% (19 @ 08:15) (99% - 100%)  Mode: PS (Pressure Support)/ Spontaneous, FiO2: 40, PEEP: 5, PS: 12  ( 2019 03:40 ) pH: 7.41  /  pCO2: 34    /  pO2: 87    / HCO3: 22    / Base Excess: -2.5  /  SaO2: 96      ( 31 Dec 2018 12:52 ) pH: 7.40  /  pCO2: 31    /  pO2: 114   / HCO3: 19    / Base Excess: -5.0  /  SaO2: 98      ( 31 Dec 2018 04:24 ) pH: 7.37  /  pCO2: 33    /  pO2: 93    / HCO3: 19    / Base Excess: -5.5  /  SaO2: 98        Respiratory Medications  montelukast 10 milliGRAM(s) Oral daily    Cardiovascular:   Continued pressor requirement. Levophed 0.065 (5.3) and vasopressin 0.04- Tolerating SBP 80's   will restart amiodarone.     2d Echo: 1. LV Ejection Fraction by Amaro's Method with a biplane EF of 25 %.   2. Severely decreased global left ventricular systolic function.   3. Mild mitral regurgitation.   4. Mild aortic regurgitation.    HR: 88 (19 @ 08:15) (88 - 102)  BP: 136/81 (18 @ 15:30) (94/65 - 136/81)  BP(mean): 95 (18 @ 15:30) (47 - 95)  ABP: 82/74 (19 @ 08:15) (62/44 - 156/74)  ABP(mean): 78 (19 @ 08:15) (48 - 110)    Cardiovascular Medications  norepinephrine Infusion 0.043 MICROgram(s)/kG/Min IV Continuous <Continuous>  vasopressin Infusion 0.04 Unit(s)/Min IV Continuous <Continuous>    EKG:   Troponin:   0.02 (19 @ 05:00)  0.04 (18 @ 11:00)  0.05 (18 00:34)    BNP:  5096 (18:34)    Gastrointestinal/Nutrition: NPO, NGT to lcws. soft Abdomen distended, low bladder pressures . Lactic acidosis improved (0.9)    Gastrointestinal Medications  pantoprazole  Injectable 40 milliGRAM(s) IV Push daily    Height (cm): 165.1 (18 @ 15:00)  Weight (kg): 81.6 (18 @ 15:00)  BMI (kg/m2): 29.9 (18 @ 15:00)    Renal/Genitourinary: -DUYEN creat 2.7 from baseline 1.2-1.4. UO 15-30 ml/hr, increasing overnight. Hypomagnesemia- repleting  -Strict I&O  -Replete electrolytes PRN    BUN/Creat:   47/2.7 (18 @ 23:30)  43/2.6 (18 @ 00:34)  43/2.5 (18 @ 23:10)    Na:  139 (18 @ 23:30)  135 (18 @ 00:34)  133 (18 @ 23:10)    K:  5.1 (18 @ 23:30)  4.8 (18 @ 00:34)  5.9 (18 @ 23:10)    M.8 (18 @ 23:30)  1.8 (18 @ 23:10)    Phos:   5.1 (18 @ 23:30)  7.0 (18 @ 23:10)    Hematologic:  S/P MTP hgb slowly downtrending 11>10>9,, has active T&S, HSQ on hold due to recent active hemorrhage acute blood loss anemia    Hb:  8.2 (18 @ 23:30)  9.0 (18 @ 11:00)  10.3 (18 @ 00:34)    Plt:  181 (12-31-18 @ 23:30)  161 (18 @ 11:00)    INR:  1.14 (18 @ 23:30)  1.08 (18 @ 11:00)    PTT:  24.4 (18 @ 23:30)  23.1 (18 @ 11:00)    Lactate (ABG):  0.7 (19 @ 03:40)  0.9 (18 @ 12:52)    Lactate (Blood):  2.5 (18 @ 00:34)    Infectious Disease: 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 Hx of DM, takes glyBURIDE 5 mg at home,  On insulin Drip wean off and start ISS.    Endocrine/Metabolic Medications  atorvastatin 10 milliGRAM(s) Oral at bedtime  finasteride 5 milliGRAM(s) Oral daily  glucagon  Injectable 1 milliGRAM(s) IntraMuscular once PRN Glucose LESS THAN 70 milligrams/deciliter  insulin Infusion 2 Unit(s)/Hr IV Continuous <Continuous>  levothyroxine 50 MICROGram(s) Oral daily  vasopressin Infusion 0.04 Unit(s)/Min IV Continuous <Continuous>    Glucose:  126 (19 @ 00:51)  130 (18 @ 23:11)  150 (18 @ 19:47)  165 (18 @ 16:23)    Musculoskeletal: Increase Activity as Tolerated.     Lines/Tubes: TLC, B/L peripheral IV, Johnson cath    Disposition: Continue ICU

## 2019-01-02 LAB
ANION GAP SERPL CALC-SCNC: 17 MMOL/L — HIGH (ref 7–14)
APTT BLD: 25.2 SEC — LOW (ref 27–39.2)
APTT BLD: 25.4 SEC — LOW (ref 27–39.2)
BASOPHILS # BLD AUTO: 0.01 K/UL — SIGNIFICANT CHANGE UP (ref 0–0.2)
BASOPHILS NFR BLD AUTO: 0.1 % — SIGNIFICANT CHANGE UP (ref 0–1)
BUN SERPL-MCNC: 44 MG/DL — HIGH (ref 10–20)
CALCIUM SERPL-MCNC: 8.1 MG/DL — LOW (ref 8.5–10.1)
CHLORIDE SERPL-SCNC: 102 MMOL/L — SIGNIFICANT CHANGE UP (ref 98–110)
CO2 SERPL-SCNC: 20 MMOL/L — SIGNIFICANT CHANGE UP (ref 17–32)
CREAT SERPL-MCNC: 2.1 MG/DL — HIGH (ref 0.7–1.5)
EOSINOPHIL # BLD AUTO: 0 K/UL — SIGNIFICANT CHANGE UP (ref 0–0.7)
EOSINOPHIL NFR BLD AUTO: 0 % — SIGNIFICANT CHANGE UP (ref 0–8)
GLUCOSE BLDC GLUCOMTR-MCNC: 114 MG/DL — HIGH (ref 70–99)
GLUCOSE BLDC GLUCOMTR-MCNC: 120 MG/DL — HIGH (ref 70–99)
GLUCOSE BLDC GLUCOMTR-MCNC: 131 MG/DL — HIGH (ref 70–99)
GLUCOSE BLDC GLUCOMTR-MCNC: 143 MG/DL — HIGH (ref 70–99)
GLUCOSE BLDC GLUCOMTR-MCNC: 165 MG/DL — HIGH (ref 70–99)
GLUCOSE BLDC GLUCOMTR-MCNC: 166 MG/DL — HIGH (ref 70–99)
GLUCOSE BLDC GLUCOMTR-MCNC: 179 MG/DL — HIGH (ref 70–99)
GLUCOSE SERPL-MCNC: 128 MG/DL — HIGH (ref 70–99)
HCT VFR BLD CALC: 20.2 % — LOW (ref 42–52)
HCT VFR BLD CALC: 21.1 % — LOW (ref 42–52)
HCT VFR BLD CALC: 22.9 % — LOW (ref 42–52)
HCT VFR BLD CALC: 24.2 % — LOW (ref 42–52)
HGB BLD-MCNC: 6.8 G/DL — CRITICAL LOW (ref 14–18)
HGB BLD-MCNC: 7.1 G/DL — CRITICAL LOW (ref 14–18)
HGB BLD-MCNC: 7.8 G/DL — LOW (ref 14–18)
HGB BLD-MCNC: 8.1 G/DL — LOW (ref 14–18)
IMM GRANULOCYTES NFR BLD AUTO: 1.4 % — HIGH (ref 0.1–0.3)
INR BLD: 1.16 RATIO — SIGNIFICANT CHANGE UP (ref 0.65–1.3)
INR BLD: 1.26 RATIO — SIGNIFICANT CHANGE UP (ref 0.65–1.3)
LYMPHOCYTES # BLD AUTO: 0.45 K/UL — LOW (ref 1.2–3.4)
LYMPHOCYTES # BLD AUTO: 2.9 % — LOW (ref 20.5–51.1)
MAGNESIUM SERPL-MCNC: 2.2 MG/DL — SIGNIFICANT CHANGE UP (ref 1.8–2.4)
MCHC RBC-ENTMCNC: 29.2 PG — SIGNIFICANT CHANGE UP (ref 27–31)
MCHC RBC-ENTMCNC: 29.8 PG — SIGNIFICANT CHANGE UP (ref 27–31)
MCHC RBC-ENTMCNC: 29.8 PG — SIGNIFICANT CHANGE UP (ref 27–31)
MCHC RBC-ENTMCNC: 30 PG — SIGNIFICANT CHANGE UP (ref 27–31)
MCHC RBC-ENTMCNC: 33.5 G/DL — SIGNIFICANT CHANGE UP (ref 32–37)
MCHC RBC-ENTMCNC: 33.6 G/DL — SIGNIFICANT CHANGE UP (ref 32–37)
MCHC RBC-ENTMCNC: 33.7 G/DL — SIGNIFICANT CHANGE UP (ref 32–37)
MCHC RBC-ENTMCNC: 34.1 G/DL — SIGNIFICANT CHANGE UP (ref 32–37)
MCV RBC AUTO: 87.4 FL — SIGNIFICANT CHANGE UP (ref 80–94)
MCV RBC AUTO: 87.4 FL — SIGNIFICANT CHANGE UP (ref 80–94)
MCV RBC AUTO: 88.6 FL — SIGNIFICANT CHANGE UP (ref 80–94)
MCV RBC AUTO: 89 FL — SIGNIFICANT CHANGE UP (ref 80–94)
MONOCYTES # BLD AUTO: 1.78 K/UL — HIGH (ref 0.1–0.6)
MONOCYTES NFR BLD AUTO: 11.7 % — HIGH (ref 1.7–9.3)
NEUTROPHILS # BLD AUTO: 12.81 K/UL — HIGH (ref 1.4–6.5)
NEUTROPHILS NFR BLD AUTO: 83.9 % — HIGH (ref 42.2–75.2)
NRBC # BLD: 0 /100 WBCS — SIGNIFICANT CHANGE UP (ref 0–0)
PHOSPHATE SERPL-MCNC: 3.4 MG/DL — SIGNIFICANT CHANGE UP (ref 2.1–4.9)
PLATELET # BLD AUTO: 182 K/UL — SIGNIFICANT CHANGE UP (ref 130–400)
PLATELET # BLD AUTO: 186 K/UL — SIGNIFICANT CHANGE UP (ref 130–400)
POTASSIUM SERPL-MCNC: 5.3 MMOL/L — HIGH (ref 3.5–5)
POTASSIUM SERPL-SCNC: 5.3 MMOL/L — HIGH (ref 3.5–5)
PROTHROM AB SERPL-ACNC: 13.3 SEC — HIGH (ref 9.95–12.87)
PROTHROM AB SERPL-ACNC: 14.5 SEC — HIGH (ref 9.95–12.87)
RBC # BLD: 2.28 M/UL — LOW (ref 4.7–6.1)
RBC # BLD: 2.37 M/UL — LOW (ref 4.7–6.1)
RBC # BLD: 2.62 M/UL — LOW (ref 4.7–6.1)
RBC # BLD: 2.77 M/UL — LOW (ref 4.7–6.1)
RBC # FLD: 16.5 % — HIGH (ref 11.5–14.5)
RBC # FLD: 16.9 % — HIGH (ref 11.5–14.5)
RBC # FLD: 17.1 % — HIGH (ref 11.5–14.5)
RBC # FLD: 17.2 % — HIGH (ref 11.5–14.5)
SODIUM SERPL-SCNC: 139 MMOL/L — SIGNIFICANT CHANGE UP (ref 135–146)
WBC # BLD: 12.02 K/UL — HIGH (ref 4.8–10.8)
WBC # BLD: 13.18 K/UL — HIGH (ref 4.8–10.8)
WBC # BLD: 13.28 K/UL — HIGH (ref 4.8–10.8)
WBC # BLD: 15.26 K/UL — HIGH (ref 4.8–10.8)
WBC # FLD AUTO: 12.02 K/UL — HIGH (ref 4.8–10.8)
WBC # FLD AUTO: 13.18 K/UL — HIGH (ref 4.8–10.8)
WBC # FLD AUTO: 13.28 K/UL — HIGH (ref 4.8–10.8)
WBC # FLD AUTO: 15.26 K/UL — HIGH (ref 4.8–10.8)

## 2019-01-02 PROCEDURE — 93880 EXTRACRANIAL BILAT STUDY: CPT | Mod: 26

## 2019-01-02 RX ORDER — GLUCAGON INJECTION, SOLUTION 0.5 MG/.1ML
1 INJECTION, SOLUTION SUBCUTANEOUS ONCE
Qty: 0 | Refills: 0 | Status: DISCONTINUED | OUTPATIENT
Start: 2019-01-02 | End: 2019-01-09

## 2019-01-02 RX ORDER — INSULIN LISPRO 100/ML
VIAL (ML) SUBCUTANEOUS
Qty: 0 | Refills: 0 | Status: DISCONTINUED | OUTPATIENT
Start: 2019-01-02 | End: 2019-01-09

## 2019-01-02 RX ORDER — DEXTROSE 50 % IN WATER 50 %
25 SYRINGE (ML) INTRAVENOUS ONCE
Qty: 0 | Refills: 0 | Status: DISCONTINUED | OUTPATIENT
Start: 2019-01-02 | End: 2019-01-09

## 2019-01-02 RX ORDER — BENZOCAINE AND MENTHOL 5; 1 G/100ML; G/100ML
1 LIQUID ORAL
Qty: 0 | Refills: 0 | Status: DISCONTINUED | OUTPATIENT
Start: 2019-01-02 | End: 2019-01-09

## 2019-01-02 RX ORDER — DEXTROSE 50 % IN WATER 50 %
15 SYRINGE (ML) INTRAVENOUS ONCE
Qty: 0 | Refills: 0 | Status: DISCONTINUED | OUTPATIENT
Start: 2019-01-02 | End: 2019-01-02

## 2019-01-02 RX ORDER — SODIUM CHLORIDE 9 MG/ML
1000 INJECTION, SOLUTION INTRAVENOUS
Qty: 0 | Refills: 0 | Status: DISCONTINUED | OUTPATIENT
Start: 2019-01-02 | End: 2019-01-07

## 2019-01-02 RX ORDER — DEXTROSE 50 % IN WATER 50 %
12.5 SYRINGE (ML) INTRAVENOUS ONCE
Qty: 0 | Refills: 0 | Status: DISCONTINUED | OUTPATIENT
Start: 2019-01-02 | End: 2019-01-09

## 2019-01-02 RX ADMIN — ATORVASTATIN CALCIUM 10 MILLIGRAM(S): 80 TABLET, FILM COATED ORAL at 21:21

## 2019-01-02 RX ADMIN — BENZOCAINE AND MENTHOL 1 LOZENGE: 5; 1 LIQUID ORAL at 21:22

## 2019-01-02 RX ADMIN — AMIODARONE HYDROCHLORIDE 400 MILLIGRAM(S): 400 TABLET ORAL at 05:37

## 2019-01-02 RX ADMIN — FINASTERIDE 5 MILLIGRAM(S): 5 TABLET, FILM COATED ORAL at 12:15

## 2019-01-02 RX ADMIN — Medication 50 MICROGRAM(S): at 05:37

## 2019-01-02 RX ADMIN — MONTELUKAST 10 MILLIGRAM(S): 4 TABLET, CHEWABLE ORAL at 21:21

## 2019-01-02 RX ADMIN — Medication 2: at 12:36

## 2019-01-02 RX ADMIN — PANTOPRAZOLE SODIUM 40 MILLIGRAM(S): 20 TABLET, DELAYED RELEASE ORAL at 06:57

## 2019-01-02 RX ADMIN — TAMSULOSIN HYDROCHLORIDE 0.4 MILLIGRAM(S): 0.4 CAPSULE ORAL at 21:21

## 2019-01-02 RX ADMIN — AMIODARONE HYDROCHLORIDE 400 MILLIGRAM(S): 400 TABLET ORAL at 18:01

## 2019-01-02 RX ADMIN — Medication 2: at 18:01

## 2019-01-02 NOTE — PROGRESS NOTE ADULT - SUBJECTIVE AND OBJECTIVE BOX
GUIDO VILCHIS  [id number]  78y Male    Indication for ICU admission:  Admit Date:12-30-18  ICU Date: 12-30-18  OR Date: IR embolization     morphine (Stomach Upset)    PAST MEDICAL & SURGICAL HISTORY:  CAD (coronary artery disease)  Ventricular tachycardia  NANDO on CPAP  COPD (chronic obstructive pulmonary disease)  Pacemaker  AICD (automatic cardioverter/defibrillator) present  Hypothyroidism  Atrial fibrillation  Congestive heart disease  Hypercholesteremia  AICD (automatic cardioverter/defibrillator) present  History of laparoscopic cholecystectomy    Home Medications:  ALPRAZolam 0.25 mg oral tablet: 1 tab(s) orally once a day, As Needed (23 Oct 2018 00:22)  finasteride 5 mg oral tablet: 1 tab(s) orally once a day (23 Oct 2018 00:22)  glyBURIDE 5 mg oral tablet: 1 tab(s) orally once a day (23 Oct 2018 00:22)  levothyroxine 50 mcg (0.05 mg) oral capsule: 1 cap(s) orally once a day (23 Oct 2018 00:22)  montelukast 10 mg oral tablet: 1 tab(s) orally once a day (23 Oct 2018 00:22)  pravastatin 20 mg oral tablet: 1 tab(s) orally once a day (23 Oct 2018 00:22)  tamsulosin 0.4 mg oral capsule: 1 cap(s) orally once a day (23 Oct 2018 00:22)    24HRS EVENT:    Neuro- Extubated, AAO x 3. Pain control with Oxy PRN    Pulm- Extubated, saturating 92% on 3L N/C, Hx of COPD, NANDO on CPAP    CV- Off pressors, [place] non functioning d/c o/n. Afib restarted home Amiodarone, holding home metoprolol for now and Eliquis. AICD with paced rhythm, per patient followed closely by Dr. Holt. EP consulted to interrogate AICD.     GI- NPO,  Abdomen soft mildly distended, +BS. GI Prophylaxis w/ Protonix 40mg Daily     Renal-DUYEN creat 2.7 from baseline 1.2-1.4. Johnson in place making adequate urine.    Heme- S/P MTP hgb slowly downtrending 11>10>9>8,, has active T&S  HSQ on hold due to recent active hemorrhage- acute blood loss anemia    ID- willl need post splenectomy vaccines prior to d/c    MSK- bedrest    Endocrine: Hypothyroidism- on home synthroid, BPH on Finasteride holding Flomax. Hx of DM, takes glyBURIDE 5 mg at home,  On insulin Drip wean off and start ISS.    Code status- DNR    DVT PPX- SCD's Chemoprophylaxis Held.  GI PPX- Protonix 40mg Daily     ***Tubes/Lines/Drains  ***  Peripheral IV  Central Venous Line           Urinary Catheter		Indication: Strict I&O      REVIEW OF SYSTEMS    [x] A ten-point review of systems was otherwise negative except as noted.  [ ] Due to altered mental status/intubation, subjective information were not able to be obtained from the patient. History was obtained, to the extent possible, from review of the chart and collateral sources of information. GUIDO VILCHIS  [id number]  78y Male    Indication for ICU admission:  Admit Date:12-30-18  ICU Date: 12-30-18  OR Date: IR embolization     morphine (Stomach Upset)    PAST MEDICAL & SURGICAL HISTORY:  CAD (coronary artery disease)  Ventricular tachycardia  NANDO on CPAP  COPD (chronic obstructive pulmonary disease)  Pacemaker  AICD (automatic cardioverter/defibrillator) present  Hypothyroidism  Atrial fibrillation  Congestive heart disease  Hypercholesteremia  AICD (automatic cardioverter/defibrillator) present  History of laparoscopic cholecystectomy    Home Medications:  ALPRAZolam 0.25 mg oral tablet: 1 tab(s) orally once a day, As Needed (23 Oct 2018 00:22)  finasteride 5 mg oral tablet: 1 tab(s) orally once a day (23 Oct 2018 00:22)  glyBURIDE 5 mg oral tablet: 1 tab(s) orally once a day (23 Oct 2018 00:22)  levothyroxine 50 mcg (0.05 mg) oral capsule: 1 cap(s) orally once a day (23 Oct 2018 00:22)  montelukast 10 mg oral tablet: 1 tab(s) orally once a day (23 Oct 2018 00:22)  pravastatin 20 mg oral tablet: 1 tab(s) orally once a day (23 Oct 2018 00:22)  tamsulosin 0.4 mg oral capsule: 1 cap(s) orally once a day (23 Oct 2018 00:22)    24HRS EVENT:    Neuro- Extubated, AAO x 3. Pain control with Oxy PRN    Pulm- Extubated, saturating 92% on 3L N/C, Hx of COPD, NANDO on CPAP    CV- Off pressors, [place] non functioning d/c o/n. Afib restarted home Amiodarone, holding home metoprolol for now and Eliquis. AICD with paced rhythm, per patient followed closely by Dr. Holt. EP consulted to interrogate AICD.     GI- NPO,  Abdomen soft mildly distended, +BS. GI Prophylaxis w/ Protonix 40mg Daily     Renal-DUYEN creat 2.7 from baseline 1.2-1.4. Johnson in place making adequate urine.    Heme- S/P MTP hgb slowly downtrending 11>10>9>8,, has active T&S  HSQ on hold due to recent active hemorrhage- acute blood loss anemia    ID- willl need post splenectomy vaccines prior to d/c    MSK- bedrest    Endocrine: Hypothyroidism- on home synthroid, BPH on Finasteride holding Flomax. Hx of DM, takes glyBURIDE 5 mg at home,  On insulin Drip wean off and start ISS.    Code status- DNR    DVT PPX- SCD's Chemoprophylaxis Held.  GI PPX- Protonix 40mg Daily     ***Tubes/Lines/Drains  ***  Peripheral IV  Central Venous Line           Urinary Catheter		Indication: Strict I&O      REVIEW OF SYSTEMS    [x] A ten-point review of systems was otherwise negative except as noted.  [ ] Due to altered mental status/intubation, subjective information were not able to be obtained from the patient. History was obtained, to the extent possible, from review of the chart and collateral sources of information.      Daily     Daily     Diet, NPO (12-31-18 @ 22:32)      CURRENT MEDS:  Neurologic Medications    Respiratory Medications  montelukast 10 milliGRAM(s) Oral at bedtime    Cardiovascular Medications  amiodarone    Tablet 400 milliGRAM(s) Oral every 12 hours  norepinephrine Infusion 0.043 MICROgram(s)/kG/Min IV Continuous <Continuous>  tamsulosin 0.4 milliGRAM(s) Oral at bedtime    Gastrointestinal Medications  pantoprazole    Tablet 40 milliGRAM(s) Oral before breakfast    Genitourinary Medications    Hematologic/Oncologic Medications    Antimicrobial/Immunologic Medications    Endocrine/Metabolic Medications  atorvastatin 10 milliGRAM(s) Oral at bedtime  finasteride 5 milliGRAM(s) Oral daily  insulin Infusion 2 Unit(s)/Hr IV Continuous <Continuous>  levothyroxine 50 MICROGram(s) Oral daily  vasopressin Infusion 0.04 Unit(s)/Min IV Continuous <Continuous>    Topical/Other Medications      ICU Vital Signs Last 24 Hrs  T(C): 37.3 (02 Jan 2019 08:00), Max: 37.3 (02 Jan 2019 08:00)  T(F): 99.2 (02 Jan 2019 08:00), Max: 99.2 (02 Jan 2019 08:00)  HR: 88 (02 Jan 2019 08:30) (80 - 98)  BP: 106/53 (02 Jan 2019 08:30) (81/44 - 119/54)  BP(mean): 73 (02 Jan 2019 08:30) (54 - 84)  ABP: 64/48 (01 Jan 2019 21:45) (64/48 - 150/62)  ABP(mean): 56 (01 Jan 2019 21:45) (56 - 118)  RR: 26 (02 Jan 2019 08:30) (16 - 38)  SpO2: 99% (02 Jan 2019 08:30) (77% - 100%)      Adult Advanced Hemodynamics Last 24 Hrs  CVP(mm Hg): --  CVP(cm H2O): --  CO: --  CI: --  PA: --  PA(mean): --  PCWP: --  SVR: --  SVRI: --  PVR: --  PVRI: --    Mode: CPAP with PS  FiO2: 40  PEEP: 5  PS: 5    ABG - ( 01 Jan 2019 14:01 )  pH, Arterial: 7.40  pH, Blood: x     /  pCO2: 35    /  pO2: 60    / HCO3: 22    / Base Excess: -2.5  /  SaO2: 92                  I&O's Summary    01 Jan 2019 07:01  -  02 Jan 2019 07:00  --------------------------------------------------------  IN: 410.1 mL / OUT: 1145 mL / NET: -734.9 mL    02 Jan 2019 07:01  -  02 Jan 2019 09:10  --------------------------------------------------------  IN: 25.8 mL / OUT: 60 mL / NET: -34.2 mL      I&O's Detail    01 Jan 2019 07:01  -  02 Jan 2019 07:00  --------------------------------------------------------  IN:    dexmedetomidine Infusion: 90 mL    insulin Infusion: 22.5 mL    norepinephrine Infusion: 240 mL    vasopressin Infusion: 57.6 mL  Total IN: 410.1 mL    OUT:    Indwelling Catheter - Urethral: 1145 mL  Total OUT: 1145 mL    Total NET: -734.9 mL      02 Jan 2019 07:01  -  02 Jan 2019 09:10  --------------------------------------------------------  IN:    insulin Infusion: 1 mL    norepinephrine Infusion: 20 mL    vasopressin Infusion: 4.8 mL  Total IN: 25.8 mL    OUT:    Indwelling Catheter - Urethral: 60 mL  Total OUT: 60 mL    Total NET: -34.2 mL          PHYSICAL EXAM:    General/Neuro  RASS:             GCS:     = E   / V   / M      Deficits:                             alert & oriented x 3, no focal deficits  Pupils:    Lungs:      clear to auscultation, Normal expansion/effort.     Cardiovascular : S1, S2.  Regular rate and rhythm.  Peripheral edema   Cardiac Rhythm: Normal Sinus Rhythm    GI: Abdomen soft, Non-tender, Non-distended.    Gastrostomy / Jejunostomy tube in place.  Nasogastric tube in place.  Colostomy / Ileostomy.    Wound:    Extremities: Extremities warm, pink, well-perfused. Pulses:Rt     Lt    Derm: Good skin turgor, no skin breakdown.      :       Johnson catheter in place.      CXR:     LABS:  CAPILLARY BLOOD GLUCOSE  139 (02 Jan 2019 08:00)  154 (01 Jan 2019 18:00)  178 (01 Jan 2019 16:00)  115 (01 Jan 2019 12:00)  143 (01 Jan 2019 10:00)      POCT Blood Glucose.: 131 mg/dL (02 Jan 2019 06:22)  POCT Blood Glucose.: 120 mg/dL (02 Jan 2019 03:57)  POCT Blood Glucose.: 143 mg/dL (02 Jan 2019 01:16)  POCT Blood Glucose.: 148 mg/dL (01 Jan 2019 23:43)  POCT Blood Glucose.: 78 mg/dL (01 Jan 2019 21:46)  POCT Blood Glucose.: 60 mg/dL (01 Jan 2019 21:42)  POCT Blood Glucose.: 154 mg/dL (01 Jan 2019 18:14)  POCT Blood Glucose.: 115 mg/dL (01 Jan 2019 12:47)  POCT Blood Glucose.: 143 mg/dL (01 Jan 2019 10:00)                          6.8    15.26 )-----------( 182      ( 01 Jan 2019 23:31 )             20.2       01-01    139  |  102  |  44<H>  ----------------------------<  128<H>  5.3<H>   |  20  |  2.1<H>    Ca    8.1<L>      01 Jan 2019 23:31  Phos  3.4     01-01  Mg     2.2     01-01        PT/INR - ( 01 Jan 2019 23:31 )   PT: 13.30 sec;   INR: 1.16 ratio         PTT - ( 01 Jan 2019 23:31 )  PTT:25.4 sec  CARDIAC MARKERS ( 01 Jan 2019 05:00 )  x     / 0.02 ng/mL / x     / x     / 2.5 ng/mL  CARDIAC MARKERS ( 31 Dec 2018 11:00 )  x     / 0.04 ng/mL / x     / x     / x GUIDO VILCHIS  [id number]  78y Male    Indication for ICU admission:  Admit Date:12-30-18  ICU Date: 12-30-18  OR Date: IR embolization     morphine (Stomach Upset)    PAST MEDICAL & SURGICAL HISTORY:  CAD (coronary artery disease)  Ventricular tachycardia  NANDO on CPAP  COPD (chronic obstructive pulmonary disease)  Pacemaker  AICD (automatic cardioverter/defibrillator) present  Hypothyroidism  Atrial fibrillation  Congestive heart disease  Hypercholesteremia  AICD (automatic cardioverter/defibrillator) present  History of laparoscopic cholecystectomy    Home Medications:  ALPRAZolam 0.25 mg oral tablet: 1 tab(s) orally once a day, As Needed (23 Oct 2018 00:22)  finasteride 5 mg oral tablet: 1 tab(s) orally once a day (23 Oct 2018 00:22)  glyBURIDE 5 mg oral tablet: 1 tab(s) orally once a day (23 Oct 2018 00:22)  levothyroxine 50 mcg (0.05 mg) oral capsule: 1 cap(s) orally once a day (23 Oct 2018 00:22)  montelukast 10 mg oral tablet: 1 tab(s) orally once a day (23 Oct 2018 00:22)  pravastatin 20 mg oral tablet: 1 tab(s) orally once a day (23 Oct 2018 00:22)  tamsulosin 0.4 mg oral capsule: 1 cap(s) orally once a day (23 Oct 2018 00:22)    24HRS EVENT:    Neuro- Extubated, AAO x 3. Pain control with Oxy PRN    Pulm- Extubated, saturating 92% on 3L N/C, Hx of COPD, NANDO on CPAP    CV- Off pressors, [place] non functioning d/c o/n. Afib restarted home Amiodarone, holding home metoprolol for now and Eliquis. AICD with paced rhythm, per patient followed closely by Dr. Holt. EP consulted to interrogate AICD.     GI- NPO,  Abdomen soft mildly distended, +BS. GI Prophylaxis w/ Protonix 40mg Daily     Renal-DUYEN creat 2.7 from baseline 1.2-1.4. Johnson in place making adequate urine.    Heme- S/P MTP hgb slowly downtrending 11>10>9>8,, has active T&S  HSQ on hold due to recent active hemorrhage- acute blood loss anemia    ID- willl need post splenectomy vaccines prior to d/c    MSK- bedrest    Endocrine: Hypothyroidism- on home synthroid, BPH on Finasteride holding Flomax. Hx of DM, takes glyBURIDE 5 mg at home,  On insulin Drip wean off and start ISS.    Code status- DNR    DVT PPX- SCD's Chemoprophylaxis Held.  GI PPX- Protonix 40mg Daily     ***Tubes/Lines/Drains  ***  Peripheral IV  Central Venous Line           Urinary Catheter		Indication: Strict I&O      REVIEW OF SYSTEMS    [x] A ten-point review of systems was otherwise negative except as noted.  [ ] Due to altered mental status/intubation, subjective information were not able to be obtained from the patient. History was obtained, to the extent possible, from review of the chart and collateral sources of information.      Daily     Daily     Diet, NPO (12-31-18 @ 22:32)      CURRENT MEDS:  Neurologic Medications    Respiratory Medications  montelukast 10 milliGRAM(s) Oral at bedtime    Cardiovascular Medications  amiodarone    Tablet 400 milliGRAM(s) Oral every 12 hours  norepinephrine Infusion 0.043 MICROgram(s)/kG/Min IV Continuous <Continuous>  tamsulosin 0.4 milliGRAM(s) Oral at bedtime    Gastrointestinal Medications  pantoprazole    Tablet 40 milliGRAM(s) Oral before breakfast    Genitourinary Medications    Hematologic/Oncologic Medications    Antimicrobial/Immunologic Medications    Endocrine/Metabolic Medications  atorvastatin 10 milliGRAM(s) Oral at bedtime  finasteride 5 milliGRAM(s) Oral daily  insulin Infusion 2 Unit(s)/Hr IV Continuous <Continuous>  levothyroxine 50 MICROGram(s) Oral daily  vasopressin Infusion 0.04 Unit(s)/Min IV Continuous <Continuous>    Topical/Other Medications      ICU Vital Signs Last 24 Hrs  T(C): 37.3 (02 Jan 2019 08:00), Max: 37.3 (02 Jan 2019 08:00)  T(F): 99.2 (02 Jan 2019 08:00), Max: 99.2 (02 Jan 2019 08:00)  HR: 88 (02 Jan 2019 08:30) (80 - 98)  BP: 106/53 (02 Jan 2019 08:30) (81/44 - 119/54)  BP(mean): 73 (02 Jan 2019 08:30) (54 - 84)  ABP: 64/48 (01 Jan 2019 21:45) (64/48 - 150/62)  ABP(mean): 56 (01 Jan 2019 21:45) (56 - 118)  RR: 26 (02 Jan 2019 08:30) (16 - 38)  SpO2: 99% (02 Jan 2019 08:30) (77% - 100%)      Mode: CPAP with PS  FiO2: 40  PEEP: 5  PS: 5  currently on nasal cannula    ABG - ( 01 Jan 2019 14:01 )  pH, Arterial: 7.40  pH, Blood: x     /  pCO2: 35    /  pO2: 60    / HCO3: 22    / Base Excess: -2.5  /  SaO2: 92                  I&O's Summary    01 Jan 2019 07:01  -  02 Jan 2019 07:00  --------------------------------------------------------  IN: 410.1 mL / OUT: 1145 mL / NET: -734.9 mL    02 Jan 2019 07:01  -  02 Jan 2019 09:10  --------------------------------------------------------  IN: 25.8 mL / OUT: 60 mL / NET: -34.2 mL      I&O's Detail    01 Jan 2019 07:01  -  02 Jan 2019 07:00  --------------------------------------------------------  IN:    dexmedetomidine Infusion: 90 mL    insulin Infusion: 22.5 mL    norepinephrine Infusion: 240 mL    vasopressin Infusion: 57.6 mL  Total IN: 410.1 mL    OUT:    Indwelling Catheter - Urethral: 1145 mL  Total OUT: 1145 mL    Total NET: -734.9 mL      02 Jan 2019 07:01  -  02 Jan 2019 09:10  --------------------------------------------------------  IN:    insulin Infusion: 1 mL    norepinephrine Infusion: 20 mL    vasopressin Infusion: 4.8 mL  Total IN: 25.8 mL    OUT:    Indwelling Catheter - Urethral: 60 mL  Total OUT: 60 mL    Total NET: -34.2 mL          PHYSICAL EXAM:    General/Neuro  RASS:   0          GCS:   15      Deficits:                             alert & oriented x 3, no focal deficits. Soft voice, improving from yesterday  Pupils: errl    Lungs:   clear to auscultation, Normal expansion/effort.     Cardiovascular : S1, S2.  Regular rate and rhythm.  no peripheral edema   paced, EPS doing interrogation this morning    GI: Abdomen soft, nondistended, mild tenderness to palpation in LUQ. no rebound or guarding    Extremities: Extremities warm, pink, well-perfused.     Derm: Good skin turgor, no skin breakdown.      :       Johnson catheter in place.      CXR:     LABS:  CAPILLARY BLOOD GLUCOSE  139 (02 Jan 2019 08:00)  154 (01 Jan 2019 18:00)  178 (01 Jan 2019 16:00)  115 (01 Jan 2019 12:00)  143 (01 Jan 2019 10:00)      POCT Blood Glucose.: 131 mg/dL (02 Jan 2019 06:22)  POCT Blood Glucose.: 120 mg/dL (02 Jan 2019 03:57)  POCT Blood Glucose.: 143 mg/dL (02 Jan 2019 01:16)  POCT Blood Glucose.: 148 mg/dL (01 Jan 2019 23:43)  POCT Blood Glucose.: 78 mg/dL (01 Jan 2019 21:46)  POCT Blood Glucose.: 60 mg/dL (01 Jan 2019 21:42)  POCT Blood Glucose.: 154 mg/dL (01 Jan 2019 18:14)  POCT Blood Glucose.: 115 mg/dL (01 Jan 2019 12:47)  POCT Blood Glucose.: 143 mg/dL (01 Jan 2019 10:00)                          6.8    15.26 )-----------( 182      ( 01 Jan 2019 23:31 )             20.2       01-01    139  |  102  |  44<H>  ----------------------------<  128<H>  5.3<H>   |  20  |  2.1<H>    Ca    8.1<L>      01 Jan 2019 23:31  Phos  3.4     01-01  Mg     2.2     01-01        PT/INR - ( 01 Jan 2019 23:31 )   PT: 13.30 sec;   INR: 1.16 ratio         PTT - ( 01 Jan 2019 23:31 )  PTT:25.4 sec  CARDIAC MARKERS ( 01 Jan 2019 05:00 )  x     / 0.02 ng/mL / x     / x     / 2.5 ng/mL  CARDIAC MARKERS ( 31 Dec 2018 11:00 )  x     / 0.04 ng/mL / x     / x     / x

## 2019-01-02 NOTE — PROGRESS NOTE ADULT - ASSESSMENT
ASSESSMENT:  78y Male with splenic laceration and hemorrhagic shock    PLAN:  Neuro- Extubated, AAO x 3. Pain control with Oxy PRN    Pulm- Extubated, saturating 92% on 3L N/C, Hx of COPD, NANDO on CPAP    CV- Off pressors, [place] non functioning d/c o/n. Afib restarted home Amiodarone, holding home metoprolol for now and Eliquis. AICD with paced rhythm, per patient followed closely by Dr. Holt. EP consulted to interrogate AICD.     GI- NPO,  Abdomen soft mildly distended, +BS. GI Prophylaxis w/ Protonix 40mg Daily     Renal-DUYEN creat 2.7 from baseline 1.2-1.4. Johnson in place making adequate urine.    Heme- S/P MTP hgb slowly downtrending 11>10>9>8,, has active T&S  HSQ on hold due to recent active hemorrhage- acute blood loss anemia    ID- willl need post splenectomy vaccines prior to d/c    MSK- bedrest    Endocrine: Hypothyroidism- on home synthroid, BPH on Finasteride holding Flomax. Hx of DM, takes glyBURIDE 5 mg at home,  On insulin Drip wean off and start ISS.    Lines/Tubes: TLC, B/L peripheral IV, Johnson cath    Disposition: Continue ICU ASSESSMENT:  78y Male with splenic laceration and hemorrhagic shock    PLAN:  Neuro- Extubated, AAO x 3. Pain control with Oxy PRN    Pulm- Extubated, saturating 92% on 3L N/C, Hx of COPD, NANDO on CPAP    CV- On minimal vaso and levo pressors, SBP 100s-110s. [place] non functioning d/c o/n.  Wean off as tolerated.  Afib restarted home Amiodarone, holding home metoprolol for now and Eliquis. AICD with paced rhythm, per patient followed closely by Dr. Holt. EP consulted to interrogate AICD.   On statin    GI- NPO,  Abdomen soft mildly distended, mild TTP LUQ, +BS and had 3 bowel movements, nonbloody. Consider starting CLD CC today.  GI Prophylaxis w/ Protonix 40mg Daily     Renal-DUYEN creat 2.7 from baseline 1.2-1.4, now 2.1. Rose in place making adequate urine. K 5.3, monitor. On home finasteride. Consider DC rose    Heme- S/P MTP hgb slowly downtrending 11>10>9>8,, has active T&S. Give one unit pRBCS this morning for Hgb 6.8 (8.2), f/u CBC  HSQ on hold due to recent active hemorrhage- acute blood loss anemia    ID- willl need post splenectomy vaccines prior to d/c. Tmax  99.2, WBC 15.26 (13.55),     MSK- bedrest, consider advancing    Endocrine: Hypothyroidism- on home synthroid 50 mcg PO, BPH on Finasteride holding Flomax. Hx of DM, takes glyBURIDE 5 mg at home,  On insulin Drip wean off and start ISS. FSG .    Lines/Tubes: TLC, B/L peripheral IV, Rose cath    Disposition: Continue ICU ASSESSMENT:  78y Male with splenic laceration and hemorrhagic shock    PLAN:  Neuro- Extubated, AAO x 3. Pain control with Oxy PRN    Pulm- Extubated, saturating 92% on 3L N/C, Hx of COPD, NANDO on CPAP    CV- On minimal vaso and levo pressors, SBP 100s-110s. [place] non functioning d/c o/n.  Wean off as tolerated.  Afib restarted home Amiodarone, holding home metoprolol for now and Eliquis. AICD with paced rhythm, per patient followed closely by Dr. Holt. EP consulted to interrogate AICD.   On statin    GI- NPO,  Abdomen soft mildly distended, mild TTP LUQ, +BS and had 3 bowel movements, nonbloody. Consider starting CLD CC today.  GI Prophylaxis w/ Protonix 40mg Daily     Renal-DUYEN creat 2.7 from baseline 1.2-1.4, now 2.1. Johnson in place making adequate urine. K 5.3, monitor. On home finasteride.     Heme- S/P MTP hgb slowly downtrending 11>10>9>8,, has active T&S. Give one unit pRBCS this morning for Hgb 6.8 (8.2), f/u CBC  HSQ on hold due to recent active hemorrhage- acute blood loss anemia    ID- willl need post splenectomy vaccines prior to d/c. Tmax  99.2, WBC 15.26 (13.55),     MSK- bedrest, consider advancing    Endocrine: Hypothyroidism- on home synthroid 50 mcg PO, BPH on Finasteride holding Flomax. Hx of DM, takes glyBURIDE 5 mg at home,  On insulin Drip wean off and start ISS. FSG .    Lines/Tubes: TLC, B/L peripheral IV, Johnson cath    Disposition: Continue ICU ASSESSMENT:  78y Male with splenic laceration and hemorrhagic shock    PLAN:  Neuro- Extubated, AAO x 3. Pain control with Oxy PRN    Pulm- Extubated, saturating 92% on 3L N/C, Hx of COPD, NANDO on CPAP. Wean to room air as tolerated, desat to high 80s on exam when off    CV- On minimal vaso and levo pressors, SBP 100s-110s. [place] non functioning d/c o/n.  Wean off as tolerated.  Afib restarted home Amiodarone, holding home metoprolol for now and Eliquis. AICD with paced rhythm, per patient followed closely by Dr. Holt. EP consulted to interrogate AICD.   On statin    GI- NPO,  Abdomen soft mildly distended, mild TTP LUQ, +BS and had 3 bowel movements, nonbloody. Consider starting CLD CC today.  GI Prophylaxis w/ Protonix 40mg Daily     Renal-DUYEN creat 2.7 from baseline 1.2-1.4, now 2.1. Johnson in place making adequate urine. K 5.3, monitor. On home finasteride.     Heme- S/P MTP hgb slowly downtrending 11>10>9>8,, has active T&S. Give one unit pRBCS this morning for Hgb 6.8 (8.2), f/u CBC  HSQ on hold due to recent active hemorrhage- acute blood loss anemia    ID- willl need post splenectomy vaccines prior to d/c. Tmax  99.2, WBC 15.26 (13.55),     MSK- bedrest, consider advancing    Endocrine: Hypothyroidism- on home synthroid 50 mcg PO, BPH on Finasteride holding Flomax. Hx of DM, takes glyBURIDE 5 mg at home,  On insulin Drip wean off and start ISS. FSG .    Lines/Tubes: TLC, B/L peripheral IV, Johnson cath    Disposition: Continue ICU

## 2019-01-02 NOTE — CONSULT NOTE ADULT - ASSESSMENT
1. Syncope  - ICD was interrogated.  Device is functioning normally.  There were no events on interrogation    2. Splenic bleed  - per primary team    3. Unintentional weight loss  - recommend check TSH 1. Syncope  - ICD was interrogated.  Device is functioning normally.  There were no events on interrogation    2. Splenic bleed  - per primary team    3. Unintentional weight loss  - recommend check TSH  - further work up as outpatient

## 2019-01-02 NOTE — PROGRESS NOTE ADULT - SUBJECTIVE AND OBJECTIVE BOX
Interventional Radiology Follow- Up Note      78y Male s/p splenic artery embolization on 12/30/18 in Interventional Radiology with Dr. Landau        O/N:     Successfully extubated and weaned off pressors.  Received one unit PRBC for downtrending H/H.    Vitals: T(F): 99.3 (01-02-19 @ 12:00), Max: 99.3 (01-02-19 @ 12:00)  HR: 88 (01-02-19 @ 13:15) (80 - 98)  BP: 117/56 (01-02-19 @ 13:15) (81/44 - 123/53)  RR: 28 (01-02-19 @ 13:15) (16 - 38)  SpO2: 100% (01-02-19 @ 13:15) (77% - 100%)  Wt(kg): --    LABS:                        6.8    15.26 )-----------( 182      ( 01 Jan 2019 23:31 )             20.2     01-01    139  |  102  |  44<H>  ----------------------------<  128<H>  5.3<H>   |  20  |  2.1<H>    Ca    8.1<L>      01 Jan 2019 23:31  Phos  3.4     01-01  Mg     2.2     01-01      PT/INR - ( 01 Jan 2019 23:31 )   PT: 13.30 sec;   INR: 1.16 ratio         PTT - ( 01 Jan 2019 23:31 )  PTT:25.4 sec      PHYSICAL EXAM:  General: Nontoxic  Neuro:  Alert & oriented x 3  CV: Paced  Lung: respirations nonlabored, good inspiratory effort  Abdomen: soft, NTND  Extremities: clean right groin    Impression: 78y Male admitted with SPLENIC LACERATION post splenic artery embolization.    Plan:  Patient feeling better, post extubation.  H/H still downtrending. Will follow up CBC post one unit PRBC.  Will cont to follow       Please call Interventional Radiology [phone] with any questions, concerns, or issues regarding above.

## 2019-01-02 NOTE — CONSULT NOTE ADULT - SUBJECTIVE AND OBJECTIVE BOX
Patient is a 78y old  Male who presents with a chief complaint of Splenic Laceration (31 Dec 2018 14:03)        HPI: Pt is a 79 yo male with a history of VT/Afib s/p BIv ICD a/w syncope.  Pt does not recall the events and does not report any symptoms prior to falling.  He has no complaints now other than a 42 pound weight loss and altered taste.          PAST MEDICAL & SURGICAL HISTORY:  CAD (coronary artery disease)  Ventricular tachycardia  NANDO on CPAP  COPD (chronic obstructive pulmonary disease)  Pacemaker   AICD (automatic cardioverter/defibrillator) present  Hypothyroidism  Atrial fibrillation  Congestive heart disease  Hypercholesteremia  AICD (automatic cardioverter/defibrillator) present  History of laparoscopic cholecystectomy    PREVIOUS DIAGNOSTIC TESTING:      ECHO  FINDINGS:   Transthoracic Echocardiogram (18 @ 07:47) >  Summary:   1. LVEjection Fraction by Amaro's Method with a biplane EF of 25 %.   2. Severely decreased global left ventricular systolic function.   3. Mild mitral regurgitation.   4. Mild aortic regurgitation.      MEDICATIONS  (STANDING):  amiodarone    Tablet 400 milliGRAM(s) Oral every 12 hours  atorvastatin 10 milliGRAM(s) Oral at bedtime  dextrose 5%. 1000 milliLiter(s) (50 mL/Hr) IV Continuous <Continuous>  dextrose 50% Injectable 12.5 Gram(s) IV Push once  dextrose 50% Injectable 25 Gram(s) IV Push once  dextrose 50% Injectable 25 Gram(s) IV Push once  finasteride 5 milliGRAM(s) Oral daily  insulin lispro (HumaLOG) corrective regimen sliding scale   SubCutaneous three times a day before meals  levothyroxine 50 MICROGram(s) Oral daily  montelukast 10 milliGRAM(s) Oral at bedtime  norepinephrine Infusion 0.043 MICROgram(s)/kG/Min (6.563 mL/Hr) IV Continuous <Continuous>  pantoprazole    Tablet 40 milliGRAM(s) Oral before breakfast  tamsulosin 0.4 milliGRAM(s) Oral at bedtime  vasopressin Infusion 0.04 Unit(s)/Min (2.4 mL/Hr) IV Continuous <Continuous>    MEDICATIONS  (PRN):  dextrose 40% Gel 15 Gram(s) Oral once PRN Blood Glucose LESS THAN 70 milliGRAM(s)/deciliter  glucagon  Injectable 1 milliGRAM(s) IntraMuscular once PRN Glucose LESS THAN 70 milligrams/deciliter      FAMILY HISTORY:  Family history of acute myocardial infarction (Aunt)      SOCIAL HISTORY:    CIGARETTES: none    ALCOHOL: none    Past Surgical History: as above    Allergies:    morphine (Stomach Upset)      REVIEW OF SYSTEMS:    CONSTITUTIONAL: No fever, weight loss, chills, shakes, or fatigue  EYES: No eye pain, visual disturbances, or discharge  ENMT:  No difficulty hearing, tinnitus, vertigo; No sinus or throat pain  NECK: No pain or stiffness  BREASTS: No pain, masses, or nipple discharge  RESPIRATORY: No cough, wheezing, hemoptysis, or shortness of breath  CARDIOVASCULAR: No chest pain, dyspnea, palpitations, dizziness, syncope, paroxysmal nocturnal dyspnea, orthopnea, or arm or leg swelling  GASTROINTESTINAL: No abdominal  or epigastric pain, nausea, vomiting, hematemesis, diarrhea, constipation, melena or bright red blood.  GENITOURINARY: No dysuria, nocturia, hematuria, or urinary incontinence  NEUROLOGICAL: No headaches, memory loss, slurred speech, limb weakness, loss of strength, numbness, or tremors  SKIN: No itching, burning, rashes, or lesions   LYMPH NODES: No enlarged glands  ENDOCRINE: No heat or cold intolerance, or hair loss  MUSCULOSKELETAL: No joint pain or swelling, muscle, back, or extremity pain  PSYCHIATRIC: No depression, anxiety, or difficulty sleeping  HEME/LYMPH: No easy bruising or bleeding gums  ALLERY AND IMMUNOLOGIC: No hives or rash.      Vital Signs Last 24 Hrs  T(C): 37.3 (2019 08:00), Max: 37.3 (2019 08:00)  T(F): 99.2 (2019 08:00), Max: 99.2 (2019 08:00)  HR: 88 (2019 09:30) (80 - 98)  BP: 123/53 (2019 09:30) (81/44 - 123/53)  BP(mean): 79 (2019 09:30) (54 - 84)  RR: 26 (2019 09:30) (16 - 38)  SpO2: 98% (2019 09:30) (77% - 100%)    PHYSICAL EXAM:        GENERAL: In no apparent distress, well nourished, and hydrated.  HEAD:  Atraumatic, Normocephalic  EYES: EOMI, PERRLA, conjunctiva and sclera clear  ENMT: No tonsillar erythema, exudates, or enlargements; ist mucous membranes, Good dentition, No lesions  NECK: Supple and normal thyroid.  No JVD or carotid bruit.  Carotid pulse is 2+ bilaterally.  HEART: Regular rate and rhythm; No murmurs, rubs, or gallops.  PULMONARY: Clear to auscultation and perfusion.  No rales, wheezing, or rhonchi bilaterally.  ABDOMEN: Soft, Nontender, Nondistended; Bowel sounds present  EXTREMITIES:  2+ Peripheral Pulses, No clubbing, cyanosis, or edema  LYMPH: No lymphadenopathy noted  NEUROLOGICAL: Grossly nonfocal      INTERPRETATION OF TELEMETRY:    EC Lead ECG (12.31.18 @ 23:32) >    Ventricular Rate 101 BPM    Atrial Rate 30 BPM    QRS Duration 188 ms    Q-T Interval 462 ms    QTC Calculation(Bezet) 599 ms    R Axis 48 degrees    T Axis 210 degrees    Diagnosis Line Ventricular pacing  Abnormal ECG      I&O's Detail    2019 07:  -  2019 07:00  --------------------------------------------------------  IN:    dexmedetomidine Infusion: 90 mL    insulin Infusion: 22.5 mL    norepinephrine Infusion: 240 mL    vasopressin Infusion: 57.6 mL  Total IN: 410.1 mL    OUT:    Indwelling Catheter - Urethral: 1145 mL  Total OUT: 1145 mL    Total NET: -734.9 mL      2019 07:  -  2019 10:56  --------------------------------------------------------  IN:    insulin Infusion: 1 mL    norepinephrine Infusion: 20 mL    Packed Red Blood Cells: 296 mL    vasopressin Infusion: 4.8 mL  Total IN: 321.8 mL    OUT:    Indwelling Catheter - Urethral: 60 mL  Total OUT: 60 mL    Total NET: 261.8 mL          LABS:                        6.8    15.26 )-----------( 182      ( 2019 23:31 )             20.2         139  |  102  |  44<H>  ----------------------------<  128<H>  5.3<H>   |  20  |  2.1<H>    Ca    8.1<L>      2019 23:31  Phos  3.4       Mg     2.2           CARDIAC MARKERS ( 2019 05:00 )  x     / 0.02 ng/mL / x     / x     / 2.5 ng/mL  CARDIAC MARKERS ( 31 Dec 2018 11:00 )  x     / 0.04 ng/mL / x     / x     / x          PT/INR - ( 2019 23:31 )   PT: 13.30 sec;   INR: 1.16 ratio         PTT - ( 2019 23:31 )  PTT:25.4 sec    BNP  I&O's Detail    2019 07:01  -  2019 07:00  --------------------------------------------------------  IN:    dexmedetomidine Infusion: 90 mL    insulin Infusion: 22.5 mL    norepinephrine Infusion: 240 mL    vasopressin Infusion: 57.6 mL  Total IN: 410.1 mL    OUT:    Indwelling Catheter - Urethral: 1145 mL  Total OUT: 1145 mL    Total NET: -734.9 mL      2019 07:01  -  2019 10:56  --------------------------------------------------------  IN:    insulin Infusion: 1 mL    norepinephrine Infusion: 20 mL    Packed Red Blood Cells: 296 mL    vasopressin Infusion: 4.8 mL  Total IN: 321.8 mL    OUT:    Indwelling Catheter - Urethral: 60 mL  Total OUT: 60 mL    Total NET: 261.8 mL        Daily     Daily     RADIOLOGY & ADDITIONAL STUDIES:

## 2019-01-03 ENCOUNTER — TRANSCRIPTION ENCOUNTER (OUTPATIENT)
Age: 79
End: 2019-01-03

## 2019-01-03 LAB
ANION GAP SERPL CALC-SCNC: 17 MMOL/L — HIGH (ref 7–14)
BLD GP AB SCN SERPL QL: SIGNIFICANT CHANGE UP
BUN SERPL-MCNC: 39 MG/DL — HIGH (ref 10–20)
CALCIUM SERPL-MCNC: 8.1 MG/DL — LOW (ref 8.5–10.1)
CHLORIDE SERPL-SCNC: 102 MMOL/L — SIGNIFICANT CHANGE UP (ref 98–110)
CO2 SERPL-SCNC: 21 MMOL/L — SIGNIFICANT CHANGE UP (ref 17–32)
CREAT SERPL-MCNC: 1.6 MG/DL — HIGH (ref 0.7–1.5)
ESTIMATED AVERAGE GLUCOSE: 140 MG/DL — HIGH (ref 68–114)
GLUCOSE BLDC GLUCOMTR-MCNC: 153 MG/DL — HIGH (ref 70–99)
GLUCOSE BLDC GLUCOMTR-MCNC: 159 MG/DL — HIGH (ref 70–99)
GLUCOSE BLDC GLUCOMTR-MCNC: 166 MG/DL — HIGH (ref 70–99)
GLUCOSE BLDC GLUCOMTR-MCNC: 178 MG/DL — HIGH (ref 70–99)
GLUCOSE BLDC GLUCOMTR-MCNC: 204 MG/DL — HIGH (ref 70–99)
GLUCOSE SERPL-MCNC: 154 MG/DL — HIGH (ref 70–99)
HBA1C BLD-MCNC: 6.5 % — HIGH (ref 4–5.6)
HCT VFR BLD CALC: 24.4 % — LOW (ref 42–52)
HGB BLD-MCNC: 8.3 G/DL — LOW (ref 14–18)
MAGNESIUM SERPL-MCNC: 2.2 MG/DL — SIGNIFICANT CHANGE UP (ref 1.8–2.4)
MCHC RBC-ENTMCNC: 30.1 PG — SIGNIFICANT CHANGE UP (ref 27–31)
MCHC RBC-ENTMCNC: 34 G/DL — SIGNIFICANT CHANGE UP (ref 32–37)
MCV RBC AUTO: 88.4 FL — SIGNIFICANT CHANGE UP (ref 80–94)
NRBC # BLD: 0 /100 WBCS — SIGNIFICANT CHANGE UP (ref 0–0)
PHOSPHATE SERPL-MCNC: 2.9 MG/DL — SIGNIFICANT CHANGE UP (ref 2.1–4.9)
PLATELET # BLD AUTO: 189 K/UL — SIGNIFICANT CHANGE UP (ref 130–400)
POTASSIUM SERPL-MCNC: 4.9 MMOL/L — SIGNIFICANT CHANGE UP (ref 3.5–5)
POTASSIUM SERPL-SCNC: 4.9 MMOL/L — SIGNIFICANT CHANGE UP (ref 3.5–5)
RBC # BLD: 2.76 M/UL — LOW (ref 4.7–6.1)
RBC # FLD: 17.9 % — HIGH (ref 11.5–14.5)
SODIUM SERPL-SCNC: 140 MMOL/L — SIGNIFICANT CHANGE UP (ref 135–146)
T3 SERPL-MCNC: 39 NG/DL — LOW (ref 80–200)
T4 AB SER-ACNC: 8.7 UG/DL — SIGNIFICANT CHANGE UP (ref 4.6–12)
TSH SERPL-MCNC: 1.68 UIU/ML — SIGNIFICANT CHANGE UP (ref 0.27–4.2)
TYPE + AB SCN PNL BLD: SIGNIFICANT CHANGE UP
WBC # BLD: 11.96 K/UL — HIGH (ref 4.8–10.8)
WBC # FLD AUTO: 11.96 K/UL — HIGH (ref 4.8–10.8)

## 2019-01-03 PROCEDURE — 99223 1ST HOSP IP/OBS HIGH 75: CPT | Mod: 25

## 2019-01-03 PROCEDURE — 31575 DIAGNOSTIC LARYNGOSCOPY: CPT

## 2019-01-03 PROCEDURE — 99291 CRITICAL CARE FIRST HOUR: CPT

## 2019-01-03 RX ORDER — HEPARIN SODIUM 5000 [USP'U]/ML
5000 INJECTION INTRAVENOUS; SUBCUTANEOUS EVERY 8 HOURS
Qty: 0 | Refills: 0 | Status: DISCONTINUED | OUTPATIENT
Start: 2019-01-03 | End: 2019-01-09

## 2019-01-03 RX ORDER — CHLORHEXIDINE GLUCONATE 213 G/1000ML
1 SOLUTION TOPICAL
Qty: 0 | Refills: 0 | Status: DISCONTINUED | OUTPATIENT
Start: 2019-01-03 | End: 2019-01-09

## 2019-01-03 RX ADMIN — Medication 50 MICROGRAM(S): at 05:41

## 2019-01-03 RX ADMIN — Medication 2: at 17:33

## 2019-01-03 RX ADMIN — FINASTERIDE 5 MILLIGRAM(S): 5 TABLET, FILM COATED ORAL at 11:47

## 2019-01-03 RX ADMIN — PANTOPRAZOLE SODIUM 40 MILLIGRAM(S): 20 TABLET, DELAYED RELEASE ORAL at 06:14

## 2019-01-03 RX ADMIN — HEPARIN SODIUM 5000 UNIT(S): 5000 INJECTION INTRAVENOUS; SUBCUTANEOUS at 21:48

## 2019-01-03 RX ADMIN — Medication 4: at 06:20

## 2019-01-03 RX ADMIN — TAMSULOSIN HYDROCHLORIDE 0.4 MILLIGRAM(S): 0.4 CAPSULE ORAL at 21:47

## 2019-01-03 RX ADMIN — ATORVASTATIN CALCIUM 10 MILLIGRAM(S): 80 TABLET, FILM COATED ORAL at 21:47

## 2019-01-03 RX ADMIN — AMIODARONE HYDROCHLORIDE 400 MILLIGRAM(S): 400 TABLET ORAL at 17:19

## 2019-01-03 RX ADMIN — Medication 2: at 12:47

## 2019-01-03 RX ADMIN — MONTELUKAST 10 MILLIGRAM(S): 4 TABLET, CHEWABLE ORAL at 21:47

## 2019-01-03 RX ADMIN — CHLORHEXIDINE GLUCONATE 1 APPLICATION(S): 213 SOLUTION TOPICAL at 05:41

## 2019-01-03 RX ADMIN — AMIODARONE HYDROCHLORIDE 400 MILLIGRAM(S): 400 TABLET ORAL at 05:41

## 2019-01-03 NOTE — CONSULT NOTE ADULT - ASSESSMENT
78 y.o male with hoarseness, worsened post extubation.    ·	will FFL on rounds  ·	cont SLP recs for diet  ·	w/d with attng

## 2019-01-03 NOTE — CHART NOTE - NSCHARTNOTEFT_GEN_A_CORE
Mr. Diaz is a 79yo PMH CAD, VTach, Afib s/p PPM and AICD on Eliquis, COPD, NANDO, hypothyroidism, CHF (EF 25%), hypercholesterolemia who presented to the hospital with left abdominal pain and was noted to be hypotensive and started on levophed. He was found to have a splenic laceration and subcapsular hematoma on CTA, and trauma surgery was consulted. He had fallen 2 days prior.  He was also intubated and went to IR for celiac angiography and coil embolization of the splenic artery, and had a massive transfusion protocol with 8u pRBCs, 3 FFP, and 1 platelet for hemorrhagic shock. He was transferred to the ICU.  He was monitored and has since been extubated and is tolerating nasal cannula. He has been advanced from NGT and NPO to a regular diet, and he has had several nonbloody bowel movements. His PPM/ AICD was interrogated and was found not to have any events and is functioning appropriately.  He had an DUYEN which has been resolving with most recent Cr 1.6.  He was weaned off of levophed yesterday and vasopressin overnight,  and his SBP has been 100s-120s during the day.  He was transfused an additional 2 units of pRBCs yesterday with an IJ scan that did not find him to be overloaded. He had a blunted response but has not had signs of bleeding.  Repeat hemoglobin today has been stable 8.1-> 7.8-> 8.3.  He has had a continued soft voice. He complained of unintended weight loss of around 40 pounds with decreased PO intake due to altered taste and odd sensation of his mouth being coated when he swallows. He did not have difficulty swallowing and no signs of aspiration, and SLP recommended regular with thins and ENT for dysphonia and phlegm. ENT says will FFL on rounds, continue SLP for diet recs, and will discuss with the ENT attending.     PLAN:  Neuro-  AAO x 3. No pain medication needed.  Dysphonia, evaluated by ENT, follow up FFL and final recommendations.     Pulm- On nasal cannula.  Hx of COPD, NANDO on CPAP. Wean to room air as tolerated. Encourage IS.     CV- Weaned off levo during day and vasopressin overnight.  SBPs 100s-110s.   - Afib, on home Amiodarone, holding home metoprolol for now due to hypotension. Hold Eliquis. Restart home meds when appropriate.   - AICD with paced rhythm, per EPS no longer pacing dependent as had been previously, no events on interrogation. Followed closely by Dr. Holt, consult for primary cardiologist pending. Patient complained of unintentional weight loss and has had abnormal swallowing and taste, TSH sent, pending.   - CHF: 12/31 echo: Ef 25%, severely decreased global LV systolic function, mild MR, mild AR  - Caroid duplex 1/02- 20-39% bilateral    - On statin    GI- Abdomen soft mildly distended, mild TTP LUQ, +BS. Has had nonbloody bowel movements, advanced to and tolerating regular diet.  Has history of unintended weight loss, odd taste for weeks and strange sensation with swallow, no aspiration per RN with [facility]. SLP consult placed, ok for regular with thins. Follow up ENT for suspected vocal cord issues.   GI Prophylaxis w/ Protonix 40mg Daily     Renal-DUYEN resolving, Cr 1.6 (2.1,2.7)  from baseline 1.2-1.4. Rose removed, due to void.   - Hyperkalemia resolving. On home finasteride, holding tamsulosin. Consider DC rose now off pressors.     Heme- S/P MTP, given additional 2 units yesterday for 6.8-> 7.1-> 8.1, repeat midnight 7.8. Consider additional transfusion as BP 100s-120s   Last T&S 1/2.  HSQ on hold due to recent active hemorrhage- acute blood loss anemia    ID- 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. Tmax  99.3,  WBC 12.02 (13)     MSK- bedrest, consider advancing today if stable     Endocrine: Hypothyroidism- on home synthroid. Recheck thyroid function tests, pending as unintended weight loss. BPH on Finasteride holding Flomax.   - Hx of DM, takes glyBURIDE 5 mg at home.  Insulin Drip weaned off and started ISS. -204.     Lines/Tubes: TLC, B/L peripheral IV, Rose cath    Disposition: Continue ICU    Follow up ENT and SLP    - he has also complained of altered taste and unintended weight loss   on home amio  ECHO: < from: Transthoracic Echocardiogram (12.31.18 @ 07:47) >   1. LVEjection Fraction by Amaro's Method with a biplane EF of 25 %.   2. Severely decreased global left ventricular systolic function.   3. Mild mitral regurgitation.   4. Mild aortic regurgitation.    < end of copied text >    78 y.o male with hoarseness, worsened post extubation.    will FFL on rounds  cont SLP recs for diet  w/d with attng Mr. Diaz is a 79yo PMH CAD, VTach, Afib s/p PPM and AICD on Eliquis, COPD, NANDO, hypothyroidism, CHF (EF 25%), hypercholesterolemia who presented to the hospital with left abdominal pain and was noted to be hypotensive and started on levophed. He was found to have a splenic laceration and subcapsular hematoma on CTA, and trauma surgery was consulted. He had fallen 2 days prior.  He was also intubated and went to IR for celiac angiography and coil embolization of the splenic artery, and had a massive transfusion protocol with 8u pRBCs, 3 FFP, and 1 platelet for hemorrhagic shock. He was transferred to the ICU.  He was monitored and has since been extubated and is tolerating nasal cannula. He has been advanced from NGT and NPO to a regular diet, and he has had several nonbloody bowel movements. His PPM/ AICD was interrogated and was found not to have any events and is functioning appropriately.  He had an DUYEN which has been resolving with most recent Cr 1.6.  He was weaned off of levophed yesterday and vasopressin overnight,  and his SBP has been 100s-120s during the day.  He was transfused an additional 2 units of pRBCs yesterday with an IJ scan that did not find him to be overloaded. He had a blunted response but has not had signs of bleeding.  Repeat hemoglobin today has been stable 8.1-> 7.8-> 8.3.  He has had a continued soft voice. He complained of unintended weight loss of around 40 pounds with decreased PO intake due to altered taste and odd sensation of his mouth being coated when he swallows. He did not have difficulty swallowing and no signs of aspiration, and SLP recommended regular with thins and ENT for dysphonia and phlegm. ENT says will FFL on rounds, continue SLP for diet recs, and will discuss with the ENT attending.     PLAN:  Neuro-  AAO x 3. No pain medication needed.  Dysphonia, evaluated by ENT, follow up FFL and final recommendations.     Pulm- On nasal cannula.  Hx of COPD, NANDO on CPAP. Wean to room air as tolerated. Encourage IS.     CV- Weaned off levo during day and vasopressin overnight.  SBPs 100s-110s.   - Afib, on home Amiodarone, holding home metoprolol for now due to hypotension. Hold Eliquis. Restart home meds when appropriate.   - AICD with paced rhythm, per EPS no longer pacing dependent as had been previously, no events on interrogation. Followed closely by Dr. Holt, consult for primary cardiologist pending. Patient complained of unintentional weight loss and has had abnormal swallowing and taste, TSH sent, pending.   - CHF: 12/31 echo: Ef 25%, severely decreased global LV systolic function, mild MR, mild AR  - Caroid duplex 1/02- 20-39% bilateral    - On statin    GI- Abdomen soft mildly distended, mild TTP LUQ, +BS. Has had nonbloody bowel movements, advanced to and tolerating regular diet.  Has history of unintended weight loss, odd taste for weeks and strange sensation with swallow, no aspiration per RN with [facility]. SLP consult placed, ok for regular with thins. Follow up ENT for suspected vocal cord issues.   GI Prophylaxis w/ Protonix 40mg Daily   Consider outpatient workup       Renal-DUYEN resolving, Cr 1.6 (2.1,2.7)  from baseline 1.2-1.4. Johnson removed, due to void.   - Hyperkalemia resolving. On home finasteride, tamsulosin.    Heme- S/P MTP, given additional 2 units yesterday for 6.8-> 7.1-> 8.1, repeat midnight 7.8, then increased most recently 8.3. BP 100s-120s  Last T&S 1/2.  HSQ restarted today.     ID- 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. Afebrile.    MSK- advanced to activity out of bed     Endocrine: Hypothyroidism- on home synthroid. Recheck thyroid function tests, pending as unintended weight loss.   -BPH on Finasteride and Flomax.   - Hx of DM, takes glyBURIDE 5 mg at home.  Insulin Drip weaned off and started ISS. -204.     Lines/Tubes: B/L peripheral IV. Johnson and TLC removed     ECHO: < from: Transthoracic Echocardiogram (12.31.18 @ 07:47) >   1. LVEjection Fraction by Amaro's Method with a biplane EF of 25 %.   2. Severely decreased global left ventricular systolic function.   3. Mild mitral regurgitation.   4. Mild aortic regurgitation.      Signed out to _______ Mr. Diaz is a 79yo PMH CAD, VTach, Afib s/p PPM and AICD on Eliquis, COPD, NANDO, hypothyroidism, CHF (EF 25%), hypercholesterolemia who presented to the hospital with left abdominal pain and was noted to be hypotensive and started on levophed. He was found to have a splenic laceration and subcapsular hematoma on CTA, and trauma surgery was consulted. He had fallen 2 days prior.  He was also intubated and went to IR for celiac angiography and coil embolization of the splenic artery, and had a massive transfusion protocol with 8u pRBCs, 3 FFP, and 1 platelet for hemorrhagic shock. He was transferred to the ICU.  He was monitored and has since been extubated and is tolerating nasal cannula. He has been advanced from NGT and NPO to a regular diet, and he has had several nonbloody bowel movements. His PPM/ AICD was interrogated and was found not to have any events and is functioning appropriately.  He had an DUYEN which has been resolving with most recent Cr 1.6.  He was weaned off of levophed yesterday and vasopressin overnight,  and his SBP has been 100s-120s during the day.  He was transfused an additional 2 units of pRBCs yesterday with an IJ scan that did not find him to be overloaded. He had a blunted response but has not had signs of bleeding.  Repeat hemoglobin today has been stable 8.1-> 7.8-> 8.3.  He has had a continued soft voice. He complained of unintended weight loss of around 40 pounds with decreased PO intake due to altered taste and odd sensation of his mouth being coated when he swallows. He did not have difficulty swallowing and no signs of aspiration, and SLP recommended regular with thins and ENT for dysphonia and phlegm. ENT says will FFL on rounds, continue SLP for diet recs, and will discuss with the ENT attending.     PLAN:  Neuro-  AAO x 3. No pain medication needed.  Dysphonia, evaluated by ENT, follow up FFL and final recommendations.     Pulm- On nasal cannula.  Hx of COPD, NANDO not on CPAP. Wean to room air as tolerated. Encourage IS.     CV- Weaned off levo during day and vasopressin overnight.  SBPs 100s-110s.   - Afib, on home Amiodarone, holding home metoprolol for now due to hypotension. Hold Eliquis. Restart home meds when appropriate.   - AICD with paced rhythm, per EPS no longer pacing dependent as had been previously, no events on interrogation. Followed closely by Dr. oHlt, consult for primary cardiologist pending. Patient complained of unintentional weight loss and has had abnormal swallowing and taste, TSH sent, pending.   - CHF: 12/31 echo: Ef 25%, severely decreased global LV systolic function, mild MR, mild AR  - Caroid duplex 1/02- 20-39% bilateral    - On statin    GI- Abdomen soft mildly distended, mild TTP LUQ, +BS. Has had nonbloody bowel movements, advanced to and tolerating regular diet.  Has history of unintended weight loss, odd taste for weeks and strange sensation with swallow, no aspiration per RN with [facility]. SLP consult placed, ok for regular with thins. Follow up ENT for suspected vocal cord issues- scope at bedside with small ulceration on vocal cords, recommend PPI and follow up outpatient for re-eval of prior symptoms.   GI Prophylaxis w/ Protonix 40mg Daily   Consider outpatient workup       Renal-DUYEN resolving, Cr 1.6 (2.1,2.7)  from baseline 1.2-1.4. Johnson removed, due to void.   - Hyperkalemia resolving. On home finasteride, tamsulosin.    Heme- S/P MTP, given additional 2 units yesterday for 6.8-> 7.1-> 8.1, repeat midnight 7.8, then increased most recently 8.3. BP 100s-120s  Last T&S 1/2.  HSQ restarted today.     ID- 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. Afebrile.    MSK- advanced to activity out of bed     Endocrine: Hypothyroidism- on home synthroid. Recheck thyroid function tests, pending as unintended weight loss.   -BPH on Finasteride and Flomax.   - Hx of DM, takes glyBURIDE 5 mg at home.  Insulin Drip weaned off and started ISS. -204.     Lines/Tubes: B/L peripheral IV. Johnson and TLC removed     ECHO: < from: Transthoracic Echocardiogram (12.31.18 @ 07:47) >   1. LVEjection Fraction by Amaro's Method with a biplane EF of 25 %.   2. Severely decreased global left ventricular systolic function.   3. Mild mitral regurgitation.   4. Mild aortic regurgitation.      Signed out to _______ Mr. Diaz is a 77yo PMH CAD, VTach, Afib s/p PPM and AICD on Eliquis, COPD, NANDO, hypothyroidism, CHF (EF 25%), hypercholesterolemia who presented to the hospital with left abdominal pain and was noted to be hypotensive and started on levophed. He was found to have a splenic laceration and subcapsular hematoma on CTA, and trauma surgery was consulted. He had fallen 2 days prior.  He was also intubated and went to IR for celiac angiography and coil embolization of the splenic artery, and had a massive transfusion protocol with 8u pRBCs, 3 FFP, and 1 platelet for hemorrhagic shock. He was transferred to the ICU.  He was monitored and has since been extubated and is tolerating nasal cannula. He has been advanced from NGT and NPO to a regular diet, and he has had several nonbloody bowel movements. His PPM/ AICD was interrogated and was found not to have any events and is functioning appropriately.  He had an DUYEN which has been resolving with most recent Cr 1.6.  He was weaned off of levophed yesterday and vasopressin overnight,  and his SBP has been 100s-120s during the day.  He was transfused an additional 2 units of pRBCs yesterday with an IJ scan that did not find him to be overloaded. He had a blunted response but has not had signs of bleeding.  Repeat hemoglobin today has been stable 8.1-> 7.8-> 8.3.  He has had a continued soft voice. He complained of unintended weight loss of around 40 pounds with decreased PO intake due to altered taste and odd sensation of his mouth being coated when he swallows. He did not have difficulty swallowing and no signs of aspiration, and SLP recommended regular with thins and ENT for dysphonia and phlegm. ENT says will FFL on rounds, continue SLP for diet recs, and will discuss with the ENT attending.     PLAN:  Neuro-  AAO x 3. No pain medication needed.  Dysphonia, evaluated by ENT, follow up FFL and final recommendations.     Pulm- On nasal cannula.  Hx of COPD, NANDO not on CPAP. Wean to room air as tolerated. Encourage IS.     CV- Weaned off levo during day and vasopressin overnight.  SBPs 100s-110s.   - Afib, on home Amiodarone, holding home metoprolol for now due to hypotension. Hold Eliquis. Restart home meds when appropriate.   - AICD with paced rhythm, per EPS no longer pacing dependent as had been previously, no events on interrogation. Followed closely by Dr. Holt, consult for primary cardiologist pending. Patient complained of unintentional weight loss and has had abnormal swallowing and taste, TSH sent, pending.   - CHF: 12/31 echo: EF 25%, severely decreased global LV systolic function, mild MR, mild AR  - Carotid duplex 1/02- 20-39% bilateral    - On statin    GI- Splenic laceration and subcapsular hematoma. Abdomen soft mildly distended, mild TTP LUQ, +BS. Has had nonbloody bowel movements, advanced to and tolerating regular diet. Has history of unintended weight loss, odd taste for weeks and strange sensation with swallow, no aspiration per RN with [facility]. SLP consult placed, ok for regular with thins. Follow up ENT for suspected vocal cord issues- scope at bedside with small ulceration on vocal cords, recommend PPI and follow up outpatient for re-eval of prior symptoms.   GI Prophylaxis w/ Protonix 40mg Daily   Consider outpatient workup       Renal-DUYEN resolving, Cr 1.6 (2.1,2.7)  from baseline 1.2-1.4. Johnson removed 4pm,  due to void.   - Hyperkalemia resolving.   BPH- on home finasteride, tamsulosin.    Heme- S/P MTP, given additional 2 units yesterday for 6.8-> 7.1-> 8.1, repeat midnight 7.8, then increased most recently 8.3. BP 100s-120s  Last T&S 1/2.  HSQ started today, holding home Eliquis.     ID- 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. Afebrile.    MSK- advanced to activity out of bed     Endocrine: Hypothyroidism- on home synthroid. Recheck thyroid function tests, pending as unintended weight loss.   -BPH on Finasteride and Flomax.   - Hx of DM, takes glyBURIDE 5 mg at home.  Insulin Drip weaned off and started ISS. -204.     Lines/Tubes: B/L peripheral IV. Johnson and TLC removed     ECHO: < from: Transthoracic Echocardiogram (12.31.18 @ 07:47) >   1. LV Ejection Fraction by Amaro's Method with a biplane EF of 25 %.   2. Severely decreased global left ventricular systolic function.   3. Mild mitral regurgitation.   4. Mild aortic regurgitation.      Signed out to MARJORIE Cerrato on trauma

## 2019-01-03 NOTE — DISCHARGE NOTE ADULT - PATIENT PORTAL LINK FT
You can access the [facility] Portal, offered by [facility], by registering with the following website: [email or web address]

## 2019-01-03 NOTE — DISCHARGE NOTE ADULT - PLAN OF CARE
Medical Management You were found to have a splenic laceration with surrounding bleed on CT scan. You have received embolization of your splenic artery, and your bleed has been stopped. your hemoglobin has been stable since. Follow up with the surgical team (Dr. Dewey) as an outpt for your rib fracture within 2 weeks of discharge. Resolution You developed respiratory failure likely secondary to an acute exacerbation of your CHF during your hospitalization. You were started on IV lasix, and are now back to your home PO dose. Follow up with your PMD within 5-7 days of discharge.

## 2019-01-03 NOTE — DISCHARGE NOTE ADULT - CARE PROVIDERS DIRECT ADDRESSES
,DirectAddress_Unknown,[email or web address]

## 2019-01-03 NOTE — PROGRESS NOTE ADULT - SUBJECTIVE AND OBJECTIVE BOX
GUIDO VILCHIS  [id number]  78y Male    Indication for ICU admission:  Admit Date:12-30-18  ICU Date: 12-30-18  OR Date: IR embolization     morphine (Stomach Upset)    PAST MEDICAL & SURGICAL HISTORY:  CAD (coronary artery disease)  Ventricular tachycardia  NANDO on CPAP  COPD (chronic obstructive pulmonary disease)  Pacemaker  AICD (automatic cardioverter/defibrillator) present  Hypothyroidism  Atrial fibrillation  Congestive heart disease  Hypercholesteremia  AICD (automatic cardioverter/defibrillator) present  History of laparoscopic cholecystectomy    Home Medications:  ALPRAZolam 0.25 mg oral tablet: 1 tab(s) orally once a day, As Needed (23 Oct 2018 00:22)  finasteride 5 mg oral tablet: 1 tab(s) orally once a day (23 Oct 2018 00:22)  glyBURIDE 5 mg oral tablet: 1 tab(s) orally once a day (23 Oct 2018 00:22)  levothyroxine 50 mcg (0.05 mg) oral capsule: 1 cap(s) orally once a day (23 Oct 2018 00:22)  montelukast 10 mg oral tablet: 1 tab(s) orally once a day (23 Oct 2018 00:22)  pravastatin 20 mg oral tablet: 1 tab(s) orally once a day (23 Oct 2018 00:22)  tamsulosin 0.4 mg oral capsule: 1 cap(s) orally once a day (23 Oct 2018 00:22)    24HRS EVENT:    Neuro- AAO x 3. pain well controlled,no medication needed    Pulm- On nasal cannula. Hx of COPD, NANDO on CPAP    CV-  Weaned off levo, continue on vasopressin with goal to wean off. SBPs 100s-110s. IJ US bedside no evidence of overload. Afib , on home Amiodarone, holding home metoprolol for now due to hypotension. Hold  Eliquis. AICD with paced rhythm, per patient followed closely by Dr. Holt. EP consulted to interrogate AICD- no events, normally functioning. patient complained of unintentional weight loss and has had abnormal swallowing and taste, unintentional weight loss. TSH sent, pending.   cardiology consult pending for primary cardiologist  12/31 echo: Ef 25%, severely decreased global LV systolic function, mildMR, mild AR  Caroid duplex 1/02- 20-39% bilateral       GI- Abdomen soft mildly distended, +BS, + BMs. Started on CLD, tolerating. Has had unintended weight loss and odd taste in mouth x weeks. SLP consult placed.   -GI Prophylaxis w/ Protonix 40mg Daily     Renal-DUYEN creat 2.1 (2.7)  from baseline 1.2-1.4. Rose in place making adequate urine, may consider DC rose tomorrow if off pressors, stable.    Heme- S/P MTP hgb slowly downtrending then 6.9, given 1 unit-> 7.1, then second one unit pRBC given, post Hgb 8.1.  T&S last 12/30, repeat MN 1/02  HSQ on hold due to recent active hemorrhage- acute blood loss anemia    ID- willl need post splenectomy vaccines prior to d/c    MSK- bedrest, will advance tomorrow if responding well to transfusions    Endocrine: Hypothyroidism- on home synthroid. Recheck thyroid function tests, pending as unintended weight loss. BPH on Finasteride holding Flomax. Hx of DM, takes glyBURIDE 5 mg at home.  Insulin Drip weaned off and started ISS.    Code status- DNR    DVT PPX- SCD's Chemoprophylaxis Held.  GI PPX- Protonix 40mg Daily     ***Tubes/Lines/Drains  ***  Peripheral IV  Central Venous Line           Urinary Catheter		Indication: Strict I&O      REVIEW OF SYSTEMS    [x] A ten-point review of systems was otherwise negative except as noted.  [ ] Due to altered mental status/intubation, subjective information were not able to be obtained from the patient. History was obtained, to the extent possible, from review of the chart and collateral sources of information. GUIDO VILCHIS  [id number]  78y Male    Indication for ICU admission:  Admit Date:18  ICU Date: 18  OR Date: IR embolization     morphine (Stomach Upset)    PAST MEDICAL & SURGICAL HISTORY:  CAD (coronary artery disease)  Ventricular tachycardia  NANDO on CPAP  COPD (chronic obstructive pulmonary disease)  Pacemaker  AICD (automatic cardioverter/defibrillator) present  Hypothyroidism  Atrial fibrillation  Congestive heart disease  Hypercholesteremia  AICD (automatic cardioverter/defibrillator) present  History of laparoscopic cholecystectomy    Home Medications:  ALPRAZolam 0.25 mg oral tablet: 1 tab(s) orally once a day, As Needed (23 Oct 2018 00:22)  finasteride 5 mg oral tablet: 1 tab(s) orally once a day (23 Oct 2018 00:22)  glyBURIDE 5 mg oral tablet: 1 tab(s) orally once a day (23 Oct 2018 00:22)  levothyroxine 50 mcg (0.05 mg) oral capsule: 1 cap(s) orally once a day (23 Oct 2018 00:22)  montelukast 10 mg oral tablet: 1 tab(s) orally once a day (23 Oct 2018 00:22)  pravastatin 20 mg oral tablet: 1 tab(s) orally once a day (23 Oct 2018 00:22)  tamsulosin 0.4 mg oral capsule: 1 cap(s) orally once a day (23 Oct 2018 00:22)    24HRS EVENT:    Neuro- AAO x 3. pain well controlled,no medication needed    Pulm- On nasal cannula. Hx of COPD, NANDO on CPAP    CV-  Weaned off levo, continue on vasopressin with goal to wean off. SBPs 100s-110s. IJ US bedside no evidence of overload. Afib , on home Amiodarone, holding home metoprolol for now due to hypotension. Hold  Eliquis. AICD with paced rhythm, per patient followed closely by Dr. Holt. EP consulted to interrogate AICD- no events, normally functioning. patient complained of unintentional weight loss and has had abnormal swallowing and taste, unintentional weight loss. TSH sent, pending.   cardiology consult pending for primary cardiologist   echo: Ef 25%, severely decreased global LV systolic function, mildMR, mild AR  Caroid duplex - 20-39% bilateral       GI- Abdomen soft mildly distended, +BS, + BMs. Started on CLD, tolerating. Has had unintended weight loss and odd taste in mouth x weeks. SLP consult placed.   -GI Prophylaxis w/ Protonix 40mg Daily     Renal-DUYEN creat 2.1 (2.7)  from baseline 1.2-1.4. Rose in place making adequate urine, may consider DC rose tomorrow if off pressors, stable.    Heme- S/P MTP hgb slowly downtrending then 6.9, given 1 unit-> 7.1, then second one unit pRBC given, post Hgb 8.1.  T&S last , repeat MN   HSQ on hold due to recent active hemorrhage- acute blood loss anemia    ID- willl need post splenectomy vaccines prior to d/c    MSK- bedrest, will advance tomorrow if responding well to transfusions    Endocrine: Hypothyroidism- on home synthroid. Recheck thyroid function tests, pending as unintended weight loss. BPH on Finasteride holding Flomax. Hx of DM, takes glyBURIDE 5 mg at home.  Insulin Drip weaned off and started ISS.    Code status- DNR    DVT PPX- SCD's Chemoprophylaxis Held.  GI PPX- Protonix 40mg Daily     ***Tubes/Lines/Drains  ***  Peripheral IV  Central Venous Line           Urinary Catheter		Indication: Strict I&O      REVIEW OF SYSTEMS    [x] A ten-point review of systems was otherwise negative except as noted.  [ ] Due to altered mental status/intubation, subjective information were not able to be obtained from the patient. History was obtained, to the extent possible, from review of the chart and collateral sources of information.  Daily     Daily Weight in k.4 (2019 00:00)    Diet, Consistent Carbohydrate Clear Liquid (19 @ 11:44)      CURRENT MEDS:  Neurologic Medications    Respiratory Medications  montelukast 10 milliGRAM(s) Oral at bedtime    Cardiovascular Medications  amiodarone    Tablet 400 milliGRAM(s) Oral every 12 hours  tamsulosin 0.4 milliGRAM(s) Oral at bedtime    Gastrointestinal Medications  dextrose 5%. 1000 milliLiter(s) IV Continuous <Continuous>  pantoprazole    Tablet 40 milliGRAM(s) Oral before breakfast    Genitourinary Medications    Hematologic/Oncologic Medications    Antimicrobial/Immunologic Medications    Endocrine/Metabolic Medications  atorvastatin 10 milliGRAM(s) Oral at bedtime  dextrose 50% Injectable 12.5 Gram(s) IV Push once  dextrose 50% Injectable 25 Gram(s) IV Push once  dextrose 50% Injectable 25 Gram(s) IV Push once  finasteride 5 milliGRAM(s) Oral daily  glucagon  Injectable 1 milliGRAM(s) IntraMuscular once PRN Glucose LESS THAN 70 milligrams/deciliter  insulin lispro (HumaLOG) corrective regimen sliding scale   SubCutaneous three times a day before meals  levothyroxine 50 MICROGram(s) Oral daily  vasopressin Infusion 0.04 Unit(s)/Min IV Continuous <Continuous>    Topical/Other Medications  benzocaine 15 mG/menthol 3.6 mG Lozenge 1 Lozenge Oral every 3 hours PRN Sore Throat  chlorhexidine 4% Liquid 1 Application(s) Topical <User Schedule>      ICU Vital Signs Last 24 Hrs  T(C): 36.7 (2019 20:00), Max: 37.4 (2019 12:00)  T(F): 98.1 (2019 20:00), Max: 99.3 (2019 12:00)  HR: 88 (2019 08:30) (88 - 88)  BP: 105/59 (2019 08:30) (84/49 - 124/71)  BP(mean): 76 (2019 08:30) (54 - 96)  ABP: --  ABP(mean): --  RR: 18 (2019 08:30) (18 - 37)  SpO2: 99% (2019 08:30) (59% - 100%)        ABG - ( 2019 14:01 )  pH, Arterial: 7.40  pH, Blood: x     /  pCO2: 35    /  pO2: 60    / HCO3: 22    / Base Excess: -2.5  /  SaO2: 92                  I&O's Summary    2019 07:01  -  2019 07:00  --------------------------------------------------------  IN: 747 mL / OUT: 1150 mL / NET: -403 mL    2019 07:01  -  2019 09:16  --------------------------------------------------------  IN: 0 mL / OUT: 0 mL / NET: 0 mL      I&O's Detail    2019 07:01  -  2019 07:00  --------------------------------------------------------  IN:    insulin Infusion: 1 mL    norepinephrine Infusion: 70 mL    Packed Red Blood Cells: 628 mL    vasopressin Infusion: 48 mL  Total IN: 747 mL    OUT:    Indwelling Catheter - Urethral: 1150 mL  Total OUT: 1150 mL    Total NET: -403 mL      2019 07:01  -  2019 09:16  --------------------------------------------------------  IN:  Total IN: 0 mL    OUT:  Total OUT: 0 mL    Total NET: 0 mL          PHYSICAL EXAM:    General/Neuro  RASS:             GCS:     = E   / V   / M      Deficits:                             alert & oriented x 3, no focal deficits  Pupils:    Lungs:      clear to auscultation, Normal expansion/effort.     Cardiovascular : S1, S2.  Regular rate and rhythm.  Peripheral edema   Cardiac Rhythm: Normal Sinus Rhythm    GI: Abdomen soft, Non-tender, Non-distended.    Gastrostomy / Jejunostomy tube in place.  Nasogastric tube in place.  Colostomy / Ileostomy.    Wound:    Extremities: Extremities warm, pink, well-perfused. Pulses:Rt     Lt    Derm: Good skin turgor, no skin breakdown.      :       Rose catheter in place.      CXR:     LABS:  CAPILLARY BLOOD GLUCOSE  179 (2019 18:00)  166 (2019 12:00)      POCT Blood Glucose.: 204 mg/dL (2019 06:19)  POCT Blood Glucose.: 179 mg/dL (2019 17:59)  POCT Blood Glucose.: 165 mg/dL (2019 16:40)  POCT Blood Glucose.: 166 mg/dL (2019 11:55)                          7.8    12.02 )-----------( 182      ( 2019 23:20 )             22.9       01-02    140  |  102  |  39<H>  ----------------------------<  154<H>  4.9   |  21  |  1.6<H>    Ca    8.1<L>      2019 23:20  Phos  2.9       Mg     2.2             PT/INR - ( 2019 23:20 )   PT: 14.50 sec;   INR: 1.26 ratio         PTT - ( 2019 23:20 )  PTT:25.2 sec GUIDO VILCHIS  [id number]  78y Male    Indication for ICU admission:  Admit Date:18  ICU Date: 18  OR Date: IR embolization     morphine (Stomach Upset)    PAST MEDICAL & SURGICAL HISTORY:  CAD (coronary artery disease)  Ventricular tachycardia  NANDO on CPAP  COPD (chronic obstructive pulmonary disease)  Pacemaker  AICD (automatic cardioverter/defibrillator) present  Hypothyroidism  Atrial fibrillation  Congestive heart disease  Hypercholesteremia  AICD (automatic cardioverter/defibrillator) present  History of laparoscopic cholecystectomy    Home Medications:  ALPRAZolam 0.25 mg oral tablet: 1 tab(s) orally once a day, As Needed (23 Oct 2018 00:22)  finasteride 5 mg oral tablet: 1 tab(s) orally once a day (23 Oct 2018 00:22)  glyBURIDE 5 mg oral tablet: 1 tab(s) orally once a day (23 Oct 2018 00:22)  levothyroxine 50 mcg (0.05 mg) oral capsule: 1 cap(s) orally once a day (23 Oct 2018 00:22)  montelukast 10 mg oral tablet: 1 tab(s) orally once a day (23 Oct 2018 00:22)  pravastatin 20 mg oral tablet: 1 tab(s) orally once a day (23 Oct 2018 00:22)  tamsulosin 0.4 mg oral capsule: 1 cap(s) orally once a day (23 Oct 2018 00:22)    24HRS EVENT:    Neuro- AAO x 3. pain well controlled,no medication needed    Pulm- On nasal cannula. Hx of COPD, NANDO on CPAP    CV-  Weaned off levo, continue on vasopressin with goal to wean off. SBPs 100s-110s. IJ US bedside no evidence of overload. Afib , on home Amiodarone, holding home metoprolol for now due to hypotension. Hold  Eliquis. AICD with paced rhythm, per patient followed closely by Dr. Holt. EP consulted to interrogate AICD- no events, normally functioning. patient complained of unintentional weight loss and has had abnormal swallowing and taste, unintentional weight loss. TSH sent, pending.   cardiology consult pending for primary cardiologist   echo: Ef 25%, severely decreased global LV systolic function, mildMR, mild AR  Caroid duplex - 20-39% bilateral       GI- Abdomen soft mildly distended, +BS, + BMs. Started on CLD, tolerating. Has had unintended weight loss and odd taste in mouth x weeks. SLP consult placed.   -GI Prophylaxis w/ Protonix 40mg Daily     Renal-DUYEN creat 2.1 (2.7)  from baseline 1.2-1.4. Rose in place making adequate urine, may consider DC rose tomorrow if off pressors, stable.    Heme- S/P MTP hgb slowly downtrending then 6.9, given 1 unit-> 7.1, then second one unit pRBC given, post Hgb 8.1.  T&S last , repeat MN   HSQ on hold due to recent active hemorrhage- acute blood loss anemia    ID- willl need post splenectomy vaccines prior to d/c    MSK- bedrest, will advance tomorrow if responding well to transfusions    Endocrine: Hypothyroidism- on home synthroid. Recheck thyroid function tests, pending as unintended weight loss. BPH on Finasteride holding Flomax. Hx of DM, takes glyBURIDE 5 mg at home.  Insulin Drip weaned off and started ISS.    Code status- DNR    DVT PPX- SCD's Chemoprophylaxis Held.  GI PPX- Protonix 40mg Daily     ***Tubes/Lines/Drains  ***  Peripheral IV  Central Venous Line           Urinary Catheter		Indication: Strict I&O      REVIEW OF SYSTEMS    [x] A ten-point review of systems was otherwise negative except as noted.  [ ] Due to altered mental status/intubation, subjective information were not able to be obtained from the patient. History was obtained, to the extent possible, from review of the chart and collateral sources of information.  Daily     Daily Weight in k.4 (2019 00:00)    Diet, Consistent Carbohydrate Clear Liquid (19 @ 11:44)      CURRENT MEDS:  Neurologic Medications    Respiratory Medications  montelukast 10 milliGRAM(s) Oral at bedtime    Cardiovascular Medications  amiodarone    Tablet 400 milliGRAM(s) Oral every 12 hours  tamsulosin 0.4 milliGRAM(s) Oral at bedtime    Gastrointestinal Medications  dextrose 5%. 1000 milliLiter(s) IV Continuous <Continuous>  pantoprazole    Tablet 40 milliGRAM(s) Oral before breakfast    Genitourinary Medications    Hematologic/Oncologic Medications    Antimicrobial/Immunologic Medications    Endocrine/Metabolic Medications  atorvastatin 10 milliGRAM(s) Oral at bedtime  dextrose 50% Injectable 12.5 Gram(s) IV Push once  dextrose 50% Injectable 25 Gram(s) IV Push once  dextrose 50% Injectable 25 Gram(s) IV Push once  finasteride 5 milliGRAM(s) Oral daily  glucagon  Injectable 1 milliGRAM(s) IntraMuscular once PRN Glucose LESS THAN 70 milligrams/deciliter  insulin lispro (HumaLOG) corrective regimen sliding scale   SubCutaneous three times a day before meals  levothyroxine 50 MICROGram(s) Oral daily  vasopressin Infusion 0.04 Unit(s)/Min IV Continuous <Continuous>    Topical/Other Medications  benzocaine 15 mG/menthol 3.6 mG Lozenge 1 Lozenge Oral every 3 hours PRN Sore Throat  chlorhexidine 4% Liquid 1 Application(s) Topical <User Schedule>      ICU Vital Signs Last 24 Hrs  T(C): 36.7 (2019 20:00), Max: 37.4 (2019 12:00)  T(F): 98.1 (2019 20:00), Max: 99.3 (2019 12:00)  HR: 88 (2019 08:30) (88 - 88)  BP: 105/59 (2019 08:30) (84/49 - 124/71)  BP(mean): 76 (2019 08:30) (54 - 96)  ABP: --  ABP(mean): --  RR: 18 (2019 08:30) (18 - 37)  SpO2: 99% (2019 08:30) (59% - 100%)        ABG - ( 2019 14:01 )  pH, Arterial: 7.40  pH, Blood: x     /  pCO2: 35    /  pO2: 60    / HCO3: 22    / Base Excess: -2.5  /  SaO2: 92                  I&O's Summary    2019 07:01  -  2019 07:00  --------------------------------------------------------  IN: 747 mL / OUT: 1150 mL / NET: -403 mL    2019 07:01  -  2019 09:16  --------------------------------------------------------  IN: 0 mL / OUT: 0 mL / NET: 0 mL      I&O's Detail    2019 07:01  -  2019 07:00  --------------------------------------------------------  IN:    insulin Infusion: 1 mL    norepinephrine Infusion: 70 mL    Packed Red Blood Cells: 628 mL    vasopressin Infusion: 48 mL  Total IN: 747 mL    OUT:    Indwelling Catheter - Urethral: 1150 mL  Total OUT: 1150 mL    Total NET: -403 mL      2019 07:01  -  2019 09:16  --------------------------------------------------------  IN:  Total IN: 0 mL    OUT:  Total OUT: 0 mL    Total NET: 0 mL          PHYSICAL EXAM:    General/Neuro  RASS:  0           GCS:   15      Deficits:    alert & oriented x 3, no focal deficits. Soft voice, improving.       Lungs:   clear to auscultation, Normal expansion/effort. On nasal cannula.      Cardiovascular : S1, S2.  Regular rate and rhythm.  Peripheral edema   Cardiac Rhythm: Normal Sinus Rhythm    GI: Abdomen soft, Non-tender, Non-distended.  + Bowel sounds, soft, mild distension. No rebound or guarding    Extremities: Extremities warm, pink, well-perfused.    Derm: Good skin turgor, no skin breakdown.      :       Rose catheter in place.      CXR:     LABS:  CAPILLARY BLOOD GLUCOSE  179 (2019 18:00)  166 (2019 12:00)      POCT Blood Glucose.: 204 mg/dL (2019 06:19)  POCT Blood Glucose.: 179 mg/dL (2019 17:59)  POCT Blood Glucose.: 165 mg/dL (2019 16:40)  POCT Blood Glucose.: 166 mg/dL (2019 11:55)                          7.8    12.02 )-----------( 182      ( 2019 23:20 )             22.9       -    140  |  102  |  39<H>  ----------------------------<  154<H>  4.9   |  21  |  1.6<H>    Ca    8.1<L>      2019 23:20  Phos  2.9       Mg     2.2             PT/INR - ( 2019 23:20 )   PT: 14.50 sec;   INR: 1.26 ratio         PTT - ( 2019 23:20 )  PTT:25.2 sec

## 2019-01-03 NOTE — DISCHARGE NOTE ADULT - MEDICATION SUMMARY - MEDICATIONS TO TAKE
I will START or STAY ON the medications listed below when I get home from the hospital:    finasteride 5 mg oral tablet  -- 1 tab(s) by mouth once a day  -- Indication: For BPH    spironolactone 25 mg oral tablet  -- 1 tab(s) by mouth once a day  -- Indication: For CHF    tamsulosin 0.4 mg oral capsule  -- 1 cap(s) by mouth once a day  -- Indication: For BPH    mexiletine 150 mg oral capsule  -- 1 cap(s) by mouth 2 times a day   -- It is very important that you take or use this exactly as directed.  Do not skip doses or discontinue unless directed by your doctor.  May cause drowsiness or dizziness.  Take with food or milk.    -- Indication: For Afib    amiodarone 200 mg oral tablet  -- 2 tab(s) by mouth every 12 hours   -- Indication: For Afib    Eliquis 2.5 mg oral tablet  -- 1 tab(s) by mouth 2 times a day  -- Indication: For Afib    glyBURIDE 5 mg oral tablet  -- 1 tab(s) by mouth once a day  -- Indication: For DM    pravastatin 20 mg oral tablet  -- 1 tab(s) by mouth once a day  -- Indication: For Cad    ALPRAZolam 0.25 mg oral tablet  -- 1 tab(s) by mouth once a day, As Needed  -- Indication: For Anxiety    metoprolol tartrate 25 mg oral tablet  -- 1 tab(s) by mouth every 8 hours  -- Indication: For CAD    furosemide 40 mg oral tablet  -- 1 tab(s) by mouth once a day   -- Avoid prolonged or excessive exposure to direct and/or artificial sunlight while taking this medication.  It is very important that you take or use this exactly as directed.  Do not skip doses or discontinue unless directed by your doctor.  It may be advisable to drink a full glass orange juice or eat a banana daily while taking this medication.    -- Indication: For CHF    montelukast 10 mg oral tablet  -- 1 tab(s) by mouth once a day  -- Indication: For ASthma    pantoprazole 40 mg oral delayed release tablet  -- 1 tab(s) by mouth once a day (before a meal)  -- Indication: For HEartburn    levothyroxine 50 mcg (0.05 mg) oral capsule  -- 1 cap(s) by mouth once a day  -- Indication: For Hypothyroidism

## 2019-01-03 NOTE — DISCHARGE NOTE ADULT - CARE PROVIDER_API CALL
Kingston Solano), Medicine  [address] [place] [address]  Phone: [phone]  Fax: [phone]    Reginaldo Dewey), Surgical Physicians  [address] [place] [address]  [address] [place] [address]  Phone: [phone]  Fax: [phone]

## 2019-01-03 NOTE — PROGRESS NOTE ADULT - ASSESSMENT
ASSESSMENT/PLAN: 78yMale      Neurologic:    Respiratory:    Cardiovascular:    Gastrointestinal/Nutrition:    Genitourinary/Renal:    Hematologic:    Infectious Disease:    Endocrine:    Disposition: ASSESSMENT:  78y Male with splenic laceration and hemorrhagic shock    PLAN:  Neuro- Extubated, AAO x 3. Pain control with Oxy PRN    Pulm- On nasal cannula,  Hx of COPD, NANDO on CPAP. Wean to room air as tolerated, desat to high 80s on exam when off    CV- Weaned off levo during day and vasopressin overnight.  SBPs 100s-110s. IJ US bedside no evidence of overload. Afib, on home Amiodarone, holding home metoprolol for now due to hypotension. Hold  Eliquis. AICD with paced rhythm, per EPS no longer pacuing dependent as had been previously. F ollowed closely by Dr. Holt. EP consulted to interrogate AICD- no events, normally functioning. patient complained of unintentional weight loss and has had abnormal swallowing and taste, unintentional weight loss. TSH sent, pending.   cardiology consult pending for primary cardiologist  12/31 echo: Ef 25%, severely decreased global LV systolic function, mild MR, mild AR  Caroid duplex 1/02- 20-39% bilateral    On statin    GI- Abdomen soft mildly distended, mild TTP LUQ, +BS and had 3 bowel movements, nonbloody then started on CLD, tolerating. Has history of unintended weight loss, odd taste for weeks and strange sensation with swallow, no aspiration per RN with CLD CC. SLP consult placed, follow up.  GI Prophylaxis w/ Protonix 40mg Daily     Renal-DUYEN creat 1.6 (2.1,2.7)  from baseline 1.2-1.4. Rose in place making adequate urine. K 4.9, hyperkalemia resolving. On home finasteride, holding tamsulosin. Consider DC rose now off pressors.     Heme- S/P MTP, given additional 2 units yesterday for 6.8-> 7.1-> 8.1, repeat midnight 7.8. Consider additional transfusion as BP 100s-120s   Last T&S 1/2.  HSQ on hold due to recent active hemorrhage- acute blood loss anemia    ID- 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. Tmax  99.3,  WBC 12.02 (13)     MSK- bedrest, consider advancing today if stable     Endocrine: Hypothyroidism- on home synthroid. Recheck thyroid function tests, pending as unintended weight loss. BPH on Finasteride holding Flomax. Hx of DM, takes glyBURIDE 5 mg at home.  Insulin Drip weaned off and started ISS   -204.     Lines/Tubes: TLC, B/L peripheral IV, Rose cath    Disposition: Continue ICU ASSESSMENT:  78y Male with splenic laceration and hemorrhagic shock    PLAN:  Neuro- Extubated, AAO x 3. Pain control with Oxy PRN    Pulm- On nasal cannula,  Hx of COPD, NANDO on CPAP. Wean to room air as tolerated, desat to high 80s on exam when off    CV- Weaned off levo during day and vasopressin overnight.  SBPs 100s-110s. IJ US bedside no evidence of overload. Afib, on home Amiodarone, holding home metoprolol for now due to hypotension. Hold  Eliquis. AICD with paced rhythm, per EPS no longer pacuing dependent as had been previously. F ollowed closely by Dr. Holt. EP consulted to interrogate AICD- no events, normally functioning. patient complained of unintentional weight loss and has had abnormal swallowing and taste, unintentional weight loss. TSH sent, pending.   cardiology consult pending for primary cardiologist  12/31 echo: Ef 25%, severely decreased global LV systolic function, mild MR, mild AR  Caroid duplex 1/02- 20-39% bilateral    On statin    GI- Abdomen soft mildly distended, mild TTP LUQ, +BS and had 3 bowel movements, nonbloody then started on CLD, tolerating. Has history of unintended weight loss, odd taste for weeks and strange sensation with swallow, no aspiration per RN with CLD CC. SLP consult placed, follow up.  GI Prophylaxis w/ Protonix 40mg Daily     Renal-DUYEN creat 1.6 (2.1,2.7)  from baseline 1.2-1.4. Rose in place making adequate urine. K 4.9, hyperkalemia resolving. On home finasteride, holding tamsulosin. Consider DC rose now off pressors.     Heme- S/P MTP, given additional 2 units yesterday for 6.8-> 7.1-> 8.1, repeat midnight 7.8. Consider additional transfusion as BP 100s-120s   Last T&S 1/2.  HSQ on hold due to recent active hemorrhage- acute blood loss anemia    ID- 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. Tmax  99.3,  WBC 12.02 (13)     MSK- bedrest, consider advancing today if stable     Endocrine: Hypothyroidism- on home synthroid. Recheck thyroid function tests, pending as unintended weight loss. BPH on Finasteride holding Flomax.   - Hx of DM, takes glyBURIDE 5 mg at home.  Insulin Drip weaned off and started ISS. -204.     Lines/Tubes: TLC, B/L peripheral IV, Rose cath    Disposition: Continue ICU

## 2019-01-03 NOTE — DISCHARGE NOTE ADULT - HOSPITAL COURSE
78 year old male pmhx as below, significant for A.fib on Eliquis, s/p fall 2 days ago presents for left side abdominal pain that began last night around 9pm. Patient was evaluated by the ED, was hypotensive and was started on levophed, he was taken for CTA of the chest/abdomen which was significant for a splenic laceration/subcapsular hematoma. Trauma was consulted at this time as well as IR, and pt had embolization of his splenic artery. He developed acute hypoxic respiratory failure likely 2/2 acute on chronic chf exacerbation. pt was treated with IV diuresis and later switched to his home PO lasix dose, currently on 2L NC.

## 2019-01-03 NOTE — DISCHARGE NOTE ADULT - CARE PLAN
Principal Discharge DX:	Splenic laceration, initial encounter  Goal:	Medical Management  Assessment and plan of treatment:	You were found to have a splenic laceration with surrounding bleed on CT scan. You have received embolization of your splenic artery, and your bleed has been stopped. your hemoglobin has been stable since. Follow up with the surgical team (Dr. Dewey) as an outpt for your rib fracture within 2 weeks of discharge.  Secondary Diagnosis:	Acute respiratory failure with hypoxia  Goal:	Resolution  Assessment and plan of treatment:	You developed respiratory failure likely secondary to an acute exacerbation of your CHF during your hospitalization. You were started on IV lasix, and are now back to your home PO dose. Follow up with your PMD within 5-7 days of discharge.

## 2019-01-03 NOTE — CONSULT NOTE ADULT - SUBJECTIVE AND OBJECTIVE BOX
PT is a 78 y.o male admitted s/p fall with splenic laceration s/p IR embolization on 12/30, called to evaluate for hoarseness. Pt was intubated on 12/30 for emergent IR procedure, extubated on 1/1 afternoon. Pt poor historian - confused? Pt states he noticed at home that he produces a significant amount of phlegm and mucus, coughs persistently and cannot always clear mucus. PT states he developed difficulty with tasting food recently as well, noted to have significant weight loss. Also states that as he speaks throughout the day, his voice becomes more hoarse and low. Denies any changes in smell, no SOB/difficulty breathing. States he saw an ENT recently for a nasal bone fracture, son at bedside denies this. Pt during last admission was evaluated by neurology for visual hallucinations, but was attributed to benzo use.    PAST MEDICAL HISTORY: CAD, Ventricular tachycardia, NANDO on CPAP, COPD, Hypothyroidism, Atrial fibrillation, Congestive heart disease, Hypercholesteremia  SURGICAL HISTORY: AICD, laparoscopic cholecystectomy  MEDICATIONS (STANDING):  amiodarone Tablet 400 milliGRAM(s) Oral every 12 hours  atorvastatin 10 milliGRAM(s) Oral at bedtime  chlorhexidine 4% Liquid 1 Application(s) Topical <User Schedule>  dextrose 5%. 1000 milliLiter(s) (50 mL/Hr) IV Continuous  finasteride 5 milliGRAM(s) Oral daily  heparin  Injectable 5000 Unit(s) SubCutaneous every 8 hours  insulin lispro (HumaLOG) corrective regimen sliding scale   SubCutaneous three times a day before meals  levothyroxine 50 MICROGram(s) Oral daily  montelukast 10 milliGRAM(s) Oral at bedtime  pantoprazole Tablet 40 milliGRAM(s) Oral before breakfast  tamsulosin 0.4 milliGRAM(s) Oral at bedtime  vasopressin Infusion 0.04 Unit(s)/Min (2.4 mL/Hr) IV Continuous  ALLERGIES: Morphine    Vital Signs: T(F): 97 (03 Jan 2019 08:00), Max: 98.1 (02 Jan 2019 20:00), HR: 88, BP: 111/57, RR: 31, SpO2: 97%  GEN: NAD, awake and alert. voice hoarse  HEENT: NC/AT; oral mucosa pink, no erythema/edema, uvula midline. neck supple.    LABS:                        8.3    11.96 )-----------( 189      ( 03 Jan 2019 13:40 )             24.4

## 2019-01-04 LAB
ANION GAP SERPL CALC-SCNC: 17 MMOL/L — HIGH (ref 7–14)
APTT BLD: 27.1 SEC — SIGNIFICANT CHANGE UP (ref 27–39.2)
BASOPHILS # BLD AUTO: 0.01 K/UL — SIGNIFICANT CHANGE UP (ref 0–0.2)
BASOPHILS NFR BLD AUTO: 0.1 % — SIGNIFICANT CHANGE UP (ref 0–1)
BUN SERPL-MCNC: 32 MG/DL — HIGH (ref 10–20)
CALCIUM SERPL-MCNC: 8.2 MG/DL — LOW (ref 8.5–10.1)
CHLORIDE SERPL-SCNC: 104 MMOL/L — SIGNIFICANT CHANGE UP (ref 98–110)
CO2 SERPL-SCNC: 21 MMOL/L — SIGNIFICANT CHANGE UP (ref 17–32)
CREAT SERPL-MCNC: 1.4 MG/DL — SIGNIFICANT CHANGE UP (ref 0.7–1.5)
EOSINOPHIL # BLD AUTO: 0.15 K/UL — SIGNIFICANT CHANGE UP (ref 0–0.7)
EOSINOPHIL NFR BLD AUTO: 1.2 % — SIGNIFICANT CHANGE UP (ref 0–8)
GLUCOSE BLDC GLUCOMTR-MCNC: 139 MG/DL — HIGH (ref 70–99)
GLUCOSE BLDC GLUCOMTR-MCNC: 159 MG/DL — HIGH (ref 70–99)
GLUCOSE BLDC GLUCOMTR-MCNC: 186 MG/DL — HIGH (ref 70–99)
GLUCOSE BLDC GLUCOMTR-MCNC: 194 MG/DL — HIGH (ref 70–99)
GLUCOSE BLDC GLUCOMTR-MCNC: 241 MG/DL — HIGH (ref 70–99)
GLUCOSE SERPL-MCNC: 169 MG/DL — HIGH (ref 70–99)
HCT VFR BLD CALC: 25.5 % — LOW (ref 42–52)
HGB BLD-MCNC: 8.3 G/DL — LOW (ref 14–18)
IMM GRANULOCYTES NFR BLD AUTO: 1.4 % — HIGH (ref 0.1–0.3)
INR BLD: 1.18 RATIO — SIGNIFICANT CHANGE UP (ref 0.65–1.3)
LYMPHOCYTES # BLD AUTO: 0.32 K/UL — LOW (ref 1.2–3.4)
LYMPHOCYTES # BLD AUTO: 2.6 % — LOW (ref 20.5–51.1)
MAGNESIUM SERPL-MCNC: 2.2 MG/DL — SIGNIFICANT CHANGE UP (ref 1.8–2.4)
MCHC RBC-ENTMCNC: 28.6 PG — SIGNIFICANT CHANGE UP (ref 27–31)
MCHC RBC-ENTMCNC: 32.5 G/DL — SIGNIFICANT CHANGE UP (ref 32–37)
MCV RBC AUTO: 87.9 FL — SIGNIFICANT CHANGE UP (ref 80–94)
MONOCYTES # BLD AUTO: 1.45 K/UL — HIGH (ref 0.1–0.6)
MONOCYTES NFR BLD AUTO: 11.8 % — HIGH (ref 1.7–9.3)
NEUTROPHILS # BLD AUTO: 10.15 K/UL — HIGH (ref 1.4–6.5)
NEUTROPHILS NFR BLD AUTO: 82.9 % — HIGH (ref 42.2–75.2)
NRBC # BLD: 1 /100 WBCS — HIGH (ref 0–0)
PHOSPHATE SERPL-MCNC: 2.1 MG/DL — SIGNIFICANT CHANGE UP (ref 2.1–4.9)
PLATELET # BLD AUTO: 223 K/UL — SIGNIFICANT CHANGE UP (ref 130–400)
POTASSIUM SERPL-MCNC: 4.6 MMOL/L — SIGNIFICANT CHANGE UP (ref 3.5–5)
POTASSIUM SERPL-SCNC: 4.6 MMOL/L — SIGNIFICANT CHANGE UP (ref 3.5–5)
PROTHROM AB SERPL-ACNC: 13.5 SEC — HIGH (ref 9.95–12.87)
RBC # BLD: 2.9 M/UL — LOW (ref 4.7–6.1)
RBC # FLD: 17.2 % — HIGH (ref 11.5–14.5)
SODIUM SERPL-SCNC: 142 MMOL/L — SIGNIFICANT CHANGE UP (ref 135–146)
WBC # BLD: 12.25 K/UL — HIGH (ref 4.8–10.8)
WBC # FLD AUTO: 12.25 K/UL — HIGH (ref 4.8–10.8)

## 2019-01-04 PROCEDURE — 99233 SBSQ HOSP IP/OBS HIGH 50: CPT

## 2019-01-04 RX ADMIN — PANTOPRAZOLE SODIUM 40 MILLIGRAM(S): 20 TABLET, DELAYED RELEASE ORAL at 06:02

## 2019-01-04 RX ADMIN — HEPARIN SODIUM 5000 UNIT(S): 5000 INJECTION INTRAVENOUS; SUBCUTANEOUS at 21:37

## 2019-01-04 RX ADMIN — HEPARIN SODIUM 5000 UNIT(S): 5000 INJECTION INTRAVENOUS; SUBCUTANEOUS at 06:03

## 2019-01-04 RX ADMIN — Medication 2: at 08:23

## 2019-01-04 RX ADMIN — ATORVASTATIN CALCIUM 10 MILLIGRAM(S): 80 TABLET, FILM COATED ORAL at 21:37

## 2019-01-04 RX ADMIN — HEPARIN SODIUM 5000 UNIT(S): 5000 INJECTION INTRAVENOUS; SUBCUTANEOUS at 13:36

## 2019-01-04 RX ADMIN — MONTELUKAST 10 MILLIGRAM(S): 4 TABLET, CHEWABLE ORAL at 21:37

## 2019-01-04 RX ADMIN — AMIODARONE HYDROCHLORIDE 400 MILLIGRAM(S): 400 TABLET ORAL at 06:03

## 2019-01-04 RX ADMIN — Medication 50 MICROGRAM(S): at 06:03

## 2019-01-04 RX ADMIN — Medication 4: at 11:34

## 2019-01-04 RX ADMIN — AMIODARONE HYDROCHLORIDE 400 MILLIGRAM(S): 400 TABLET ORAL at 17:14

## 2019-01-04 RX ADMIN — FINASTERIDE 5 MILLIGRAM(S): 5 TABLET, FILM COATED ORAL at 11:34

## 2019-01-04 RX ADMIN — TAMSULOSIN HYDROCHLORIDE 0.4 MILLIGRAM(S): 0.4 CAPSULE ORAL at 21:37

## 2019-01-04 RX ADMIN — Medication 2: at 17:14

## 2019-01-04 NOTE — CONSULT NOTE ADULT - SUBJECTIVE AND OBJECTIVE BOX
Patient is a 78y old  Male who presents with a chief complaint of Splenic Laceration (03 Jan 2019 14:57)    HPI:  78 year old male pmhx as below, significant for A.fib on Eliquis, s/p fall 2 days ago presents for left side abdominal pain that began last night around 9pm. Patient was evaluated by the ED, was hypotensive and was started on levophed, he was taken for CTA of the chest/abdomen which was significant for a splenic laceration/subcapsular hematoma. Trauma was consulted at this time when the patient mentioned his fall 2 days prior. Patient was seen and examined. SBP at this time on levophed was 100-110 on the monitor. Patient complaining of lower left side abdominal pain and left shoulder pain. He states he fell 2 days ago but felt fine until last night around 9pm when he developed some abdominal pain, his last meal was yesterday around 5pm and the last time he took medications (Eliquis) was yesterday morning. (30 Dec 2018 08:48)    PAST MEDICAL & SURGICAL HISTORY:  CAD (coronary artery disease)  Ventricular tachycardia  NANDO on CPAP  COPD (chronic obstructive pulmonary disease)  Pacemaker  AICD (automatic cardioverter/defibrillator) present  Hypothyroidism  Atrial fibrillation  Congestive heart disease  Hypercholesteremia  AICD (automatic cardioverter/defibrillator) present  History of laparoscopic cholecystectomy    MEDICATIONS  (STANDING):  amiodarone    Tablet 400 milliGRAM(s) Oral every 12 hours  atorvastatin 10 milliGRAM(s) Oral at bedtime  chlorhexidine 4% Liquid 1 Application(s) Topical <User Schedule>  dextrose 5%. 1000 milliLiter(s) (50 mL/Hr) IV Continuous <Continuous>  dextrose 50% Injectable 12.5 Gram(s) IV Push once  dextrose 50% Injectable 25 Gram(s) IV Push once  dextrose 50% Injectable 25 Gram(s) IV Push once  finasteride 5 milliGRAM(s) Oral daily  heparin  Injectable 5000 Unit(s) SubCutaneous every 8 hours  insulin lispro (HumaLOG) corrective regimen sliding scale   SubCutaneous three times a day before meals  levothyroxine 50 MICROGram(s) Oral daily  montelukast 10 milliGRAM(s) Oral at bedtime  pantoprazole    Tablet 40 milliGRAM(s) Oral before breakfast  tamsulosin 0.4 milliGRAM(s) Oral at bedtime  vasopressin Infusion 0.04 Unit(s)/Min (2.4 mL/Hr) IV Continuous <Continuous>    MEDICATIONS  (PRN):  benzocaine 15 mG/menthol 3.6 mG Lozenge 1 Lozenge Oral every 3 hours PRN Sore Throat  glucagon  Injectable 1 milliGRAM(s) IntraMuscular once PRN Glucose LESS THAN 70 milligrams/deciliter    FAMILY HISTORY:  Family history of acute myocardial infarction (Aunt)    Review of Systems:  CONSTITUTIONAL: No fever, weight loss, or fatigue  EYES: No eye pain, visual disturbances, or discharge  ENMT:  No difficulty hearing, tinnitus, vertigo; No sinus or throat pain  NECK: No pain or stiffness  BREASTS: No pain, masses, or nipple discharge  RESPIRATORY: No cough, wheezing, chills or hemoptysis; No shortness of breath  CARDIOVASCULAR: No chest pain, palpitations, dizziness, or leg swelling  GASTROINTESTINAL: No abdominal or epigastric pain. No nausea, vomiting, or hematemesis; No diarrhea or constipation. No melena or hematochezia.  GENITOURINARY: No dysuria, frequency, hematuria, or incontinence  NEUROLOGICAL: No headaches, memory loss, loss of strength, numbness, or tremors  SKIN: No itching, burning, rashes, or lesions   LYMPH NODES: No enlarged glands  ENDOCRINE: No heat or cold intolerance; No hair loss  MUSCULOSKELETAL: No joint pain or swelling; No muscle, back, or extremity pain  PSYCHIATRIC: No depression, anxiety, mood swings, or difficulty sleeping  HEME/LYMPH: No easy bruising, or bleeding gums  ALLERY AND IMMUNOLOGIC: No hives or eczema    SOCIAL HISTORY:  Denied smoking    Vital Signs Last 24 Hrs  T(C): 35.7 (04 Jan 2019 08:00), Max: 36.7 (03 Jan 2019 23:30)  T(F): 96.3 (04 Jan 2019 08:00), Max: 98 (03 Jan 2019 23:30)  HR: 94 (04 Jan 2019 08:00) (88 - 98)  BP: 117/59 (04 Jan 2019 08:00) (111/57 - 131/67)  BP(mean): 95 (03 Jan 2019 16:00) (72 - 95)  RR: 18 (04 Jan 2019 08:00) (18 - 42)  SpO2: 94% (04 Jan 2019 10:15) (86% - 100%)    Physical Exam:  GENERAL: NAD, well-groomed, well-developed  HEAD:  NCAT  EYES: EOMI, PERRL, conjunctiva clear  ENMT: No tonsillar erythema, exudates, or enlargement; Moist mucous membranes, Good dentition, No lesions  NECK: Supple, No JVD, Normal thyroid  NERVOUS SYSTEM: AAOX3, Good concentration; Motor Strength 5/5 B/L upper and lower extremities  CHEST/LUNG: CTA b/l no w/r/r  HEART: +s1s2 RRR no m/g/r  ABDOMEN: soft, NT/ND (+) bs, no HSM  EXTREMITIES:  2+ Peripheral Pulses, No c/c/e  LYMPH: No lymphadenopathy noted  SKIN: No rashes or lesions    ECG:  < from: 12 Lead ECG (12.31.18 @ 23:32) >  Diagnosis Line Ventricular pacing    < end of copied text >    ECHO:  < from: Transthoracic Echocardiogram (12.31.18 @ 07:47) >  Summary:   1. LVEjection Fraction by Amaro's Method with a biplane EF of 25 %.   2. Severely decreased global left ventricular systolic function.   3. Mild mitral regurgitation.   4. Mild aortic regurgitation.    < end of copied text >    I&O's Detail    03 Jan 2019 07:01  -  04 Jan 2019 07:00  --------------------------------------------------------  IN:  Total IN: 0 mL    OUT:    Indwelling Catheter - Urethral: 390 mL    Voided: 290 mL  Total OUT: 680 mL    Total NET: -680 mL    04 Jan 2019 07:01  -  04 Jan 2019 11:28  --------------------------------------------------------  IN:    Oral Fluid: 240 mL  Total IN: 240 mL    OUT:    Voided: 200 mL  Total OUT: 200 mL    Total NET: 40 mL    LABS:                        8.3    12.25 )-----------( 223      ( 04 Jan 2019 00:28 )             25.5     01-04    142  |  104  |  32<H>  ----------------------------<  169<H>  4.6   |  21  |  1.4    Ca    8.2<L>      04 Jan 2019 00:28  Phos  2.1     01-04  Mg     2.2     01-04    PT/INR - ( 04 Jan 2019 00:28 )   PT: 13.50 sec;   INR: 1.18 ratio      PTT - ( 04 Jan 2019 00:28 )  PTT:27.1 sec    I&O's Summary    03 Jan 2019 07:01  -  04 Jan 2019 07:00  --------------------------------------------------------  IN: 0 mL / OUT: 680 mL / NET: -680 mL    04 Jan 2019 07:01  -  04 Jan 2019 11:28  --------------------------------------------------------  IN: 240 mL / OUT: 200 mL / NET: 40 mL    RADIOLOGY & ADDITIONAL STUDIES:  < from: CT Angio Chest Dissection Protocol (12.30.18 @ 05:59) >  IMPRESSION:     Linear hyperdensity through the lower pole of the spleen suggestive of a   laceration, with a subcapsular hematoma and small volume extracapsular   extension layering along the peritoneum. No evidence of contrast blush to   suggest active arterial extravasation.    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 No acute aortic dissection    Acute nondisplaced right anterior rib fractures of the third and fourth   rib. Nondisplaced left anterior rib fractures, age indeterminate, but   appear chronic in nature.    < end of copied text >    < from: Xray Chest 1 View- PORTABLE-Routine (01.03.19 @ 05:46) >  Impression:      Unchanged right basilar opacity.    Decreased left basilar opacity.      < end of copied text >

## 2019-01-04 NOTE — CONSULT NOTE ADULT - ASSESSMENT
IMPRESSION: Rehab of Debilitation     PRECAUTIONS: [   x ] Cardiac  [   x ] Respiratory  [    ] Seizures [    ] Contact Isolation  [    ] Droplet Isolation  [    ] Other    Weight Bearing Status:     RECOMMENDATION: AMBULATE ONCE HR CONTROLLED    Out of Bed to Chair     DVT/Decubiti Prophylaxis    REHAB PLAN:     [ x    ] Bedside P/T 3-5 times a week   [     ] Bedside O/T  2-3 times a week   [     ] No Rehab Therapy Indicated   [     ]  Speech Therapy   Conditioning/ROM                                 ADL  Bed Mobility                                            Conditioning/ROM  Transfers                                                  Bed Mobility  Sitting /Standing Balance                      Transfers                                        Gait Training                                            Sitting/Standing Balance  Stair Training [x ]Applicable                 Home equipment Eval                                                                     Splinting  [   ] Only      GOALS:   ADL   [ x   ]   Independent         Transfers  [x    ] Independent            Ambulation  [ x    ] Independent     [  x   ] With device                            [    ]  CG                                               [    ]  CG                                                    [     ] CG                            [    ] Min A                                          [    ] Min A                                                [     ] Min  A          DISCHARGE PLAN:   [     ]  Good candidate for Intensive Rehabilitation/Hospital based                                             Will tolerate 3hrs Intensive Rehab Daily                                       [   x   ]  Short Term Rehab in Skilled Nursing Facility                                   VS                                     [     x ]  Home with Outpatient or VN services  RW ON DC                                         [      ]  Possible Candidate for Intensive Hospital based Rehab

## 2019-01-04 NOTE — CONSULT NOTE ADULT - ASSESSMENT
78 year old male PMHx Afib on Eliquis, CAD, hx Ventricular tachycardia, NANDO on CPAP, COPD, HFrEF(last EF 25% 12/2018) s/p AICD, hypothyroidism, DLD here s/p fall, L abdominal pain found to have splenic subcapsular hermatoma and laceration    Impression:  Afib on Eliquis  CAD  hx Ventricular tachycardia  NANDO on CPAP  COPD  HFrEF(last EF 25%) s/p AICD  hypothyroidism  DLD    Plan:  -TTE(12/2018): EF 25%  -c/w amiodarone, eliquis, atorvastartin  -monitor I&Os, limit fluid intake to 1.5L, DASH/TLC 78 year old male PMHx Afib on Eliquis, CAD, hx Ventricular tachycardia, NANDO on CPAP, COPD, HFrEF(last EF 25% 12/2018) s/p AICD, hypothyroidism, DLD here s/p fall, L abdominal pain found to have splenic subcapsular hermatoma and laceration    Impression:  Afib on Eliquis  CAD  hx Ventricular tachycardia  NANDO on CPAP  COPD  HFrEF(last EF 25%) s/p AICD  hypothyroidism  DLD    Plan:  -TTE(12/2018): EF 25%  -c/w amiodarone, atorvastartin  -monitor I&Os, limit fluid intake to 1.5L, DASH/TLC  -can restart eliquis per surgery 78 year old male PMHx Afib on Eliquis, CAD, hx Ventricular tachycardia, NANDO on CPAP, COPD, HFrEF(last EF 25% 12/2018) s/p AICD, hypothyroidism, DLD here s/p fall, L abdominal pain found to have splenic subcapsular hermatoma and laceration    Impression:  Afib on Eliquis  CAD  hx Ventricular tachycardia s/p ablation 10/26  NANDO on CPAP  COPD  HFrEF(last EF 25%) s/p AICD  hypothyroidism  DLD    Plan:  -TTE(12/2018): EF 25%  -EP(1/2/19) interrogated AICD and no events were seen  -TSH 1.68  -can restart metoprolol if SBP>90 and HR >70  -c/w amiodarone, atorvastartin  -monitor I&Os, limit fluid intake to 1.5L, DASH/TLC  -can restart eliquis per surgery 78 year old male PMHx Afib on Eliquis, CAD, hx Ventricular tachycardia, NANDO on CPAP, COPD, HFrEF(last EF 25% 12/2018) s/p AICD, hypothyroidism, DLD here s/p fall, L abdominal pain found to have splenic subcapsular hermatoma and laceration    Impression:  Afib on Eliquis  CAD  hx Ventricular tachycardia s/p ablation 10/26  NANDO on CPAP  COPD  HFrEF(last EF 25%) s/p AICD  hypothyroidism  DLD    Plan:  -TTE(12/2018): EF 25%  -EP(1/2/19) interrogated AICD and no events were seen. Ventricular paced rhythm on EKG  -TSH 1.68  -can restart metoprolol if SBP>90 and HR >70  -c/w amiodarone, atorvastartin  -monitor I&Os, limit fluid intake to 1.5L, DASH/TLC  -can restart eliquis per surgery   -mexilitene, lasix, aldactone on hold 78 year old male PMHx Afib on Eliquis, CAD, hx Ventricular tachycardia, NANDO on CPAP, COPD, HFrEF(last EF 25% 12/2018) s/p AICD, hypothyroidism, DLD here s/p fall, L abdominal pain found to have splenic subcapsular hermatoma and laceration    Impression:  Afib on Eliquis  CAD  hx Ventricular tachycardia s/p ablation 10/26  NANDO on CPAP  COPD  HFrEF(last EF 25%) s/p AICD  hypothyroidism  DLD    Plan:  -TTE(12/2018): EF 25%  -EP(1/2/19) interrogated AICD and no events were seen. Ventricular paced rhythm on EKG  -TSH 1.68  -can restart metoprolol if SBP>90 and HR >70  -c/w amiodarone, atorvastartin  -monitor I&Os, limit fluid intake to 1.5L, DASH/TLC  -can restart eliquis per surgery

## 2019-01-04 NOTE — CHART NOTE - NSCHARTNOTEFT_GEN_A_CORE
Phone conversation between surgery team and cardiology fellow. As discussed, our plan is not to restart eliquis at this time, for at least 1-2 weeks. Cardiology can restart as an outpatient.

## 2019-01-04 NOTE — CONSULT NOTE ADULT - ATTENDING COMMENTS
Flexible laryngoscopy: all WNL with exception of small pale ulcerative area on posterior aspect of bilateral true vocal folds, left > right, consistent with recent intubation history. Airway patent. Bilateral and symmetric vocal fold mobility.    - start daily PPI if patient not already on this medication  - f/up outpatient in 1 month
Patient requires sicu monitoring.
Seen / examined and above reviewed.    AF ([id number] = 4).  NICM, Severe LV dysfunction, VT s/p AICD.  Chronic Systolic CHF.    Presented with splenic laceration secondary to mechanical fall (tripped on rug) s/p embolization.    Breathing comfortable.  Hemodynamics stable.  DUYEN improving.  Clinically euvolemic / no CHF decompensation.  EKG: AF, V-PCK  AICD interrogated / no events.    RECOMMEND:  - No further cardiac testing required at present.  - Cont Amiodarone.  - Resume Eliquis when bleeding risk acceptable.  Do not bridge.    - Resume other home Rx (metoprolol, spironolactone, mexilitine, lasix).  -

## 2019-01-04 NOTE — PROGRESS NOTE ADULT - SUBJECTIVE AND OBJECTIVE BOX
Progress Note: General Surgery  Patient: GUIDO VILCHIS , 78y (1940)Male   MRN: [id number]  Location: [address]  Visit: 12-30-18 Inpatient  Date: 01-04-19 @ 04:51    Procedure/Diagnosis: s/p IR embolization of splenic artery for grade 2 splenic lac/subcapsular hematoma    Events/ 24h: No acute events overnight. Pain controlled.    Vitals: T(F): 98 (01-03-19 @ 23:30), Max: 98 (01-03-19 @ 23:30)  HR: 89 (01-03-19 @ 23:30)  BP: 115/67 (01-03-19 @ 23:30) (94/53 - 129/71)  RR: 18 (01-03-19 @ 20:25)  SpO2: 95% (01-03-19 @ 17:30)    In:   01-02-19 @ 07:01  -  01-03-19 @ 07:00  --------------------------------------------------------  IN: 747 mL    01-03-19 @ 07:01  -  01-04-19 @ 04:51  --------------------------------------------------------  IN: 0 mL      Out:   01-02-19 @ 07:01  -  01-03-19 @ 07:00  --------------------------------------------------------  OUT:    Indwelling Catheter - Urethral: 1150 mL  Total OUT: 1150 mL      01-03-19 @ 07:01  -  01-04-19 @ 04:51  --------------------------------------------------------  OUT:    Indwelling Catheter - Urethral: 390 mL    Voided: 290 mL  Total OUT: 680 mL        Net:   01-02-19 @ 07:01  -  01-03-19 @ 07:00  --------------------------------------------------------  NET: -403 mL    01-03-19 @ 07:01  -  01-04-19 @ 04:51  --------------------------------------------------------  NET: -680 mL        Diet: Diet, Consistent Carbohydrate w/Evening Snack (01-03-19 @ 10:54)    IV Fluids: dextrose 5%. 1000 milliLiter(s) (50 mL/Hr) IV Continuous <Continuous>      Physical Examination:  General Appearance: NAD  HEENT: EOMI, sclera non-icteric.  Heart: RRR   Lungs: CTABL.   Abdomen:  Soft, nontender, mildly distended. No rigidity, guarding, or rebound tenderness.   MSK/Extremities: Warm & well-perfused. Peripheral pulses intact.  Skin: Warm, dry. No jaundice.       Medications: [Standing]  amiodarone    Tablet 400 milliGRAM(s) Oral every 12 hours  atorvastatin 10 milliGRAM(s) Oral at bedtime  chlorhexidine 4% Liquid 1 Application(s) Topical <User Schedule>  dextrose 5%. 1000 milliLiter(s) (50 mL/Hr) IV Continuous <Continuous>  dextrose 50% Injectable 12.5 Gram(s) IV Push once  dextrose 50% Injectable 25 Gram(s) IV Push once  dextrose 50% Injectable 25 Gram(s) IV Push once  finasteride 5 milliGRAM(s) Oral daily  heparin  Injectable 5000 Unit(s) SubCutaneous every 8 hours  insulin lispro (HumaLOG) corrective regimen sliding scale   SubCutaneous three times a day before meals  levothyroxine 50 MICROGram(s) Oral daily  montelukast 10 milliGRAM(s) Oral at bedtime  pantoprazole    Tablet 40 milliGRAM(s) Oral before breakfast  tamsulosin 0.4 milliGRAM(s) Oral at bedtime  vasopressin Infusion 0.04 Unit(s)/Min (2.4 mL/Hr) IV Continuous <Continuous>    DVT Prophylaxis: heparin  Injectable 5000 Unit(s) SubCutaneous every 8 hours    GI Prophylaxis: pantoprazole    Tablet 40 milliGRAM(s) Oral before breakfast    Antibiotics:   Anticoagulation:   Medications:[PRN]  benzocaine 15 mG/menthol 3.6 mG Lozenge 1 Lozenge Oral every 3 hours PRN  glucagon  Injectable 1 milliGRAM(s) IntraMuscular once PRN      Labs:                        8.3    12.25 )-----------( 223      ( 04 Jan 2019 00:28 )             25.5     01-04    142  |  104  |  32<H>  ----------------------------<  169<H>  4.6   |  21  |  1.4    Ca    8.2<L>      04 Jan 2019 00:28  Phos  2.1     01-04  Mg     2.2     01-04        PT/INR - ( 04 Jan 2019 00:28 )   PT: 13.50 sec;   INR: 1.18 ratio         PTT - ( 04 Jan 2019 00:28 )  PTT:27.1 sec      Imaging:     < from: CT Angio Chest Dissection Protocol (12.30.18 @ 05:59) >  SPLEEN: Linear hyperdensity through the lower pole of the spleen with a   subcapsular hyperdense fluid collection surrounding the spleen and small   volume extracapsular extension layering along the peritoneum. No evidence   of contrast blush to suggest active arterial extravasation.    < end of copied text >    < from: Xray Chest 1 View- PORTABLE-Routine (01.03.19 @ 05:46) >  Unchanged right basilar opacity.    Decreased left basilar opacity.    < end of copied text >    Assessment:  78y Male patient admitted s/p IR embolization of splenic artery for grade 2 splenic lac/subcapsular hematoma    Plan:    F/u cards  EF 25%  Hb stable  Xfuse PRN  DVT/GI ppx  OOBAT  IS  Pain control    Date/Time: 01-04-19 @ 04:51

## 2019-01-05 LAB
ANION GAP SERPL CALC-SCNC: 18 MMOL/L — HIGH (ref 7–14)
BASE EXCESS BLDA CALC-SCNC: 2.5 MMOL/L — HIGH (ref -2–2)
BASOPHILS # BLD AUTO: 0.02 K/UL — SIGNIFICANT CHANGE UP (ref 0–0.2)
BASOPHILS # BLD AUTO: 0.02 K/UL — SIGNIFICANT CHANGE UP (ref 0–0.2)
BASOPHILS NFR BLD AUTO: 0.2 % — SIGNIFICANT CHANGE UP (ref 0–1)
BASOPHILS NFR BLD AUTO: 0.2 % — SIGNIFICANT CHANGE UP (ref 0–1)
BUN SERPL-MCNC: 29 MG/DL — HIGH (ref 10–20)
CALCIUM SERPL-MCNC: 8.3 MG/DL — LOW (ref 8.5–10.1)
CHLORIDE SERPL-SCNC: 103 MMOL/L — SIGNIFICANT CHANGE UP (ref 98–110)
CO2 SERPL-SCNC: 21 MMOL/L — SIGNIFICANT CHANGE UP (ref 17–32)
CREAT SERPL-MCNC: 1.3 MG/DL — SIGNIFICANT CHANGE UP (ref 0.7–1.5)
EOSINOPHIL # BLD AUTO: 0.25 K/UL — SIGNIFICANT CHANGE UP (ref 0–0.7)
EOSINOPHIL # BLD AUTO: 0.26 K/UL — SIGNIFICANT CHANGE UP (ref 0–0.7)
EOSINOPHIL NFR BLD AUTO: 2.2 % — SIGNIFICANT CHANGE UP (ref 0–8)
EOSINOPHIL NFR BLD AUTO: 2.2 % — SIGNIFICANT CHANGE UP (ref 0–8)
GAS PNL BLDA: SIGNIFICANT CHANGE UP
GLUCOSE BLDC GLUCOMTR-MCNC: 177 MG/DL — HIGH (ref 70–99)
GLUCOSE BLDC GLUCOMTR-MCNC: 184 MG/DL — HIGH (ref 70–99)
GLUCOSE BLDC GLUCOMTR-MCNC: 249 MG/DL — HIGH (ref 70–99)
GLUCOSE BLDC GLUCOMTR-MCNC: 250 MG/DL — HIGH (ref 70–99)
GLUCOSE SERPL-MCNC: 189 MG/DL — HIGH (ref 70–99)
HCO3 BLDA-SCNC: 26 MMOL/L — SIGNIFICANT CHANGE UP (ref 23–27)
HCT VFR BLD CALC: 26.8 % — LOW (ref 42–52)
HCT VFR BLD CALC: 28.9 % — LOW (ref 42–52)
HGB BLD-MCNC: 8.9 G/DL — LOW (ref 14–18)
HGB BLD-MCNC: 9.6 G/DL — LOW (ref 14–18)
IMM GRANULOCYTES NFR BLD AUTO: 2 % — HIGH (ref 0.1–0.3)
IMM GRANULOCYTES NFR BLD AUTO: 2.3 % — HIGH (ref 0.1–0.3)
LYMPHOCYTES # BLD AUTO: 0.41 K/UL — LOW (ref 1.2–3.4)
LYMPHOCYTES # BLD AUTO: 0.5 K/UL — LOW (ref 1.2–3.4)
LYMPHOCYTES # BLD AUTO: 3.6 % — LOW (ref 20.5–51.1)
LYMPHOCYTES # BLD AUTO: 4.3 % — LOW (ref 20.5–51.1)
MAGNESIUM SERPL-MCNC: 2.1 MG/DL — SIGNIFICANT CHANGE UP (ref 1.8–2.4)
MCHC RBC-ENTMCNC: 29.4 PG — SIGNIFICANT CHANGE UP (ref 27–31)
MCHC RBC-ENTMCNC: 29.6 PG — SIGNIFICANT CHANGE UP (ref 27–31)
MCHC RBC-ENTMCNC: 33.2 G/DL — SIGNIFICANT CHANGE UP (ref 32–37)
MCHC RBC-ENTMCNC: 33.2 G/DL — SIGNIFICANT CHANGE UP (ref 32–37)
MCV RBC AUTO: 88.4 FL — SIGNIFICANT CHANGE UP (ref 80–94)
MCV RBC AUTO: 89.2 FL — SIGNIFICANT CHANGE UP (ref 80–94)
MONOCYTES # BLD AUTO: 1.36 K/UL — HIGH (ref 0.1–0.6)
MONOCYTES # BLD AUTO: 1.41 K/UL — HIGH (ref 0.1–0.6)
MONOCYTES NFR BLD AUTO: 11.6 % — HIGH (ref 1.7–9.3)
MONOCYTES NFR BLD AUTO: 12.5 % — HIGH (ref 1.7–9.3)
NEUTROPHILS # BLD AUTO: 8.94 K/UL — HIGH (ref 1.4–6.5)
NEUTROPHILS # BLD AUTO: 9.3 K/UL — HIGH (ref 1.4–6.5)
NEUTROPHILS NFR BLD AUTO: 79.4 % — HIGH (ref 42.2–75.2)
NEUTROPHILS NFR BLD AUTO: 79.5 % — HIGH (ref 42.2–75.2)
NRBC # BLD: 0 /100 WBCS — SIGNIFICANT CHANGE UP (ref 0–0)
NRBC # BLD: 1 /100 WBCS — HIGH (ref 0–0)
PCO2 BLDA: 38 MMHG — SIGNIFICANT CHANGE UP (ref 38–42)
PH BLDA: 7.45 — HIGH (ref 7.38–7.42)
PHOSPHATE SERPL-MCNC: 1.3 MG/DL — LOW (ref 2.1–4.9)
PLATELET # BLD AUTO: 277 K/UL — SIGNIFICANT CHANGE UP (ref 130–400)
PLATELET # BLD AUTO: 318 K/UL — SIGNIFICANT CHANGE UP (ref 130–400)
PO2 BLDA: 113 MMHG — HIGH (ref 78–95)
POTASSIUM SERPL-MCNC: 4.5 MMOL/L — SIGNIFICANT CHANGE UP (ref 3.5–5)
POTASSIUM SERPL-SCNC: 4.5 MMOL/L — SIGNIFICANT CHANGE UP (ref 3.5–5)
RBC # BLD: 3.03 M/UL — LOW (ref 4.7–6.1)
RBC # BLD: 3.24 M/UL — LOW (ref 4.7–6.1)
RBC # FLD: 16.7 % — HIGH (ref 11.5–14.5)
RBC # FLD: 17 % — HIGH (ref 11.5–14.5)
SAO2 % BLDA: 99 % — HIGH (ref 94–98)
SODIUM SERPL-SCNC: 142 MMOL/L — SIGNIFICANT CHANGE UP (ref 135–146)
WBC # BLD: 11.25 K/UL — HIGH (ref 4.8–10.8)
WBC # BLD: 11.71 K/UL — HIGH (ref 4.8–10.8)
WBC # FLD AUTO: 11.25 K/UL — HIGH (ref 4.8–10.8)
WBC # FLD AUTO: 11.71 K/UL — HIGH (ref 4.8–10.8)

## 2019-01-05 PROCEDURE — 99233 SBSQ HOSP IP/OBS HIGH 50: CPT

## 2019-01-05 RX ORDER — FUROSEMIDE 40 MG
20 TABLET ORAL ONCE
Qty: 0 | Refills: 0 | Status: COMPLETED | OUTPATIENT
Start: 2019-01-05 | End: 2019-01-05

## 2019-01-05 RX ORDER — METOPROLOL TARTRATE 50 MG
12.5 TABLET ORAL EVERY 12 HOURS
Qty: 0 | Refills: 0 | Status: DISCONTINUED | OUTPATIENT
Start: 2019-01-05 | End: 2019-01-09

## 2019-01-05 RX ORDER — FUROSEMIDE 40 MG
40 TABLET ORAL DAILY
Qty: 0 | Refills: 0 | Status: DISCONTINUED | OUTPATIENT
Start: 2019-01-05 | End: 2019-01-09

## 2019-01-05 RX ADMIN — Medication 20 MILLIGRAM(S): at 14:47

## 2019-01-05 RX ADMIN — HEPARIN SODIUM 5000 UNIT(S): 5000 INJECTION INTRAVENOUS; SUBCUTANEOUS at 05:50

## 2019-01-05 RX ADMIN — AMIODARONE HYDROCHLORIDE 400 MILLIGRAM(S): 400 TABLET ORAL at 17:03

## 2019-01-05 RX ADMIN — TAMSULOSIN HYDROCHLORIDE 0.4 MILLIGRAM(S): 0.4 CAPSULE ORAL at 21:28

## 2019-01-05 RX ADMIN — Medication 2: at 08:18

## 2019-01-05 RX ADMIN — AMIODARONE HYDROCHLORIDE 400 MILLIGRAM(S): 400 TABLET ORAL at 05:50

## 2019-01-05 RX ADMIN — ATORVASTATIN CALCIUM 10 MILLIGRAM(S): 80 TABLET, FILM COATED ORAL at 21:28

## 2019-01-05 RX ADMIN — FINASTERIDE 5 MILLIGRAM(S): 5 TABLET, FILM COATED ORAL at 11:45

## 2019-01-05 RX ADMIN — Medication 50 MICROGRAM(S): at 05:49

## 2019-01-05 RX ADMIN — Medication 4: at 17:06

## 2019-01-05 RX ADMIN — Medication 12.5 MILLIGRAM(S): at 17:03

## 2019-01-05 RX ADMIN — MONTELUKAST 10 MILLIGRAM(S): 4 TABLET, CHEWABLE ORAL at 21:27

## 2019-01-05 RX ADMIN — PANTOPRAZOLE SODIUM 40 MILLIGRAM(S): 20 TABLET, DELAYED RELEASE ORAL at 08:18

## 2019-01-05 RX ADMIN — Medication 4: at 11:44

## 2019-01-05 RX ADMIN — CHLORHEXIDINE GLUCONATE 1 APPLICATION(S): 213 SOLUTION TOPICAL at 05:51

## 2019-01-05 RX ADMIN — HEPARIN SODIUM 5000 UNIT(S): 5000 INJECTION INTRAVENOUS; SUBCUTANEOUS at 21:27

## 2019-01-05 RX ADMIN — HEPARIN SODIUM 5000 UNIT(S): 5000 INJECTION INTRAVENOUS; SUBCUTANEOUS at 13:16

## 2019-01-05 NOTE — CHART NOTE - NSCHARTNOTEFT_GEN_A_CORE
Patient experiencing Shortness of breath when he walked a few steps with PT, spo2 recorded was 89% on 2l/min nasal cannula, increased the flow to 4l/min. Chest xray and Stat ABF abd CBC was drawn.  ABG- did not signify any hypoxia or hypercarbia, Chest xray- showed some increased congestion and blunting of the left costophrenic angle.   Lasix 20mg iv given   Cardiology called- keeping in mind patient's cardiac history and h/o CHF, talked to the [id number]) advised to restart his home dose of lasix, restarted  Patient's metorprolol started at 12.5mg bid(patient takes 25mg bid at home)- will gradually increase it.  Dr Torres([id number]) called for Medical evaluation and transfer of service, discussed with him and agreed to see him in the am and will take him to his service tomorrow morning.  At present patient is stable, sitting comfortably in chair, spo2 97% with o2 via nasal cannula@4l/min

## 2019-01-05 NOTE — PROVIDER CONTACT NOTE (MEDICATION) - SITUATION
pt's BP is 111/90 HR 88. Pt is due to receive 400 mg Amiodarone and 12.5 mg metoprolol. do you want both of these medications given?

## 2019-01-05 NOTE — PROGRESS NOTE ADULT - SUBJECTIVE AND OBJECTIVE BOX
Progress Note: General Surgery  Patient: GUIDO VILCHIS , 78y (1940)Male   MRN: [id number]  Location: [address]  Visit: 12-30-18 Inpatient  Date: 01-05-19 @ 04:09  Hospital Day: 7    Procedure/Diagnosis: S/p embolization of splenic artery for splenic injury  Events over 24h: No acute events overnight, patient seen and examined bedside with no active complaints.    Vitals: T(F): 97.6 (01-05-19 @ 00:00), Max: 97.9 (01-04-19 @ 15:30)  HR: 95 (01-05-19 @ 00:00)  BP: 103/61 (01-05-19 @ 01:41) (97/52 - 131/67)  RR: 18 (01-05-19 @ 00:00)  SpO2: 98% (01-04-19 @ 19:14)    In:   01-03-19 @ 07:01  -  01-04-19 @ 07:00  --------------------------------------------------------  IN: 0 mL    01-04-19 @ 07:01  -  01-05-19 @ 04:09  --------------------------------------------------------  IN: 780 mL      Out:   01-03-19 @ 07:01  -  01-04-19 @ 07:00  --------------------------------------------------------  OUT:    Indwelling Catheter - Urethral: 390 mL    Voided: 290 mL  Total OUT: 680 mL      01-04-19 @ 07:01  -  01-05-19 @ 04:09  --------------------------------------------------------  OUT:    Voided: 850 mL  Total OUT: 850 mL        Net:   01-03-19 @ 07:01  -  01-04-19 @ 07:00  --------------------------------------------------------  NET: -680 mL    01-04-19 @ 07:01  -  01-05-19 @ 04:09  --------------------------------------------------------  NET: -70 mL      Diet: Diet, Regular:   Dysphagia 3, Soft, Thin Liquids ([id number]) (01-04-19 @ 10:32)    IV Fluids: yes, Type: dextrose 5%. 1000 milliLiter(s) (50 mL/Hr) IV Continuous <Continuous>    Physical Examination:  General Appearance: NAD, alert and cooperative  HEENT: NCAT, WNL  Heart: S1 and S2. No murmurs. RRR  Lungs: Clear to auscultation BL without rales, rhonchi, wheezing, crackles or diminished breath sounds.  Abdomen: Soft, nondistended      Medications: [Standing]  amiodarone    Tablet 400 milliGRAM(s) Oral every 12 hours  atorvastatin 10 milliGRAM(s) Oral at bedtime  chlorhexidine 4% Liquid 1 Application(s) Topical <User Schedule>  dextrose 5%. 1000 milliLiter(s) (50 mL/Hr) IV Continuous <Continuous>  dextrose 50% Injectable 12.5 Gram(s) IV Push once  dextrose 50% Injectable 25 Gram(s) IV Push once  dextrose 50% Injectable 25 Gram(s) IV Push once  finasteride 5 milliGRAM(s) Oral daily  heparin  Injectable 5000 Unit(s) SubCutaneous every 8 hours  insulin lispro (HumaLOG) corrective regimen sliding scale   SubCutaneous three times a day before meals  levothyroxine 50 MICROGram(s) Oral daily  montelukast 10 milliGRAM(s) Oral at bedtime  pantoprazole    Tablet 40 milliGRAM(s) Oral before breakfast  tamsulosin 0.4 milliGRAM(s) Oral at bedtime  vasopressin Infusion 0.04 Unit(s)/Min (2.4 mL/Hr) IV Continuous <Continuous>    DVT Prophylaxis: heparin  Injectable 5000 Unit(s) SubCutaneous every 8 hours  GI Prophylaxis: pantoprazole    Tablet 40 milliGRAM(s) Oral before breakfast    Antibiotics:   Anticoagulation:   Medications:[PRN]  benzocaine 15 mG/menthol 3.6 mG Lozenge 1 Lozenge Oral every 3 hours PRN  glucagon  Injectable 1 milliGRAM(s) IntraMuscular once PRN    Labs:                        8.9    11.25 )-----------( 277      ( 04 Jan 2019 02:27 )             26.8     01-04    142  |  103  |  29<H>  ----------------------------<  189<H>  4.5   |  21  |  1.3    Ca    8.3<L>      04 Jan 2019 02:27  Phos  1.3     01-04  Mg     2.1     01-04    PT/INR - ( 04 Jan 2019 00:28 )   PT: 13.50 sec;   INR: 1.18 ratio    PTT - ( 04 Jan 2019 00:28 )  PTT:27.1 sec

## 2019-01-05 NOTE — PROGRESS NOTE ADULT - ASSESSMENT
Assessment:  78y Male patient admitted S/p embolization of splenic artery for splenic injury    Plan:  Diet regular  Pending PT  Anticipated discharge today  Cardiology and EPS consult appreciated  F/u ENT outpatient  Hold eliquis, resume outpatient  Postsplenectmoy vaccines on discharge  Ambulate  Encourage incentive spirometer use Assessment:  78y Male patient admitted S/p embolization of splenic artery for splenic injury    Plan:  Diet regular  Pending PT  Anticipated discharge today  Cardiology and EPS consult appreciated  F/u ENT outpatient  Hold eliquis, resume outpatient; will start heparin sq since stable hgb  Postsplenectmoy vaccines on discharge  Ambulate  Encourage incentive spirometer use

## 2019-01-05 NOTE — PHYSICAL THERAPY INITIAL EVALUATION ADULT - GENERAL OBSERVATIONS, REHAB EVAL
PT IE 8-8:30am. Pt sitting in NAD. +call bell, +chair alarm, +O2 2L. No c/o pain. +SOB during activities which subsided with short rest breaks.

## 2019-01-06 LAB
ANION GAP SERPL CALC-SCNC: 14 MMOL/L — SIGNIFICANT CHANGE UP (ref 7–14)
BASOPHILS # BLD AUTO: 0.04 K/UL — SIGNIFICANT CHANGE UP (ref 0–0.2)
BASOPHILS NFR BLD AUTO: 0.3 % — SIGNIFICANT CHANGE UP (ref 0–1)
BUN SERPL-MCNC: 28 MG/DL — HIGH (ref 10–20)
CALCIUM SERPL-MCNC: 8.1 MG/DL — LOW (ref 8.5–10.1)
CHLORIDE SERPL-SCNC: 104 MMOL/L — SIGNIFICANT CHANGE UP (ref 98–110)
CO2 SERPL-SCNC: 24 MMOL/L — SIGNIFICANT CHANGE UP (ref 17–32)
CREAT SERPL-MCNC: 1.3 MG/DL — SIGNIFICANT CHANGE UP (ref 0.7–1.5)
EOSINOPHIL # BLD AUTO: 0.53 K/UL — SIGNIFICANT CHANGE UP (ref 0–0.7)
EOSINOPHIL NFR BLD AUTO: 4.6 % — SIGNIFICANT CHANGE UP (ref 0–8)
GLUCOSE BLDC GLUCOMTR-MCNC: 178 MG/DL — HIGH (ref 70–99)
GLUCOSE BLDC GLUCOMTR-MCNC: 195 MG/DL — HIGH (ref 70–99)
GLUCOSE BLDC GLUCOMTR-MCNC: 203 MG/DL — HIGH (ref 70–99)
GLUCOSE BLDC GLUCOMTR-MCNC: 251 MG/DL — HIGH (ref 70–99)
GLUCOSE SERPL-MCNC: 149 MG/DL — HIGH (ref 70–99)
HCT VFR BLD CALC: 26.8 % — LOW (ref 42–52)
HGB BLD-MCNC: 8.8 G/DL — LOW (ref 14–18)
IMM GRANULOCYTES NFR BLD AUTO: 2.8 % — HIGH (ref 0.1–0.3)
LYMPHOCYTES # BLD AUTO: 0.59 K/UL — LOW (ref 1.2–3.4)
LYMPHOCYTES # BLD AUTO: 5.1 % — LOW (ref 20.5–51.1)
MAGNESIUM SERPL-MCNC: 2 MG/DL — SIGNIFICANT CHANGE UP (ref 1.8–2.4)
MCHC RBC-ENTMCNC: 29.1 PG — SIGNIFICANT CHANGE UP (ref 27–31)
MCHC RBC-ENTMCNC: 32.8 G/DL — SIGNIFICANT CHANGE UP (ref 32–37)
MCV RBC AUTO: 88.7 FL — SIGNIFICANT CHANGE UP (ref 80–94)
MONOCYTES # BLD AUTO: 1.26 K/UL — HIGH (ref 0.1–0.6)
MONOCYTES NFR BLD AUTO: 10.9 % — HIGH (ref 1.7–9.3)
NEUTROPHILS # BLD AUTO: 8.86 K/UL — HIGH (ref 1.4–6.5)
NEUTROPHILS NFR BLD AUTO: 76.3 % — HIGH (ref 42.2–75.2)
NRBC # BLD: 0 /100 WBCS — SIGNIFICANT CHANGE UP (ref 0–0)
PHOSPHATE SERPL-MCNC: 1.5 MG/DL — LOW (ref 2.1–4.9)
PLATELET # BLD AUTO: 305 K/UL — SIGNIFICANT CHANGE UP (ref 130–400)
POTASSIUM SERPL-MCNC: 4.4 MMOL/L — SIGNIFICANT CHANGE UP (ref 3.5–5)
POTASSIUM SERPL-SCNC: 4.4 MMOL/L — SIGNIFICANT CHANGE UP (ref 3.5–5)
RBC # BLD: 3.02 M/UL — LOW (ref 4.7–6.1)
RBC # FLD: 16.5 % — HIGH (ref 11.5–14.5)
SODIUM SERPL-SCNC: 142 MMOL/L — SIGNIFICANT CHANGE UP (ref 135–146)
WBC # BLD: 11.6 K/UL — HIGH (ref 4.8–10.8)
WBC # FLD AUTO: 11.6 K/UL — HIGH (ref 4.8–10.8)

## 2019-01-06 PROCEDURE — 99233 SBSQ HOSP IP/OBS HIGH 50: CPT

## 2019-01-06 RX ORDER — IPRATROPIUM/ALBUTEROL SULFATE 18-103MCG
3 AEROSOL WITH ADAPTER (GRAM) INHALATION EVERY 8 HOURS
Qty: 0 | Refills: 0 | Status: DISCONTINUED | OUTPATIENT
Start: 2019-01-06 | End: 2019-01-09

## 2019-01-06 RX ADMIN — FINASTERIDE 5 MILLIGRAM(S): 5 TABLET, FILM COATED ORAL at 11:05

## 2019-01-06 RX ADMIN — AMIODARONE HYDROCHLORIDE 400 MILLIGRAM(S): 400 TABLET ORAL at 05:39

## 2019-01-06 RX ADMIN — Medication 6: at 11:04

## 2019-01-06 RX ADMIN — Medication 2: at 17:13

## 2019-01-06 RX ADMIN — MONTELUKAST 10 MILLIGRAM(S): 4 TABLET, CHEWABLE ORAL at 21:50

## 2019-01-06 RX ADMIN — CHLORHEXIDINE GLUCONATE 1 APPLICATION(S): 213 SOLUTION TOPICAL at 07:28

## 2019-01-06 RX ADMIN — Medication 3 MILLILITER(S): at 20:22

## 2019-01-06 RX ADMIN — TAMSULOSIN HYDROCHLORIDE 0.4 MILLIGRAM(S): 0.4 CAPSULE ORAL at 21:50

## 2019-01-06 RX ADMIN — HEPARIN SODIUM 5000 UNIT(S): 5000 INJECTION INTRAVENOUS; SUBCUTANEOUS at 05:39

## 2019-01-06 RX ADMIN — PANTOPRAZOLE SODIUM 40 MILLIGRAM(S): 20 TABLET, DELAYED RELEASE ORAL at 05:39

## 2019-01-06 RX ADMIN — ATORVASTATIN CALCIUM 10 MILLIGRAM(S): 80 TABLET, FILM COATED ORAL at 21:50

## 2019-01-06 RX ADMIN — Medication 40 MILLIGRAM(S): at 05:39

## 2019-01-06 RX ADMIN — Medication 2: at 08:22

## 2019-01-06 RX ADMIN — HEPARIN SODIUM 5000 UNIT(S): 5000 INJECTION INTRAVENOUS; SUBCUTANEOUS at 21:50

## 2019-01-06 RX ADMIN — HEPARIN SODIUM 5000 UNIT(S): 5000 INJECTION INTRAVENOUS; SUBCUTANEOUS at 13:12

## 2019-01-06 RX ADMIN — Medication 50 MICROGRAM(S): at 05:39

## 2019-01-06 RX ADMIN — Medication 12.5 MILLIGRAM(S): at 05:39

## 2019-01-06 RX ADMIN — Medication 12.5 MILLIGRAM(S): at 17:14

## 2019-01-06 RX ADMIN — AMIODARONE HYDROCHLORIDE 400 MILLIGRAM(S): 400 TABLET ORAL at 17:14

## 2019-01-06 NOTE — CONSULT NOTE ADULT - CONSULT REQUESTED DATE/TIME
02-Jan-2019
04-Jan-2019 11:27
04-Jan-2019 13:35
06-Jan-2019 16:47
30-Dec-2018 14:34
03-Jan-2019 16:06

## 2019-01-06 NOTE — PROGRESS NOTE ADULT - SUBJECTIVE AND OBJECTIVE BOX
GENERAL SURGERY PROGRESS NOTE     GUIDO VILCHIS  78y  Male  Hospital day :7d  s/p Splenic artery embolisation     24 Hrs events:   Patient experiencing Shortness of breath when he walked a few steps with PT, spo2 recorded was 89% on 2l/min nasal cannula, increased the flow to 4l/min. Chest xray and Stat ABF abd CBC was drawn.  ABG- did not signify any hypoxia or hypercarbia, Chest xray- showed some increased congestion and blunting of the left costophrenic angle.   Lasix 20mg iv given   Cardiology called- keeping in mind patient's cardiac history and h/o CHF, talked to the [id number]) advised to restart his home dose of lasix, restarted  Patient's metorprolol started at 12.5mg bid(patient takes 25mg bid at home)- will gradually increase it.  Dr Torres([id number]) called for Medical evaluation and transfer of service, discussed with him and agreed to see him in the am and will take him to his service tomorrow morning.  At present patient is stable, sitting comfortably in chair, spo2 97% with o2 via nasal cannula@4l/min.      T(F): 98.8 (01-06-19 @ 00:00), Max: 98.8 (01-06-19 @ 00:00)  HR: 98 (01-06-19 @ 00:00) (86 - 98)  BP: 100/60 (01-06-19 @ 00:00) (96/53 - 142/63)  RR: 18 (01-06-19 @ 00:00) (18 - 18)  SpO2: 97% (01-05-19 @ 15:30) (89% - 99%)    DIET/FLUIDS: dextrose 5%. 1000 milliLiter(s) IV Continuous <Continuous>       GI proph:  pantoprazole    Tablet 40 milliGRAM(s) Oral before breakfast    AC/ proph: heparin  Injectable 5000 Unit(s) SubCutaneous every 8 hours    ABx:     PHYSICAL EXAM:  GENERAL: NAD, well-appearing  CHEST/LUNG: Clear to auscultation bilaterally  HEART: Regular rate and rhythm  ABDOMEN: Soft, Nontender, Nondistended;   EXTREMITIES:  No clubbing, cyanosis, or edema      LABS  Labs:  CAPILLARY BLOOD GLUCOSE      POCT Blood Glucose.: 177 mg/dL (05 Jan 2019 20:58)  POCT Blood Glucose.: 250 mg/dL (05 Jan 2019 16:56)  POCT Blood Glucose.: 249 mg/dL (05 Jan 2019 11:42)  POCT Blood Glucose.: 184 mg/dL (05 Jan 2019 07:35)                          8.8    11.60 )-----------( 305      ( 06 Jan 2019 00:40 )             26.8       Auto Neutrophil %: 76.3 % (01-06-19 @ 00:40)  Auto Immature Granulocyte %: 2.8 % (01-06-19 @ 00:40)  Auto Neutrophil %: 79.4 % (01-05-19 @ 14:00)  Auto Immature Granulocyte %: 2.3 % (01-05-19 @ 14:00)    01-06    142  |  104  |  28<H>  ----------------------------<  149<H>  4.4   |  24  |  1.3      Calcium, Total Serum: 8.1 mg/dL (01-06-19 @ 00:40)      LFTs:     Blood Gas Arterial, Lactate: 0.7 mmoL/L (01-05-19 @ 13:29)    ABG - ( 05 Jan 2019 13:29 )  pH: 7.45  /  pCO2: 38    /  pO2: 113   / HCO3: 26    / Base Excess: 2.5   /  SaO2: 99              ABG - ( 01 Jan 2019 14:01 )  pH: 7.40  /  pCO2: 35    /  pO2: 60    / HCO3: 22    / Base Excess: -2.5  /  SaO2: 92              ABG - ( 01 Jan 2019 11:54 )  pH: 7.42  /  pCO2: 35    /  pO2: 88    / HCO3: 23    / Base Excess: -1.5  /  SaO2: 98

## 2019-01-06 NOTE — CONSULT NOTE ADULT - ASSESSMENT
78 year old male pmhx of Afib on Eliquis, CAD, hx Ventricular tachycardia, NANDO on CPAP, COPD, HFrEF(last EF 25% 12/2018) s/p AICD, hypothyroidism, DLD  presented 2 days after a fall. Suffered a splenic laceration, now s/p splenic artery embolization. A/c still on hold until surgery says ok to use.   Still continues to be mildly dyspneic at rest. CAnnot complete a sentence. Requiring O2 currently (never been on home O2 before). Saturating 88% on 2L at rest, desats and gets dyspneic with any attempts to walk.     #Acute hypoxemic Respiratory failure : likely 2/2   #Acute on Chronic systolic CHF exacerbation :     Needs to get his breathing a little more optimized before he can participate in Rehab.   s/p IV diuresis for a few days. Now CXR much better and having good output with just PO lasix. But still dyspneic with as little activity as just talking.   On exam, wheeze but no crackles. Says he sees Dr Dye for "some lung disease". Not sure what  10 pack year smoker. Quit around 20 years ago.   Will add some Duoneb Q8H to see if that helps with his breathing.   work with PT to Gradually increase the exercise tolerance.     If this doesn't improve breathing, may have to try IV diuretics again for a few days.   Also have a weak suspicion for HCAP. Since patient has a productive cough with a leukocytosis (but no fever). Hard to discern much from his Xrays since he has bibasal effusions.  Hold off Antibiotics for now. May use if he doesn't improve with other measures.     If nothing helps , may have to d/c him to STR with O2 and wait for his cardiorespiratory function to gradually normalize.    #h/o A Fib : eliquis held. c/w amio    #h/o AICD: recent interrogation showed no events.     # recent splenic laceration now s/p splenic artery embolization :  Wait for surgery to tell us when is it ok to resume a/c ?    Can Transfer to medicine.   Will f/u 78 year old male pmhx of Afib on Eliquis, CAD, hx Ventricular tachycardia, NANDO on CPAP, COPD, HFrEF(last EF 25% 12/2018) s/p AICD, hypothyroidism, DLD  presented 2 days after a fall. Suffered a splenic laceration, now s/p splenic artery embolization. A/c still on hold until surgery says ok to use.   Still continues to be mildly dyspneic at rest. CAnnot complete a sentence. Requiring O2 currently (never been on home O2 before). Saturating 88% on 2L at rest, desats and gets dyspneic with any attempts to walk.     #Acute hypoxemic Respiratory failure : likely 2/2   #Acute on Chronic systolic CHF exacerbation :     Needs to get his breathing a little more optimized before he can participate in Rehab.   s/p IV diuresis for a few days. Now CXR much better and having good output with just PO lasix. But still dyspneic with as little activity as just talking.   On exam, wheeze but no crackles. Says he sees Dr Dye for "some lung disease". Not sure what  10 pack year smoker. Quit around 20 years ago.   Will add some Duoneb Q8H to see if that helps with his breathing. But mostly this is cardiac wheeze.  work with PT to Gradually increase the exercise tolerance.     If this doesn't improve breathing,can try IV diuretics again for a few days.   Also have a weak suspicion for HCAP. Since patient has a productive cough with a leukocytosis (but no fever). Hard to discern much from his Xrays since he has bibasal effusions.  Hold off Antibiotics for now. May use if he doesn't improve with other measures.     If nothing helps , may have to d/c him to STR with O2 and wait for his cardiorespiratory function to gradually normalize.    #h/o A Fib : eliquis held. c/w amio    #h/o AICD: recent interrogation showed no events.     # recent splenic laceration now s/p splenic artery embolization :  Wait for surgery to tell us when is it ok to resume a/c ?    #Hypophosphatemia : repleted.    Can Transfer to medicine.   Will f/u

## 2019-01-06 NOTE — CONSULT NOTE ADULT - SUBJECTIVE AND OBJECTIVE BOX
78 year old male pmhx of Afib on Eliquis, CAD, hx Ventricular tachycardia, NANDO on CPAP, COPD, HFrEF(last EF 25% 12/2018) s/p AICD, hypothyroidism, DLD  presented 2 days after a fall. Suffered a splenic laceration, now s/p splenic artery embolization. A/c still on hold until surgery says ok to use.   Still continues to be mildly dyspneic at rest. CAnnot complete a sentence. Requiring O2. Saturating 88% on 2L at rest, desats and gets dyspneic with any attempts to walk.     Currently,     S: +FRANKEL. No CP/palpitations/light headedness. Some orthopnea.   also c/o a productive cough since a few days, no fever    All other pertinent ROS negative.      01-05-19 @ 07:01  -  01-06-19 @ 07:00  --------------------------------------------------------  IN: 640 mL / OUT: 1750 mL / NET: -1110 mL    01-06-19 @ 07:01  -  01-06-19 @ 16:51  --------------------------------------------------------  IN: 400 mL / OUT: 300 mL / NET: 100 mL      Vital Signs Last 24 Hrs  T(C): 36.7 (06 Jan 2019 15:30), Max: 37.1 (06 Jan 2019 00:00)  T(F): 98 (06 Jan 2019 15:30), Max: 98.8 (06 Jan 2019 00:00)  HR: 75 (06 Jan 2019 15:30) (75 - 105)  BP: 121/82 (06 Jan 2019 15:30) (89/59 - 121/82)  BP(mean): --  RR: 18 (06 Jan 2019 15:30) (18 - 18)  SpO2: 96% (06 Jan 2019 15:30) (95% - 96%)  PHYSICAL EXAM:    Constitutional: NAD, awake and alert, well-developed  HEENT: PERR, EOMI, Normal Hearing, MMM  Neck: Soft and supple, No LAD, No JVD  Respiratory: diffuse b/l wheeze. No crackles. Decreased breath sounds at the bases, bases dull to percussion  Cardiovascular: S1 and S2, regular rate and rhythm, + S3  Gastrointestinal: Bowel Sounds present, soft, nontender, nondistended, no guarding, no rebound  Extremities: trace LE edema    MEDICATIONS:  MEDICATIONS  (STANDING):  amiodarone    Tablet 400 milliGRAM(s) Oral every 12 hours  atorvastatin 10 milliGRAM(s) Oral at bedtime  chlorhexidine 4% Liquid 1 Application(s) Topical <User Schedule>  dextrose 5%. 1000 milliLiter(s) (50 mL/Hr) IV Continuous <Continuous>  dextrose 50% Injectable 12.5 Gram(s) IV Push once  dextrose 50% Injectable 25 Gram(s) IV Push once  dextrose 50% Injectable 25 Gram(s) IV Push once  finasteride 5 milliGRAM(s) Oral daily  furosemide    Tablet 40 milliGRAM(s) Oral daily  heparin  Injectable 5000 Unit(s) SubCutaneous every 8 hours  insulin lispro (HumaLOG) corrective regimen sliding scale   SubCutaneous three times a day before meals  levothyroxine 50 MICROGram(s) Oral daily  metoprolol tartrate 12.5 milliGRAM(s) Oral every 12 hours  montelukast 10 milliGRAM(s) Oral at bedtime  pantoprazole    Tablet 40 milliGRAM(s) Oral before breakfast  tamsulosin 0.4 milliGRAM(s) Oral at bedtime  vasopressin Infusion 0.04 Unit(s)/Min (2.4 mL/Hr) IV Continuous <Continuous>      LABS: All Labs Reviewed:                        8.8    11.60 )-----------( 305      ( 06 Jan 2019 00:40 )             26.8     01-06    142  |  104  |  28<H>  ----------------------------<  149<H>  4.4   |  24  |  1.3    Ca    8.1<L>      06 Jan 2019 00:40  Phos  1.5     01-06  Mg     2.0     01-06            Blood Culture:     Radiology: reviewed

## 2019-01-06 NOTE — PROVIDER CONTACT NOTE (OTHER) - SITUATION
patient and patient's family have question regarding plan of care and want to speak with the provider

## 2019-01-06 NOTE — PROGRESS NOTE ADULT - ASSESSMENT
Plan:  1.medicine transfer to Dr Torres's service today   2.encourage incentive spirometry   3,DVT prophylaxis  4.GI prophylaxis   5.monitor vitals.  6.FU cardiology

## 2019-01-06 NOTE — CHART NOTE - NSCHARTNOTEFT_GEN_A_CORE
78 year old male pmhx as below, significant for A.fib on Eliquis, s/p fall 2 days prior to presentation to ED on 12/30/18, c/o LLE pain. Was found to have splenic lac/subcapsular hematoma on CT, treated by IR embolization, as well as acute R nondisplaced 3rd and 4th rib fx, and chronic L rib fx. Cleared from trauma perspective, no surgical intervention. Presently, unable to tolerate more than a few steps of ambulation, requiring O2 by NC, and medical management of CHF and other medical comorbidities.    Ok to restart eliquis in 1-2 weeks as an outpatient    Patient History:   Past Medical History:  AICD (automatic cardioverter/defibrillator) present    Atrial fibrillation    CAD (coronary artery disease)    Congestive heart disease    COPD (chronic obstructive pulmonary disease)    Hypercholesteremia    Hypothyroidism    NANDO on CPAP    Pacemaker    Ventricular tachycardia.    Past Surgical History:  AICD (automatic cardioverter/defibrillator) present    History of laparoscopic cholecystectomy.    Dr. Torres aware and accepts  Dr. Zapata #[id number] aware and accepts

## 2019-01-07 LAB
ANION GAP SERPL CALC-SCNC: 17 MMOL/L — HIGH (ref 7–14)
BUN SERPL-MCNC: 30 MG/DL — HIGH (ref 10–20)
CALCIUM SERPL-MCNC: 7.9 MG/DL — LOW (ref 8.5–10.1)
CHLORIDE SERPL-SCNC: 99 MMOL/L — SIGNIFICANT CHANGE UP (ref 98–110)
CO2 SERPL-SCNC: 23 MMOL/L — SIGNIFICANT CHANGE UP (ref 17–32)
CREAT SERPL-MCNC: 1.2 MG/DL — SIGNIFICANT CHANGE UP (ref 0.7–1.5)
GLUCOSE BLDC GLUCOMTR-MCNC: 185 MG/DL — HIGH (ref 70–99)
GLUCOSE BLDC GLUCOMTR-MCNC: 190 MG/DL — HIGH (ref 70–99)
GLUCOSE BLDC GLUCOMTR-MCNC: 253 MG/DL — HIGH (ref 70–99)
GLUCOSE BLDC GLUCOMTR-MCNC: 269 MG/DL — HIGH (ref 70–99)
GLUCOSE SERPL-MCNC: 175 MG/DL — HIGH (ref 70–99)
HCT VFR BLD CALC: 27.5 % — LOW (ref 42–52)
HGB BLD-MCNC: 9 G/DL — LOW (ref 14–18)
MCHC RBC-ENTMCNC: 29.3 PG — SIGNIFICANT CHANGE UP (ref 27–31)
MCHC RBC-ENTMCNC: 32.7 G/DL — SIGNIFICANT CHANGE UP (ref 32–37)
MCV RBC AUTO: 89.6 FL — SIGNIFICANT CHANGE UP (ref 80–94)
NRBC # BLD: 0 /100 WBCS — SIGNIFICANT CHANGE UP (ref 0–0)
PHOSPHATE SERPL-MCNC: 1.7 MG/DL — LOW (ref 2.1–4.9)
PLATELET # BLD AUTO: 346 K/UL — SIGNIFICANT CHANGE UP (ref 130–400)
POTASSIUM SERPL-MCNC: 4.2 MMOL/L — SIGNIFICANT CHANGE UP (ref 3.5–5)
POTASSIUM SERPL-SCNC: 4.2 MMOL/L — SIGNIFICANT CHANGE UP (ref 3.5–5)
RBC # BLD: 3.07 M/UL — LOW (ref 4.7–6.1)
RBC # FLD: 16.4 % — HIGH (ref 11.5–14.5)
SODIUM SERPL-SCNC: 139 MMOL/L — SIGNIFICANT CHANGE UP (ref 135–146)
WBC # BLD: 12.52 K/UL — HIGH (ref 4.8–10.8)
WBC # FLD AUTO: 12.52 K/UL — HIGH (ref 4.8–10.8)

## 2019-01-07 RX ORDER — SODIUM,POTASSIUM PHOSPHATES 278-250MG
1 POWDER IN PACKET (EA) ORAL ONCE
Qty: 0 | Refills: 0 | Status: COMPLETED | OUTPATIENT
Start: 2019-01-07 | End: 2019-01-07

## 2019-01-07 RX ORDER — INSULIN LISPRO 100/ML
6 VIAL (ML) SUBCUTANEOUS ONCE
Qty: 0 | Refills: 0 | Status: COMPLETED | OUTPATIENT
Start: 2019-01-07 | End: 2019-01-07

## 2019-01-07 RX ADMIN — Medication 12.5 MILLIGRAM(S): at 05:33

## 2019-01-07 RX ADMIN — MONTELUKAST 10 MILLIGRAM(S): 4 TABLET, CHEWABLE ORAL at 22:31

## 2019-01-07 RX ADMIN — PANTOPRAZOLE SODIUM 40 MILLIGRAM(S): 20 TABLET, DELAYED RELEASE ORAL at 05:33

## 2019-01-07 RX ADMIN — Medication 3 MILLILITER(S): at 16:21

## 2019-01-07 RX ADMIN — HEPARIN SODIUM 5000 UNIT(S): 5000 INJECTION INTRAVENOUS; SUBCUTANEOUS at 13:20

## 2019-01-07 RX ADMIN — AMIODARONE HYDROCHLORIDE 400 MILLIGRAM(S): 400 TABLET ORAL at 05:32

## 2019-01-07 RX ADMIN — HEPARIN SODIUM 5000 UNIT(S): 5000 INJECTION INTRAVENOUS; SUBCUTANEOUS at 05:33

## 2019-01-07 RX ADMIN — HEPARIN SODIUM 5000 UNIT(S): 5000 INJECTION INTRAVENOUS; SUBCUTANEOUS at 22:31

## 2019-01-07 RX ADMIN — AMIODARONE HYDROCHLORIDE 400 MILLIGRAM(S): 400 TABLET ORAL at 17:16

## 2019-01-07 RX ADMIN — Medication 12.5 MILLIGRAM(S): at 17:16

## 2019-01-07 RX ADMIN — Medication 40 MILLIGRAM(S): at 05:33

## 2019-01-07 RX ADMIN — Medication 2: at 17:16

## 2019-01-07 RX ADMIN — Medication 6 UNIT(S): at 22:31

## 2019-01-07 RX ADMIN — TAMSULOSIN HYDROCHLORIDE 0.4 MILLIGRAM(S): 0.4 CAPSULE ORAL at 22:31

## 2019-01-07 RX ADMIN — FINASTERIDE 5 MILLIGRAM(S): 5 TABLET, FILM COATED ORAL at 11:13

## 2019-01-07 RX ADMIN — Medication 2: at 07:56

## 2019-01-07 RX ADMIN — Medication 6: at 11:57

## 2019-01-07 RX ADMIN — ATORVASTATIN CALCIUM 10 MILLIGRAM(S): 80 TABLET, FILM COATED ORAL at 22:31

## 2019-01-07 RX ADMIN — Medication 3 MILLILITER(S): at 07:54

## 2019-01-07 RX ADMIN — Medication 50 MICROGRAM(S): at 05:33

## 2019-01-07 RX ADMIN — Medication 1 PACKET(S): at 11:13

## 2019-01-07 NOTE — SWALLOW BEDSIDE ASSESSMENT ADULT - SLP GENERAL OBSERVATIONS
Pt awake and alert with no c/o pain. +dysphonia
pt is awake. + confusion noted. pt unable to clearly give a timeline of events or respond to SLP questions with an organized answer. + circumlocutions and word finding deficits noted

## 2019-01-07 NOTE — SWALLOW BEDSIDE ASSESSMENT ADULT - SLP PERTINENT HISTORY OF CURRENT PROBLEM
pt admitted with abdominal pain. had a fall 2 days PTA. pt found to have splenic laceration. pt with recent admission in october
pt admitted with abdominal pain. had a fal 2 days PTA. pt found to have splenic laceration. pt with recent admission in october regr

## 2019-01-07 NOTE — DIETITIAN INITIAL EVALUATION ADULT. - OTHER INFO
p/w L side abd pain began around 9pm. Sx now following s/p splenic artery embolisation. pt developed SOB after working with PT. lasix is given. cardio consulted. eating good PTA

## 2019-01-07 NOTE — PROGRESS NOTE ADULT - ATTENDING COMMENTS
deconditioning, PT eval  hospitalizt eval for medical optimization
Assessment and plan above were modified and discussed with residents, physician assistants, and nurses.
Assessment and plan above were modified and discussed with residents, physician assistants, and nurses.
Assessment and plan above were modified and discussed with residents, physician assistants, and nurses.  I have provided  40 min of Critical Care to this patient.
Assessment and plan above were modified and discussed with residents, physician assistants, and nurses.  I have provided 35 min of Critical Care to this patient.
Patient was seen independently. Latest vital signs and labs were reviewed. Case was discussed with resident in morning rounds.    Agree with resident's assessment and plan with following additions/modifications.   ASSESSMENT:  Principal Diagnosis:  Acute hypoxemic Respiratory failure possibly secondary to CHF exacerbation   Acute on chronic  heart failure with reduced ejection fraction  Recent splenic laceration//subcapsular hematoma, s/p splenic artery embolization  Chronic renal disease stage 3       Associated Active Comorbid Conditions:  Coronary artery disease   Obstructive sleep apnea   Hypothyroidism  Paroxysmal atrial fibrillation   Hypercholesteremia  AICD (automatic cardioverter/defibrillator) present      PLAN:   - continue with PO diuresis as dyspnea seems to be improving  -Strict input/output monitoring  -Watch off antibiotics   -hold anti-coagulation for now. Restart in one week  - Afib -rate controlled on current regimen of amiodarone  and metoprolol  -Electrolyte supplementation and follow up with BMP. Keep K+>4, Mg>2  -Get PT   -Elevate Head end of bed >35*, aspiration precautions   -GI Prophylaxis: Protonix 40mg PO QQdaily   -DVT Prophylaxis: Heparin 5000 units sc q8hrs  Discharge Disposition: possibly home with services when stable
Assessment and plan above were modified and discussed with residents, physician assistants, and nurses.  I have provided 36  min of Critical Care to this patient.
Assessment and plan above were modified and discussed with residents, physician assistants, and nurses.  I have provided 36min of Critical Care to this patient.

## 2019-01-07 NOTE — DIETITIAN INITIAL EVALUATION ADULT. - PHYSICAL APPEARANCE
BMI is 30.9 (pt reported weight loss of 43# since early december of 2018 until now. He says he used to wear 198# before december. However, current EMR weight states 185#. reporting accuracy error?/overweight/obese

## 2019-01-07 NOTE — PROGRESS NOTE ADULT - SUBJECTIVE AND OBJECTIVE BOX
LENGTH OF HOSPITAL STAY: 8d    CHIEF COMPLAINT:   Patient is a 78y old  Male who presents with a chief complaint of splenic laceration (06 Jan 2019 16:47)    EVENTS OVER NIGHT;   still dyspneic , saturating 95% at 5 L NC   very upset ,wants to leave and don t wants to be examined       HISTORY OF PRESENTING ILLNESS:    HPI:  78 year old male pmhx as below, significant for A.fib on Eliquis, s/p fall 2 days ago presents for left side abdominal pain that began last night around 9pm. Patient was evaluated by the ED, was hypotensive and was started on levophed, he was taken for CTA of the chest/abdomen which was significant for a splenic laceration/subcapsular hematoma. Trauma was consulted at this time when the patient mentioned his fall 2 days prior. Patient was seen and examined. SBP at this time on levophed was 100-110 on the monitor. Patient complaining of lower left side abdominal pain and left shoulder pain. He states he fell 2 days ago but felt fine until last night around 9pm when he developed some abdominal pain, his last meal was yesterday around 5pm and the last time he took medications (Eliquis) was yesterday morning. (30 Dec 2018 08:48)    Hospital course ;  pt was transferred to medicine service yesterday after splenic A embolization     PAST MEDICAL & SURGICAL HISTORY  PAST MEDICAL & SURGICAL HISTORY:  CAD (coronary artery disease)  Ventricular tachycardia  NANDO on CPAP  COPD (chronic obstructive pulmonary disease)  Pacemaker  AICD (automatic cardioverter/defibrillator) present  Hypothyroidism  Atrial fibrillation  Congestive heart disease  Hypercholesteremia  AICD (automatic cardioverter/defibrillator) present  History of laparoscopic cholecystectomy    SOCIAL HISTORY:  ex smoker ,quit 20 yr ago ,10 Pack yr hx   ALLERGIES:  morphine (Stomach Upset)    MEDICATIONS:  STANDING MEDICATIONS  ALBUTerol/ipratropium for Nebulization 3 milliLiter(s) Nebulizer every 8 hours  amiodarone    Tablet 400 milliGRAM(s) Oral every 12 hours  atorvastatin 10 milliGRAM(s) Oral at bedtime  chlorhexidine 4% Liquid 1 Application(s) Topical <User Schedule>  dextrose 5%. 1000 milliLiter(s) IV Continuous <Continuous>  dextrose 50% Injectable 12.5 Gram(s) IV Push once  dextrose 50% Injectable 25 Gram(s) IV Push once  dextrose 50% Injectable 25 Gram(s) IV Push once  finasteride 5 milliGRAM(s) Oral daily  furosemide    Tablet 40 milliGRAM(s) Oral daily  heparin  Injectable 5000 Unit(s) SubCutaneous every 8 hours  insulin lispro (HumaLOG) corrective regimen sliding scale   SubCutaneous three times a day before meals  levothyroxine 50 MICROGram(s) Oral daily  metoprolol tartrate 12.5 milliGRAM(s) Oral every 12 hours  montelukast 10 milliGRAM(s) Oral at bedtime  pantoprazole    Tablet 40 milliGRAM(s) Oral before breakfast  potassium phosphate / sodium phosphate powder 1 Packet(s) Oral once  tamsulosin 0.4 milliGRAM(s) Oral at bedtime    PRN MEDICATIONS  benzocaine 15 mG/menthol 3.6 mG Lozenge 1 Lozenge Oral every 3 hours PRN  glucagon  Injectable 1 milliGRAM(s) IntraMuscular once PRN    VITALS:   T(F): 96.2  HR: 89  BP: 96/51  RR: 18  SpO2: 95%    LABS:                        9.0    12.52 )-----------( 346      ( 07 Jan 2019 05:15 )             27.5     01-07    139  |  99  |  30<H>  ----------------------------<  175<H>  4.2   |  23  |  1.2    Ca    7.9<L>      07 Jan 2019 05:15  Phos  1.7     01-07  Mg     2.0     01-06          ABG - ( 05 Jan 2019 13:29 )  pH, Arterial: 7.45  pH, Blood: x     /  pCO2: 38    /  pO2: 113   / HCO3: 26    / Base Excess: 2.5   /  SaO2: 99                        RADIOLOGY:  < from: Xray Chest 1 View- PORTABLE-Urgent (01.05.19 @ 17:01) >  Findings:    Support devices: Left chest biventricular AICD.    Cardiac/mediastinum/hilum: Stable cardiomegaly.    Lung parenchyma/Pleura: Stable bibasilar opacities. No pneumothorax.    Skeleton/soft tissues: Degenerative changes of the shoulders.    Upper abdomen: Splenic artery embolization material.    Impression:      Stable bibasilar opacities.    < end of copied text >    PHYSICAL EXAM:  GEN: No acute distress, taking to wife on fone   HEENT: within normal range   LUNGS: Clear to auscultation bilaterally (examined while on fone)  HEART: S1/S2 present. RRR.   ABD: Soft, non-tender, distended. Bowel sounds present  EXT: minimal LE edema   NEURO: AAOX3  non focal

## 2019-01-07 NOTE — PROGRESS NOTE ADULT - ASSESSMENT
8 year old male pmhx of Afib on Eliquis, CAD, hx Ventricular tachycardia, NANDO on CPAP, COPD, HFrEF(last EF 25% 12/2018) s/p AICD, hypothyroidism, DLD  presented 2 days after a fall. Suffered a splenic laceration,  s/p splenic artery embolization. A/c still on hold until surgery clears.  Still continues to be mildly dyspneic at rest. Cannot complete a sentence. Requiring O2 currently (never been on home O2 before). Saturating 95% on 5L at rest, desats and gets dyspneic with minimal activity .    #Acute hypoxemic Respiratory failure :   likely 2/2 Acute on Chronic systolic CHF exacerbation vs atelectasia vs PNA    s/p IV diuresis for a few days.   on PO lasix 40 now . But still dyspneic ,improving though    cardiology recs noticed   cw lasix ,encourage incentive spirometry   will repeat CXR ,no fever ,just high WBCs(likely reactive)    #h/o A Fib :   Eliquis held ,need surgery clearance before resuming it   c/w amiodarone  and metoprolol     #h/o AICD:   recent interrogation showed no events.     # recent splenic laceration now s/p splenic artery embolization :     surgery clearance before  resuming  a/c     #Hypophosphatemia :   repleted.    #DVT ppx ; with SUB Q heparin   FULL CODE     #Dispo; after dyspnea improvement ,will recall PT 8 year old male pmhx of Afib on Eliquis, CAD, hx Ventricular tachycardia, NANDO on CPAP, COPD, HFrEF(last EF 25% 12/2018) s/p AICD, hypothyroidism, DLD  presented 2 days after a fall. Suffered a splenic laceration,  s/p splenic artery embolization. A/c still on hold until surgery clears.  Still continues to be mildly dyspneic at rest. Cannot complete a sentence. Requiring O2 currently (never been on home O2 before). Saturating 95% on 5L at rest, desats and gets dyspneic with minimal activity .    #Acute hypoxemic Respiratory failure :   likely 2/2 Acute on Chronic systolic CHF exacerbation vs atelectasia vs PNA    s/p IV diuresis for a few days.   on PO lasix 40 now . But still dyspneic ,improving though    cardiology recs noticed   cw lasix ,encourage incentive spirometry   will repeat CXR ,no fever ,just high WBCs(likely reactive)    #h/o A Fib :   Eliquis held ,need surgery clearance before resuming it   c/w amiodarone  and metoprolol     #h/o AICD:   recent interrogation showed no events.     # recent splenic laceration now s/p splenic artery embolization :     surgery clearance before  resuming  a/c     #Hypophosphatemia :   repleted.    #DVT ppx and GI ppx; with SUB Q heparin and po protonics     DNR    #Dispo; after dyspnea improvement ,will recall PT 8 year old male pmhx of Afib on Eliquis, CAD, hx Ventricular tachycardia, NANDO on CPAP, COPD, HFrEF(last EF 25% 12/2018) s/p AICD, hypothyroidism, DLD  presented 2 days after a fall. Suffered a splenic laceration,  s/p splenic artery embolization. A/c still on hold until surgery clears.  Still continues to be mildly dyspneic at rest. Cannot complete a sentence. Requiring O2 currently (never been on home O2 before). Saturating 95% on 5L at rest, desats and gets dyspneic with minimal activity .    #Acute hypoxemic Respiratory failure :   likely 2/2 Acute on Chronic systolic CHF exacerbation vs atelectasia vs PNA    s/p IV diuresis for a few days.   on PO lasix 40 now . But still dyspneic ,improving though    cardiology recs noticed   cw lasix ,encourage incentive spirometry   will repeat CXR ,no fever ,just high WBCs(likely reactive)    #h/o A Fib :   Eliquis held ,as per surgery can restart A/C in 1-2 weeks after  out pt follow up   but could be resumed earlier   c/w amiodarone  and metoprolol     #h/o AICD:   recent interrogation showed no events.     # s/p fall ,rib fx and splenic laceration ;  s/p splenic artery embolization  cw incentive spirometry and pain control  put pt follow with surgery team      #Hypophosphatemia :   repleted.    #DVT ppx and GI ppx; with SUB Q heparin and po protonics     DNR    #Dispo; after dyspnea improvement ,will recall PT

## 2019-01-07 NOTE — DIETITIAN INITIAL EVALUATION ADULT. - FACTORS AFF FOOD INTAKE
per pt that happened very recently likely during this admission with unknown etiology/change in sense of smell or taste

## 2019-01-07 NOTE — DIETITIAN INITIAL EVALUATION ADULT. - DIET TYPE
dysphagia 3, soft, thin liquids/pt reported eating about 25-50% of meals due to recent taste change. agreed to try supplement/DASH/TLC (sodium and cholesterol restricted diet)/1500ml

## 2019-01-07 NOTE — SWALLOW BEDSIDE ASSESSMENT ADULT - SWALLOW EVAL: DIAGNOSIS
No s/s aspiration/penetration for thin liquids and regular consistency
toleration observed for regular and thins

## 2019-01-07 NOTE — DIETITIAN INITIAL EVALUATION ADULT. - PT NOT SOURCE
LOS- pt is alert and oriented on the chair. No edema. LBM 1/7 per pt. surgical incision. no oral issue but c/o taste bud changes/other (specify)

## 2019-01-08 LAB
ALBUMIN SERPL ELPH-MCNC: 2.7 G/DL — LOW (ref 3.5–5.2)
ALP SERPL-CCNC: 70 U/L — SIGNIFICANT CHANGE UP (ref 30–115)
ALT FLD-CCNC: 16 U/L — SIGNIFICANT CHANGE UP (ref 0–41)
ANION GAP SERPL CALC-SCNC: 17 MMOL/L — HIGH (ref 7–14)
AST SERPL-CCNC: 27 U/L — SIGNIFICANT CHANGE UP (ref 0–41)
BASOPHILS # BLD AUTO: 0.04 K/UL — SIGNIFICANT CHANGE UP (ref 0–0.2)
BASOPHILS NFR BLD AUTO: 0.3 % — SIGNIFICANT CHANGE UP (ref 0–1)
BILIRUB SERPL-MCNC: 2.3 MG/DL — HIGH (ref 0.2–1.2)
BUN SERPL-MCNC: 32 MG/DL — HIGH (ref 10–20)
CALCIUM SERPL-MCNC: 8.1 MG/DL — LOW (ref 8.5–10.1)
CHLORIDE SERPL-SCNC: 98 MMOL/L — SIGNIFICANT CHANGE UP (ref 98–110)
CO2 SERPL-SCNC: 25 MMOL/L — SIGNIFICANT CHANGE UP (ref 17–32)
CREAT SERPL-MCNC: 1.4 MG/DL — SIGNIFICANT CHANGE UP (ref 0.7–1.5)
EOSINOPHIL # BLD AUTO: 0.29 K/UL — SIGNIFICANT CHANGE UP (ref 0–0.7)
EOSINOPHIL NFR BLD AUTO: 2.2 % — SIGNIFICANT CHANGE UP (ref 0–8)
GLUCOSE BLDC GLUCOMTR-MCNC: 188 MG/DL — HIGH (ref 70–99)
GLUCOSE BLDC GLUCOMTR-MCNC: 195 MG/DL — HIGH (ref 70–99)
GLUCOSE BLDC GLUCOMTR-MCNC: 206 MG/DL — HIGH (ref 70–99)
GLUCOSE BLDC GLUCOMTR-MCNC: 232 MG/DL — HIGH (ref 70–99)
GLUCOSE SERPL-MCNC: 162 MG/DL — HIGH (ref 70–99)
HCT VFR BLD CALC: 26.7 % — LOW (ref 42–52)
HGB BLD-MCNC: 8.9 G/DL — LOW (ref 14–18)
IMM GRANULOCYTES NFR BLD AUTO: 4.4 % — HIGH (ref 0.1–0.3)
LYMPHOCYTES # BLD AUTO: 0.73 K/UL — LOW (ref 1.2–3.4)
LYMPHOCYTES # BLD AUTO: 5.6 % — LOW (ref 20.5–51.1)
MAGNESIUM SERPL-MCNC: 1.8 MG/DL — SIGNIFICANT CHANGE UP (ref 1.8–2.4)
MCHC RBC-ENTMCNC: 29.8 PG — SIGNIFICANT CHANGE UP (ref 27–31)
MCHC RBC-ENTMCNC: 33.3 G/DL — SIGNIFICANT CHANGE UP (ref 32–37)
MCV RBC AUTO: 89.3 FL — SIGNIFICANT CHANGE UP (ref 80–94)
MONOCYTES # BLD AUTO: 1.63 K/UL — HIGH (ref 0.1–0.6)
MONOCYTES NFR BLD AUTO: 12.5 % — HIGH (ref 1.7–9.3)
NEUTROPHILS # BLD AUTO: 9.79 K/UL — HIGH (ref 1.4–6.5)
NEUTROPHILS NFR BLD AUTO: 75 % — SIGNIFICANT CHANGE UP (ref 42.2–75.2)
PLATELET # BLD AUTO: 392 K/UL — SIGNIFICANT CHANGE UP (ref 130–400)
POTASSIUM SERPL-MCNC: 4 MMOL/L — SIGNIFICANT CHANGE UP (ref 3.5–5)
POTASSIUM SERPL-SCNC: 4 MMOL/L — SIGNIFICANT CHANGE UP (ref 3.5–5)
PROT SERPL-MCNC: 4.6 G/DL — LOW (ref 6–8)
RBC # BLD: 2.99 M/UL — LOW (ref 4.7–6.1)
RBC # FLD: 16.3 % — HIGH (ref 11.5–14.5)
SODIUM SERPL-SCNC: 140 MMOL/L — SIGNIFICANT CHANGE UP (ref 135–146)
WBC # BLD: 13.05 K/UL — HIGH (ref 4.8–10.8)
WBC # FLD AUTO: 13.05 K/UL — HIGH (ref 4.8–10.8)

## 2019-01-08 RX ORDER — INSULIN LISPRO 100/ML
4 VIAL (ML) SUBCUTANEOUS ONCE
Qty: 0 | Refills: 0 | Status: COMPLETED | OUTPATIENT
Start: 2019-01-08 | End: 2019-01-08

## 2019-01-08 RX ORDER — FUROSEMIDE 40 MG
40 TABLET ORAL ONCE
Qty: 0 | Refills: 0 | Status: COMPLETED | OUTPATIENT
Start: 2019-01-08 | End: 2019-01-08

## 2019-01-08 RX ADMIN — PANTOPRAZOLE SODIUM 40 MILLIGRAM(S): 20 TABLET, DELAYED RELEASE ORAL at 07:36

## 2019-01-08 RX ADMIN — FINASTERIDE 5 MILLIGRAM(S): 5 TABLET, FILM COATED ORAL at 12:10

## 2019-01-08 RX ADMIN — Medication 3 MILLILITER(S): at 03:15

## 2019-01-08 RX ADMIN — Medication 12.5 MILLIGRAM(S): at 17:02

## 2019-01-08 RX ADMIN — HEPARIN SODIUM 5000 UNIT(S): 5000 INJECTION INTRAVENOUS; SUBCUTANEOUS at 14:29

## 2019-01-08 RX ADMIN — Medication 3 MILLILITER(S): at 16:06

## 2019-01-08 RX ADMIN — AMIODARONE HYDROCHLORIDE 400 MILLIGRAM(S): 400 TABLET ORAL at 05:10

## 2019-01-08 RX ADMIN — Medication 12.5 MILLIGRAM(S): at 05:09

## 2019-01-08 RX ADMIN — Medication 40 MILLIGRAM(S): at 05:09

## 2019-01-08 RX ADMIN — HEPARIN SODIUM 5000 UNIT(S): 5000 INJECTION INTRAVENOUS; SUBCUTANEOUS at 05:09

## 2019-01-08 RX ADMIN — Medication 4 UNIT(S): at 21:55

## 2019-01-08 RX ADMIN — Medication 2: at 08:37

## 2019-01-08 RX ADMIN — Medication 40 MILLIGRAM(S): at 17:52

## 2019-01-08 RX ADMIN — MONTELUKAST 10 MILLIGRAM(S): 4 TABLET, CHEWABLE ORAL at 21:35

## 2019-01-08 RX ADMIN — Medication 4: at 12:09

## 2019-01-08 RX ADMIN — Medication 2: at 17:02

## 2019-01-08 RX ADMIN — HEPARIN SODIUM 5000 UNIT(S): 5000 INJECTION INTRAVENOUS; SUBCUTANEOUS at 21:34

## 2019-01-08 RX ADMIN — AMIODARONE HYDROCHLORIDE 400 MILLIGRAM(S): 400 TABLET ORAL at 17:02

## 2019-01-08 RX ADMIN — TAMSULOSIN HYDROCHLORIDE 0.4 MILLIGRAM(S): 0.4 CAPSULE ORAL at 21:35

## 2019-01-08 RX ADMIN — ATORVASTATIN CALCIUM 10 MILLIGRAM(S): 80 TABLET, FILM COATED ORAL at 21:35

## 2019-01-08 RX ADMIN — Medication 50 MICROGRAM(S): at 05:09

## 2019-01-08 RX ADMIN — Medication 3 MILLILITER(S): at 08:29

## 2019-01-08 NOTE — PROGRESS NOTE ADULT - REASON FOR ADMISSION
Splenic Laceration
Splenic Laceration
Acute hypoxic respiratory failure
fall s/p splenic Artery embolization for splenic laceration   transferred from surgery yesterday

## 2019-01-08 NOTE — PROGRESS NOTE ADULT - ASSESSMENT
A 78 year old male mph  of Afib on Eliquis, CAD, history of  Ventricular tachycardia, NANDO on CPAP, COPD, HFrEF(last EF 25% 12/2018) s/p AICD, hypothyroidism, DLD  presented after after a fall, found to have traumatic splenic laceration,  s/p splenic artery embolization, managed in ICU , now downgraded to floor.      Principal Diagnosis:  Acute hypoxemic Respiratory failure possibly secondary to CHF exacerbation   Acute on chronic  heart failure with reduced ejection fraction  Recent splenic laceration//subcapsular hematoma, s/p splenic artery embolization in this hopsitaliztion  Chronic renal disease stage 3       Associated Active Comorbid Conditions:  Coronary artery disease   Obstructive sleep apnea   Hypothyroidism  Paroxysmal atrial fibrillation   Dyslipidemia   AICD (automatic cardioverter/defibrillator) present      PLAN:   - continue with PO diuresis as dyspnea seems to be improving  -Strict input/output monitoring  -Watch off antibiotics   -hold anti-coagulation for now. Restart in one week  - Afib -rate controlled on current regimen of amiodarone  and metoprolol  -Electrolyte supplementation and follow up with BMP. Keep K+>4, Mg>2  -Get PT   -Elevate Head end of bed >35*, aspiration precautions   -GI Prophylaxis: Protonix 40mg PO QQdaily   -DVT Prophylaxis: Heparin 5000 units sc q8hrs  Discharge Disposition: possibly tomorrow to 4A if hypoxia resolved A 78 year old male mph  of Afib on Eliquis, CAD, history of  Ventricular tachycardia, NANDO on CPAP, COPD, HFrEF(last EF 25% 12/2018) s/p AICD, hypothyroidism, DLD  presented after after a fall, found to have traumatic splenic laceration,  s/p splenic artery embolization, managed in ICU , now downgraded to floor.      Principal Diagnosis:  Acute hypoxemic Respiratory failure possibly secondary to CHF exacerbation   Acute on chronic  heart failure with reduced ejection fraction  Recent splenic laceration//subcapsular hematoma, s/p splenic artery embolization in this hopsitaliztion  Chronic renal disease stage 3       Associated Active Comorbid Conditions:  Coronary artery disease   Obstructive sleep apnea   Hypothyroidism  Paroxysmal atrial fibrillation   Dyslipidemia   AICD (automatic cardioverter/defibrillator) present      PLAN:   - continue with PO diuresis as dyspnea seems to be improving  -Strict input/output monitoring  -Watch off antibiotics   -hold anti-coagulation for now. Restart in one week  - Afib -rate controlled on current regimen of amiodarone  and metoprolol  -Electrolyte supplementation and follow up with BMP. Keep K+>4, Mg>2  -Get PT   -Elevate Head end of bed >35*, aspiration precautions   -GI Prophylaxis: Protonix 40mg PO QQdaily   -DVT Prophylaxis: Heparin 5000 units sc q8hrs  Discharge Disposition: possibly tomorrow to 4A if hypoxia resolves.

## 2019-01-08 NOTE — PROVIDER CONTACT NOTE (OTHER) - ACTION/TREATMENT ORDERED:
no further intervention at this time
provider stated once he hears back from medicine team he will speak with family.
provider stated to increase O2 to 4L. ABGs ordered and collected by provider. cxr ordered and IV push Lasix ordered.
Bladder pressure done.  EKG done as per Dr. Valencia.  Levophed drip increased to 0.47 mcg.
MD will assess pt.

## 2019-01-08 NOTE — PROGRESS NOTE ADULT - SUBJECTIVE AND OBJECTIVE BOX
GUIDO VILCHIS [id number]    Subjective:  Continue to have moderate hypoxia with minimal activity. Feels very lethargic .      Vital Signs Last 24 Hrs  T(C): 35.8 (08 Jan 2019 08:00), Max: 36.4 (07 Jan 2019 16:00)  T(F): 96.5 (08 Jan 2019 08:00), Max: 97.6 (08 Jan 2019 00:00)  HR: 88 (08 Jan 2019 08:00) (68 - 88)  BP: 112/51 (08 Jan 2019 08:00) (108/58 - 112/51)  BP(mean): --  RR: 18 (08 Jan 2019 08:00) (16 - 18)  SpO2: 94% (08 Jan 2019 08:15) (94% - 94%)    Physical Examination:  Mild respiratory  distress  General: No pallor, or icterus, afebrile  HEENT:  DNONA, EOMI, no JVD, no Bruit.  Heart: S1+S2 audible, no murmur  Lungs: bilateral  fair air entry, mild bilateral  wheezing, no crepitations.  Abdomen: Soft, non-tender, non-distended , no  rigidity or guarding.  CNS: AAOx3, CN  grossly intact.  Extremities:  No edema          ROS:   Patient denies any headache, any vision complaints, runny nose, fever, chills, sore throat. Denies chest pain,palpitation. Denies nausea, vomiting, abdominal pain, diarrhoea, Denies urinary burning, urgency, frequency, dysuria. Denies weakness in any part of the body or numbness.   At least 10 systems were reviewed in ROS. All systems reviewed  are within normal limits except for the complaints as described in Subjective.    MEDICATIONS  (STANDING):  ALBUTerol/ipratropium for Nebulization 3 milliLiter(s) Nebulizer every 8 hours  amiodarone    Tablet 400 milliGRAM(s) Oral every 12 hours  atorvastatin 10 milliGRAM(s) Oral at bedtime  chlorhexidine 4% Liquid 1 Application(s) Topical <User Schedule>  dextrose 50% Injectable 12.5 Gram(s) IV Push once  dextrose 50% Injectable 25 Gram(s) IV Push once  dextrose 50% Injectable 25 Gram(s) IV Push once  finasteride 5 milliGRAM(s) Oral daily  furosemide    Tablet 40 milliGRAM(s) Oral daily  heparin  Injectable 5000 Unit(s) SubCutaneous every 8 hours  insulin lispro (HumaLOG) corrective regimen sliding scale   SubCutaneous three times a day before meals  levothyroxine 50 MICROGram(s) Oral daily  metoprolol tartrate 12.5 milliGRAM(s) Oral every 12 hours  montelukast 10 milliGRAM(s) Oral at bedtime  pantoprazole    Tablet 40 milliGRAM(s) Oral before breakfast  tamsulosin 0.4 milliGRAM(s) Oral at bedtime    MEDICATIONS  (PRN):  benzocaine 15 mG/menthol 3.6 mG Lozenge 1 Lozenge Oral every 3 hours PRN Sore Throat  glucagon  Injectable 1 milliGRAM(s) IntraMuscular once PRN Glucose LESS THAN 70 milligrams/deciliter                            8.9    13.05 )-----------( 392      ( 08 Jan 2019 05:26 )             26.7     01-08    140  |  98  |  32<H>  ----------------------------<  162<H>  4.0   |  25  |  1.4    Ca    8.1<L>      08 Jan 2019 05:26  Phos  1.7     01-07  Mg     1.8     01-08    TPro  4.6<L>  /  Alb  2.7<L>  /  TBili  2.3<H>  /  DBili  x   /  AST  27  /  ALT  16  /  AlkPhos  70  01-08

## 2019-01-08 NOTE — PROGRESS NOTE ADULT - ASSESSMENT
78 year old male pmhx of Afib on Eliquis, CAD, hx Ventricular tachycardia, NANDO on CPAP, COPD, HFrEF(last EF 25% 12/2018) s/p AICD, hypothyroidism, DLD  presented 2 days after a fall. Suffered a splenic laceration,  s/p splenic artery embolization. A/c still on hold until surgery clears.  Still continues to be mildly dyspneic at rest. Cannot complete a sentence. Requiring O2 currently (never been on home O2 before). Saturating 95% on 5L at rest, desats and gets dyspneic with minimal activity .    #Acute hypoxemic Respiratory failure - improved  likely 2/2 Acute on Chronic systolic CHF exacerbation   - s/p IV diuresis, now on PO lasix 40 now .   -c/w lasix ,encourage incentive spirometry     #h/o A Fib :   -Rate controlled on Amiodarone and   Eliquis held ,as per surgery can restart in  1-2 weeks as an outpt    #h/o AICD:   recent interrogation showed no events.     # s/p fall ,rib fx and splenic laceration ;  s/p splenic artery embolization  cw incentive spirometry and pain control  put pt follow with surgery team      #Hypophosphatemia :   repleted.    #DVT ppx and GI ppx; with SUB Q heparin and po protonics     DNR    #Dispo; after dyspnea improvement ,will recall PT

## 2019-01-09 VITALS
HEART RATE: 89 BPM | RESPIRATION RATE: 18 BRPM | TEMPERATURE: 97 F | DIASTOLIC BLOOD PRESSURE: 62 MMHG | SYSTOLIC BLOOD PRESSURE: 110 MMHG

## 2019-01-09 LAB
ALBUMIN SERPL ELPH-MCNC: 2.6 G/DL — LOW (ref 3.5–5.2)
ALP SERPL-CCNC: 69 U/L — SIGNIFICANT CHANGE UP (ref 30–115)
ALT FLD-CCNC: 15 U/L — SIGNIFICANT CHANGE UP (ref 0–41)
ANION GAP SERPL CALC-SCNC: 18 MMOL/L — HIGH (ref 7–14)
AST SERPL-CCNC: 27 U/L — SIGNIFICANT CHANGE UP (ref 0–41)
BILIRUB SERPL-MCNC: 2.4 MG/DL — HIGH (ref 0.2–1.2)
BUN SERPL-MCNC: 35 MG/DL — HIGH (ref 10–20)
CALCIUM SERPL-MCNC: 8.1 MG/DL — LOW (ref 8.5–10.1)
CHLORIDE SERPL-SCNC: 96 MMOL/L — LOW (ref 98–110)
CO2 SERPL-SCNC: 25 MMOL/L — SIGNIFICANT CHANGE UP (ref 17–32)
CREAT SERPL-MCNC: 1.5 MG/DL — SIGNIFICANT CHANGE UP (ref 0.7–1.5)
GLUCOSE BLDC GLUCOMTR-MCNC: 191 MG/DL — HIGH (ref 70–99)
GLUCOSE BLDC GLUCOMTR-MCNC: 217 MG/DL — HIGH (ref 70–99)
GLUCOSE SERPL-MCNC: 159 MG/DL — HIGH (ref 70–99)
HCT VFR BLD CALC: 26.7 % — LOW (ref 42–52)
HGB BLD-MCNC: 8.8 G/DL — LOW (ref 14–18)
MCHC RBC-ENTMCNC: 28.9 PG — SIGNIFICANT CHANGE UP (ref 27–31)
MCHC RBC-ENTMCNC: 33 G/DL — SIGNIFICANT CHANGE UP (ref 32–37)
MCV RBC AUTO: 87.8 FL — SIGNIFICANT CHANGE UP (ref 80–94)
NRBC # BLD: 0 /100 WBCS — SIGNIFICANT CHANGE UP (ref 0–0)
PLATELET # BLD AUTO: 430 K/UL — HIGH (ref 130–400)
POTASSIUM SERPL-MCNC: 3.9 MMOL/L — SIGNIFICANT CHANGE UP (ref 3.5–5)
POTASSIUM SERPL-SCNC: 3.9 MMOL/L — SIGNIFICANT CHANGE UP (ref 3.5–5)
PROT SERPL-MCNC: 4.8 G/DL — LOW (ref 6–8)
RBC # BLD: 3.04 M/UL — LOW (ref 4.7–6.1)
RBC # FLD: 15.9 % — HIGH (ref 11.5–14.5)
SODIUM SERPL-SCNC: 139 MMOL/L — SIGNIFICANT CHANGE UP (ref 135–146)
WBC # BLD: 10.78 K/UL — SIGNIFICANT CHANGE UP (ref 4.8–10.8)
WBC # FLD AUTO: 10.78 K/UL — SIGNIFICANT CHANGE UP (ref 4.8–10.8)

## 2019-01-09 RX ORDER — FUROSEMIDE 40 MG
40 TABLET ORAL ONCE
Qty: 0 | Refills: 0 | Status: COMPLETED | OUTPATIENT
Start: 2019-01-09 | End: 2019-01-09

## 2019-01-09 RX ADMIN — HEPARIN SODIUM 5000 UNIT(S): 5000 INJECTION INTRAVENOUS; SUBCUTANEOUS at 05:33

## 2019-01-09 RX ADMIN — Medication 40 MILLIGRAM(S): at 05:34

## 2019-01-09 RX ADMIN — Medication 3 MILLILITER(S): at 08:58

## 2019-01-09 RX ADMIN — Medication 12.5 MILLIGRAM(S): at 05:35

## 2019-01-09 RX ADMIN — PANTOPRAZOLE SODIUM 40 MILLIGRAM(S): 20 TABLET, DELAYED RELEASE ORAL at 08:01

## 2019-01-09 RX ADMIN — AMIODARONE HYDROCHLORIDE 400 MILLIGRAM(S): 400 TABLET ORAL at 05:34

## 2019-01-09 RX ADMIN — HEPARIN SODIUM 5000 UNIT(S): 5000 INJECTION INTRAVENOUS; SUBCUTANEOUS at 13:35

## 2019-01-09 RX ADMIN — FINASTERIDE 5 MILLIGRAM(S): 5 TABLET, FILM COATED ORAL at 11:23

## 2019-01-09 RX ADMIN — Medication 50 MICROGRAM(S): at 05:34

## 2019-01-09 RX ADMIN — Medication 2: at 08:01

## 2019-01-09 RX ADMIN — Medication 4: at 11:23

## 2019-01-09 RX ADMIN — Medication 40 MILLIGRAM(S): at 13:35

## 2019-01-09 NOTE — PROGRESS NOTE ADULT - ASSESSMENT
A 78 year old male mph  of Afib on Eliquis, CAD, history of  Ventricular tachycardia, NANDO on CPAP, COPD, HFrEF(last EF 25% 12/2018) s/p AICD, hypothyroidism, DLD  presented after mechanical fall, found to have traumatic splenic laceration,  s/p splenic artery embolization, managed in ICU, downgraded to floor.      Principal Diagnosis:  Acute hypoxemic Respiratory failure possibly secondary to systolic CHF exacerbation - improving  Recent splenic laceration//subcapsular hematoma, s/p splenic artery embolization on this hopsitaliztion  Chronic renal disease stage 3       Associated Active Comorbid Conditions:  Coronary artery disease   Obstructive sleep apnea   Hypothyroidism  Paroxysmal atrial fibrillation   Dyslipidemia   AICD (automatic cardioverter/defibrillator) present      PLAN:   - will give IV lasix 40 mg and will continue home PO regimen, dyspnea seems to be improving  - hold anti-coagulation for now. Restart in one week  - Afib -rate controlled on current regimen of amiodarone  and metoprolol  - continue PT    Pt is clinically ready for discharge to [facility].

## 2019-01-09 NOTE — PROGRESS NOTE ADULT - SUBJECTIVE AND OBJECTIVE BOX
GUIDO VILCHIS    Patient is a 78y old  Male who presents with a chief complaint of Acute hypoxic respiratory failure (08 Jan 2019 15:22)    INTERVAL HPI/OVERNIGHT EVENTS: no events overnight, pt was seen walking to the bathroom with O2 sat 90% on RA. No active complaints.    ROS: All ROS negative except LE edema    PHYSICAL EXAM:  T(C): 36.2, Max: 36.3 (01-08-19 @ 23:00)  HR: 89 (89 - 89)  BP: 110/62 (103/55 - 110/62)  RR: 18 (18 - 18)  SpO2: --    GENERAL: NAD  NECK: Supple, No JVD  PULMONARY/CHEST: bibasilar crackles  CARDIOVASC: Regular rate and rhythm; No murmurs  GI/ABDOMEN: Soft, Nontender, Nondistended; Bowel sounds present  EXTREMITIES:  2+ Peripheral Pulses, No clubbing, cyanosis, +1 edema, no deformity. No calf tenderness b/l.  SKIN: No rashes or lesions  NERVOUS SYSTEM:  Alert & Oriented X3, no focal deficit     Consultant(s) Notes Reviewed by me.     LABS:                        8.8    10.78 )-----------( 430      ( 09 Jan 2019 05:22 )             26.7     01-09    139  |  96<L>  |  35<H>  ----------------------------<  159<H>  3.9   |  25  |  1.5    Ca    8.1<L>      09 Jan 2019 05:22  Mg     1.8     01-08    TPro  4.8<L>  /  Alb  2.6<L>  /  TBili  2.4<H>  /  DBili  x   /  AST  27  /  ALT  15  /  AlkPhos  69  01-09      CAPILLARY BLOOD GLUCOSE  POCT Blood Glucose.: 217 mg/dL (09 Jan 2019 11:05)  POCT Blood Glucose.: 191 mg/dL (09 Jan 2019 07:58)  POCT Blood Glucose.: 232 mg/dL (08 Jan 2019 21:38)  POCT Blood Glucose.: 195 mg/dL (08 Jan 2019 16:31)        MEDICATIONS  (STANDING):  ALBUTerol/ipratropium for Nebulization 3 milliLiter(s) Nebulizer every 8 hours  amiodarone    Tablet 400 milliGRAM(s) Oral every 12 hours  atorvastatin 10 milliGRAM(s) Oral at bedtime  chlorhexidine 4% Liquid 1 Application(s) Topical <User Schedule>  dextrose 50% Injectable 12.5 Gram(s) IV Push once  dextrose 50% Injectable 25 Gram(s) IV Push once  dextrose 50% Injectable 25 Gram(s) IV Push once  finasteride 5 milliGRAM(s) Oral daily  furosemide    Tablet 40 milliGRAM(s) Oral daily  heparin  Injectable 5000 Unit(s) SubCutaneous every 8 hours  insulin lispro (HumaLOG) corrective regimen sliding scale   SubCutaneous three times a day before meals  levothyroxine 50 MICROGram(s) Oral daily  metoprolol tartrate 12.5 milliGRAM(s) Oral every 12 hours  montelukast 10 milliGRAM(s) Oral at bedtime  pantoprazole    Tablet 40 milliGRAM(s) Oral before breakfast  tamsulosin 0.4 milliGRAM(s) Oral at bedtime    MEDICATIONS  (PRN):  benzocaine 15 mG/menthol 3.6 mG Lozenge 1 Lozenge Oral every 3 hours PRN Sore Throat  glucagon  Injectable 1 milliGRAM(s) IntraMuscular once PRN Glucose LESS THAN 70 milligrams/deciliter

## 2019-01-09 NOTE — PROGRESS NOTE ADULT - PROVIDER SPECIALTY LIST ADULT
Hospitalist
Hospitalist
Internal Medicine
Internal Medicine
Intervent Radiology
Intervent Radiology
SICU
Surgery
Surgery
Trauma Surgery
Intervent Radiology
SICU

## 2019-01-10 ENCOUNTER — OUTPATIENT (OUTPATIENT)
Dept: OUTPATIENT SERVICES | Facility: HOSPITAL | Age: 79
LOS: 1 days | Discharge: HOME | End: 2019-01-10

## 2019-01-10 DIAGNOSIS — Z98.890 OTHER SPECIFIED POSTPROCEDURAL STATES: Chronic | ICD-10-CM

## 2019-01-10 DIAGNOSIS — Z95.810 PRESENCE OF AUTOMATIC (IMPLANTABLE) CARDIAC DEFIBRILLATOR: Chronic | ICD-10-CM

## 2019-01-11 DIAGNOSIS — E11.9 TYPE 2 DIABETES MELLITUS WITHOUT COMPLICATIONS: ICD-10-CM
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
## 2019-01-11 DIAGNOSIS — I72.3 ANEURYSM OF ILIAC ARTERY: ICD-10-CM

## 2019-01-11 DIAGNOSIS — R55 SYNCOPE AND COLLAPSE: ICD-10-CM

## 2019-01-11 DIAGNOSIS — S36.039A UNSPECIFIED LACERATION OF SPLEEN, INITIAL ENCOUNTER: ICD-10-CM

## 2019-01-11 DIAGNOSIS — N18.3 CHRONIC KIDNEY DISEASE, STAGE 3 (MODERATE): ICD-10-CM

## 2019-01-11 DIAGNOSIS — E83.42 HYPOMAGNESEMIA: ICD-10-CM

## 2019-01-11 DIAGNOSIS — J98.11 ATELECTASIS: ICD-10-CM

## 2019-01-11 DIAGNOSIS — Z99.89 DEPENDENCE ON OTHER ENABLING MACHINES AND DEVICES: ICD-10-CM
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
## 2019-01-11 DIAGNOSIS — I50.23 ACUTE ON CHRONIC SYSTOLIC (CONGESTIVE) HEART FAILURE: ICD-10-CM

## 2019-01-11 DIAGNOSIS — E78.00 PURE HYPERCHOLESTEROLEMIA, UNSPECIFIED: ICD-10-CM

## 2019-01-11 DIAGNOSIS — D64.9 ANEMIA, UNSPECIFIED: ICD-10-CM

## 2019-01-11 DIAGNOSIS — E87.2 ACIDOSIS: ICD-10-CM

## 2019-01-11 DIAGNOSIS — N17.9 ACUTE KIDNEY FAILURE, UNSPECIFIED: ICD-10-CM

## 2019-01-11 DIAGNOSIS — I47.2 VENTRICULAR TACHYCARDIA: ICD-10-CM

## 2019-01-11 DIAGNOSIS — N40.0 BENIGN PROSTATIC HYPERPLASIA WITHOUT LOWER URINARY TRACT SYMPTOMS: ICD-10-CM

## 2019-01-11 DIAGNOSIS — Z95.810 PRESENCE OF AUTOMATIC (IMPLANTABLE) CARDIAC DEFIBRILLATOR: ICD-10-CM

## 2019-01-11 DIAGNOSIS — E87.5 HYPERKALEMIA: ICD-10-CM

## 2019-01-11 DIAGNOSIS — D62 ACUTE POSTHEMORRHAGIC ANEMIA: ICD-10-CM

## 2019-01-11 DIAGNOSIS — J96.01 ACUTE RESPIRATORY FAILURE WITH HYPOXIA: ICD-10-CM

## 2019-01-11 DIAGNOSIS — I95.89 OTHER HYPOTENSION: ICD-10-CM

## 2019-01-11 DIAGNOSIS — G47.33 OBSTRUCTIVE SLEEP APNEA (ADULT) (PEDIATRIC): ICD-10-CM

## 2019-01-11 DIAGNOSIS — T79.4XXA TRAUMATIC SHOCK, INITIAL ENCOUNTER: ICD-10-CM

## 2019-01-11 DIAGNOSIS — I48.91 UNSPECIFIED ATRIAL FIBRILLATION: ICD-10-CM

## 2019-01-11 DIAGNOSIS — E03.9 HYPOTHYROIDISM, UNSPECIFIED: ICD-10-CM

## 2019-01-11 DIAGNOSIS — J44.9 CHRONIC OBSTRUCTIVE PULMONARY DISEASE, UNSPECIFIED: ICD-10-CM

## 2019-01-11 DIAGNOSIS — S22.41XA MULTIPLE FRACTURES OF RIBS, RIGHT SIDE, INITIAL ENCOUNTER FOR CLOSED FRACTURE: ICD-10-CM

## 2019-01-11 DIAGNOSIS — Z90.49 ACQUIRED ABSENCE OF OTHER SPECIFIED PARTS OF DIGESTIVE TRACT: ICD-10-CM

## 2019-01-11 DIAGNOSIS — I25.10 ATHEROSCLEROTIC HEART DISEASE OF NATIVE CORONARY ARTERY WITHOUT ANGINA PECTORIS: ICD-10-CM

## 2019-01-11 DIAGNOSIS — R79.9 ABNORMAL FINDING OF BLOOD CHEMISTRY, UNSPECIFIED: ICD-10-CM
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
## 2019-01-14 ENCOUNTER — CLINICAL ADVICE (OUTPATIENT)
Age: 79
End: 2019-01-14

## 2019-01-15 ENCOUNTER — OUTPATIENT (OUTPATIENT)
Dept: OUTPATIENT SERVICES | Facility: HOSPITAL | Age: 79
LOS: 1 days | Discharge: HOME | End: 2019-01-15

## 2019-01-15 DIAGNOSIS — Z95.810 PRESENCE OF AUTOMATIC (IMPLANTABLE) CARDIAC DEFIBRILLATOR: Chronic | ICD-10-CM

## 2019-01-15 DIAGNOSIS — Z98.890 OTHER SPECIFIED POSTPROCEDURAL STATES: Chronic | ICD-10-CM

## 2019-01-17 NOTE — CONSULT NOTE ADULT - CARDIOVASCULAR DETAILS
Emergency Department  [address] [place] [address]  Phone:  [phone]  Fax:  [phone]                                    Irish Rosales   MRN: [id number]    Department:   Emergency Department   Date of Visit:  1/17/2019           After Visit Summary Signature Page    I have received my discharge instructions, and my questions have been answered. I have discussed any challenges I see with this plan with the nurse or doctor.    ..........................................................................................................................................  Patient/Patient Representative Signature      ..........................................................................................................................................  Patient Representative Print Name and Relationship to Patient    ..................................................               ................................................  Date                                   Time    ..........................................................................................................................................  Reviewed by Signature/Title    ...................................................              ..............................................  Date                                               Time          22EPIC Rev 08/18       
tachycardia

## 2019-01-19 ENCOUNTER — OUTPATIENT (OUTPATIENT)
Dept: OUTPATIENT SERVICES | Facility: HOSPITAL | Age: 79
LOS: 1 days | Discharge: HOME | End: 2019-01-19

## 2019-01-19 DIAGNOSIS — Z98.890 OTHER SPECIFIED POSTPROCEDURAL STATES: Chronic | ICD-10-CM

## 2019-01-19 DIAGNOSIS — Z95.810 PRESENCE OF AUTOMATIC (IMPLANTABLE) CARDIAC DEFIBRILLATOR: Chronic | ICD-10-CM

## 2019-01-21 ENCOUNTER — OUTPATIENT (OUTPATIENT)
Dept: OUTPATIENT SERVICES | Facility: HOSPITAL | Age: 79
LOS: 1 days | Discharge: HOME | End: 2019-01-21

## 2019-01-21 DIAGNOSIS — Z95.810 PRESENCE OF AUTOMATIC (IMPLANTABLE) CARDIAC DEFIBRILLATOR: Chronic | ICD-10-CM

## 2019-01-21 DIAGNOSIS — Z98.890 OTHER SPECIFIED POSTPROCEDURAL STATES: Chronic | ICD-10-CM

## 2019-01-21 DIAGNOSIS — R79.9 ABNORMAL FINDING OF BLOOD CHEMISTRY, UNSPECIFIED: ICD-10-CM

## 2019-01-21 DIAGNOSIS — D64.9 ANEMIA, UNSPECIFIED: ICD-10-CM

## 2019-01-22 ENCOUNTER — APPOINTMENT (OUTPATIENT)
Dept: CARDIOLOGY | Facility: CLINIC | Age: 79
End: 2019-01-22

## 2019-01-22 VITALS — SYSTOLIC BLOOD PRESSURE: 130 MMHG | RESPIRATION RATE: 18 BRPM | DIASTOLIC BLOOD PRESSURE: 80 MMHG | HEART RATE: 76 BPM

## 2019-01-22 DIAGNOSIS — I10 ESSENTIAL (PRIMARY) HYPERTENSION: ICD-10-CM

## 2019-01-22 DIAGNOSIS — R06.02 SHORTNESS OF BREATH: ICD-10-CM

## 2019-01-22 RX ORDER — PRAVASTATIN SODIUM 20 MG/1
20 TABLET ORAL
Qty: 60 | Refills: 0 | Status: DISCONTINUED | COMMUNITY
Start: 2017-07-10 | End: 2019-01-22

## 2019-01-22 RX ORDER — APIXABAN 2.5 MG/1
2.5 TABLET, FILM COATED ORAL
Qty: 180 | Refills: 3 | Status: DISCONTINUED | COMMUNITY
Start: 2018-01-23 | End: 2019-01-22

## 2019-01-22 RX ORDER — PANTOPRAZOLE 40 MG/1
40 TABLET, DELAYED RELEASE ORAL
Refills: 0 | Status: DISCONTINUED | COMMUNITY
End: 2019-01-22

## 2019-01-22 RX ORDER — MONTELUKAST 10 MG/1
10 TABLET, FILM COATED ORAL
Qty: 30 | Refills: 0 | Status: DISCONTINUED | COMMUNITY
Start: 2018-03-08 | End: 2019-01-22

## 2019-01-22 RX ORDER — AMIODARONE HYDROCHLORIDE 200 MG/1
200 TABLET ORAL TWICE DAILY
Qty: 180 | Refills: 3 | Status: DISCONTINUED | COMMUNITY
Start: 2018-12-26 | End: 2019-01-22

## 2019-01-22 RX ORDER — MECLIZINE HYDROCHLORIDE 12.5 MG/1
12.5 TABLET ORAL
Qty: 20 | Refills: 0 | Status: DISCONTINUED | COMMUNITY
Start: 2018-01-30 | End: 2019-01-22

## 2019-01-22 RX ORDER — LEVOTHYROXINE SODIUM 0.05 MG/1
50 TABLET ORAL
Qty: 30 | Refills: 0 | Status: DISCONTINUED | COMMUNITY
Start: 2017-02-15 | End: 2019-01-22

## 2019-01-22 RX ORDER — MEXILETINE HYDROCHLORIDE 150 MG/1
150 CAPSULE ORAL
Qty: 180 | Refills: 3 | Status: DISCONTINUED | COMMUNITY
Start: 2018-05-04 | End: 2019-01-22

## 2019-01-22 RX ORDER — ALPRAZOLAM 0.25 MG/1
0.25 TABLET ORAL
Qty: 30 | Refills: 0 | Status: DISCONTINUED | COMMUNITY
Start: 2018-03-13 | End: 2019-01-22

## 2019-01-22 RX ORDER — METOPROLOL TARTRATE 25 MG/1
25 TABLET, FILM COATED ORAL TWICE DAILY
Qty: 60 | Refills: 3 | Status: DISCONTINUED | COMMUNITY
End: 2019-01-22

## 2019-01-22 NOTE — REVIEW OF SYSTEMS
[Shortness Of Breath] : shortness of breath [Abdominal Pain] : abdominal pain [Negative] : Heme/Lymph

## 2019-01-22 NOTE — PHYSICAL EXAM
[General Appearance - Well Developed] : well developed [Normal Appearance] : normal appearance [Well Groomed] : well groomed [General Appearance - Well Nourished] : well nourished [No Deformities] : no deformities [General Appearance - In No Acute Distress] : no acute distress [Normal Conjunctiva] : the conjunctiva exhibited no abnormalities [Eyelids - No Xanthelasma] : the eyelids demonstrated no xanthelasmas [Normal Oral Mucosa] : normal oral mucosa [No Oral Pallor] : no oral pallor [No Oral Cyanosis] : no oral cyanosis [Normal Jugular Venous A Waves Present] : normal jugular venous A waves present [Normal Jugular Venous V Waves Present] : normal jugular venous V waves present [No Jugular Venous Stock A Waves] : no jugular venous stock A waves [Respiration, Rhythm And Depth] : normal respiratory rhythm and effort [Exaggerated Use Of Accessory Muscles For Inspiration] : no accessory muscle use [Auscultation Breath Sounds / Voice Sounds] : lungs were clear to auscultation bilaterally [Heart Rate And Rhythm] : heart rate and rhythm were normal [Heart Sounds] : normal S1 and S2 [Murmurs] : no murmurs present [Bowel Sounds] : normal bowel sounds [Abdomen Soft] : soft [Abdomen Tenderness] : non-tender [Abdomen Mass (___ Cm)] : no abdominal mass palpated [Nail Clubbing] : no clubbing of the fingernails [Cyanosis, Localized] : no localized cyanosis [Petechial Hemorrhages (___cm)] : no petechial hemorrhages [Skin Color & Pigmentation] : normal skin color and pigmentation [] : no rash [No Venous Stasis] : no venous stasis [Skin Lesions] : no skin lesions [No Skin Ulcers] : no skin ulcer [No Xanthoma] : no  xanthoma was observed [Oriented To Time, Place, And Person] : oriented to person, place, and time [FreeTextEntry1] : edema

## 2019-01-22 NOTE — PROCEDURE
[Voltage: ___ volts] : Voltage was [unfilled] volts [Longevity: ___ months] : The estimated remaining battery life is [unfilled] months [Threshold Testing Performed] : Threshold testing was performed [de-identified] : Medtronic [de-identified] : Herb KENNY DR [de-identified] : [id number] [de-identified] : 11/9/2015 [de-identified] : ADAM

## 2019-01-24 ENCOUNTER — APPOINTMENT (OUTPATIENT)
Dept: CARDIOLOGY | Facility: CLINIC | Age: 79
End: 2019-01-24

## 2019-01-24 VITALS — WEIGHT: 17.38 LBS | DIASTOLIC BLOOD PRESSURE: 60 MMHG | BODY MASS INDEX: 2.57 KG/M2 | SYSTOLIC BLOOD PRESSURE: 110 MMHG

## 2019-01-24 RX ORDER — UMECLIDINIUM 62.5 UG/1
62.5 AEROSOL, POWDER ORAL
Qty: 30 | Refills: 0 | Status: COMPLETED | COMMUNITY
Start: 2017-10-24 | End: 2019-01-24

## 2019-01-24 RX ORDER — AZELASTINE HYDROCHLORIDE 137 UG/1
0.1 SPRAY, METERED NASAL
Qty: 30 | Refills: 0 | Status: COMPLETED | COMMUNITY
Start: 2017-09-29 | End: 2019-01-24

## 2019-01-24 RX ORDER — ASPIRIN ENTERIC COATED TABLETS 81 MG 81 MG/1
81 TABLET, DELAYED RELEASE ORAL
Refills: 0 | Status: COMPLETED | COMMUNITY
End: 2019-01-24

## 2019-01-24 RX ORDER — TAMSULOSIN HYDROCHLORIDE 0.4 MG/1
0.4 CAPSULE ORAL
Qty: 90 | Refills: 3 | Status: ACTIVE | COMMUNITY

## 2019-01-24 RX ORDER — LEVOTHYROXINE SODIUM 50 UG/1
50 TABLET ORAL DAILY
Refills: 0 | Status: ACTIVE | COMMUNITY

## 2019-01-24 RX ORDER — LOSARTAN POTASSIUM 50 MG/1
50 TABLET, FILM COATED ORAL
Refills: 0 | Status: COMPLETED | COMMUNITY
End: 2019-01-24

## 2019-01-24 RX ORDER — ALBUTEROL SULFATE 108 UG/1
108 (90 BASE) AEROSOL, METERED RESPIRATORY (INHALATION)
Refills: 0 | Status: COMPLETED | COMMUNITY
End: 2019-01-24

## 2019-01-24 RX ORDER — AMLODIPINE BESYLATE 5 MG/1
5 TABLET ORAL
Refills: 0 | Status: COMPLETED | COMMUNITY
End: 2019-01-24

## 2019-01-24 RX ORDER — GLIPIZIDE 5 MG/1
5 TABLET ORAL DAILY
Refills: 0 | Status: ACTIVE | COMMUNITY

## 2019-01-24 RX ORDER — MONTELUKAST 10 MG/1
10 TABLET, FILM COATED ORAL
Refills: 0 | Status: ACTIVE | COMMUNITY

## 2019-01-24 RX ORDER — FINASTERIDE 5 MG/1
5 TABLET, FILM COATED ORAL
Qty: 90 | Refills: 3 | Status: ACTIVE | COMMUNITY

## 2019-01-24 RX ORDER — DOCUSATE SODIUM 100 MG/1
100 CAPSULE ORAL
Refills: 0 | Status: COMPLETED | COMMUNITY
End: 2019-01-24

## 2019-01-24 NOTE — PHYSICAL EXAM
[General Appearance - Well Developed] : well developed [Normal Appearance] : normal appearance [Well Groomed] : well groomed [General Appearance - Well Nourished] : well nourished [No Deformities] : no deformities [General Appearance - In No Acute Distress] : no acute distress [Heart Rate And Rhythm] : heart rate and rhythm were normal [Heart Sounds] : normal S1 and S2 [Murmurs] : no murmurs present [Respiration, Rhythm And Depth] : normal respiratory rhythm and effort [Exaggerated Use Of Accessory Muscles For Inspiration] : no accessory muscle use [Auscultation Breath Sounds / Voice Sounds] : lungs were clear to auscultation bilaterally [Clean] : clean [Dry] : dry [Well-Healed] : well-healed [Abdomen Soft] : soft [Abdomen Tenderness] : non-tender [Abdomen Mass (___ Cm)] : no abdominal mass palpated [Nail Clubbing] : no clubbing of the fingernails [Cyanosis, Localized] : no localized cyanosis [Petechial Hemorrhages (___cm)] : no petechial hemorrhages [] : no ischemic changes [FreeTextEntry1] : +3 edema

## 2019-01-24 NOTE — HISTORY OF PRESENT ILLNESS
[Palpitations] : no palpitations [SOB] : no dyspnea [Syncope] : no syncope [ICD Shocks] : no recent ICD shocks [Erythema at Site] : no erythema at device site [de-identified] : Patient s/p VT storm and VT ablation. Pt had 6 e[sides of VT terminated by at\par \par He also c/o SOB and NOEL

## 2019-01-24 NOTE — ASSESSMENT
[FreeTextEntry1] : VT - one day of 6 episodes successfully terminated\par - cont amio\par - cont BB anf give if sys BP >90 and not 110 bc pt always has low BP\par \par AF - cont AC\par \par HT - edema\par - lasix as outpatient can give lasix if sys BP >90\par - if edema does not improve then pt will need hospitalization for edema

## 2019-01-24 NOTE — PROCEDURE
[No] : not [NSR] : normal sinus rhythm [CRT-D] : Cardiac resynchronization therapy defibrillator [DDD] : DDD [Voltage: ___ volts] : Voltage was [unfilled] volts [Charge Time: ___ sec] : charge time was [unfilled] seconds [Longevity: ___ months] : The estimated remaining battery life is [unfilled] months [Threshold Testing Performed] : Threshold testing was performed [Sensing Amplitude ___mv] : sensing amplitude was [unfilled] mv [Lead Imp:  ___ohms] : lead impedance was [unfilled] ohms [___V @] : [unfilled] V [___ ms] : [unfilled] ms [Programmed for Longevity] : output reprogrammed for improved battery longevity [Asense-Vsense ___ %] : Asense-Vsense [unfilled]% [Asense-Vpace ___ %] : Asense-Vpace [unfilled]% [Apace-Vsense ___ %] : Apace-Vsense [unfilled]% [Apace-Vpace ___ %] : Apace-Vpace [unfilled]% [de-identified] : Medtronic [de-identified] : Kraig CORNEJO [de-identified] : [id number] [de-identified] : 5/30/18 [de-identified] : 90 [de-identified] : AT/AF [place] 5.4%.\par 10 VT Treated Episodes. 9 successfully treated by [facility], one on 1/14/19 treated successfully by 35J shock.

## 2019-02-21 ENCOUNTER — APPOINTMENT (OUTPATIENT)
Dept: CARDIOLOGY | Facility: CLINIC | Age: 79
End: 2019-02-21

## 2019-02-21 VITALS — SYSTOLIC BLOOD PRESSURE: 118 MMHG | BODY MASS INDEX: 24.07 KG/M2 | DIASTOLIC BLOOD PRESSURE: 60 MMHG | WEIGHT: 163 LBS

## 2019-02-21 DIAGNOSIS — Z86.79 PERSONAL HISTORY OF OTHER DISEASES OF THE CIRCULATORY SYSTEM: ICD-10-CM

## 2019-02-21 RX ORDER — LIDOCAINE HYDROCHLORIDE 10 MG/ML
1 INJECTION, SOLUTION INFILTRATION; PERINEURAL
Refills: 0 | Status: DISCONTINUED | COMMUNITY
End: 2019-02-21

## 2019-02-24 RX ORDER — PRAVASTATIN SODIUM 20 MG/1
20 TABLET ORAL
Refills: 0 | Status: ACTIVE | COMMUNITY

## 2019-02-24 NOTE — PHYSICAL EXAM
[General Appearance - Well Developed] : well developed [Normal Appearance] : normal appearance [Well Groomed] : well groomed [General Appearance - Well Nourished] : well nourished [No Deformities] : no deformities [General Appearance - In No Acute Distress] : no acute distress [Heart Rate And Rhythm] : heart rate and rhythm were normal [Heart Sounds] : normal S1 and S2 [Murmurs] : no murmurs present [Respiration, Rhythm And Depth] : normal respiratory rhythm and effort [Exaggerated Use Of Accessory Muscles For Inspiration] : no accessory muscle use [Auscultation Breath Sounds / Voice Sounds] : lungs were clear to auscultation bilaterally [Clean] : clean [Dry] : dry [Well-Healed] : well-healed [Abdomen Soft] : soft [Abdomen Tenderness] : non-tender [Abdomen Mass (___ Cm)] : no abdominal mass palpated [Nail Clubbing] : no clubbing of the fingernails [Cyanosis, Localized] : no localized cyanosis [Petechial Hemorrhages (___cm)] : no petechial hemorrhages [] : no ischemic changes [FreeTextEntry1] : no edema

## 2019-02-24 NOTE — HISTORY OF PRESENT ILLNESS
[None] : The patient complains of no symptoms [Syncope] : no syncope [Dizziness] : no dizziness [Chest Pain] : no chest pain or discomfort [ICD Shocks] : no recent ICD shocks [Swelling at Site] : no swelling at device site [de-identified] : Patient s/p VT storm and VT ablation. Pt had 6 esides of VT terminated by at [facility]. \par \par He also c/o SOB and NOEL

## 2019-02-24 NOTE — PROCEDURE
[No] : not [Sinus Bradycardia] : sinus bradycardia [CRT-D] : Cardiac resynchronization therapy defibrillator [DDD] : DDD [Voltage: ___ volts] : Voltage was [unfilled] volts [Charge Time: ___ sec] : charge time was [unfilled] seconds [Longevity: ___ months] : The estimated remaining battery life is [unfilled] months [Threshold Testing Performed] : Threshold testing was performed [Sensing Amplitude ___mv] : sensing amplitude was [unfilled] mv [Lead Imp:  ___ohms] : lead impedance was [unfilled] ohms [___V @] : [unfilled] V [___ ms] : [unfilled] ms [Programmed for Longevity] : output reprogrammed for improved battery longevity [Asense-Vsense ___ %] : Asense-Vsense [unfilled]% [Asense-Vpace ___ %] : Asense-Vpace [unfilled]% [Apace-Vsense ___ %] : Apace-Vsense [unfilled]% [Apace-Vpace ___ %] : Apace-Vpace [unfilled]% [de-identified] : Medtronic [de-identified] : Kraig CORNEJO  [de-identified] : [id number] [de-identified] : 5/30/18 [de-identified] : 90 [de-identified] : 3 VT Events on 1/25/19 all treated successfully by [facility].

## 2019-02-24 NOTE — ASSESSMENT
[FreeTextEntry1] : VT - no further VT episodes \par - cont amio; I discussed with apt and Dr. MACIEL the effect of amio, will cont to monitor at this time . The befits outweigh the risks\par - cont BB anf give if sys BP >90 and not 110 bc pt always has low BP\par \par AF - cont AC\par \par HT - edema\par - significantly improved

## 2019-02-27 ENCOUNTER — OUTPATIENT (OUTPATIENT)
Dept: OUTPATIENT SERVICES | Facility: HOSPITAL | Age: 79
LOS: 1 days | Discharge: HOME | End: 2019-02-27

## 2019-02-27 DIAGNOSIS — Z95.810 PRESENCE OF AUTOMATIC (IMPLANTABLE) CARDIAC DEFIBRILLATOR: Chronic | ICD-10-CM

## 2019-02-27 DIAGNOSIS — N28.9 DISORDER OF KIDNEY AND URETER, UNSPECIFIED: ICD-10-CM

## 2019-02-27 DIAGNOSIS — Z98.890 OTHER SPECIFIED POSTPROCEDURAL STATES: Chronic | ICD-10-CM

## 2019-03-12 LAB
ANION GAP SERPL CALC-SCNC: 18 MMOL/L
BASOPHILS # BLD AUTO: 0.08 K/UL
BASOPHILS NFR BLD AUTO: 1.1 %
BUN SERPL-MCNC: 49 MG/DL
CALCIUM SERPL-MCNC: 9.2 MG/DL
CHLORIDE SERPL-SCNC: 94 MMOL/L
CHOLEST SERPL-MCNC: 187 MG/DL
CHOLEST/HDLC SERPL: 3.2 RATIO
CO2 SERPL-SCNC: 24 MMOL/L
CREAT SERPL-MCNC: 2.7 MG/DL
EOSINOPHIL # BLD AUTO: 0.21 K/UL
EOSINOPHIL NFR BLD AUTO: 2.8 %
GLUCOSE SERPL-MCNC: 275 MG/DL
HCT VFR BLD CALC: 42.5 %
HDLC SERPL-MCNC: 59 MG/DL
HGB BLD-MCNC: 13.1 G/DL
IMM GRANULOCYTES NFR BLD AUTO: 0.7 %
LDLC SERPL CALC-MCNC: 98 MG/DL
LYMPHOCYTES # BLD AUTO: 0.7 K/UL
LYMPHOCYTES NFR BLD AUTO: 9.4 %
MAN DIFF?: NORMAL
MCHC RBC-ENTMCNC: 27.4 PG
MCHC RBC-ENTMCNC: 30.8 G/DL
MCV RBC AUTO: 88.9 FL
MONOCYTES # BLD AUTO: 0.56 K/UL
MONOCYTES NFR BLD AUTO: 7.5 %
NEUTROPHILS # BLD AUTO: 5.88 K/UL
NEUTROPHILS NFR BLD AUTO: 78.5 %
PLATELET # BLD AUTO: 257 K/UL
POTASSIUM SERPL-SCNC: 5.4 MMOL/L
RBC # BLD: 4.78 M/UL
RBC # FLD: 15.7 %
SODIUM SERPL-SCNC: 136 MMOL/L
T3FREE SERPL-MCNC: 1.61 PG/ML
T4 FREE SERPL-MCNC: 1.6 NG/DL
TRIGL SERPL-MCNC: 195 MG/DL
TSH SERPL-ACNC: 5.28 UIU/ML
WBC # FLD AUTO: 7.48 K/UL

## 2019-03-28 ENCOUNTER — APPOINTMENT (OUTPATIENT)
Dept: CARDIOLOGY | Facility: CLINIC | Age: 79
End: 2019-03-28

## 2019-04-08 ENCOUNTER — OUTPATIENT (OUTPATIENT)
Dept: OUTPATIENT SERVICES | Facility: HOSPITAL | Age: 79
LOS: 1 days | Discharge: HOME | End: 2019-04-08
Payer: MEDICARE

## 2019-04-08 DIAGNOSIS — Z95.810 PRESENCE OF AUTOMATIC (IMPLANTABLE) CARDIAC DEFIBRILLATOR: Chronic | ICD-10-CM

## 2019-04-08 DIAGNOSIS — Z98.890 OTHER SPECIFIED POSTPROCEDURAL STATES: Chronic | ICD-10-CM

## 2019-04-08 DIAGNOSIS — J84.112 IDIOPATHIC PULMONARY FIBROSIS: ICD-10-CM

## 2019-04-08 PROCEDURE — 71250 CT THORAX DX C-: CPT | Mod: 26

## 2019-05-22 ENCOUNTER — APPOINTMENT (OUTPATIENT)
Dept: CARDIOLOGY | Facility: CLINIC | Age: 79
End: 2019-05-22
Payer: MEDICARE

## 2019-05-22 VITALS — BODY MASS INDEX: 22.81 KG/M2 | WEIGHT: 154 LBS | HEIGHT: 69 IN

## 2019-05-22 VITALS — RESPIRATION RATE: 18 BRPM | SYSTOLIC BLOOD PRESSURE: 110 MMHG | DIASTOLIC BLOOD PRESSURE: 70 MMHG | HEART RATE: 88 BPM

## 2019-05-22 DIAGNOSIS — E78.00 PURE HYPERCHOLESTEROLEMIA, UNSPECIFIED: ICD-10-CM

## 2019-05-22 DIAGNOSIS — I25.10 ATHEROSCLEROTIC HEART DISEASE OF NATIVE CORONARY ARTERY W/OUT ANGINA PECTORIS: ICD-10-CM

## 2019-05-22 DIAGNOSIS — J44.9 CHRONIC OBSTRUCTIVE PULMONARY DISEASE, UNSPECIFIED: ICD-10-CM

## 2019-05-22 DIAGNOSIS — Z95.810 PRESENCE OF AUTOMATIC (IMPLANTABLE) CARDIAC DEFIBRILLATOR: ICD-10-CM

## 2019-05-22 DIAGNOSIS — E03.9 HYPOTHYROIDISM, UNSPECIFIED: ICD-10-CM

## 2019-05-22 DIAGNOSIS — Z99.89 OBSTRUCTIVE SLEEP APNEA (ADULT) (PEDIATRIC): ICD-10-CM

## 2019-05-22 DIAGNOSIS — E11.9 TYPE 2 DIABETES MELLITUS W/OUT COMPLICATIONS: ICD-10-CM

## 2019-05-22 DIAGNOSIS — G47.33 OBSTRUCTIVE SLEEP APNEA (ADULT) (PEDIATRIC): ICD-10-CM

## 2019-05-22 PROCEDURE — 99214 OFFICE O/P EST MOD 30 MIN: CPT

## 2019-05-22 PROCEDURE — 93000 ELECTROCARDIOGRAM COMPLETE: CPT

## 2019-05-22 NOTE — PROCEDURE
[Voltage: ___ volts] : Voltage was [unfilled] volts [Longevity: ___ months] : The estimated remaining battery life is [unfilled] months [Threshold Testing Performed] : Threshold testing was performed [de-identified] : Medtronic [de-identified] : Herb KENNY DR [de-identified] : [id number] [de-identified] : 11/9/2015 [de-identified] : ADAM

## 2019-05-22 NOTE — PHYSICAL EXAM
[General Appearance - Well Developed] : well developed [Normal Appearance] : normal appearance [Well Groomed] : well groomed [General Appearance - Well Nourished] : well nourished [No Deformities] : no deformities [General Appearance - In No Acute Distress] : no acute distress [Normal Conjunctiva] : the conjunctiva exhibited no abnormalities [Eyelids - No Xanthelasma] : the eyelids demonstrated no xanthelasmas [Normal Oral Mucosa] : normal oral mucosa [No Oral Pallor] : no oral pallor [No Oral Cyanosis] : no oral cyanosis [Normal Jugular Venous A Waves Present] : normal jugular venous A waves present [Normal Jugular Venous V Waves Present] : normal jugular venous V waves present [No Jugular Venous Stock A Waves] : no jugular venous stock A waves [Respiration, Rhythm And Depth] : normal respiratory rhythm and effort [Exaggerated Use Of Accessory Muscles For Inspiration] : no accessory muscle use [Auscultation Breath Sounds / Voice Sounds] : lungs were clear to auscultation bilaterally [Heart Rate And Rhythm] : heart rate and rhythm were normal [Heart Sounds] : normal S1 and S2 [Murmurs] : no murmurs present [Bowel Sounds] : normal bowel sounds [Abdomen Soft] : soft [Abdomen Tenderness] : non-tender [Abdomen Mass (___ Cm)] : no abdominal mass palpated [Nail Clubbing] : no clubbing of the fingernails [Cyanosis, Localized] : no localized cyanosis [Petechial Hemorrhages (___cm)] : no petechial hemorrhages [FreeTextEntry1] : edema [Skin Color & Pigmentation] : normal skin color and pigmentation [] : no rash [No Venous Stasis] : no venous stasis [Skin Lesions] : no skin lesions [No Skin Ulcers] : no skin ulcer [No Xanthoma] : no  xanthoma was observed [Oriented To Time, Place, And Person] : oriented to person, place, and time

## 2019-06-06 ENCOUNTER — APPOINTMENT (OUTPATIENT)
Dept: CARDIOLOGY | Facility: CLINIC | Age: 79
End: 2019-06-06
Payer: MEDICARE

## 2019-06-06 VITALS — SYSTOLIC BLOOD PRESSURE: 90 MMHG | DIASTOLIC BLOOD PRESSURE: 50 MMHG

## 2019-06-06 VITALS — BODY MASS INDEX: 22.74 KG/M2 | WEIGHT: 154 LBS

## 2019-06-06 PROCEDURE — 93284 PRGRMG EVAL IMPLANTABLE DFB: CPT

## 2019-06-06 PROCEDURE — 99213 OFFICE O/P EST LOW 20 MIN: CPT

## 2019-06-06 PROCEDURE — 93297 REM INTERROG DEV EVAL ICPMS: CPT

## 2019-06-06 RX ORDER — MIDODRINE HYDROCHLORIDE 5 MG/1
5 TABLET ORAL 3 TIMES DAILY
Refills: 0 | Status: DISCONTINUED | COMMUNITY
End: 2019-06-06

## 2019-06-06 RX ORDER — FUROSEMIDE 40 MG/1
40 TABLET ORAL TWICE DAILY
Refills: 0 | Status: DISCONTINUED | COMMUNITY
End: 2019-06-06

## 2019-06-06 RX ORDER — MEXILETINE HYDROCHLORIDE 150 MG/1
150 CAPSULE ORAL TWICE DAILY
Refills: 0 | Status: ACTIVE | COMMUNITY

## 2019-06-06 RX ORDER — METHYLPREDNISOLONE ACETATE 40 MG/ML
40 INJECTION, SUSPENSION INTRA-ARTICULAR; INTRALESIONAL; INTRAMUSCULAR; SOFT TISSUE
Refills: 0 | Status: DISCONTINUED | COMMUNITY
End: 2019-06-06

## 2019-06-06 RX ORDER — GLYBURIDE 5 MG/1
5 TABLET ORAL DAILY
Refills: 0 | Status: DISCONTINUED | COMMUNITY
End: 2019-06-06

## 2019-06-06 NOTE — PATIENT PROFILE ADULT. - FUNCTIONAL LEVEL PRIOR: EATING
OPERATIVE REPORT              . Patient:  Elina Cabrales    YOB: 1985  Date of Service:  6/6/2019   Location:  [facility]      Preoperative Diagnosis:   fracture of Right Ulna Shaft     Postoperative Diagnosis:  Same. Procedure:  Open reduction & internal fixation of fracture of Right Ulna Shaft     Surgeon:    Macarena Gutierrez. Rajani Castro MD    Surgical Assistant:    Nabeel S [facility] Sera Assistant    Anesthesia:  General    Complications:  None    Tourniquet Time:  28 minutes    Indications:  Ms. Elina Cabrales is a 27y.o. year-old female who sustained a significant fracture of Right Ulna Shaft . I have discussed with her, preoperatively, the complications, limitations, expectations, alternatives, and risks of surgical care which she has understood. Ms. Elina Cabrales has provided written informed consent to proceed. Procedure:  After written consent was obtained and the proper operative site was identified and marked, Ms. Elina Cabrales was brought to the operating room and placed in the supine position on the operating table. The Right arm was then extended upon a hand table. The patient was administered a dose of preoperative antibiotics and general anesthesia was induced. The Right upper extremity was prepped and draped in the usual sterile fashion. After Esmarch exsanguination, the pneumo-tourniquet was inflated to 250 mm of mercury. A longitudinal incision was fashioned over the ulnar border of the forearm overlying the subcutaneous border of the ulna. Dissection was carried carefully through the subcutaneous tissues, taking great care to protect the   neurovascular structures, particularly the Ulnar sensory nerve branches. The interval between the FCU and ECU muscles was identified and exploited.  The ulna an the underlying fracture was exposed in this interval. A limited amount of subperiosteal dissection allowed exposure of the fracture margins. Sharp debridement of the interposid soft tissues and hematoma allowed adequate exposure for reduction. The ulna was anatomically reduced restoring length, rotation and alignment. A 6 - hole locking plate was selected and applied to the appropriate surface of the distal ulna. The distal 2 holes were drilled, measured and tapped with appropriately sized screws inserted. The plate was then loaded under compression, assuring the ulna was of anatomic alignment and anatomic rotation. It was secured distally with 2 bicortical screws under compression mode. The fracture site was seen to compress beautifully and maintain excellent anatomic alignment during the remaining fixation. The remaining 2 holes were filled with locking screws. Final fluoroscopic imaging was obtained, demonstrating excellent & anatomic alignment of the ulna at the fracture site. The wound was copiously irrigated with sterile saline for irrigation. The forearm fascia was reapproximated in a watertight fashion over the hardware, obtaining excellent soft tissue coverage. The subcutaneous tissue and skin were closed with interrupted, absorbable sutures. Local anesthetic was instilled for postoperative analgesia. Adaptic dry sterile dressings and volar short arm splint was applied. Ms. Rodolfo Burns was awakened from anesthesia having tolerated the procedure without apparent complication and was returned to the recovery room in stable condition. At the conclusion of the procedure, all needles, instruments and sponge counts were correct. Nuno Pichardo MD   6/6/2019 , 11:21 AM (0) independent

## 2019-06-14 ENCOUNTER — APPOINTMENT (OUTPATIENT)
Dept: CARDIOLOGY | Facility: CLINIC | Age: 79
End: 2019-06-14
Payer: MEDICARE

## 2019-06-14 ENCOUNTER — INPATIENT (INPATIENT)
Facility: HOSPITAL | Age: 79
LOS: 2 days | Discharge: HOME | End: 2019-06-17
Attending: HOSPITALIST | Admitting: HOSPITALIST
Payer: MEDICARE

## 2019-06-14 VITALS
BODY MASS INDEX: 22.81 KG/M2 | WEIGHT: 154 LBS | SYSTOLIC BLOOD PRESSURE: 77 MMHG | HEIGHT: 69 IN | DIASTOLIC BLOOD PRESSURE: 51 MMHG

## 2019-06-14 VITALS
WEIGHT: 141.1 LBS | TEMPERATURE: 95 F | HEART RATE: 77 BPM | DIASTOLIC BLOOD PRESSURE: 69 MMHG | RESPIRATION RATE: 17 BRPM | HEIGHT: 68 IN | SYSTOLIC BLOOD PRESSURE: 104 MMHG | OXYGEN SATURATION: 95 %

## 2019-06-14 VITALS — HEART RATE: 90 BPM

## 2019-06-14 DIAGNOSIS — Z95.810 PRESENCE OF AUTOMATIC (IMPLANTABLE) CARDIAC DEFIBRILLATOR: Chronic | ICD-10-CM

## 2019-06-14 DIAGNOSIS — Z98.890 OTHER SPECIFIED POSTPROCEDURAL STATES: Chronic | ICD-10-CM

## 2019-06-14 LAB
ALBUMIN SERPL ELPH-MCNC: 3.8 G/DL — SIGNIFICANT CHANGE UP (ref 3.5–5.2)
ALP SERPL-CCNC: 76 U/L — SIGNIFICANT CHANGE UP (ref 30–115)
ALT FLD-CCNC: 24 U/L — SIGNIFICANT CHANGE UP (ref 0–41)
ANION GAP SERPL CALC-SCNC: 10 MMOL/L — SIGNIFICANT CHANGE UP (ref 7–14)
APTT BLD: 38.6 SEC — SIGNIFICANT CHANGE UP (ref 27–39.2)
AST SERPL-CCNC: 26 U/L — SIGNIFICANT CHANGE UP (ref 0–41)
BASE EXCESS BLDV CALC-SCNC: 4.7 MMOL/L — HIGH (ref -2–2)
BASOPHILS # BLD AUTO: 0.09 K/UL — SIGNIFICANT CHANGE UP (ref 0–0.2)
BASOPHILS NFR BLD AUTO: 1.2 % — HIGH (ref 0–1)
BILIRUB SERPL-MCNC: 0.5 MG/DL — SIGNIFICANT CHANGE UP (ref 0.2–1.2)
BUN SERPL-MCNC: 40 MG/DL — HIGH (ref 10–20)
CA-I SERPL-SCNC: 1.21 MMOL/L — SIGNIFICANT CHANGE UP (ref 1.12–1.3)
CALCIUM SERPL-MCNC: 9.6 MG/DL — SIGNIFICANT CHANGE UP (ref 8.5–10.1)
CHLORIDE SERPL-SCNC: 101 MMOL/L — SIGNIFICANT CHANGE UP (ref 98–110)
CO2 SERPL-SCNC: 28 MMOL/L — SIGNIFICANT CHANGE UP (ref 17–32)
CREAT SERPL-MCNC: 2.5 MG/DL — HIGH (ref 0.7–1.5)
EOSINOPHIL # BLD AUTO: 0.28 K/UL — SIGNIFICANT CHANGE UP (ref 0–0.7)
EOSINOPHIL NFR BLD AUTO: 3.6 % — SIGNIFICANT CHANGE UP (ref 0–8)
GAS PNL BLDV: 139 MMOL/L — SIGNIFICANT CHANGE UP (ref 136–145)
GAS PNL BLDV: SIGNIFICANT CHANGE UP
GLUCOSE SERPL-MCNC: 125 MG/DL — HIGH (ref 70–99)
HCO3 BLDV-SCNC: 31 MMOL/L — HIGH (ref 22–29)
HCT VFR BLD CALC: 43.5 % — SIGNIFICANT CHANGE UP (ref 42–52)
HCT VFR BLDA CALC: 41.5 % — SIGNIFICANT CHANGE UP (ref 34–44)
HGB BLD CALC-MCNC: 13.5 G/DL — LOW (ref 14–18)
HGB BLD-MCNC: 14.2 G/DL — SIGNIFICANT CHANGE UP (ref 14–18)
HOROWITZ INDEX BLDV+IHG-RTO: 21 — SIGNIFICANT CHANGE UP
IMM GRANULOCYTES NFR BLD AUTO: 0.5 % — HIGH (ref 0.1–0.3)
INR BLD: 1.47 RATIO — HIGH (ref 0.65–1.3)
LACTATE BLDV-MCNC: 0.6 MMOL/L — SIGNIFICANT CHANGE UP (ref 0.5–1.6)
LYMPHOCYTES # BLD AUTO: 0.94 K/UL — LOW (ref 1.2–3.4)
LYMPHOCYTES # BLD AUTO: 12.1 % — LOW (ref 20.5–51.1)
MAGNESIUM SERPL-MCNC: 2.3 MG/DL — SIGNIFICANT CHANGE UP (ref 1.8–2.4)
MCHC RBC-ENTMCNC: 28.9 PG — SIGNIFICANT CHANGE UP (ref 27–31)
MCHC RBC-ENTMCNC: 32.6 G/DL — SIGNIFICANT CHANGE UP (ref 32–37)
MCV RBC AUTO: 88.4 FL — SIGNIFICANT CHANGE UP (ref 80–94)
MONOCYTES # BLD AUTO: 0.73 K/UL — HIGH (ref 0.1–0.6)
MONOCYTES NFR BLD AUTO: 9.4 % — HIGH (ref 1.7–9.3)
NEUTROPHILS # BLD AUTO: 5.67 K/UL — SIGNIFICANT CHANGE UP (ref 1.4–6.5)
NEUTROPHILS NFR BLD AUTO: 73.2 % — SIGNIFICANT CHANGE UP (ref 42.2–75.2)
NRBC # BLD: 0 /100 WBCS — SIGNIFICANT CHANGE UP (ref 0–0)
NT-PROBNP SERPL-SCNC: 4077 PG/ML — HIGH (ref 0–300)
PCO2 BLDV: 53 MMHG — HIGH (ref 41–51)
PH BLDV: 7.38 — SIGNIFICANT CHANGE UP (ref 7.26–7.43)
PLATELET # BLD AUTO: 387 K/UL — SIGNIFICANT CHANGE UP (ref 130–400)
PO2 BLDV: 20 MMHG — SIGNIFICANT CHANGE UP (ref 20–40)
POTASSIUM BLDV-SCNC: 5 MMOL/L — SIGNIFICANT CHANGE UP (ref 3.3–5.6)
POTASSIUM SERPL-MCNC: 5.4 MMOL/L — HIGH (ref 3.5–5)
POTASSIUM SERPL-SCNC: 5.4 MMOL/L — HIGH (ref 3.5–5)
PROT SERPL-MCNC: 6.3 G/DL — SIGNIFICANT CHANGE UP (ref 6–8)
PROTHROM AB SERPL-ACNC: 16.8 SEC — HIGH (ref 9.95–12.87)
RBC # BLD: 4.92 M/UL — SIGNIFICANT CHANGE UP (ref 4.7–6.1)
RBC # FLD: 16.9 % — HIGH (ref 11.5–14.5)
SAO2 % BLDV: 24 % — SIGNIFICANT CHANGE UP
SODIUM SERPL-SCNC: 139 MMOL/L — SIGNIFICANT CHANGE UP (ref 135–146)
TROPONIN T SERPL-MCNC: 0.03 NG/ML — CRITICAL HIGH
WBC # BLD: 7.75 K/UL — SIGNIFICANT CHANGE UP (ref 4.8–10.8)
WBC # FLD AUTO: 7.75 K/UL — SIGNIFICANT CHANGE UP (ref 4.8–10.8)

## 2019-06-14 PROCEDURE — 99223 1ST HOSP IP/OBS HIGH 75: CPT | Mod: AI

## 2019-06-14 PROCEDURE — 72125 CT NECK SPINE W/O DYE: CPT | Mod: 26

## 2019-06-14 PROCEDURE — 93284 PRGRMG EVAL IMPLANTABLE DFB: CPT

## 2019-06-14 PROCEDURE — 70450 CT HEAD/BRAIN W/O DYE: CPT | Mod: 26

## 2019-06-14 PROCEDURE — 71250 CT THORAX DX C-: CPT | Mod: 26

## 2019-06-14 PROCEDURE — 99214 OFFICE O/P EST MOD 30 MIN: CPT

## 2019-06-14 PROCEDURE — 74176 CT ABD & PELVIS W/O CONTRAST: CPT | Mod: 26

## 2019-06-14 PROCEDURE — 99285 EMERGENCY DEPT VISIT HI MDM: CPT

## 2019-06-14 PROCEDURE — 71045 X-RAY EXAM CHEST 1 VIEW: CPT | Mod: 26

## 2019-06-14 RX ORDER — SODIUM CHLORIDE 9 MG/ML
500 INJECTION INTRAMUSCULAR; INTRAVENOUS; SUBCUTANEOUS ONCE
Refills: 0 | Status: COMPLETED | OUTPATIENT
Start: 2019-06-14 | End: 2019-06-14

## 2019-06-14 RX ORDER — SODIUM CHLORIDE 9 MG/ML
500 INJECTION INTRAMUSCULAR; INTRAVENOUS; SUBCUTANEOUS ONCE
Refills: 0 | Status: COMPLETED | OUTPATIENT
Start: 2019-06-14 | End: 2019-06-15

## 2019-06-14 RX ADMIN — SODIUM CHLORIDE 500 MILLILITER(S): 9 INJECTION INTRAMUSCULAR; INTRAVENOUS; SUBCUTANEOUS at 21:01

## 2019-06-14 NOTE — ED PROVIDER NOTE - OBJECTIVE STATEMENT
78 y.o male w/ hx of a-fib on eliquis, CAD, NANDO, COPD, CHF, AICD, hypothyroid, DLD presents to the ED for evaluation of near syncope. This morning from sitting to standing position felt lightheaded and felt he was going to pass out.  Went to see his EP doctor Dr. Holt and was found to be hypotensive at 77 systolically prompting visit to the ED. Per family recent weight loss, not eating and decreased fluid intake.  Pt denies chest pain, dyspnea, fever, chills, N/V/D, abd pain, back pain, headache, changes in vision.  Per pt fell today because he lost balance, fell on back but was able to get back up.  No further complaints.

## 2019-06-14 NOTE — ED PROVIDER NOTE - NS ED ROS FT
Constitutional: See HPI.  Eyes: No visual changes, eye pain or discharge.   ENMT: No hearing changes, pain, discharge or infections.   Cardiac: + near syncope. No SOB or edema. No chest pain with exertion.  Respiratory: No cough or respiratory distress.  GI: No nausea, vomiting, diarrhea or abdominal pain.  : No dysuria, frequency or burning. No Discharge  MS: No myalgia, muscle weakness, joint pain or back pain.  Neuro: No headache or weakness. No LOC.  Skin: No skin rash.  Except as documented in the HPI, all other systems are negative.

## 2019-06-14 NOTE — H&P ADULT - NSHPLABSRESULTS_GEN_ALL_CORE
14.2   7.75  )-----------( 387      ( 14 Jun 2019 18:53 )             43.5     06-14    139  |  101  |  40<H>  ----------------------------<  125<H>  5.4<H>   |  28  |  2.5<H>    Ca    9.6      14 Jun 2019 18:53  Mg     2.3     06-14    TPro  6.3  /  Alb  3.8  /  TBili  0.5  /  DBili  x   /  AST  26  /  ALT  24  /  AlkPhos  76  06-14            PT/INR - ( 14 Jun 2019 18:53 )   PT: 16.80 sec;   INR: 1.47 ratio         PTT - ( 14 Jun 2019 18:53 )  PTT:38.6 sec  Lactate Trend    CARDIAC MARKERS ( 14 Jun 2019 18:53 )  x     / 0.03 ng/mL / x     / x     / x          CAPILLARY BLOOD GLUCOSE    EKG - paced 14.2   7.75  )-----------( 387      ( 14 Jun 2019 18:53 )             43.5     06-14    139  |  101  |  40<H>  ----------------------------<  125<H>  5.4<H>   |  28  |  2.5<H>    Ca    9.6      14 Jun 2019 18:53  Mg     2.3     06-14    TPro  6.3  /  Alb  3.8  /  TBili  0.5  /  DBili  x   /  AST  26  /  ALT  24  /  AlkPhos  76  06-14            PT/INR - ( 14 Jun 2019 18:53 )   PT: 16.80 sec;   INR: 1.47 ratio         PTT - ( 14 Jun 2019 18:53 )  PTT:38.6 sec  Lactate Trend    CARDIAC MARKERS ( 14 Jun 2019 18:53 )  x     / 0.03 ng/mL / x     / x     / x          CAPILLARY BLOOD GLUCOSE    EKG - paced    CXR to me is AICD on left with apparent atelectasis

## 2019-06-14 NOTE — PROCEDURE
[No] : not [Sinus Bradycardia] : sinus bradycardia [Voltage: ___ volts] : Voltage was [unfilled] volts [CRT-D] : Cardiac resynchronization therapy defibrillator [DDD] : DDD [Magnet Rate: ___ Ppm] : magnet rate was [unfilled] Ppm [Charge Time: ___ sec] : charge time was [unfilled] seconds [Longevity: ___ months] : The estimated remaining battery life is [unfilled] months [Threshold Testing Performed] : Threshold testing was performed [Sensing Amplitude ___mv] : sensing amplitude was [unfilled] mv [Lead Imp:  ___ohms] : lead impedance was [unfilled] ohms [___V @] : [unfilled] V [___ ms] : [unfilled] ms [Programmed for Longevity] : output reprogrammed for improved battery longevity [Asense-Vsense ___ %] : Asense-Vsense [unfilled]% [Asense-Vpace ___ %] : Asense-Vpace [unfilled]% [Apace-Vpace ___ %] : Apace-Vpace [unfilled]% [Apace-Vsense ___ %] : Apace-Vsense [unfilled]% [de-identified] : Medtronic [de-identified] : Kraig CORNEJO  [de-identified] : 5/30/18 [de-identified] : [id number] [de-identified] : 90 [de-identified] : lower rate limit changed to 95 bpm

## 2019-06-14 NOTE — H&P ADULT - NSICDXPASTSURGICALHX_GEN_ALL_CORE_FT
PAST SURGICAL HISTORY:  AICD (automatic cardioverter/defibrillator) present     History of laparoscopic cholecystectomy

## 2019-06-14 NOTE — H&P ADULT - PROBLEM SELECTOR PLAN 2
Actually has documented CKD stage 3, suspect current issue is due to pre-renal situation, on lasix. Received IV fluid bolus. Will repeat labs in am with renal consult

## 2019-06-14 NOTE — ED PROVIDER NOTE - ATTENDING CONTRIBUTION TO CARE
78 year old male, pmhx afib on eliquis, CAD, COPD, CHF, AICD, HLD, presenting s/p episode of near syncope. Patient states this morning patient felt lightheaded upon standing and then felt like he was going to pass out. Saw Dr. Holt today at which point he was found to be hypotensive in the office to 77 systolic so he was sent to the ED. Patient and family state patient lost his balance earlier today and fell on his buttocks but denies head trauma or LOC and patient states he remembers the whole event and was able to ambulate since the fall. Denies pain. Otherwise denies fevers, headache, vision changes, weakness/numbness, confusion, URI symptoms, neck pain, chest pain, back pain, dyspnea, cough, palpitations, nausea, vomiting, abdominal pain, diarrhea, constipation, blood in stool/dark stools, urinary symptoms, penile discharge/testicular pain, leg swelling, rash, recent travel or sick contacts.    Vital Signs: I have reviewed the initial vital signs.  Constitutional: NAD, well-nourished, appears stated age, no acute distress.  HEENT: Airway patent, moist MM, no erythema/swelling/deformity of oral structures. EOMI, PERRLA.  CV: regular rate, regular rhythm, well-perfused extremities, 2+ b/l DP and radial pulses equal.  Lungs: BCTA, no increased WOB.  ABD: NTND, no guarding or rebound, no pulsatile mass, no hernias.   MSK: Neck supple, nontender, nl ROM, no stepoff. Chest nontender. Back nontender in TLS spine or to b/l bony structures or flanks. Ext nontender, nl rom, no deformity.   INTEG: Skin warm, dry, no rash.  NEURO: A&Ox3, normal strength, nl sensation throughout, normal speech.   PSYCH: Calm, cooperative, normal affect and interaction.    Neuro intact. BP low 100s systolic at this time. EKG shows paced rhythm but no significant abnormalities. Will obtain labs, pan scan given fall on eliquis and transient episode of hypotension, CXR, re-eval.

## 2019-06-14 NOTE — ED PROVIDER NOTE - PROGRESS NOTE DETAILS
creat noted.  dry scans ordered. discussed case with hospitalist.  aware of BP which responds to small fluid bolus

## 2019-06-14 NOTE — H&P ADULT - NSICDXFAMILYHX_GEN_ALL_CORE_FT
FAMILY HISTORY:  Aunt  Still living? Unknown  Family history of acute myocardial infarction, Age at diagnosis: Age Unknown

## 2019-06-14 NOTE — ASSESSMENT
[FreeTextEntry1] : VT - one day of 6 episodes successfully terminated\par - cont amio\par - Lower rate limit was increased to 95 is limited to overdrive the short episodes of slow VT.\par \par Hypertension – questionable etiology of hypertension at this time. Might be related to over diuresis or other causes.\par \par Recommend sending patient to emergency room.

## 2019-06-14 NOTE — H&P ADULT - HISTORY OF PRESENT ILLNESS
[age]yo male with extensive past cardiac history presents form his cardiologist office to ER following an episode of dizziness with fall (but no loss of consciousness) this am and a low blood pressure in the doctor's office. No chest pain and patient reports no change in usual urination except that he hasn't urinated since arrival to the hospital. 77yo male with extensive past cardiac history presents form his cardiologist office to ER following an episode of dizziness with fall (but no loss of consciousness) early afternoon and a low blood pressure in the doctor's office. No chest pain and patient reports no change in usual urination except that he hasn't urinated since arrival to the hospital. Patient's blood pressure did improve with fluids given in the ER 77yo male with extensive past cardiac history (A fib, V Tach, CHF also CKD, copd dodie) presents form his cardiologist office to ER following an episode of dizziness with fall (but no loss of consciousness) early afternoon and a low blood pressure in the doctor's office. No chest pain and patient reports no change in usual urination except that he hasn't urinated since arrival to the hospital. Patient's blood pressure did improve with fluids given in the ER 77yo male with extensive past cardiac history (A fib, V Tach, CHF also CKD, copd dodie and splenic laceration following a fall) presents form his cardiologist office to ER following an episode of dizziness with fall (but no loss of consciousness) early afternoon and a low blood pressure in the doctor's office. No chest pain and patient reports no change in usual urination except that he hasn't urinated since arrival to the hospital. Patient's blood pressure did improve with fluids given in the ER

## 2019-06-14 NOTE — H&P ADULT - PROBLEM SELECTOR PLAN 7
although hemolyzed specimen, will not order aldactone at this time (may be holding Lasix depending on blood pressures)

## 2019-06-14 NOTE — ED PROVIDER NOTE - CARE PLAN
Principal Discharge DX:	Near syncope  Secondary Diagnosis:	DUYEN (acute kidney injury) Principal Discharge DX:	Near syncope  Secondary Diagnosis:	DUYEN (acute kidney injury)  Secondary Diagnosis:	Elevated troponin

## 2019-06-14 NOTE — H&P ADULT - PROBLEM SELECTOR PLAN 3
monitor with daily weight but for now continue lasix monitor with daily weight but for now continue lasix (Ejection fraction on 12/2018 was 25%)

## 2019-06-14 NOTE — PHYSICAL EXAM
[General Appearance - Well Developed] : well developed [Normal Appearance] : normal appearance [Well Groomed] : well groomed [General Appearance - Well Nourished] : well nourished [Heart Rate And Rhythm] : heart rate and rhythm were normal [No Deformities] : no deformities [General Appearance - In No Acute Distress] : no acute distress [Heart Sounds] : normal S1 and S2 [Murmurs] : no murmurs present [Exaggerated Use Of Accessory Muscles For Inspiration] : no accessory muscle use [Respiration, Rhythm And Depth] : normal respiratory rhythm and effort [Clean] : clean [Auscultation Breath Sounds / Voice Sounds] : lungs were clear to auscultation bilaterally [Well-Healed] : well-healed [Dry] : dry [Abdomen Soft] : soft [Abdomen Tenderness] : non-tender [Abdomen Mass (___ Cm)] : no abdominal mass palpated [Cyanosis, Localized] : no localized cyanosis [Nail Clubbing] : no clubbing of the fingernails [Petechial Hemorrhages (___cm)] : no petechial hemorrhages [] : no ischemic changes [FreeTextEntry1] : +3 edema

## 2019-06-14 NOTE — ED PROVIDER NOTE - CLINICAL SUMMARY MEDICAL DECISION MAKING FREE TEXT BOX
Patient presented with episode of near syncope, found to be hypotensive in Dr. Holt's office. Also s/p mechanical fall earlier today without head trauma or LOC. Obtained pan scan from trauma standpoint which was negative for acute findings (patient and son at bedside informed of non-emergent incidental findings). EKG showed paced rhythm without significant abnormalities. Labs remarkable for elevated trop with evidence of DUYEN, likely 2/2 dehydration which would explain lightheadedness upon standing - likely 2/2 orthostatic hypotension. Patient remained HD stable throughout ED course. Will admit for further monitoring and management given the DUYEN and elevated troponin. Patient and son at bedside agreeable with plan.

## 2019-06-14 NOTE — HISTORY OF PRESENT ILLNESS
[Palpitations] : no palpitations [SOB] : no dyspnea [Syncope] : no syncope [ICD Shocks] : no recent ICD shocks [Erythema at Site] : no erythema at device site [de-identified] : Patient s/p VT storm and VT ablation. Pt Called in today to evaluate four dizziness. Remote monitoring showed episodes of VT at 95 bpm. Patient was asked to come to the office. Patient describes today feeling dizzy and lightheaded. Patient found to be hypotensive today as well. And EMS was called.\par \par Is device lower rate limit was increased to 95 bpm.\par

## 2019-06-14 NOTE — H&P ADULT - NSICDXPASTMEDICALHX_GEN_ALL_CORE_FT
PAST MEDICAL HISTORY:  AICD (automatic cardioverter/defibrillator) present     Atrial fibrillation     CAD (coronary artery disease)     Congestive heart disease     COPD (chronic obstructive pulmonary disease)     Hypercholesteremia     Hypothyroidism     NANDO on CPAP     Pacemaker     Ventricular tachycardia

## 2019-06-14 NOTE — ED ADULT TRIAGE NOTE - CHIEF COMPLAINT QUOTE
BIBA via [employer] from Cardiologist office- pt went to MD office for dizziness and fall, pt states dizziness began at 8:00am that he woke up with. at cardiology office BP was low. pt denies chest pain, denies shortness of breath.

## 2019-06-14 NOTE — H&P ADULT - PROBLEM SELECTOR PLAN 1
Suspect a dehydration etiology. Suspect a dehydration etiology. Monitor on telemetry with orthostatics

## 2019-06-15 DIAGNOSIS — I48.91 UNSPECIFIED ATRIAL FIBRILLATION: ICD-10-CM

## 2019-06-15 DIAGNOSIS — Z95.810 PRESENCE OF AUTOMATIC (IMPLANTABLE) CARDIAC DEFIBRILLATOR: ICD-10-CM

## 2019-06-15 DIAGNOSIS — Z79.2 LONG TERM (CURRENT) USE OF ANTIBIOTICS: ICD-10-CM

## 2019-06-15 DIAGNOSIS — E87.5 HYPERKALEMIA: ICD-10-CM

## 2019-06-15 DIAGNOSIS — I50.22 CHRONIC SYSTOLIC (CONGESTIVE) HEART FAILURE: ICD-10-CM

## 2019-06-15 DIAGNOSIS — N17.9 ACUTE KIDNEY FAILURE, UNSPECIFIED: ICD-10-CM

## 2019-06-15 DIAGNOSIS — Z95.0 PRESENCE OF CARDIAC PACEMAKER: ICD-10-CM

## 2019-06-15 DIAGNOSIS — R55 SYNCOPE AND COLLAPSE: ICD-10-CM

## 2019-06-15 LAB
ALBUMIN SERPL ELPH-MCNC: 3.3 G/DL — LOW (ref 3.5–5.2)
ALP SERPL-CCNC: 69 U/L — SIGNIFICANT CHANGE UP (ref 30–115)
ALT FLD-CCNC: 21 U/L — SIGNIFICANT CHANGE UP (ref 0–41)
ANION GAP SERPL CALC-SCNC: 9 MMOL/L — SIGNIFICANT CHANGE UP (ref 7–14)
AST SERPL-CCNC: 20 U/L — SIGNIFICANT CHANGE UP (ref 0–41)
BILIRUB SERPL-MCNC: 0.3 MG/DL — SIGNIFICANT CHANGE UP (ref 0.2–1.2)
BUN SERPL-MCNC: 37 MG/DL — HIGH (ref 10–20)
CALCIUM SERPL-MCNC: 8.8 MG/DL — SIGNIFICANT CHANGE UP (ref 8.5–10.1)
CHLORIDE SERPL-SCNC: 104 MMOL/L — SIGNIFICANT CHANGE UP (ref 98–110)
CK MB CFR SERPL CALC: 2.1 NG/ML — SIGNIFICANT CHANGE UP (ref 0.6–6.3)
CK SERPL-CCNC: 65 U/L — SIGNIFICANT CHANGE UP (ref 0–225)
CO2 SERPL-SCNC: 28 MMOL/L — SIGNIFICANT CHANGE UP (ref 17–32)
CREAT SERPL-MCNC: 2.3 MG/DL — HIGH (ref 0.7–1.5)
ESTIMATED AVERAGE GLUCOSE: 126 MG/DL — HIGH (ref 68–114)
GLUCOSE BLDC GLUCOMTR-MCNC: 87 MG/DL — SIGNIFICANT CHANGE UP (ref 70–99)
GLUCOSE SERPL-MCNC: 93 MG/DL — SIGNIFICANT CHANGE UP (ref 70–99)
HBA1C BLD-MCNC: 6 % — HIGH (ref 4–5.6)
HCT VFR BLD CALC: 38.7 % — LOW (ref 42–52)
HGB BLD-MCNC: 12.5 G/DL — LOW (ref 14–18)
MAGNESIUM SERPL-MCNC: 2.1 MG/DL — SIGNIFICANT CHANGE UP (ref 1.8–2.4)
MCHC RBC-ENTMCNC: 28.5 PG — SIGNIFICANT CHANGE UP (ref 27–31)
MCHC RBC-ENTMCNC: 32.3 G/DL — SIGNIFICANT CHANGE UP (ref 32–37)
MCV RBC AUTO: 88.4 FL — SIGNIFICANT CHANGE UP (ref 80–94)
NRBC # BLD: 0 /100 WBCS — SIGNIFICANT CHANGE UP (ref 0–0)
PHOSPHATE SERPL-MCNC: 3.8 MG/DL — SIGNIFICANT CHANGE UP (ref 2.1–4.9)
PLATELET # BLD AUTO: 339 K/UL — SIGNIFICANT CHANGE UP (ref 130–400)
POTASSIUM SERPL-MCNC: 4.6 MMOL/L — SIGNIFICANT CHANGE UP (ref 3.5–5)
POTASSIUM SERPL-SCNC: 4.6 MMOL/L — SIGNIFICANT CHANGE UP (ref 3.5–5)
PROT SERPL-MCNC: 5.5 G/DL — LOW (ref 6–8)
RBC # BLD: 4.38 M/UL — LOW (ref 4.7–6.1)
RBC # FLD: 16.9 % — HIGH (ref 11.5–14.5)
SODIUM SERPL-SCNC: 141 MMOL/L — SIGNIFICANT CHANGE UP (ref 135–146)
TROPONIN T SERPL-MCNC: 0.02 NG/ML — HIGH
WBC # BLD: 9.09 K/UL — SIGNIFICANT CHANGE UP (ref 4.8–10.8)
WBC # FLD AUTO: 9.09 K/UL — SIGNIFICANT CHANGE UP (ref 4.8–10.8)

## 2019-06-15 PROCEDURE — 76775 US EXAM ABDO BACK WALL LIM: CPT | Mod: 26

## 2019-06-15 PROCEDURE — 99233 SBSQ HOSP IP/OBS HIGH 50: CPT

## 2019-06-15 RX ORDER — SPIRONOLACTONE 25 MG/1
25 TABLET, FILM COATED ORAL DAILY
Refills: 0 | Status: DISCONTINUED | OUTPATIENT
Start: 2019-06-15 | End: 2019-06-15

## 2019-06-15 RX ORDER — SODIUM CHLORIDE 9 MG/ML
1000 INJECTION INTRAMUSCULAR; INTRAVENOUS; SUBCUTANEOUS
Refills: 0 | Status: DISCONTINUED | OUTPATIENT
Start: 2019-06-15 | End: 2019-06-15

## 2019-06-15 RX ORDER — SODIUM CHLORIDE 9 MG/ML
1000 INJECTION INTRAMUSCULAR; INTRAVENOUS; SUBCUTANEOUS
Refills: 0 | Status: DISCONTINUED | OUTPATIENT
Start: 2019-06-15 | End: 2019-06-17

## 2019-06-15 RX ORDER — PANTOPRAZOLE SODIUM 20 MG/1
40 TABLET, DELAYED RELEASE ORAL
Refills: 0 | Status: DISCONTINUED | OUTPATIENT
Start: 2019-06-15 | End: 2019-06-17

## 2019-06-15 RX ORDER — ALPRAZOLAM 0.25 MG
0.25 TABLET ORAL DAILY
Refills: 0 | Status: DISCONTINUED | OUTPATIENT
Start: 2019-06-15 | End: 2019-06-15

## 2019-06-15 RX ORDER — APIXABAN 2.5 MG/1
2.5 TABLET, FILM COATED ORAL EVERY 12 HOURS
Refills: 0 | Status: DISCONTINUED | OUTPATIENT
Start: 2019-06-15 | End: 2019-06-17

## 2019-06-15 RX ORDER — FUROSEMIDE 40 MG
40 TABLET ORAL DAILY
Refills: 0 | Status: DISCONTINUED | OUTPATIENT
Start: 2019-06-15 | End: 2019-06-15

## 2019-06-15 RX ORDER — METOPROLOL TARTRATE 50 MG
25 TABLET ORAL EVERY 8 HOURS
Refills: 0 | Status: DISCONTINUED | OUTPATIENT
Start: 2019-06-15 | End: 2019-06-15

## 2019-06-15 RX ORDER — MONTELUKAST 4 MG/1
10 TABLET, CHEWABLE ORAL AT BEDTIME
Refills: 0 | Status: DISCONTINUED | OUTPATIENT
Start: 2019-06-15 | End: 2019-06-17

## 2019-06-15 RX ORDER — MEXILETINE HYDROCHLORIDE 150 MG/1
150 CAPSULE ORAL
Refills: 0 | Status: DISCONTINUED | OUTPATIENT
Start: 2019-06-15 | End: 2019-06-17

## 2019-06-15 RX ORDER — SODIUM CHLORIDE 9 MG/ML
500 INJECTION INTRAMUSCULAR; INTRAVENOUS; SUBCUTANEOUS ONCE
Refills: 0 | Status: COMPLETED | OUTPATIENT
Start: 2019-06-15 | End: 2019-06-15

## 2019-06-15 RX ORDER — LEVOTHYROXINE SODIUM 125 MCG
50 TABLET ORAL
Refills: 0 | Status: DISCONTINUED | OUTPATIENT
Start: 2019-06-15 | End: 2019-06-17

## 2019-06-15 RX ORDER — TAMSULOSIN HYDROCHLORIDE 0.4 MG/1
0.4 CAPSULE ORAL AT BEDTIME
Refills: 0 | Status: DISCONTINUED | OUTPATIENT
Start: 2019-06-15 | End: 2019-06-15

## 2019-06-15 RX ORDER — FINASTERIDE 5 MG/1
5 TABLET, FILM COATED ORAL DAILY
Refills: 0 | Status: DISCONTINUED | OUTPATIENT
Start: 2019-06-15 | End: 2019-06-17

## 2019-06-15 RX ORDER — AMIODARONE HYDROCHLORIDE 400 MG/1
400 TABLET ORAL
Refills: 0 | Status: DISCONTINUED | OUTPATIENT
Start: 2019-06-15 | End: 2019-06-17

## 2019-06-15 RX ADMIN — MEXILETINE HYDROCHLORIDE 150 MILLIGRAM(S): 150 CAPSULE ORAL at 05:41

## 2019-06-15 RX ADMIN — SODIUM CHLORIDE 75 MILLILITER(S): 9 INJECTION INTRAMUSCULAR; INTRAVENOUS; SUBCUTANEOUS at 05:44

## 2019-06-15 RX ADMIN — FINASTERIDE 5 MILLIGRAM(S): 5 TABLET, FILM COATED ORAL at 11:18

## 2019-06-15 RX ADMIN — APIXABAN 2.5 MILLIGRAM(S): 2.5 TABLET, FILM COATED ORAL at 05:41

## 2019-06-15 RX ADMIN — MEXILETINE HYDROCHLORIDE 150 MILLIGRAM(S): 150 CAPSULE ORAL at 17:18

## 2019-06-15 RX ADMIN — APIXABAN 2.5 MILLIGRAM(S): 2.5 TABLET, FILM COATED ORAL at 17:18

## 2019-06-15 RX ADMIN — AMIODARONE HYDROCHLORIDE 400 MILLIGRAM(S): 400 TABLET ORAL at 17:18

## 2019-06-15 RX ADMIN — Medication 50 MICROGRAM(S): at 05:41

## 2019-06-15 RX ADMIN — SODIUM CHLORIDE 500 MILLILITER(S): 9 INJECTION INTRAMUSCULAR; INTRAVENOUS; SUBCUTANEOUS at 05:17

## 2019-06-15 RX ADMIN — AMIODARONE HYDROCHLORIDE 400 MILLIGRAM(S): 400 TABLET ORAL at 06:14

## 2019-06-15 RX ADMIN — MONTELUKAST 10 MILLIGRAM(S): 4 TABLET, CHEWABLE ORAL at 21:07

## 2019-06-15 RX ADMIN — SODIUM CHLORIDE 500 MILLILITER(S): 9 INJECTION INTRAMUSCULAR; INTRAVENOUS; SUBCUTANEOUS at 00:31

## 2019-06-15 RX ADMIN — SODIUM CHLORIDE 75 MILLILITER(S): 9 INJECTION INTRAMUSCULAR; INTRAVENOUS; SUBCUTANEOUS at 13:29

## 2019-06-15 RX ADMIN — PANTOPRAZOLE SODIUM 40 MILLIGRAM(S): 20 TABLET, DELAYED RELEASE ORAL at 05:41

## 2019-06-15 NOTE — CONSULT NOTE ADULT - ASSESSMENT
Patient history as above. Per family did not eat yesterday. He had near syncope because of hypotension. He needs cautiously fluid. Watch for chf. Diuretic when stable. Encourage eating

## 2019-06-15 NOTE — CHART NOTE - NSCHARTNOTEFT_GEN_A_CORE
On arrival to floor patient's blood pressure was low but improved with fluid challenge. He signed DNR/DNI papers.

## 2019-06-15 NOTE — CONSULT NOTE ADULT - SUBJECTIVE AND OBJECTIVE BOX
CARDIOLOGY CONSULT NOTE     CHIEF COMPLAINT/REASON FOR CONSULT:    HPI:  79yo male with extensive past cardiac history (A fib, V Tach, CHF also CKD, copd nando and splenic laceration following a fall) presents form his cardiologist office to ER following an episode of dizziness with fall (but no loss of consciousness) early afternoon and a low blood pressure in the doctor's office. No chest pain and patient reports . Patient's blood pressure did improve with fluids given in the ER (14 Jun 2019 21:56)      PAST MEDICAL & SURGICAL HISTORY:  CAD (coronary artery disease)  Ventricular tachycardia  NANDO on CPAP  COPD (chronic obstructive pulmonary disease)  Pacemaker  AICD (automatic cardioverter/defibrillator) present  Hypothyroidism  Atrial fibrillation  Congestive heart disease  Hypercholesteremia  AICD (automatic cardioverter/defibrillator) present  History of laparoscopic cholecystectomy      Cardiac Risks:   [ ]HTN, [ ] DM, [ ] Smoking, [ ] FH,  [x ] Lipids        MEDICATIONS:  MEDICATIONS  (STANDING):  amiodarone    Tablet 400 milliGRAM(s) Oral two times a day  apixaban 2.5 milliGRAM(s) Oral every 12 hours  finasteride 5 milliGRAM(s) Oral daily  levothyroxine 50 MICROGram(s) Oral <User Schedule>  mexiletine 150 milliGRAM(s) Oral two times a day  montelukast 10 milliGRAM(s) Oral at bedtime  pantoprazole    Tablet 40 milliGRAM(s) Oral before breakfast  sodium chloride 0.9%. 1000 milliLiter(s) (75 mL/Hr) IV Continuous <Continuous>      FAMILY HISTORY:  Family history of acute myocardial infarction (Aunt)      SOCIAL HISTORY:      [ ] Marital status    Allergies    morphine (Stomach Upset)      	    REVIEW OF SYSTEMS:  CONSTITUTIONAL: No fever, weight loss, or fatigue  EYES: No eye pain, visual disturbances, or discharge  ENMT:  No difficulty hearing, tinnitus, vertigo; No sinus or throat pain  NECK: No pain or stiffness  RESPIRATORY: No cough, wheezing, chills or hemoptysis; No Shortness of Breath  CARDIOVASCULAR: See above  GASTROINTESTINAL: No abdominal or epigastric pain. No nausea, vomiting, or hematemesis; No diarrhea or constipation. No melena or hematochezia.  GENITOURINARY: No dysuria, frequency, hematuria, or incontinence  NEUROLOGICAL: See above  SKIN: No itching, burning, rashes, or lesions   	        PHYSICAL EXAM:  T(C): 35.7 (06-15-19 @ 05:00), Max: 35.7 (06-15-19 @ 05:00)  HR: 100 (06-15-19 @ 07:32) (77 - 100)  BP: 70/49 (06-15-19 @ 07:32) (66/41 - 104/69)  RR: 16 (06-15-19 @ 07:32) (16 - 18)  SpO2: 96% (06-15-19 @ 07:32) (95% - 97%)  Wt(kg): --  I&O's Summary    14 Jun 2019 07:01  -  15 Feroz 2019 07:00  --------------------------------------------------------  IN: 0 mL / OUT: 300 mL / NET: -300 mL        Appearance: Normal	  Psychiatry: A & O x 3, Mood & affect appropriate  HEENT:   Normal oral mucosa, PERRL, EOMI	  Lymphatic: No lymphadenopathy  Cardiovascular: Normal S1 S2,RRR, No JVD, No murmurs  Respiratory: Lungs clear to auscultation	  Gastrointestinal:  Soft, Non-tender, + BS	  Skin: No rashes, No ecchymoses, No cyanosis	  Neurologic: Non-focal  Extremities: Normal range of motion, No clubbing, cyanosis or edema  Vascular: Peripheral pulses palpable 2+ bilaterally      ECG:  	< from: 12 Lead ECG (06.14.19 @ 18:20) >  T Axis 81 degrees    Diagnosis Line *** Poor data quality, interpretation may be adversely affected    Ventricular pacing  incomplete      Confirmed by TEMITOPE ALVAREZ, FRANSISCO ([id number]) on 6/15/2019 7:45:48 AM    < end of copied text >      	  LABS:	 	    CARDIAC MARKERS:          Serum Pro-Brain Natriuretic Peptide: 4077 pg/mL (06-14 @ 18:53)                            12.5   9.09  )-----------( 339      ( 15 Feroz 2019 06:17 )             38.7     06-15    141  |  104  |  37<H>  ----------------------------<  93  4.6   |  28  |  2.3<H>    Ca    8.8      15 Feroz 2019 06:17  Phos  3.8     06-15  Mg     2.1     06-15    TPro  5.5<L>  /  Alb  3.3<L>  /  TBili  0.3  /  DBili  x   /  AST  20  /  ALT  21  /  AlkPhos  69  06-15    PT/INR - ( 14 Jun 2019 18:53 )   PT: 16.80 sec;   INR: 1.47 ratio         PTT - ( 14 Jun 2019 18:53 )  PTT:38.6 sec  proBNP: Serum Pro-Brain Natriuretic Peptide: 4077 pg/mL (06-14 @ 18:53)

## 2019-06-15 NOTE — PROGRESS NOTE ADULT - PROBLEM SELECTOR PLAN 1
possibly due to overmedication and poor po intake  encourage oral intake  continue tele  check echo  hold bp meds  continue IVF  cardio consult appreciated  AICD interrogated at Dr. Holt's office on 6/14

## 2019-06-16 LAB
ALBUMIN SERPL ELPH-MCNC: 3.3 G/DL — LOW (ref 3.5–5.2)
ALP SERPL-CCNC: 83 U/L — SIGNIFICANT CHANGE UP (ref 30–115)
ALT FLD-CCNC: 19 U/L — SIGNIFICANT CHANGE UP (ref 0–41)
ANION GAP SERPL CALC-SCNC: 8 MMOL/L — SIGNIFICANT CHANGE UP (ref 7–14)
AST SERPL-CCNC: 18 U/L — SIGNIFICANT CHANGE UP (ref 0–41)
BILIRUB SERPL-MCNC: 0.2 MG/DL — SIGNIFICANT CHANGE UP (ref 0.2–1.2)
BUN SERPL-MCNC: 29 MG/DL — HIGH (ref 10–20)
CALCIUM SERPL-MCNC: 8.6 MG/DL — SIGNIFICANT CHANGE UP (ref 8.5–10.1)
CHLORIDE SERPL-SCNC: 107 MMOL/L — SIGNIFICANT CHANGE UP (ref 98–110)
CO2 SERPL-SCNC: 28 MMOL/L — SIGNIFICANT CHANGE UP (ref 17–32)
CREAT SERPL-MCNC: 2 MG/DL — HIGH (ref 0.7–1.5)
GLUCOSE SERPL-MCNC: 131 MG/DL — HIGH (ref 70–99)
HCT VFR BLD CALC: 38.9 % — LOW (ref 42–52)
HGB BLD-MCNC: 12.5 G/DL — LOW (ref 14–18)
MCHC RBC-ENTMCNC: 28.7 PG — SIGNIFICANT CHANGE UP (ref 27–31)
MCHC RBC-ENTMCNC: 32.1 G/DL — SIGNIFICANT CHANGE UP (ref 32–37)
MCV RBC AUTO: 89.2 FL — SIGNIFICANT CHANGE UP (ref 80–94)
NRBC # BLD: 0 /100 WBCS — SIGNIFICANT CHANGE UP (ref 0–0)
PLATELET # BLD AUTO: 364 K/UL — SIGNIFICANT CHANGE UP (ref 130–400)
POTASSIUM SERPL-MCNC: 4.2 MMOL/L — SIGNIFICANT CHANGE UP (ref 3.5–5)
POTASSIUM SERPL-SCNC: 4.2 MMOL/L — SIGNIFICANT CHANGE UP (ref 3.5–5)
PROT SERPL-MCNC: 5.4 G/DL — LOW (ref 6–8)
RBC # BLD: 4.36 M/UL — LOW (ref 4.7–6.1)
RBC # FLD: 17.2 % — HIGH (ref 11.5–14.5)
SODIUM SERPL-SCNC: 143 MMOL/L — SIGNIFICANT CHANGE UP (ref 135–146)
WBC # BLD: 10.05 K/UL — SIGNIFICANT CHANGE UP (ref 4.8–10.8)
WBC # FLD AUTO: 10.05 K/UL — SIGNIFICANT CHANGE UP (ref 4.8–10.8)

## 2019-06-16 PROCEDURE — 99233 SBSQ HOSP IP/OBS HIGH 50: CPT

## 2019-06-16 RX ORDER — ALPRAZOLAM 0.25 MG
0.25 TABLET ORAL ONCE
Refills: 0 | Status: DISCONTINUED | OUTPATIENT
Start: 2019-06-16 | End: 2019-06-16

## 2019-06-16 RX ADMIN — MONTELUKAST 10 MILLIGRAM(S): 4 TABLET, CHEWABLE ORAL at 21:05

## 2019-06-16 RX ADMIN — FINASTERIDE 5 MILLIGRAM(S): 5 TABLET, FILM COATED ORAL at 11:31

## 2019-06-16 RX ADMIN — APIXABAN 2.5 MILLIGRAM(S): 2.5 TABLET, FILM COATED ORAL at 05:30

## 2019-06-16 RX ADMIN — SODIUM CHLORIDE 75 MILLILITER(S): 9 INJECTION INTRAMUSCULAR; INTRAVENOUS; SUBCUTANEOUS at 18:16

## 2019-06-16 RX ADMIN — MEXILETINE HYDROCHLORIDE 150 MILLIGRAM(S): 150 CAPSULE ORAL at 18:02

## 2019-06-16 RX ADMIN — APIXABAN 2.5 MILLIGRAM(S): 2.5 TABLET, FILM COATED ORAL at 17:07

## 2019-06-16 RX ADMIN — Medication 50 MICROGRAM(S): at 05:30

## 2019-06-16 RX ADMIN — AMIODARONE HYDROCHLORIDE 400 MILLIGRAM(S): 400 TABLET ORAL at 17:09

## 2019-06-16 RX ADMIN — AMIODARONE HYDROCHLORIDE 400 MILLIGRAM(S): 400 TABLET ORAL at 05:31

## 2019-06-16 RX ADMIN — Medication 0.25 MILLIGRAM(S): at 22:35

## 2019-06-16 RX ADMIN — PANTOPRAZOLE SODIUM 40 MILLIGRAM(S): 20 TABLET, DELAYED RELEASE ORAL at 08:17

## 2019-06-16 RX ADMIN — MEXILETINE HYDROCHLORIDE 150 MILLIGRAM(S): 150 CAPSULE ORAL at 05:32

## 2019-06-16 NOTE — PROGRESS NOTE ADULT - ASSESSMENT
#near syncope due to low BP due to overmedication and poor PO intake  BP improving; continue gentle IV hydration, watch for overload  f/u ECHO  hold BP medications  cardiology appreciated    #DUYEN on CKD3 - improving   due to low BP  continue IV hydration for today  recheck labs in AM  renal sono ok    #chronic systolic CHF with EF 25%, s/p AICD/PPM  #hx VT  #afib on coumadin  AICD interrogated at Dr. oHlt's office on 6/14  not on BB or ARB due to low BP and DUYEN  continue on mexiletine, amiodarone      #chronic afib - continue on eliquis, amiodarone    #NANDO on CPAP   #hypothyroidism - continue on synthroid  #BPH - continue finasteride  #PPx - on eliquis    Progress Note Handoff  Pending:  improvement in renal function  Patient/Family discussion: d/w pt, agrees  Disposition: hopefully home in AM

## 2019-06-17 ENCOUNTER — TRANSCRIPTION ENCOUNTER (OUTPATIENT)
Age: 79
End: 2019-06-17

## 2019-06-17 VITALS
SYSTOLIC BLOOD PRESSURE: 95 MMHG | HEART RATE: 95 BPM | TEMPERATURE: 96 F | DIASTOLIC BLOOD PRESSURE: 53 MMHG | RESPIRATION RATE: 16 BRPM

## 2019-06-17 DIAGNOSIS — G47.33 OBSTRUCTIVE SLEEP APNEA (ADULT) (PEDIATRIC): ICD-10-CM

## 2019-06-17 LAB
ANION GAP SERPL CALC-SCNC: 7 MMOL/L — SIGNIFICANT CHANGE UP (ref 7–14)
BUN SERPL-MCNC: 27 MG/DL — HIGH (ref 10–20)
CALCIUM SERPL-MCNC: 8.9 MG/DL — SIGNIFICANT CHANGE UP (ref 8.5–10.1)
CHLORIDE SERPL-SCNC: 109 MMOL/L — SIGNIFICANT CHANGE UP (ref 98–110)
CO2 SERPL-SCNC: 27 MMOL/L — SIGNIFICANT CHANGE UP (ref 17–32)
CREAT SERPL-MCNC: 1.5 MG/DL — SIGNIFICANT CHANGE UP (ref 0.7–1.5)
GLUCOSE SERPL-MCNC: 113 MG/DL — HIGH (ref 70–99)
POTASSIUM SERPL-MCNC: 4.4 MMOL/L — SIGNIFICANT CHANGE UP (ref 3.5–5)
POTASSIUM SERPL-SCNC: 4.4 MMOL/L — SIGNIFICANT CHANGE UP (ref 3.5–5)
SODIUM SERPL-SCNC: 143 MMOL/L — SIGNIFICANT CHANGE UP (ref 135–146)

## 2019-06-17 PROCEDURE — 99239 HOSP IP/OBS DSCHRG MGMT >30: CPT

## 2019-06-17 RX ORDER — GLYBURIDE 5 MG
1 TABLET ORAL
Qty: 0 | Refills: 0 | DISCHARGE

## 2019-06-17 RX ADMIN — APIXABAN 2.5 MILLIGRAM(S): 2.5 TABLET, FILM COATED ORAL at 05:36

## 2019-06-17 RX ADMIN — PANTOPRAZOLE SODIUM 40 MILLIGRAM(S): 20 TABLET, DELAYED RELEASE ORAL at 06:09

## 2019-06-17 RX ADMIN — AMIODARONE HYDROCHLORIDE 400 MILLIGRAM(S): 400 TABLET ORAL at 05:36

## 2019-06-17 RX ADMIN — FINASTERIDE 5 MILLIGRAM(S): 5 TABLET, FILM COATED ORAL at 11:06

## 2019-06-17 RX ADMIN — Medication 50 MICROGRAM(S): at 05:37

## 2019-06-17 RX ADMIN — MEXILETINE HYDROCHLORIDE 150 MILLIGRAM(S): 150 CAPSULE ORAL at 05:37

## 2019-06-17 NOTE — PROGRESS NOTE ADULT - PROBLEM SELECTOR PLAN 1
possibly due to overmedication and poor po intake  encourage oral intake  off tele  echo - small pleural effusion, mod MR, moderately decreased function   hold bp meds  continue IVF  cardio consult appreciated  AICD interrogated at Dr. Holt's office on 6/14

## 2019-06-17 NOTE — PHYSICAL THERAPY INITIAL EVALUATION ADULT - GAIT DEVIATIONS NOTED, PT EVAL
decreased kamryn/decreased weight-shifting ability/decreased step length/stooped posture, dec heel strike/pushoff/stance nhi LLE

## 2019-06-17 NOTE — DISCHARGE NOTE PROVIDER - NSDCCPCAREPLAN_GEN_ALL_CORE_FT
PRINCIPAL DISCHARGE DIAGNOSIS  Diagnosis: Near syncope  Assessment and Plan of Treatment: due to poor oral intake and medication effect  get up slowly from a sitting or lying position  follow up with PMD/Cardiology/EPS within one week  Please do not skip meals and drink plenty of liquids  Do not resume metoprolol until you see cardiology/EPS      SECONDARY DISCHARGE DIAGNOSES  Diagnosis: DUYEN (acute kidney injury)  Assessment and Plan of Treatment: due to poor oral intake  please adhere to a well balanced diet and do not skip meals

## 2019-06-17 NOTE — DISCHARGE NOTE NURSING/CASE MANAGEMENT/SOCIAL WORK - NSDCDPATPORTLINK_GEN_ALL_CORE
You can access the [facility] Patient Portal, offered by [facility], by registering with the following website: [email or web address]

## 2019-06-17 NOTE — DISCHARGE NOTE PROVIDER - PROVIDER TOKENS
PROVIDER:[TOKEN:[[id number]],FOLLOWUP:[1-3 days]],PROVIDER:[TOKEN:[[id number]],FOLLOWUP:[1-3 days]],PROVIDER:[TOKEN:[[id number]],FOLLOWUP:[1 week]]

## 2019-06-17 NOTE — PROGRESS NOTE ADULT - SUBJECTIVE AND OBJECTIVE BOX
MAMEGUIDO  78y, Male  Allergy: morphine (Stomach Upset)    Hospital Day: 2d    Patient seen and examined earlier today.  Doing well, he is eager to go home    PMH/PSH:  PAST MEDICAL & SURGICAL HISTORY:  CAD (coronary artery disease)  Ventricular tachycardia  NANDO on CPAP  COPD (chronic obstructive pulmonary disease)  Pacemaker  AICD (automatic cardioverter/defibrillator) present  Hypothyroidism  Atrial fibrillation  Congestive heart disease  Hypercholesteremia  AICD (automatic cardioverter/defibrillator) present  History of laparoscopic cholecystectomy      VITALS:  T(F): 96 (06-16-19 @ 05:07), Max: 96.7 (06-15-19 @ 22:22)  HR: 94 (06-16-19 @ 05:07)  BP: 90/58 (06-16-19 @ 05:07) (90/58 - 95/61)  RR: 16 (06-16-19 @ 05:07)  SpO2: --    PHYSICAL EXAM:  GENERAL: NAD  HEENT: MMM  CVS:  RRR  CHEST/LUNG: Good air entry, no wheeze, no rales  ABD:  soft, NT/ND +bs  EXTREMITIES:  no edema  NEURO: AOx3    TESTS & MEASUREMENTS:                          12.5   10.05 )-----------( 364      ( 16 Jun 2019 06:44 )             38.9     PT/INR - ( 14 Jun 2019 18:53 )   PT: 16.80 sec;   INR: 1.47 ratio         PTT - ( 14 Jun 2019 18:53 )  PTT:38.6 sec  06-16    143  |  107  |  29<H>  ----------------------------<  131<H>  4.2   |  28  |  2.0<H>    Ca    8.6      16 Jun 2019 06:44  Phos  3.8     06-15  Mg     2.1     06-15    TPro  5.4<L>  /  Alb  3.3<L>  /  TBili  0.2  /  DBili  x   /  AST  18  /  ALT  19  /  AlkPhos  83  06-16    LIVER FUNCTIONS - ( 16 Jun 2019 06:44 )  Alb: 3.3 g/dL / Pro: 5.4 g/dL / ALK PHOS: 83 U/L / ALT: 19 U/L / AST: 18 U/L / GGT: x           CARDIAC MARKERS ( 15 Feroz 2019 06:17 )  x     / 0.02 ng/mL / 65 U/L / x     / 2.1 ng/mL  CARDIAC MARKERS ( 14 Jun 2019 18:53 )  x     / 0.03 ng/mL / x     / x     / x          RADIOLOGY & ADDITIONAL TESTS:  < from: US Retroperitoneal B-Scan Limited (06.15.19 @ 12:40) >  Impression:    No hydronephrosis or stone in either kidney.    Left renal lower pole 2.8 cm exophytic cyst, also seen on the CT abdomen   pelvis.    < end of copied text >        MEDICATIONS:  MEDICATIONS  (STANDING):  amiodarone    Tablet 400 milliGRAM(s) Oral two times a day  apixaban 2.5 milliGRAM(s) Oral every 12 hours  finasteride 5 milliGRAM(s) Oral daily  levothyroxine 50 MICROGram(s) Oral <User Schedule>  mexiletine 150 milliGRAM(s) Oral two times a day  montelukast 10 milliGRAM(s) Oral at bedtime  pantoprazole    Tablet 40 milliGRAM(s) Oral before breakfast  sodium chloride 0.9%. 1000 milliLiter(s) (75 mL/Hr) IV Continuous <Continuous>    MEDICATIONS  (PRN):      HOME MEDICATIONS:  ALPRAZolam 0.25 mg oral tablet (10-23)  finasteride 5 mg oral tablet (10-23)  glyBURIDE 5 mg oral tablet (10-23)  levothyroxine 50 mcg (0.05 mg) oral capsule (10-23)  montelukast 10 mg oral tablet (10-23)  pravastatin 20 mg oral tablet (10-23)  tamsulosin 0.4 mg oral capsule (10-23)
GUIDO VILCHIS  78y  Male      Patient is a 78y old  Male who presents with DUYEN and near syncope (15 Feroz 2019 10:36)      INTERVAL HPI/OVERNIGHT EVENTS:  Patient seen and examined earlier this morning.  Lying comfortably in bed; In NAD.  Denies chest pain/dizziness or sob.  Eager to go home      REVIEW OF SYSTEMS:  CONSTITUTIONAL: No fever, weight loss, or fatigue  EYES: No eye pain, visual disturbances, or discharge  ENMT:  No difficulty hearing, tinnitus, vertigo; No sinus or throat pain  NECK: No pain or stiffness  RESPIRATORY: No cough, wheezing, chills or hemoptysis; No shortness of breath  CARDIOVASCULAR: No chest pain, palpitations, dizziness, or leg swelling  GASTROINTESTINAL: No abdominal or epigastric pain. No nausea, vomiting, or hematemesis; No diarrhea or constipation. No melena or hematochezia.  GENITOURINARY: No dysuria, frequency, hematuria, or incontinence  NEUROLOGICAL: No headaches, memory loss, loss of strength, numbness, or tremors  SKIN: No itching, burning, rashes, or lesions   LYMPH NODES: No enlarged glands  ENDOCRINE: No heat or cold intolerance; No hair loss  MUSCULOSKELETAL: No joint pain or swelling; No muscle, back, or extremity pain  PSYCHIATRIC: No depression, anxiety, mood swings, or difficulty sleeping  HEME/LYMPH: No easy bruising, or bleeding gums  ALLERY AND IMMUNOLOGIC: No hives or eczema    Vital Signs Last 24 Hrs  T(C): 35.8 (17 Jun 2019 06:10), Max: 35.8 (17 Jun 2019 06:10)  T(F): 96.5 (17 Jun 2019 06:10), Max: 96.5 (17 Jun 2019 06:10)  HR: 95 (17 Jun 2019 06:10) (94 - 95)  BP: 95/53 (17 Jun 2019 06:10) (82/54 - 134/71)  BP(mean): --  RR: 16 (17 Jun 2019 06:10) (16 - 16)  SpO2: --      PHYSICAL EXAM:  GENERAL: NAD, well-groomed, well-developed  HEAD:  Atraumatic, Normocephalic  EYES: EOMI, PERRLA, conjunctiva and sclera clear  ENMT: No tonsillar erythema, exudates, or enlargement; Moist mucous membranes, Good dentition, No lesions  NECK: Supple, No JVD, Normal thyroid  NERVOUS SYSTEM:  Alert & Oriented X3, Good concentration; Motor Strength 5/5 B/L upper and lower extremities; DTRs 2+ intact and symmetric  CHEST/LUNG: Clear to percussion bilaterally; No rales, rhonchi, wheezing, or rubs  HEART: Regular rate and rhythm; No murmurs, rubs, or gallops  ABDOMEN: Soft, Nontender, Nondistended; Bowel sounds present  EXTREMITIES:  2+ Peripheral Pulses, No clubbing, cyanosis, or edema  LYMPH: No lymphadenopathy noted  SKIN: No rashes or lesions    Consultant(s) Notes Reviewed:  [x ] YES  [ ] NO  Care Discussed with Consultants/Other Providers [ x] YES  [ ] NO    LAB:                        12.5   10.05 )-----------( 364      ( 16 Jun 2019 06:44 )             38.9     06-17    143  |  109  |  27<H>  ----------------------------<  113<H>  4.4   |  27  |  1.5    Ca    8.9      17 Jun 2019 07:58    TPro  5.4<L>  /  Alb  3.3<L>  /  TBili  0.2  /  DBili  x   /  AST  18  /  ALT  19  /  AlkPhos  83  06-16      14 Jun 2019 07:01  -  15 Jun 2019 07:00  --------------------------------------------------------  IN: 0 mL / OUT: 300 mL / NET: -300 mL      RADIOLOGY & ADDITIONAL TESTS:  Imaging Personally Reviewed:  [x] YES  [ ] NO    HEALTH ISSUES - PROBLEM Dx:  Hyperkalemia: Hyperkalemia  Atrial fibrillation, unspecified type: Atrial fibrillation, unspecified type  Pacemaker: Pacemaker  AICD (automatic cardioverter/defibrillator) present: AICD (automatic cardioverter/defibrillator) present  Chronic systolic congestive heart failure: Chronic systolic congestive heart failure  DUYEN (acute kidney injury): DUYEN (acute kidney injury)  Near syncope: Near syncope      MEDICATIONS  (STANDING):  amiodarone    Tablet 400 milliGRAM(s) Oral two times a day  apixaban 2.5 milliGRAM(s) Oral every 12 hours  finasteride 5 milliGRAM(s) Oral daily  levothyroxine 50 MICROGram(s) Oral <User Schedule>  mexiletine 150 milliGRAM(s) Oral two times a day  montelukast 10 milliGRAM(s) Oral at bedtime  pantoprazole    Tablet 40 milliGRAM(s) Oral before breakfast  sodium chloride 0.9%. 1000 milliLiter(s) (75 mL/Hr) IV Continuous <Continuous>      Progress Note Handoff  Pending:  Consults none  Tests: orthostatics  Family Discussion: discussed plan of care with pt - he is in agreement  Disposition: Home_x__/SNF____/Other______________/Unknown at this time_____    Please call me with any questions at extension [phone]
GUIDO VILCHIS  78y  Male      Patient is a 78y old  Male who presents with a chief complaint of DUYEN, near syncope (15 Feroz 2019 10:36)      INTERVAL HPI/OVERNIGHT EVENTS:  Patient seen and examined earlier this morning.  Lying comfortably in bed; In NAD.  Denies chest pain/dizziness or sob.      REVIEW OF SYSTEMS:  CONSTITUTIONAL: No fever, weight loss, or fatigue  EYES: No eye pain, visual disturbances, or discharge  ENMT:  No difficulty hearing, tinnitus, vertigo; No sinus or throat pain  NECK: No pain or stiffness  RESPIRATORY: No cough, wheezing, chills or hemoptysis; No shortness of breath  CARDIOVASCULAR: No chest pain, palpitations, dizziness, or leg swelling  GASTROINTESTINAL: No abdominal or epigastric pain. No nausea, vomiting, or hematemesis; No diarrhea or constipation. No melena or hematochezia.  GENITOURINARY: No dysuria, frequency, hematuria, or incontinence  NEUROLOGICAL: No headaches, memory loss, loss of strength, numbness, or tremors  SKIN: No itching, burning, rashes, or lesions   LYMPH NODES: No enlarged glands  ENDOCRINE: No heat or cold intolerance; No hair loss  MUSCULOSKELETAL: No joint pain or swelling; No muscle, back, or extremity pain  PSYCHIATRIC: No depression, anxiety, mood swings, or difficulty sleeping  HEME/LYMPH: No easy bruising, or bleeding gums  ALLERY AND IMMUNOLOGIC: No hives or eczema    T(C): 35.7 (06-15-19 @ 05:00), Max: 35.7 (06-15-19 @ 05:00)  HR: 100 (06-15-19 @ 07:32) (77 - 100)  BP: 70/49 (06-15-19 @ 07:32) (66/41 - 104/69)  RR: 16 (06-15-19 @ 07:32) (16 - 18)  SpO2: 96% (06-15-19 @ 07:32) (95% - 97%) on RA    PHYSICAL EXAM:  GENERAL: NAD, well-groomed, well-developed  HEAD:  Atraumatic, Normocephalic  EYES: EOMI, PERRLA, conjunctiva and sclera clear  ENMT: No tonsillar erythema, exudates, or enlargement; Moist mucous membranes, Good dentition, No lesions  NECK: Supple, No JVD, Normal thyroid  NERVOUS SYSTEM:  Alert & Oriented X3, Good concentration; Motor Strength 5/5 B/L upper and lower extremities; DTRs 2+ intact and symmetric  CHEST/LUNG: Clear to percussion bilaterally; No rales, rhonchi, wheezing, or rubs  HEART: Regular rate and rhythm; No murmurs, rubs, or gallops  ABDOMEN: Soft, Nontender, Nondistended; Bowel sounds present  EXTREMITIES:  2+ Peripheral Pulses, No clubbing, cyanosis, or edema  LYMPH: No lymphadenopathy noted  SKIN: No rashes or lesions    Consultant(s) Notes Reviewed:  [x ] YES  [ ] NO  Care Discussed with Consultants/Other Providers [ x] YES  [ ] NO    LAB:  06-15    141  |  104  |  37<H>  ----------------------------<  93  4.6   |  28  |  2.3<H>    Ca    8.8      15 Feroz 2019 06:17  Phos  3.8     06-15  Mg     2.1     06-15    TPro  5.5<L>  /  Alb  3.3<L>  /  TBili  0.3  /  DBili  x   /  AST  20  /  ALT  21  /  AlkPhos  69  06-15    CARDIAC MARKERS ( 15 Feroz 2019 06:17 )  x     / 0.02 ng/mL / 65 U/L / x     / 2.1 ng/mL  CARDIAC MARKERS ( 14 Jun 2019 18:53 )  x     / 0.03 ng/mL / x     / x     / x                                12.5   9.09  )-----------( 339      ( 15 Feroz 2019 06:17 )             38.7     LIVER FUNCTIONS - ( 15 Feroz 2019 06:17 )  Alb: 3.3 g/dL / Pro: 5.5 g/dL / ALK PHOS: 69 U/L / ALT: 21 U/L / AST: 20 U/L / GGT: x           I&O's Summary    14 Jun 2019 07:01  -  15 Feroz 2019 07:00  --------------------------------------------------------  IN: 0 mL / OUT: 300 mL / NET: -300 mL      RADIOLOGY & ADDITIONAL TESTS:  Imaging Personally Reviewed:  [x] YES  [ ] NO    HEALTH ISSUES - PROBLEM Dx:  Hyperkalemia: Hyperkalemia  Atrial fibrillation, unspecified type: Atrial fibrillation, unspecified type  Pacemaker: Pacemaker  AICD (automatic cardioverter/defibrillator) present: AICD (automatic cardioverter/defibrillator) present  Chronic systolic congestive heart failure: Chronic systolic congestive heart failure  DUYEN (acute kidney injury): DUYEN (acute kidney injury)  Near syncope: Near syncope          MEDS:  amiodarone    Tablet 400 milliGRAM(s) Oral two times a day  apixaban 2.5 milliGRAM(s) Oral every 12 hours  finasteride 5 milliGRAM(s) Oral daily  levothyroxine 50 MICROGram(s) Oral <User Schedule>  mexiletine 150 milliGRAM(s) Oral two times a day  montelukast 10 milliGRAM(s) Oral at bedtime  pantoprazole    Tablet 40 milliGRAM(s) Oral before breakfast  sodium chloride 0.9%. 1000 milliLiter(s) IV Continuous <Continuous>      Progress Note Handoff  Pending:  Consults none  Tests: echo, orthostatics  Family Discussion: discussed plan of care with pt - he is in agreement  Disposition: Home___/SNF____/Other______________/Unknown at this time___x__    Please call me with any questions at extension [phone]

## 2019-06-17 NOTE — PHYSICAL THERAPY INITIAL EVALUATION ADULT - GENERAL OBSERVATIONS, REHAB EVAL
10:00-10:25 Chart reviewed. Pt encountered senmireclined in bed, may be seen by Physical Therapist as confirmed with Nurse. Patient denied pain at rest and ready to walk; +IV

## 2019-06-17 NOTE — DISCHARGE NOTE PROVIDER - CARE PROVIDERS DIRECT ADDRESSES
[email or web address]

## 2019-06-17 NOTE — DISCHARGE NOTE PROVIDER - HOSPITAL COURSE
77yo male with extensive past cardiac history (A fib, V Tach, CHF also CKD, copd dodie and splenic laceration following a fall) presents form his cardiologist office to ER following an episode of dizziness with fall (but no loss of consciousness) early afternoon and a low blood pressure in the doctor's office. No chest pain and patient reports no change in usual urination except that he hasn't urinated since arrival to the hospital. Patient's blood pressure did improve with fluids given in the ER.        Patient was admitted to tele.  He was pacing on the monitor throughout his hospitalization.    His BP meds were on hold and he was hydrated with improvement in BP.    Patient was seen by cardiology.    Echo revealed small pericardial effusion with moderately reduced cardiac function and Mod MR.    Patient's orthostatics are negative the day of discharge.    His kali improves to a Cr = 1.5    He ambulated with rehab    I spoke to his wife and will arrange for home pt and homecare.     Medically stable for d/c today with close eps and cardio follow up.     Time spent on d/c is over 65 min

## 2019-06-17 NOTE — DISCHARGE NOTE PROVIDER - CARE PROVIDER_API CALL
Malcolm Varela)  Cardiovascular Disease; Internal Medicine  [address] [facility], [address]  [place] [address]  Phone: [phone]  Fax: [phone]  Follow Up Time: 1-3 days    Dimas Holt; CHARLES)  Cardiac Electrophysiology  [address] [facility], [place] [address] [place] [address]  Phone: [phone]  Fax: [phone]  Follow Up Time: 1-3 days    Marino Landaverde (DO)  Family Medicine  [address] [place] [address]  Phone: [phone]  Fax: [phone]  Follow Up Time: 1 week

## 2019-06-20 DIAGNOSIS — E03.9 HYPOTHYROIDISM, UNSPECIFIED: ICD-10-CM

## 2019-06-20 DIAGNOSIS — E86.0 DEHYDRATION: ICD-10-CM

## 2019-06-20 DIAGNOSIS — J44.9 CHRONIC OBSTRUCTIVE PULMONARY DISEASE, UNSPECIFIED: ICD-10-CM

## 2019-06-20 DIAGNOSIS — I50.22 CHRONIC SYSTOLIC (CONGESTIVE) HEART FAILURE: ICD-10-CM

## 2019-06-20 DIAGNOSIS — Z95.810 PRESENCE OF AUTOMATIC (IMPLANTABLE) CARDIAC DEFIBRILLATOR: ICD-10-CM

## 2019-06-20 DIAGNOSIS — I34.0 NONRHEUMATIC MITRAL (VALVE) INSUFFICIENCY: ICD-10-CM

## 2019-06-20 DIAGNOSIS — Z66 DO NOT RESUSCITATE: ICD-10-CM

## 2019-06-20 DIAGNOSIS — R55 SYNCOPE AND COLLAPSE: ICD-10-CM

## 2019-06-20 DIAGNOSIS — I48.91 UNSPECIFIED ATRIAL FIBRILLATION: ICD-10-CM

## 2019-06-20 DIAGNOSIS — I25.10 ATHEROSCLEROTIC HEART DISEASE OF NATIVE CORONARY ARTERY WITHOUT ANGINA PECTORIS: ICD-10-CM

## 2019-06-20 DIAGNOSIS — N17.9 ACUTE KIDNEY FAILURE, UNSPECIFIED: ICD-10-CM

## 2019-06-20 DIAGNOSIS — N40.0 BENIGN PROSTATIC HYPERPLASIA WITHOUT LOWER URINARY TRACT SYMPTOMS: ICD-10-CM

## 2019-06-20 DIAGNOSIS — Z91.81 HISTORY OF FALLING: ICD-10-CM

## 2019-06-20 DIAGNOSIS — N18.3 CHRONIC KIDNEY DISEASE, STAGE 3 (MODERATE): ICD-10-CM

## 2019-06-27 ENCOUNTER — APPOINTMENT (OUTPATIENT)
Dept: CARDIOLOGY | Facility: CLINIC | Age: 79
End: 2019-06-27
Payer: MEDICARE

## 2019-06-27 VITALS — DIASTOLIC BLOOD PRESSURE: 56 MMHG | SYSTOLIC BLOOD PRESSURE: 80 MMHG

## 2019-06-27 DIAGNOSIS — I47.2 VENTRICULAR TACHYCARDIA: ICD-10-CM

## 2019-06-27 DIAGNOSIS — I48.91 UNSPECIFIED ATRIAL FIBRILLATION: ICD-10-CM

## 2019-06-27 DIAGNOSIS — I50.9 HEART FAILURE, UNSPECIFIED: ICD-10-CM

## 2019-06-27 DIAGNOSIS — I50.22 CHRONIC SYSTOLIC (CONGESTIVE) HEART FAILURE: ICD-10-CM

## 2019-06-27 PROCEDURE — 99214 OFFICE O/P EST MOD 30 MIN: CPT

## 2019-06-27 PROCEDURE — 93284 PRGRMG EVAL IMPLANTABLE DFB: CPT

## 2019-06-27 RX ORDER — APIXABAN 2.5 MG/1
2.5 TABLET, FILM COATED ORAL
Qty: 180 | Refills: 3 | Status: ACTIVE | COMMUNITY

## 2019-06-27 RX ORDER — METOPROLOL TARTRATE 25 MG/1
25 TABLET, FILM COATED ORAL TWICE DAILY
Refills: 0 | Status: DISCONTINUED | COMMUNITY
End: 2019-06-27

## 2019-06-27 RX ORDER — TRAMADOL HYDROCHLORIDE 50 MG/1
50 TABLET, COATED ORAL
Qty: 60 | Refills: 0 | Status: ACTIVE | COMMUNITY
Start: 2019-01-28

## 2019-06-27 RX ORDER — METOPROLOL TARTRATE 25 MG/1
25 TABLET, FILM COATED ORAL TWICE DAILY
Qty: 90 | Refills: 3 | Status: ACTIVE | COMMUNITY
Start: 2019-06-27 | End: 1900-01-01

## 2019-06-27 RX ORDER — SPIRONOLACTONE 25 MG/1
25 TABLET ORAL DAILY
Qty: 90 | Refills: 3 | Status: DISCONTINUED | COMMUNITY
End: 2019-06-27

## 2019-06-27 RX ORDER — MECLIZINE HYDROCHLORIDE 25 MG/1
25 TABLET ORAL
Qty: 20 | Refills: 0 | Status: ACTIVE | COMMUNITY
Start: 2019-04-29

## 2019-06-27 RX ORDER — AMIODARONE HYDROCHLORIDE 200 MG/1
200 TABLET ORAL TWICE DAILY
Qty: 180 | Refills: 3 | Status: ACTIVE | COMMUNITY

## 2019-06-27 NOTE — PHYSICAL EXAM
[General Appearance - Well Developed] : well developed [Well Groomed] : well groomed [Normal Appearance] : normal appearance [General Appearance - Well Nourished] : well nourished [No Deformities] : no deformities [General Appearance - In No Acute Distress] : no acute distress [Heart Sounds] : normal S1 and S2 [Heart Rate And Rhythm] : heart rate and rhythm were normal [Murmurs] : no murmurs present [Respiration, Rhythm And Depth] : normal respiratory rhythm and effort [Auscultation Breath Sounds / Voice Sounds] : lungs were clear to auscultation bilaterally [Exaggerated Use Of Accessory Muscles For Inspiration] : no accessory muscle use [Clean] : clean [Well-Healed] : well-healed [Dry] : dry [Abdomen Tenderness] : non-tender [Abdomen Soft] : soft [Nail Clubbing] : no clubbing of the fingernails [Abdomen Mass (___ Cm)] : no abdominal mass palpated [Cyanosis, Localized] : no localized cyanosis [Petechial Hemorrhages (___cm)] : no petechial hemorrhages [] : no ischemic changes [FreeTextEntry1] : +3 edema

## 2019-06-27 NOTE — HISTORY OF PRESENT ILLNESS
[Palpitations] : no palpitations [SOB] : no dyspnea [Syncope] : no syncope [Erythema at Site] : no erythema at device site [ICD Shocks] : no recent ICD shocks [de-identified] : Patient s/p VT storm and VT ablation. Pt Called in today to evaluate four dizziness. Remote monitoring showed episodes of VT at 95 bpm. Patient was asked to come to the office. Patient describes today feeling dizzy and lightheaded. Patient fills better today. He was found to be dehydrated\par \par \par

## 2019-06-27 NOTE — PROCEDURE
[No] : not [Sinus Bradycardia] : sinus bradycardia [CRT-D] : Cardiac resynchronization therapy defibrillator [DDD] : DDD [Voltage: ___ volts] : Voltage was [unfilled] volts [Magnet Rate: ___ Ppm] : magnet rate was [unfilled] Ppm [Charge Time: ___ sec] : charge time was [unfilled] seconds [Longevity: ___ months] : The estimated remaining battery life is [unfilled] months [Threshold Testing Performed] : Threshold testing was performed [Sensing Amplitude ___mv] : sensing amplitude was [unfilled] mv [Lead Imp:  ___ohms] : lead impedance was [unfilled] ohms [___V @] : [unfilled] V [___ ms] : [unfilled] ms [Programmed for Longevity] : output reprogrammed for improved battery longevity [Asense-Vsense ___ %] : Asense-Vsense [unfilled]% [Apace-Vsense ___ %] : Apace-Vsense [unfilled]% [Asense-Vpace ___ %] : Asense-Vpace [unfilled]% [Apace-Vpace ___ %] : Apace-Vpace [unfilled]% [de-identified] : Medtronic [de-identified] : Kraig CORNEJO  [de-identified] : [id number] [de-identified] : 5/30/18 [de-identified] : 90 [de-identified] : Normal device function. AT/AF Mccloud 1.8%, longest episode 6/20/19 lasting 1 hour and 44 minutes.\par decrease LRL to 85 bpm

## 2019-06-27 NOTE — ASSESSMENT
[FreeTextEntry1] : VT - one day of 6 episodes successfully terminated\par - cont amio\par -restart BB\par - hold spironolactone \par \par CHF - cont lasix 40 mg daily

## 2019-07-03 NOTE — PHYSICAL THERAPY INITIAL EVALUATION ADULT - PREDICTED DURATION OF THERAPY (DAYS/WKS), PT EVAL
Left patient a voicemail   
Patient called said that the prescription she received imiquimod (ALDARA) 5 % cream    Is making her sick and would like to know if there is something else you can send in for her to try.  
There is not an alternative topical medication that I can send.  She can schedule TCA treatment for management of her symptoms.  
7 days

## 2019-07-06 NOTE — PROCEDURE
[Sinus Bradycardia] : sinus bradycardia [No] : not [CRT-D] : Cardiac resynchronization therapy defibrillator [DDD] : DDD [Voltage: ___ volts] : Voltage was [unfilled] volts [Charge Time: ___ sec] : charge time was [unfilled] seconds [Magnet Rate: ___ Ppm] : magnet rate was [unfilled] Ppm [Longevity: ___ months] : The estimated remaining battery life is [unfilled] months [Threshold Testing Performed] : Threshold testing was performed [Sensing Amplitude ___mv] : sensing amplitude was [unfilled] mv [Lead Imp:  ___ohms] : lead impedance was [unfilled] ohms [___ ms] : [unfilled] ms [___V @] : [unfilled] V [Programmed for Longevity] : output reprogrammed for improved battery longevity [Asense-Vsense ___ %] : Asense-Vsense [unfilled]% [Asense-Vpace ___ %] : Asense-Vpace [unfilled]% [Apace-Vsense ___ %] : Apace-Vsense [unfilled]% [Apace-Vpace ___ %] : Apace-Vpace [unfilled]% [de-identified] : Medtronic [de-identified] : Kraig CORNEJO  [de-identified] : 5/30/18 [de-identified] : [id number] [de-identified] : 90 [de-identified] : Normal device function. One episode of VT detected on 04/07/19 treated successfully with 2 bursts of ATP. Multiple other episodes of NSVT detected on the same day 04/07/19 lasting up to 2 seconds (avg v-rate 146-150 bpm). Optivol fluid threshold crossing has occurred.

## 2019-07-06 NOTE — ASSESSMENT
[FreeTextEntry1] : VT - one day of 6 episodes successfully terminated\par - cont amio\par - cont BB anf give if sys BP >90 and not 110 bc pt always has low BP\par \par AF - cont AC\par \par

## 2019-07-06 NOTE — PHYSICAL EXAM
[General Appearance - Well Developed] : well developed [Well Groomed] : well groomed [Normal Appearance] : normal appearance [General Appearance - Well Nourished] : well nourished [No Deformities] : no deformities [General Appearance - In No Acute Distress] : no acute distress [Heart Rate And Rhythm] : heart rate and rhythm were normal [Heart Sounds] : normal S1 and S2 [Murmurs] : no murmurs present [Respiration, Rhythm And Depth] : normal respiratory rhythm and effort [Auscultation Breath Sounds / Voice Sounds] : lungs were clear to auscultation bilaterally [Exaggerated Use Of Accessory Muscles For Inspiration] : no accessory muscle use [Clean] : clean [Dry] : dry [Well-Healed] : well-healed [Abdomen Tenderness] : non-tender [Abdomen Soft] : soft [Abdomen Mass (___ Cm)] : no abdominal mass palpated [Nail Clubbing] : no clubbing of the fingernails [Cyanosis, Localized] : no localized cyanosis [Petechial Hemorrhages (___cm)] : no petechial hemorrhages [] : no ischemic changes [FreeTextEntry1] : +3 edema

## 2019-07-06 NOTE — HISTORY OF PRESENT ILLNESS
[Palpitations] : no palpitations [SOB] : no dyspnea [Syncope] : no syncope [ICD Shocks] : no recent ICD shocks [de-identified] : Patient s/p VT storm and VT ablation. Pt had one episode terminated by ATP\par \par He also c/o SOB and NOEL [Erythema at Site] : no erythema at device site

## 2019-07-20 ENCOUNTER — INPATIENT (INPATIENT)
Facility: HOSPITAL | Age: 79
LOS: 14 days | Discharge: HOSPICE | End: 2019-08-04
Attending: INTERNAL MEDICINE | Admitting: INTERNAL MEDICINE
Payer: MEDICARE

## 2019-07-20 VITALS
HEART RATE: 100 BPM | OXYGEN SATURATION: 80 % | RESPIRATION RATE: 28 BRPM | TEMPERATURE: 98 F | WEIGHT: 160.06 LBS | DIASTOLIC BLOOD PRESSURE: 60 MMHG | SYSTOLIC BLOOD PRESSURE: 107 MMHG

## 2019-07-20 DIAGNOSIS — Z95.810 PRESENCE OF AUTOMATIC (IMPLANTABLE) CARDIAC DEFIBRILLATOR: Chronic | ICD-10-CM

## 2019-07-20 DIAGNOSIS — Z98.890 OTHER SPECIFIED POSTPROCEDURAL STATES: Chronic | ICD-10-CM

## 2019-07-20 LAB
ALBUMIN SERPL ELPH-MCNC: 3.6 G/DL — SIGNIFICANT CHANGE UP (ref 3.5–5.2)
ALP SERPL-CCNC: 93 U/L — SIGNIFICANT CHANGE UP (ref 30–115)
ALT FLD-CCNC: 23 U/L — SIGNIFICANT CHANGE UP (ref 0–41)
ANION GAP SERPL CALC-SCNC: 16 MMOL/L — HIGH (ref 7–14)
AST SERPL-CCNC: 21 U/L — SIGNIFICANT CHANGE UP (ref 0–41)
BASE EXCESS BLDV CALC-SCNC: 1.6 MMOL/L — SIGNIFICANT CHANGE UP (ref -2–2)
BILIRUB SERPL-MCNC: 0.3 MG/DL — SIGNIFICANT CHANGE UP (ref 0.2–1.2)
BUN SERPL-MCNC: 35 MG/DL — HIGH (ref 10–20)
CA-I SERPL-SCNC: 1.12 MMOL/L — SIGNIFICANT CHANGE UP (ref 1.12–1.3)
CALCIUM SERPL-MCNC: 8.9 MG/DL — SIGNIFICANT CHANGE UP (ref 8.5–10.1)
CHLORIDE SERPL-SCNC: 99 MMOL/L — SIGNIFICANT CHANGE UP (ref 98–110)
CO2 SERPL-SCNC: 23 MMOL/L — SIGNIFICANT CHANGE UP (ref 17–32)
CREAT SERPL-MCNC: 2.3 MG/DL — HIGH (ref 0.7–1.5)
GAS PNL BLDV: 135 MMOL/L — LOW (ref 136–145)
GAS PNL BLDV: SIGNIFICANT CHANGE UP
GLUCOSE SERPL-MCNC: 159 MG/DL — HIGH (ref 70–99)
HCO3 BLDV-SCNC: 28 MMOL/L — SIGNIFICANT CHANGE UP (ref 22–29)
HCT VFR BLD CALC: 35.8 % — LOW (ref 42–52)
HCT VFR BLDA CALC: 47.4 % — HIGH (ref 34–44)
HGB BLD CALC-MCNC: 15.5 G/DL — SIGNIFICANT CHANGE UP (ref 14–18)
HGB BLD-MCNC: 11.6 G/DL — LOW (ref 14–18)
LACTATE BLDV-MCNC: 1.2 MMOL/L — SIGNIFICANT CHANGE UP (ref 0.5–1.6)
MCHC RBC-ENTMCNC: 29.5 PG — SIGNIFICANT CHANGE UP (ref 27–31)
MCHC RBC-ENTMCNC: 32.4 G/DL — SIGNIFICANT CHANGE UP (ref 32–37)
MCV RBC AUTO: 91.1 FL — SIGNIFICANT CHANGE UP (ref 80–94)
NRBC # BLD: 0 /100 WBCS — SIGNIFICANT CHANGE UP (ref 0–0)
NT-PROBNP SERPL-SCNC: 3681 PG/ML — HIGH (ref 0–300)
PCO2 BLDV: 47 MMHG — SIGNIFICANT CHANGE UP (ref 41–51)
PH BLDV: 7.38 — SIGNIFICANT CHANGE UP (ref 7.26–7.43)
PLATELET # BLD AUTO: 376 K/UL — SIGNIFICANT CHANGE UP (ref 130–400)
PO2 BLDV: 51 MMHG — HIGH (ref 20–40)
POTASSIUM BLDV-SCNC: 5 MMOL/L — SIGNIFICANT CHANGE UP (ref 3.3–5.6)
POTASSIUM SERPL-MCNC: 4.5 MMOL/L — SIGNIFICANT CHANGE UP (ref 3.5–5)
POTASSIUM SERPL-SCNC: 4.5 MMOL/L — SIGNIFICANT CHANGE UP (ref 3.5–5)
PROT SERPL-MCNC: 6.2 G/DL — SIGNIFICANT CHANGE UP (ref 6–8)
RBC # BLD: 3.93 M/UL — LOW (ref 4.7–6.1)
RBC # FLD: 16.2 % — HIGH (ref 11.5–14.5)
SAO2 % BLDV: 82 % — SIGNIFICANT CHANGE UP
SODIUM SERPL-SCNC: 138 MMOL/L — SIGNIFICANT CHANGE UP (ref 135–146)
TROPONIN T SERPL-MCNC: 0.02 NG/ML — HIGH
TROPONIN T SERPL-MCNC: 0.02 NG/ML — HIGH
WBC # BLD: 13.21 K/UL — HIGH (ref 4.8–10.8)
WBC # FLD AUTO: 13.21 K/UL — HIGH (ref 4.8–10.8)

## 2019-07-20 PROCEDURE — 71045 X-RAY EXAM CHEST 1 VIEW: CPT | Mod: 26

## 2019-07-20 PROCEDURE — 99291 CRITICAL CARE FIRST HOUR: CPT | Mod: GC

## 2019-07-20 PROCEDURE — 93010 ELECTROCARDIOGRAM REPORT: CPT | Mod: 76

## 2019-07-20 RX ORDER — LEVOTHYROXINE SODIUM 125 MCG
50 TABLET ORAL DAILY
Refills: 0 | Status: DISCONTINUED | OUTPATIENT
Start: 2019-07-20 | End: 2019-08-04

## 2019-07-20 RX ORDER — APIXABAN 2.5 MG/1
2.5 TABLET, FILM COATED ORAL EVERY 12 HOURS
Refills: 0 | Status: DISCONTINUED | OUTPATIENT
Start: 2019-07-20 | End: 2019-08-04

## 2019-07-20 RX ORDER — APIXABAN 2.5 MG/1
2.5 TABLET, FILM COATED ORAL ONCE
Refills: 0 | Status: DISCONTINUED | OUTPATIENT
Start: 2019-07-20 | End: 2019-07-20

## 2019-07-20 RX ORDER — FUROSEMIDE 40 MG
40 TABLET ORAL DAILY
Refills: 0 | Status: DISCONTINUED | OUTPATIENT
Start: 2019-07-20 | End: 2019-07-22

## 2019-07-20 RX ORDER — TAMSULOSIN HYDROCHLORIDE 0.4 MG/1
0.4 CAPSULE ORAL AT BEDTIME
Refills: 0 | Status: DISCONTINUED | OUTPATIENT
Start: 2019-07-20 | End: 2019-08-04

## 2019-07-20 RX ORDER — IPRATROPIUM/ALBUTEROL SULFATE 18-103MCG
3 AEROSOL WITH ADAPTER (GRAM) INHALATION EVERY 6 HOURS
Refills: 0 | Status: DISCONTINUED | OUTPATIENT
Start: 2019-07-20 | End: 2019-08-04

## 2019-07-20 RX ORDER — FUROSEMIDE 40 MG
40 TABLET ORAL DAILY
Refills: 0 | Status: DISCONTINUED | OUTPATIENT
Start: 2019-07-20 | End: 2019-07-20

## 2019-07-20 RX ORDER — BUDESONIDE AND FORMOTEROL FUMARATE DIHYDRATE 160; 4.5 UG/1; UG/1
2 AEROSOL RESPIRATORY (INHALATION)
Refills: 0 | Status: DISCONTINUED | OUTPATIENT
Start: 2019-07-20 | End: 2019-08-04

## 2019-07-20 RX ORDER — ALPRAZOLAM 0.25 MG
0.25 TABLET ORAL DAILY
Refills: 0 | Status: DISCONTINUED | OUTPATIENT
Start: 2019-07-20 | End: 2019-07-22

## 2019-07-20 RX ORDER — MEXILETINE HYDROCHLORIDE 150 MG/1
150 CAPSULE ORAL EVERY 12 HOURS
Refills: 0 | Status: DISCONTINUED | OUTPATIENT
Start: 2019-07-20 | End: 2019-07-21

## 2019-07-20 RX ORDER — ATORVASTATIN CALCIUM 80 MG/1
40 TABLET, FILM COATED ORAL AT BEDTIME
Refills: 0 | Status: DISCONTINUED | OUTPATIENT
Start: 2019-07-20 | End: 2019-08-04

## 2019-07-20 RX ORDER — FINASTERIDE 5 MG/1
5 TABLET, FILM COATED ORAL DAILY
Refills: 0 | Status: DISCONTINUED | OUTPATIENT
Start: 2019-07-20 | End: 2019-08-04

## 2019-07-20 RX ORDER — MONTELUKAST 4 MG/1
10 TABLET, CHEWABLE ORAL DAILY
Refills: 0 | Status: DISCONTINUED | OUTPATIENT
Start: 2019-07-20 | End: 2019-08-04

## 2019-07-20 RX ORDER — AMIODARONE HYDROCHLORIDE 400 MG/1
200 TABLET ORAL EVERY 12 HOURS
Refills: 0 | Status: DISCONTINUED | OUTPATIENT
Start: 2019-07-20 | End: 2019-07-21

## 2019-07-20 RX ADMIN — AMIODARONE HYDROCHLORIDE 200 MILLIGRAM(S): 400 TABLET ORAL at 14:13

## 2019-07-20 RX ADMIN — FINASTERIDE 5 MILLIGRAM(S): 5 TABLET, FILM COATED ORAL at 14:11

## 2019-07-20 RX ADMIN — MEXILETINE HYDROCHLORIDE 150 MILLIGRAM(S): 150 CAPSULE ORAL at 14:13

## 2019-07-20 RX ADMIN — MONTELUKAST 10 MILLIGRAM(S): 4 TABLET, CHEWABLE ORAL at 14:11

## 2019-07-20 RX ADMIN — Medication 3 MILLILITER(S): at 22:01

## 2019-07-20 RX ADMIN — Medication 50 MICROGRAM(S): at 14:27

## 2019-07-20 RX ADMIN — TAMSULOSIN HYDROCHLORIDE 0.4 MILLIGRAM(S): 0.4 CAPSULE ORAL at 22:25

## 2019-07-20 RX ADMIN — Medication 40 MILLIGRAM(S): at 14:11

## 2019-07-20 RX ADMIN — ATORVASTATIN CALCIUM 40 MILLIGRAM(S): 80 TABLET, FILM COATED ORAL at 22:25

## 2019-07-20 RX ADMIN — APIXABAN 2.5 MILLIGRAM(S): 2.5 TABLET, FILM COATED ORAL at 15:16

## 2019-07-20 RX ADMIN — BUDESONIDE AND FORMOTEROL FUMARATE DIHYDRATE 2 PUFF(S): 160; 4.5 AEROSOL RESPIRATORY (INHALATION) at 22:09

## 2019-07-20 NOTE — ED PROVIDER NOTE - NS ED ROS FT
Eyes:  No visual changes, eye pain or discharge.  ENMT:  No hearing changes, pain, no sore throat or runny nose, no difficulty swallowing  Cardiac:  No chest pain, edema. No chest pain with exertion. + SOB  Respiratory:  No cough or respiratory distress. No hemoptysis. No history of asthma or RAD.  GI:  No nausea, vomiting, diarrhea or abdominal pain.  :  No dysuria, frequency or burning.  MS:  No myalgia, muscle weakness, joint pain or back pain.  Neuro:  No headache or weakness.  No LOC.  Skin:  No skin rash.   Endocrine: No history of thyroid disease or diabetes.

## 2019-07-20 NOTE — ED ADULT TRIAGE NOTE - CHIEF COMPLAINT QUOTE
Pt came c/o difficulty breathing Pt came c/o difficulty breathing, pt is in tripod position and using accessory muscles to breath in triage, has history of intubation.

## 2019-07-20 NOTE — H&P ADULT - ATTENDING COMMENTS
PHYSICAL EXAM:    CONSTITUTIONAL: NAD, currently on BIPAP, no respiratory distress  ENMT: EOMI, PERRLA, No tonsillar erythema, exudates, or enlargement, neck supple, No JVD  PSYCH: Alert & Oriented X3  RESPIRATORY: Clear to percussion bilaterally; bibasilar opacity  CARDIOVASCULAR: Regular rate and rhythm; No murmurs, rubs, or gallops, negative edema  GASTROINTESTINAL: Soft, Nontender, Nondistended; Bowel sounds present  EXTREMITIES:  2+ Peripheral Pulses, No clubbing, cyanosis  SKIN: No rashes or lesions    78 yo male with PMHx of HFrEF (EF of 25%), AFIB on Eliquis, Vtach s/p AICD, COPD, NANDO, Splenic Laceration presents with complaint of worsening dyspnea on exertion for few days duration. Patient denies chest pain, palpitations, cough, fever, chills, nausea, vomiting, sick contacts or recent travel.     # Acute on Chronic Systolic CHF Exacerbation   -Continue IV Lasix  -Strict Inputs and Outputs  -Daily weight  -Cardiology recommendations noted  -Awaiting EPS for AICD interrogation, last interrogated at the [facility] on previous admission in June    #Left Lower Lobe Opacity secondary to HCAP?  -Patient remains afebrile, mild leukocytosis, however denies any symptoms of cough  -F/U CXR in AM, trend leukocytosis, monitor for development of fever, cough and start IV Antibiotics    # DUYEN on CKD III  -Cr has been in 2.3 range in June  -Monitor BMP  -Consider Nephrology evaluation if worsening progression  -Renal US performed in June revealing of Left renal lower pole 2.8 cm exophytic cyst, will need outpatient work up  -F/U urine studies    #AFIB, rate controlled   -Continue Eliquis    #COPD   -Continue inhalers  -Pulmonary toilet     Code Status: DNR/DNI, MOLST completed and in chart    Disposition: Home when medically stable PHYSICAL EXAM:    CONSTITUTIONAL: NAD, currently on BIPAP, no respiratory distress  ENMT: EOMI, PERRLA, No tonsillar erythema, exudates, or enlargement, neck supple, No JVD  PSYCH: Alert & Oriented X3  RESPIRATORY: Clear to percussion bilaterally; bibasilar opacity  CARDIOVASCULAR: Regular rate and rhythm; No murmurs, rubs, or gallops, negative edema  GASTROINTESTINAL: Soft, Nontender, Nondistended; Bowel sounds present  EXTREMITIES:  2+ Peripheral Pulses, No clubbing, cyanosis  SKIN: No rashes or lesions    78 yo male with PMHx of HFrEF (EF of 25%), AFIB on Eliquis, Vtach s/p AICD, COPD, NANDO, Splenic Laceration presents with complaint of worsening dyspnea on exertion for few days duration. Patient denies chest pain, palpitations, cough, fever, chills, nausea, vomiting, sick contacts or recent travel.     # Acute on Chronic Systolic CHF Exacerbation   -Continue IV Lasix  -Strict Inputs and Outputs  -Daily weight  -Cardiology recommendations noted  -Awaiting EPS for AICD interrogation, last interrogated at the [facility] on previous admission in June    #Left Lower Lobe Opacity secondary to HCAP?  -Patient remains afebrile, mild leukocytosis, however denies any symptoms of cough  -F/U CXR in AM, trend leukocytosis, monitor for development of fever, cough and start IV Antibiotics    # DUYEN on CKD III  -Cr has been in 2.3 range in June  -Monitor BMP  -Consider Nephrology evaluation if worsening progression  -Renal US performed in June revealing of Left renal lower pole 2.8 cm exophytic cyst, will need outpatient work up  -F/U urine studies    #AFIB, rate controlled   -Continue Eliquis    #COPD   -Continue inhalers  -Pulmonary toilet     Code Status: DNR/DNI, MOLST completed and in chart    Disposition: Home when medically stable.

## 2019-07-20 NOTE — H&P ADULT - NSHPREVIEWOFSYSTEMS_GEN_ALL_CORE
CONSTITUTIONAL: No weakness, fevers or chills, no weight loss   EYES/ENT: No visual changes;  No vertigo or throat pain   NECK: No pain or stiffness  GASTROINTESTINAL: No abdominal or epigastric pain. No nausea, vomiting, or hematemesis; No diarrhea or constipation. No melena or hematochezia.  GENITOURINARY: No dysuria, frequency or hematuria  NEUROLOGICAL: No numbness or weakness  All other review of systems is negative unless indicated above.

## 2019-07-20 NOTE — ED ADULT NURSE NOTE - CHIEF COMPLAINT QUOTE
Pt came c/o difficulty breathing, pt is in tripod position and using accessory muscles to breath in triage, has history of intubation.

## 2019-07-20 NOTE — H&P ADULT - HISTORY OF PRESENT ILLNESS
78 yo male with PMH CHFrEF, Afib, Vtach s/p AICD,COPD, NANDO, spleenic laceration following fall) comes to the ED with complaint of SOB.  SOB gradual onset, worsened in the evening yesterday, could not sleep s/t SOB, aggravated on exertion, no relieving factor.  NO chest pain, or wheezing or cough.  No fever, headache, recent sick contacts.  normal Bowel and bladder habit. 80 yo male with PMH CHFrEF, Afib, Vtach s/p AICD,COPD, NANDO, spleenic laceration following fall) comes to the ED with complaint of SOB.  SOB gradual onset, worsened in the evening yesterday, could not sleep s/t SOB, aggravated on exertion, no relieving factor.  NO chest pain, or wheezing or cough or palpitation.  No fever, headache, recent sick contacts.  normal Bowel and bladder habit.    Patient was recently admitted for near syncope and DUYEN, discharged on july 17.    In the ED, patient RR28, , spo2 80 room air, /60 80 yo male with PMH CHFrEF, Afib, Vtach s/p AICD,COPD, NANDO, spleenic laceration following fall) comes to the ED with complaint of SOB.  SOB gradual onset, worsened in the evening yesterday, could not sleep s/t SOB, aggravated on exertion, no relieving factor.  NO chest pain, or wheezing or cough or palpitation.  No fever, headache, recent sick contacts.  normal Bowel and bladder habit.    Patient was recently admitted for near syncope and DUYEN, discharged on july 17.    In the ED, patient RR 28, , spo2 80 room air, /60 80 yo male with PMH CHFrEF, Afib, Vtach s/p AICD,COPD, NANDO, spleenic laceration following fall) comes to the ED with complaint of SOB.  SOB gradual onset, worsened in the evening yesterday, could not sleep s/t SOB, aggravated on exertion, no relieving factor.  NO chest pain, or wheezing or cough or palpitation.  No fever, headache, recent sick contacts.  normal Bowel and bladder habit.    Patient was recently admitted for near syncope and DUYEN, discharged on june 17.    In the ED, patient RR 28, , spo2 80 room air, /60

## 2019-07-20 NOTE — H&P ADULT - NSHPPHYSICALEXAM_GEN_ALL_CORE
PHYSICAL EXAM:      Constitutional: comfortable on o2 via NC    Respiratory: dec BS+, no weeze or ronchi    Cardiovascular: S1S2 present      Gastrointestinal: soft, Non tender, BS+    Extremities: no edema of lower extremity    Vascular: pulses+    Neurological: intact

## 2019-07-20 NOTE — ED PROVIDER NOTE - OBJECTIVE STATEMENT
79y M w/ PMH of CHF, Afib s/p ablation, HLD, and HTN presents with acute episode of SOB that started while he was watching TV earlier tonight. No other symptoms. Was found to be hypoxic to 70s on initial pulse ox. Denies fever, chills, chest pain, palpitations, n/v/d, or edema.

## 2019-07-20 NOTE — ED PROVIDER NOTE - ATTENDING CONTRIBUTION TO CARE
80yo m hx of copd, chf, aicdppm, c/o sob, moderate. started last night. worse laying down. mild chest pressure. no leg pain or swelling. has been intubated for resp failure before. no fever or chills. pt in mild dist, using accessory musc, broken sentences, scant b/l wheezing and bibasilar rales. b lines on bedside US. s1s2, ab soft, nt, no LE edema. will place on bipap , labs, cxr, ekg, cardiac monitor and reassess.  pts BP low, sts a "goog bp" is around 90 for him, usually in 80s.

## 2019-07-20 NOTE — CONSULT NOTE ADULT - SUBJECTIVE AND OBJECTIVE BOX
HPI:  80 yo male with PMH CHFrEF, Afib, Vtach s/p AICD,COPD, NANDO, spleenic laceration following fall) comes to the ED with complaint of SOB.  SOB gradual onset, worsened in the evening yesterday, could not sleep s/t SOB, aggravated on exertion, no relieving factor.  NO chest pain, or wheezing or cough or palpitation.  No fever, headache, recent sick contacts.  normal Bowel and bladder habit.    Patient was recently admitted for near syncope and DUYEN, discharged on july 17.    In the ED, patient RR 28, , spo2 80 room air, /60 (20 Jul 2019 11:36).    Cardiology note:     79 M with non-ischemic CMP, HFrEF (s/p AICD) EF~25%, CAD, A-fib, V-tach s/p ablation, COPD, NANDO splenic laceration, CKD presenting for dyspnea for the past few days. Sees Dr Holt and Dr Varela in the office.       PAST MEDICAL & SURGICAL HISTORY  CAD (coronary artery disease)  Ventricular tachycardia  NANDO on CPAP  COPD (chronic obstructive pulmonary disease)  Pacemaker  AICD (automatic cardioverter/defibrillator) present  Hypothyroidism  Atrial fibrillation  Congestive heart disease  Hypercholesteremia  AICD (automatic cardioverter/defibrillator) present  History of laparoscopic cholecystectomy      FAMILY HISTORY:  FAMILY HISTORY:  Family history of acute myocardial infarction (Aunt)      SOCIAL HISTORY:  []Smoker  []Alcohol  []Drug    ALLERGIES:  morphine (Stomach Upset)      MEDICATIONS:  MEDICATIONS  (STANDING):  amiodarone    Tablet 200 milliGRAM(s) Oral every 12 hours  apixaban 2.5 milliGRAM(s) Oral every 12 hours  atorvastatin 40 milliGRAM(s) Oral at bedtime  buDESOnide 160 MICROgram(s)/formoterol 4.5 MICROgram(s) Inhaler 2 Puff(s) Inhalation two times a day  finasteride 5 milliGRAM(s) Oral daily  furosemide   Injectable 40 milliGRAM(s) IV Push daily  levothyroxine  Oral Tab/Cap - Peds 50 MICROGram(s) Oral daily  mexiletine 150 milliGRAM(s) Oral every 12 hours  montelukast 10 milliGRAM(s) Oral daily  tamsulosin 0.4 milliGRAM(s) Oral at bedtime    MEDICATIONS  (PRN):  ALBUTerol/ipratropium for Nebulization 3 milliLiter(s) Nebulizer every 6 hours PRN Bronchospasm  ALPRAZolam 0.25 milliGRAM(s) Oral daily PRN anxiety and insomnia      HOME MEDICATIONS:  Home Medications:  ALPRAZolam 0.25 mg oral tablet: 1 tab(s) orally once a day, As Needed (23 Oct 2018 00:22)  finasteride 5 mg oral tablet: 1 tab(s) orally once a day (23 Oct 2018 00:22)  levothyroxine 50 mcg (0.05 mg) oral capsule: 1 cap(s) orally once a day (23 Oct 2018 00:22)  montelukast 10 mg oral tablet: 1 tab(s) orally once a day (23 Oct 2018 00:22)  pravastatin 20 mg oral tablet: 1 tab(s) orally once a day (23 Oct 2018 00:22)  tamsulosin 0.4 mg oral capsule: 1 cap(s) orally once a day (23 Oct 2018 00:22)      VITALS:   T(F): 97.8 (07-20 @ 20:35), Max: 98.3 (07-20 @ 07:54)  HR: 92 (07-20 @ 20:35) (90 - 100)  BP: 86/50 (07-20 @ 20:35) (78/56 - 107/60)  BP(mean): --  RR: 18 (07-20 @ 20:35) (18 - 28)  SpO2: 98% (07-20 @ 20:35) (80% - 100%)    I&O's Summary      REVIEW OF SYSTEMS:  CONSTITUTIONAL: No weakness, fevers or chills  EYES/ENT: No visual changes;  No vertigo or throat pain   NECK: No pain or stiffness  RESPIRATORY: No cough, wheezing, hemoptysis; No shortness of breath  CARDIOVASCULAR: No chest pain or palpitations  GASTROINTESTINAL: No abdominal or epigastric pain. No nausea, vomiting, or hematemesis; No diarrhea or constipation. No melena or hematochezia.  GENITOURINARY: No dysuria, frequency or hematuria  NEUROLOGICAL: No numbness or weakness  SKIN: No itching, no rashes    PHYSICAL EXAM:  NEURO: patient is awake , alert and oriented  GEN: Not in acute distress  NECK: no thyroid enlargement, no JVD  LUNGS: Clear to auscultation bilaterally   CARDIOVASCULAR: S1/S2 present, RRR , no murmurs or rubs, no carotid bruits,  + PP bilaterally  ABD: Soft, non-tender, non-distended, +BS  EXT: No NOEL  SKIN: Intact    LABS:                        11.6   13.21 )-----------( 376      ( 20 Jul 2019 03:15 )             35.8     07-20    138  |  99  |  35<H>  ----------------------------<  159<H>  4.5   |  23  |  2.3<H>    Ca    8.9      20 Jul 2019 03:15    TPro  6.2  /  Alb  3.6  /  TBili  0.3  /  DBili  x   /  AST  21  /  ALT  23  /  AlkPhos  93  07-20      Troponin T, Serum: 0.02 ng/mL <H> (07-20-19 @ 16:07)  Troponin T, Serum: 0.02 ng/mL <H> (07-20-19 @ 03:15)    CARDIAC MARKERS ( 20 Jul 2019 16:07 )  x     / 0.02 ng/mL / x     / x     / x      CARDIAC MARKERS ( 20 Jul 2019 03:15 )  x     / 0.02 ng/mL / x     / x     / x            Troponin trend:    Serum Pro-Brain Natriuretic Peptide: 3681 pg/mL (07-20-19 @ 03:15)          RADIOLOGY:  -CXR:  -TTE:  -CCTA:  -STRESS TEST:  -CATHETERIZATION:    ECG:    TELEMETRY EVENTS: HPI:  78 yo male with PMH CHFrEF, Afib, Vtach s/p AICD,COPD, NANDO, spleenic laceration following fall) comes to the ED with complaint of SOB.  SOB gradual onset, worsened in the evening yesterday, could not sleep s/t SOB, aggravated on exertion, no relieving factor.  NO chest pain, or wheezing or cough or palpitation.  No fever, headache, recent sick contacts.    Patient was recently admitted for near syncope and DUYEN, discharged on july 17.    In the ED, patient RR 28, , spo2 80 room air, /60 (20 Jul 2019 11:36).    Cardiology note:     79 M with non-ischemic CMP, HFrEF (s/p CRT-D) EF~25%, CAD, A-fib, V-tach s/p ablation, COPD, NANDO splenic laceration, CKD presenting for dyspnea for the past few days. Sees Dr Holt and Dr Varela in the office.   Denies chest pain, dizziness, LOC.    PAST MEDICAL & SURGICAL HISTORY  CAD (coronary artery disease)  Ventricular tachycardia  NANDO on CPAP  COPD (chronic obstructive pulmonary disease)  Pacemaker  AICD (automatic cardioverter/defibrillator) present  Hypothyroidism  Atrial fibrillation  Congestive heart disease  Hypercholesteremia  AICD (automatic cardioverter/defibrillator) present  History of laparoscopic cholecystectomy      FAMILY HISTORY:  FAMILY HISTORY:  Family history of acute myocardial infarction (Aunt)      SOCIAL HISTORY:  []Smoker. Former smoker  []Alcohol  []Drug    ALLERGIES:  morphine (Stomach Upset)      MEDICATIONS:  MEDICATIONS  (STANDING):  amiodarone    Tablet 200 milliGRAM(s) Oral every 12 hours  apixaban 2.5 milliGRAM(s) Oral every 12 hours  atorvastatin 40 milliGRAM(s) Oral at bedtime  buDESOnide 160 MICROgram(s)/formoterol 4.5 MICROgram(s) Inhaler 2 Puff(s) Inhalation two times a day  finasteride 5 milliGRAM(s) Oral daily  furosemide   Injectable 40 milliGRAM(s) IV Push daily  levothyroxine  Oral Tab/Cap - Peds 50 MICROGram(s) Oral daily  mexiletine 150 milliGRAM(s) Oral every 12 hours  montelukast 10 milliGRAM(s) Oral daily  tamsulosin 0.4 milliGRAM(s) Oral at bedtime    MEDICATIONS  (PRN):  ALBUTerol/ipratropium for Nebulization 3 milliLiter(s) Nebulizer every 6 hours PRN Bronchospasm  ALPRAZolam 0.25 milliGRAM(s) Oral daily PRN anxiety and insomnia      HOME MEDICATIONS:  Home Medications:  ALPRAZolam 0.25 mg oral tablet: 1 tab(s) orally once a day, As Needed (23 Oct 2018 00:22)  finasteride 5 mg oral tablet: 1 tab(s) orally once a day (23 Oct 2018 00:22)  levothyroxine 50 mcg (0.05 mg) oral capsule: 1 cap(s) orally once a day (23 Oct 2018 00:22)  montelukast 10 mg oral tablet: 1 tab(s) orally once a day (23 Oct 2018 00:22)  pravastatin 20 mg oral tablet: 1 tab(s) orally once a day (23 Oct 2018 00:22)  tamsulosin 0.4 mg oral capsule: 1 cap(s) orally once a day (23 Oct 2018 00:22)      VITALS:   T(F): 97.8 (07-20 @ 20:35), Max: 98.3 (07-20 @ 07:54)  HR: 92 (07-20 @ 20:35) (90 - 100)  BP: 86/50 (07-20 @ 20:35) (78/56 - 107/60)  BP(mean): --  RR: 18 (07-20 @ 20:35) (18 - 28)  SpO2: 98% (07-20 @ 20:35) (80% - 100%)    I&O's Summary      REVIEW OF SYSTEMS:  CONSTITUTIONAL: (+) weakness, fevers or chills  EYES/ENT: No visual changes;  No vertigo or throat pain   NECK: No pain or stiffness  RESPIRATORY:(+) dry cough, wheezing, hemoptysis, dyspnea at rest   CARDIOVASCULAR: see HPI.  GASTROINTESTINAL: No abdominal or epigastric pain. No nausea, vomiting, or hematemesis; No diarrhea or constipation. No melena or hematochezia.  GENITOURINARY: No dysuria, frequency or hematuria  NEUROLOGICAL: No numbness or weakness  SKIN: No itching, no rashes    PHYSICAL EXAM:  NEURO: patient is awake , alert and oriented  GEN: Not in acute distress  NECK: no thyroid enlargement, mild JVD  LUNGS: bilateral crackles  CARDIOVASCULAR: S1/S2 present, RRR , systolic murmur best heard at the apex. No rubs, no carotid bruits,  + PP bilaterally  ABD: Soft, non-tender, non-distended, +BS  EXT: No NOEL  SKIN: Intact    LABS:                        11.6   13.21 )-----------( 376      ( 20 Jul 2019 03:15 )             35.8     07-20    138  |  99  |  35<H>  ----------------------------<  159<H>  4.5   |  23  |  2.3<H>    Ca    8.9      20 Jul 2019 03:15    TPro  6.2  /  Alb  3.6  /  TBili  0.3  /  DBili  x   /  AST  21  /  ALT  23  /  AlkPhos  93  07-20      Troponin T, Serum: 0.02 ng/mL <H> (07-20-19 @ 16:07)  Troponin T, Serum: 0.02 ng/mL <H> (07-20-19 @ 03:15)    CARDIAC MARKERS ( 20 Jul 2019 16:07 )  x     / 0.02 ng/mL / x     / x     / x      CARDIAC MARKERS ( 20 Jul 2019 03:15 )  x     / 0.02 ng/mL / x     / x     / x          Serum Pro-Brain Natriuretic Peptide: 3681 pg/mL (07-20-19 @ 03:15)          RADIOLOGY:  -CXR: bilateral vascular congestion  -TTE: < from: Transthoracic Echocardiogram (12.31.18 @ 07:47) >   1. LVEjection Fraction by Amaro's Method with a biplane EF of 25 %.   2. Severely decreased global left ventricular systolic function.   3. Mild mitral regurgitation.   4. Mild aortic regurgitation.    < end of copied text >    -CATHETERIZATION: (2016) 70% LCx stenosis. No intervention.     ECG:    A-V dissociation   Pacing spikes w/ failure to sense  Slow vtach?      TELEMETRY EVENTS:  None HPI:  80 yo male with PMH CHFrEF, Afib, Vtach s/p AICD,COPD, NANDO, spleenic laceration following fall) comes to the ED with complaint of SOB.  SOB gradual onset, worsened in the evening yesterday, could not sleep s/t SOB, aggravated on exertion, no relieving factor.  NO chest pain, or wheezing or cough or palpitation.  No fever, headache, recent sick contacts.    Patient was recently admitted for near syncope and DUYEN, discharged on july 17.    In the ED, patient RR 28, , spo2 80 room air, /60 (20 Jul 2019 11:36).    Cardiology note:     79 M with non-ischemic CMP, HFrEF (s/p CRT-D) EF~25%, CAD, A-fib, V-tach s/p ablation, COPD, NANDO splenic laceration, CKD presenting for dyspnea for the past few days. Sees Dr Holt and Dr Varela in the office.   Denies chest pain, dizziness, LOC.    PAST MEDICAL & SURGICAL HISTORY  CAD (coronary artery disease)  Ventricular tachycardia  NANDO on CPAP  COPD (chronic obstructive pulmonary disease)  Pacemaker  AICD (automatic cardioverter/defibrillator) present  Hypothyroidism  Atrial fibrillation  Congestive heart disease  Hypercholesteremia  AICD (automatic cardioverter/defibrillator) present  History of laparoscopic cholecystectomy      FAMILY HISTORY:  FAMILY HISTORY:  Family history of acute myocardial infarction (Aunt)      SOCIAL HISTORY:  []Smoker. Former smoker  []Alcohol  []Drug    ALLERGIES:  morphine (Stomach Upset)      MEDICATIONS:  MEDICATIONS  (STANDING):  amiodarone    Tablet 200 milliGRAM(s) Oral every 12 hours  apixaban 2.5 milliGRAM(s) Oral every 12 hours  atorvastatin 40 milliGRAM(s) Oral at bedtime  buDESOnide 160 MICROgram(s)/formoterol 4.5 MICROgram(s) Inhaler 2 Puff(s) Inhalation two times a day  finasteride 5 milliGRAM(s) Oral daily  furosemide   Injectable 40 milliGRAM(s) IV Push daily  levothyroxine  Oral Tab/Cap - Peds 50 MICROGram(s) Oral daily  mexiletine 150 milliGRAM(s) Oral every 12 hours  montelukast 10 milliGRAM(s) Oral daily  tamsulosin 0.4 milliGRAM(s) Oral at bedtime    MEDICATIONS  (PRN):  ALBUTerol/ipratropium for Nebulization 3 milliLiter(s) Nebulizer every 6 hours PRN Bronchospasm  ALPRAZolam 0.25 milliGRAM(s) Oral daily PRN anxiety and insomnia      HOME MEDICATIONS:  Home Medications:  ALPRAZolam 0.25 mg oral tablet: 1 tab(s) orally once a day, As Needed (23 Oct 2018 00:22)  finasteride 5 mg oral tablet: 1 tab(s) orally once a day (23 Oct 2018 00:22)  levothyroxine 50 mcg (0.05 mg) oral capsule: 1 cap(s) orally once a day (23 Oct 2018 00:22)  montelukast 10 mg oral tablet: 1 tab(s) orally once a day (23 Oct 2018 00:22)  pravastatin 20 mg oral tablet: 1 tab(s) orally once a day (23 Oct 2018 00:22)  tamsulosin 0.4 mg oral capsule: 1 cap(s) orally once a day (23 Oct 2018 00:22)      VITALS:   T(F): 97.8 (07-20 @ 20:35), Max: 98.3 (07-20 @ 07:54)  HR: 92 (07-20 @ 20:35) (90 - 100)  BP: 86/50 (07-20 @ 20:35) (78/56 - 107/60)  BP(mean): --  RR: 18 (07-20 @ 20:35) (18 - 28)  SpO2: 98% (07-20 @ 20:35) (80% - 100%)    I&O's Summary      REVIEW OF SYSTEMS:  CONSTITUTIONAL: (+) weakness, fevers or chills  EYES/ENT: No visual changes;  No vertigo or throat pain   NECK: No pain or stiffness  RESPIRATORY:(+) dry cough, wheezing, hemoptysis, dyspnea at rest   CARDIOVASCULAR: see HPI.  GASTROINTESTINAL: No abdominal or epigastric pain. No nausea, vomiting, or hematemesis; No diarrhea or constipation. No melena or hematochezia.  GENITOURINARY: No dysuria, frequency or hematuria  NEUROLOGICAL: No numbness or weakness  SKIN: No itching, no rashes    PHYSICAL EXAM:  NEURO: patient is awake , alert and oriented  GEN: Not in acute distress  NECK: no thyroid enlargement, mild JVD  LUNGS: bilateral crackles  CARDIOVASCULAR: S1/S2 present, RRR , systolic murmur best heard at the apex. No rubs, no carotid bruits,  + PP bilaterally  ABD: Soft, non-tender, non-distended, +BS  EXT: No NOEL  SKIN: Intact    LABS:                        11.6   13.21 )-----------( 376      ( 20 Jul 2019 03:15 )             35.8     07-20    138  |  99  |  35<H>  ----------------------------<  159<H>  4.5   |  23  |  2.3<H>    Ca    8.9      20 Jul 2019 03:15    TPro  6.2  /  Alb  3.6  /  TBili  0.3  /  DBili  x   /  AST  21  /  ALT  23  /  AlkPhos  93  07-20      Troponin T, Serum: 0.02 ng/mL <H> (07-20-19 @ 16:07)  Troponin T, Serum: 0.02 ng/mL <H> (07-20-19 @ 03:15)    CARDIAC MARKERS ( 20 Jul 2019 16:07 )  x     / 0.02 ng/mL / x     / x     / x      CARDIAC MARKERS ( 20 Jul 2019 03:15 )  x     / 0.02 ng/mL / x     / x     / x          Serum Pro-Brain Natriuretic Peptide: 3681 pg/mL (07-20-19 @ 03:15)          RADIOLOGY:  -CXR: bilateral vascular congestion  -TTE: < from: Transthoracic Echocardiogram (12.31.18 @ 07:47) >   1. LVEjection Fraction by Amaro's Method with a biplane EF of 25 %.   2. Severely decreased global left ventricular systolic function.   3. Mild mitral regurgitation.   4. Mild aortic regurgitation.    < end of copied text >    -CATHETERIZATION: (2016) 70% LCx stenosis. No intervention.     ECG:    A-sensed V-paced      TELEMETRY EVENTS:  None    DEVICE INTERROGATION:  Underlying rhythm: NSR    Since June 27:  No treated events   NSVT x6 at >143 bpm    AT/AF 87     AS-VS 11.8%  AS- 10.1%  AP-VS 1.1%  AP- 76.9% HPI:  78 yo male with PMH CHFrEF, Afib, Vtach s/p AICD,COPD, NANDO, spleenic laceration following fall) comes to the ED with complaint of SOB.  SOB gradual onset, worsened in the evening yesterday, could not sleep s/t SOB, aggravated on exertion, no relieving factor.  NO chest pain, or wheezing or cough or palpitation.  No fever, headache, recent sick contacts.    Patient was recently admitted for near syncope and DUYEN, discharged on july 17.    In the ED, patient RR 28, , spo2 80 room air, /60 (20 Jul 2019 11:36).    Cardiology note:     79 M with non-ischemic CMP, HFrEF (s/p CRT-D) EF~25%, CAD, A-fib, V-tach s/p ablation, COPD, NANDO splenic laceration, CKD presenting for dyspnea for the past few days. Sees Dr Holt and Dr Varela in the office.   Denies chest pain, dizziness, LOC.    PAST MEDICAL & SURGICAL HISTORY  CAD (coronary artery disease)  Ventricular tachycardia  NANDO on CPAP  COPD (chronic obstructive pulmonary disease)  Pacemaker  AICD (automatic cardioverter/defibrillator) present  Hypothyroidism  Atrial fibrillation  Congestive heart disease  Hypercholesteremia  AICD (automatic cardioverter/defibrillator) present  History of laparoscopic cholecystectomy      FAMILY HISTORY:  FAMILY HISTORY:  Family history of acute myocardial infarction (Aunt)      SOCIAL HISTORY:  []Smoker. Former smoker  []Alcohol  []Drug    ALLERGIES:  morphine (Stomach Upset)      MEDICATIONS:  MEDICATIONS  (STANDING):  amiodarone    Tablet 200 milliGRAM(s) Oral every 12 hours  apixaban 2.5 milliGRAM(s) Oral every 12 hours  atorvastatin 40 milliGRAM(s) Oral at bedtime  buDESOnide 160 MICROgram(s)/formoterol 4.5 MICROgram(s) Inhaler 2 Puff(s) Inhalation two times a day  finasteride 5 milliGRAM(s) Oral daily  furosemide   Injectable 40 milliGRAM(s) IV Push daily  levothyroxine  Oral Tab/Cap - Peds 50 MICROGram(s) Oral daily  mexiletine 150 milliGRAM(s) Oral every 12 hours  montelukast 10 milliGRAM(s) Oral daily  tamsulosin 0.4 milliGRAM(s) Oral at bedtime    MEDICATIONS  (PRN):  ALBUTerol/ipratropium for Nebulization 3 milliLiter(s) Nebulizer every 6 hours PRN Bronchospasm  ALPRAZolam 0.25 milliGRAM(s) Oral daily PRN anxiety and insomnia      HOME MEDICATIONS:  Home Medications:  ALPRAZolam 0.25 mg oral tablet: 1 tab(s) orally once a day, As Needed (23 Oct 2018 00:22)  finasteride 5 mg oral tablet: 1 tab(s) orally once a day (23 Oct 2018 00:22)  levothyroxine 50 mcg (0.05 mg) oral capsule: 1 cap(s) orally once a day (23 Oct 2018 00:22)  montelukast 10 mg oral tablet: 1 tab(s) orally once a day (23 Oct 2018 00:22)  pravastatin 20 mg oral tablet: 1 tab(s) orally once a day (23 Oct 2018 00:22)  tamsulosin 0.4 mg oral capsule: 1 cap(s) orally once a day (23 Oct 2018 00:22)      VITALS:   T(F): 97.8 (07-20 @ 20:35), Max: 98.3 (07-20 @ 07:54)  HR: 92 (07-20 @ 20:35) (90 - 100)  BP: 86/50 (07-20 @ 20:35) (78/56 - 107/60)  BP(mean): --  RR: 18 (07-20 @ 20:35) (18 - 28)  SpO2: 98% (07-20 @ 20:35) (80% - 100%)    I&O's Summary      REVIEW OF SYSTEMS:  CONSTITUTIONAL: (+) weakness, fevers or chills  EYES/ENT: No visual changes;  No vertigo or throat pain   NECK: No pain or stiffness  RESPIRATORY:(+) dry cough, wheezing, hemoptysis, dyspnea at rest   CARDIOVASCULAR: see HPI.  GASTROINTESTINAL: No abdominal or epigastric pain. No nausea, vomiting, or hematemesis; No diarrhea or constipation. No melena or hematochezia.  GENITOURINARY: No dysuria, frequency or hematuria  NEUROLOGICAL: No numbness or weakness  SKIN: No itching, no rashes    PHYSICAL EXAM:  NEURO: patient is awake , alert and oriented  GEN: Not in acute distress  NECK: no thyroid enlargement, mild JVD  LUNGS: bilateral crackles  CARDIOVASCULAR: S1/S2 present, RRR , systolic murmur best heard at the apex. No rubs, no carotid bruits,  + PP bilaterally  ABD: Soft, non-tender, non-distended, +BS  EXT: No NOEL  SKIN: Intact    LABS:                        11.6   13.21 )-----------( 376      ( 20 Jul 2019 03:15 )             35.8     07-20    138  |  99  |  35<H>  ----------------------------<  159<H>  4.5   |  23  |  2.3<H>    Ca    8.9      20 Jul 2019 03:15    TPro  6.2  /  Alb  3.6  /  TBili  0.3  /  DBili  x   /  AST  21  /  ALT  23  /  AlkPhos  93  07-20      Troponin T, Serum: 0.02 ng/mL <H> (07-20-19 @ 16:07)  Troponin T, Serum: 0.02 ng/mL <H> (07-20-19 @ 03:15)    CARDIAC MARKERS ( 20 Jul 2019 16:07 )  x     / 0.02 ng/mL / x     / x     / x      CARDIAC MARKERS ( 20 Jul 2019 03:15 )  x     / 0.02 ng/mL / x     / x     / x          Serum Pro-Brain Natriuretic Peptide: 3681 pg/mL (07-20-19 @ 03:15)          RADIOLOGY:  -CXR: bilateral vascular congestion  -TTE: < from: Transthoracic Echocardiogram (12.31.18 @ 07:47) >   1. LVEjection Fraction by Amaro's Method with a biplane EF of 25 %.   2. Severely decreased global left ventricular systolic function.   3. Mild mitral regurgitation.   4. Mild aortic regurgitation.    < end of copied text >    -CATHETERIZATION: (2016) 70% LCx stenosis. No intervention.     ECG:    A-sensed V-paced      TELEMETRY EVENTS:  None    DEVICE INTERROGATION:  Underlying rhythm: NSR    Since June 27:  No treated events   NSVT x6 at >143 bpm    AT/AF 87     AS-VS 11.8%  AS- 10.1%  AP-VS 1.1%  AP- 76.9%

## 2019-07-20 NOTE — ED PROVIDER NOTE - CRITICAL CARE PROVIDED
direct patient care (not related to procedure)/additional history taking/interpretation of diagnostic studies/consult w/ pt's family directly relating to pts condition/documentation

## 2019-07-20 NOTE — H&P ADULT - NSHPLABSRESULTS_GEN_ALL_CORE
11.6   13.21 )-----------( 376      ( 20 Jul 2019 03:15 )             35.8             07-20    138  |  99  |  35<H>  ----------------------------<  159<H>  4.5   |  23  |  2.3<H>    Ca    8.9      20 Jul 2019 03:15    TPro  6.2  /  Alb  3.6  /  TBili  0.3  /  DBili  x   /  AST  21  /  ALT  23  /  AlkPhos  93  07-20    LIVER FUNCTIONS - ( 20 Jul 2019 03:15 )  Alb: 3.6 g/dL / Pro: 6.2 g/dL / ALK PHOS: 93 U/L / ALT: 23 U/L / AST: 21 U/L / GGT: x                   CARDIAC MARKERS ( 20 Jul 2019 03:15 )  x     / 0.02 ng/mL / x     / x     / x          Serum Pro-Brain Natriuretic Peptide: 3681 pg/mL (07-20-19 @ 03:15)    12 Lead ECG:   Ventricular Rate 89 BPM    Atrial Rate 85 BPM    QRS Duration 192 ms    Q-T Interval 480 ms    QTC Calculation(Bezet) 584 ms    R Axis 56 degrees    T Axis 255 degrees    Diagnosis Line Sinus rhythm with A-V dissociation and Wide QRS rhythm  Left bundle branch block  Pacing spikes noted, c/w loss of sensing, pacemaker malfunction.  Abnormal ECG    Unchanged from prior

## 2019-07-20 NOTE — ED PROVIDER NOTE - PHYSICAL EXAMINATION
CONSTITUTIONAL: Frail appearing.  SKIN: warm, dry  HEAD: Normocephalic; atraumatic.  EYES: PERRL, EOMI, no conjunctival erythema  ENT: No nasal discharge; airway clear.  NECK: Supple; non tender.  CARD: S1, S2 normal; no murmurs, gallops, or rubs. Regular rate and rhythm.   RESP: Increased work of breathing. No wheezes, rales or rhonchi.  ABD: soft ntnd  EXT: Normal ROM.  No clubbing, cyanosis or edema.   LYMPH: No acute cervical adenopathy.  NEURO: Alert, oriented, grossly unremarkable  PSYCH: Cooperative, appropriate.

## 2019-07-20 NOTE — H&P ADULT - ASSESSMENT
# Acute exacerbation of Systolic CHF  - lasix IV 40 mg bid  - repeat xray chest in am  - f/u BMP  - intake and output  - daily weight  - cardio Dr Varela  - Ep for device interrogation # Acute exacerbation of Systolic CHF  - lasix IV 40 mg daily  - repeat xray chest in am  - f/u BMP  - intake and output  - daily weight  - cardio Dr Varela    # DUYEN:  likely from CHF  continue with diuresis  follow up bmp in am  follow up urine stuidies    # Afib s/p ablation:  cw eliquis  rate controlled    # AICD:  follow up with EP as OP Dr Holt    NANDO:  on CPAP    DVT ppx:  on eliquis    Diet:  dash/tlc    Activity: AAT    Dispo: from home # Acute exacerbation of Systolic CHF  - lasix IV 40 mg daily  - repeat xray chest in am  - f/u BMP  - intake and output  - daily weight  - cardio Dr Varela  - there is increase in left LL opacity, no fever, chest pain or cough/wheeze- will follow up cxr in am and CRP  - no abx for now    # DUYEN:  likely from CHF  continue with diuresis  follow up bmp in am  follow up urine stuidies    # Afib s/p ablation:  cw eliquis  rate controlled    # COPD    stable   no wheeze  follows up with Dr ta nickerson home  will give symbicort while in house  kaushik PRN    # AICD:  follow up with EP as OP Dr Holt    NANDO:  on CPAP    DVT ppx:  on eliquis    Diet:  dash/tlc    Activity: AAT    Dispo: from home

## 2019-07-20 NOTE — CONSULT NOTE ADULT - ASSESSMENT
Patient is a 79y old  Male who presents with a chief complaint of SOB (20 Jul 2019 20:37)    Impression:    CAD (coronary artery disease)  Ventricular tachycardia s/p ablation/AICD  NANDO on CPAP  COPD (chronic obstructive pulmonary disease)  HFrEF s/p CRT-D  Hypothyroidism  Atrial fibrillation  Hypercholesteremia Patient is a 79y old  Male who presents with a chief complaint of SOB (20 Jul 2019 20:37)    Impression:    CAD (coronary artery disease)  Ventricular tachycardia s/p ablation/AICD  NANDO on CPAP  COPD (chronic obstructive pulmonary disease)  HFrEF s/p CRT-D  Hypothyroidism  Atrial fibrillation  Hypercholesteremia    Plan:   - IV diuresis, monitor I's and O's and daily weights  - Monitor Cr  - Nephrology and EP consult  - Continue home meds

## 2019-07-21 LAB
ALBUMIN SERPL ELPH-MCNC: 3.4 G/DL — LOW (ref 3.5–5.2)
ALP SERPL-CCNC: 77 U/L — SIGNIFICANT CHANGE UP (ref 30–115)
ALT FLD-CCNC: 27 U/L — SIGNIFICANT CHANGE UP (ref 0–41)
ANION GAP SERPL CALC-SCNC: 19 MMOL/L — HIGH (ref 7–14)
AST SERPL-CCNC: 26 U/L — SIGNIFICANT CHANGE UP (ref 0–41)
BASOPHILS # BLD AUTO: 0.12 K/UL — SIGNIFICANT CHANGE UP (ref 0–0.2)
BASOPHILS NFR BLD AUTO: 1 % — SIGNIFICANT CHANGE UP (ref 0–1)
BILIRUB SERPL-MCNC: 0.6 MG/DL — SIGNIFICANT CHANGE UP (ref 0.2–1.2)
BUN SERPL-MCNC: 34 MG/DL — HIGH (ref 10–20)
CALCIUM SERPL-MCNC: 9 MG/DL — SIGNIFICANT CHANGE UP (ref 8.5–10.1)
CHLORIDE SERPL-SCNC: 99 MMOL/L — SIGNIFICANT CHANGE UP (ref 98–110)
CO2 SERPL-SCNC: 21 MMOL/L — SIGNIFICANT CHANGE UP (ref 17–32)
CREAT SERPL-MCNC: 2.1 MG/DL — HIGH (ref 0.7–1.5)
CRP SERPL-MCNC: 7.63 MG/DL — HIGH (ref 0–0.4)
EOSINOPHIL # BLD AUTO: 0.14 K/UL — SIGNIFICANT CHANGE UP (ref 0–0.7)
EOSINOPHIL NFR BLD AUTO: 1.2 % — SIGNIFICANT CHANGE UP (ref 0–8)
GLUCOSE BLDC GLUCOMTR-MCNC: 183 MG/DL — HIGH (ref 70–99)
GLUCOSE SERPL-MCNC: 141 MG/DL — HIGH (ref 70–99)
HCT VFR BLD CALC: 33.6 % — LOW (ref 42–52)
HGB BLD-MCNC: 10.8 G/DL — LOW (ref 14–18)
IMM GRANULOCYTES NFR BLD AUTO: 0.3 % — SIGNIFICANT CHANGE UP (ref 0.1–0.3)
LYMPHOCYTES # BLD AUTO: 1.15 K/UL — LOW (ref 1.2–3.4)
LYMPHOCYTES # BLD AUTO: 9.5 % — LOW (ref 20.5–51.1)
MAGNESIUM SERPL-MCNC: 2.1 MG/DL — SIGNIFICANT CHANGE UP (ref 1.8–2.4)
MCHC RBC-ENTMCNC: 29.1 PG — SIGNIFICANT CHANGE UP (ref 27–31)
MCHC RBC-ENTMCNC: 32.1 G/DL — SIGNIFICANT CHANGE UP (ref 32–37)
MCV RBC AUTO: 90.6 FL — SIGNIFICANT CHANGE UP (ref 80–94)
MONOCYTES # BLD AUTO: 1.05 K/UL — HIGH (ref 0.1–0.6)
MONOCYTES NFR BLD AUTO: 8.7 % — SIGNIFICANT CHANGE UP (ref 1.7–9.3)
NEUTROPHILS # BLD AUTO: 9.57 K/UL — HIGH (ref 1.4–6.5)
NEUTROPHILS NFR BLD AUTO: 79.3 % — HIGH (ref 42.2–75.2)
NRBC # BLD: 0 /100 WBCS — SIGNIFICANT CHANGE UP (ref 0–0)
PLATELET # BLD AUTO: 348 K/UL — SIGNIFICANT CHANGE UP (ref 130–400)
POTASSIUM SERPL-MCNC: 4.4 MMOL/L — SIGNIFICANT CHANGE UP (ref 3.5–5)
POTASSIUM SERPL-SCNC: 4.4 MMOL/L — SIGNIFICANT CHANGE UP (ref 3.5–5)
PROT SERPL-MCNC: 5.9 G/DL — LOW (ref 6–8)
RBC # BLD: 3.71 M/UL — LOW (ref 4.7–6.1)
RBC # FLD: 16.1 % — HIGH (ref 11.5–14.5)
SODIUM SERPL-SCNC: 139 MMOL/L — SIGNIFICANT CHANGE UP (ref 135–146)
WBC # BLD: 12.07 K/UL — HIGH (ref 4.8–10.8)
WBC # FLD AUTO: 12.07 K/UL — HIGH (ref 4.8–10.8)

## 2019-07-21 PROCEDURE — 71045 X-RAY EXAM CHEST 1 VIEW: CPT | Mod: 26

## 2019-07-21 PROCEDURE — 93284 PRGRMG EVAL IMPLANTABLE DFB: CPT | Mod: 26

## 2019-07-21 PROCEDURE — 99222 1ST HOSP IP/OBS MODERATE 55: CPT

## 2019-07-21 PROCEDURE — 99233 SBSQ HOSP IP/OBS HIGH 50: CPT

## 2019-07-21 PROCEDURE — 93010 ELECTROCARDIOGRAM REPORT: CPT

## 2019-07-21 PROCEDURE — 99223 1ST HOSP IP/OBS HIGH 75: CPT

## 2019-07-21 RX ORDER — LIDOCAINE HCL 20 MG/ML
70 VIAL (ML) INJECTION ONCE
Refills: 0 | Status: COMPLETED | OUTPATIENT
Start: 2019-07-21 | End: 2019-07-21

## 2019-07-21 RX ORDER — METOPROLOL TARTRATE 50 MG
25 TABLET ORAL ONCE
Refills: 0 | Status: COMPLETED | OUTPATIENT
Start: 2019-07-21 | End: 2019-07-21

## 2019-07-21 RX ORDER — LIDOCAINE HCL 20 MG/ML
35 VIAL (ML) INJECTION ONCE
Refills: 0 | Status: COMPLETED | OUTPATIENT
Start: 2019-07-21 | End: 2019-07-21

## 2019-07-21 RX ORDER — AMIODARONE HYDROCHLORIDE 400 MG/1
1 TABLET ORAL
Qty: 900 | Refills: 0 | Status: DISCONTINUED | OUTPATIENT
Start: 2019-07-21 | End: 2019-07-22

## 2019-07-21 RX ORDER — AMIODARONE HYDROCHLORIDE 400 MG/1
150 TABLET ORAL ONCE
Refills: 0 | Status: COMPLETED | OUTPATIENT
Start: 2019-07-21 | End: 2019-07-21

## 2019-07-21 RX ORDER — METOPROLOL TARTRATE 50 MG
50 TABLET ORAL
Refills: 0 | Status: DISCONTINUED | OUTPATIENT
Start: 2019-07-21 | End: 2019-07-21

## 2019-07-21 RX ORDER — MEXILETINE HYDROCHLORIDE 150 MG/1
200 CAPSULE ORAL EVERY 12 HOURS
Refills: 0 | Status: DISCONTINUED | OUTPATIENT
Start: 2019-07-21 | End: 2019-07-25

## 2019-07-21 RX ORDER — LIDOCAINE HCL 20 MG/ML
35 VIAL (ML) INJECTION ONCE
Refills: 0 | Status: DISCONTINUED | OUTPATIENT
Start: 2019-07-21 | End: 2019-07-21

## 2019-07-21 RX ORDER — AMIODARONE HYDROCHLORIDE 400 MG/1
1 TABLET ORAL
Qty: 900 | Refills: 0 | Status: DISCONTINUED | OUTPATIENT
Start: 2019-07-21 | End: 2019-07-21

## 2019-07-21 RX ORDER — METOPROLOL TARTRATE 50 MG
25 TABLET ORAL THREE TIMES A DAY
Refills: 0 | Status: DISCONTINUED | OUTPATIENT
Start: 2019-07-21 | End: 2019-07-21

## 2019-07-21 RX ORDER — MEXILETINE HYDROCHLORIDE 150 MG/1
200 CAPSULE ORAL ONCE
Refills: 0 | Status: COMPLETED | OUTPATIENT
Start: 2019-07-21 | End: 2019-07-21

## 2019-07-21 RX ORDER — AMIODARONE HYDROCHLORIDE 400 MG/1
0.5 TABLET ORAL
Qty: 900 | Refills: 0 | Status: DISCONTINUED | OUTPATIENT
Start: 2019-07-21 | End: 2019-07-21

## 2019-07-21 RX ORDER — METOPROLOL TARTRATE 50 MG
5 TABLET ORAL ONCE
Refills: 0 | Status: COMPLETED | OUTPATIENT
Start: 2019-07-21 | End: 2019-07-21

## 2019-07-21 RX ORDER — METOPROLOL TARTRATE 50 MG
25 TABLET ORAL
Refills: 0 | Status: DISCONTINUED | OUTPATIENT
Start: 2019-07-21 | End: 2019-07-23

## 2019-07-21 RX ORDER — METOPROLOL TARTRATE 50 MG
25 TABLET ORAL
Refills: 0 | Status: DISCONTINUED | OUTPATIENT
Start: 2019-07-21 | End: 2019-07-21

## 2019-07-21 RX ORDER — LIDOCAINE HCL 20 MG/ML
2 VIAL (ML) INJECTION
Qty: 2 | Refills: 0 | Status: DISCONTINUED | OUTPATIENT
Start: 2019-07-21 | End: 2019-07-22

## 2019-07-21 RX ORDER — AMIODARONE HYDROCHLORIDE 400 MG/1
150 TABLET ORAL ONCE
Refills: 0 | Status: DISCONTINUED | OUTPATIENT
Start: 2019-07-21 | End: 2019-07-21

## 2019-07-21 RX ORDER — METOPROLOL TARTRATE 50 MG
25 TABLET ORAL EVERY 6 HOURS
Refills: 0 | Status: DISCONTINUED | OUTPATIENT
Start: 2019-07-21 | End: 2019-07-21

## 2019-07-21 RX ORDER — AMIODARONE HYDROCHLORIDE 400 MG/1
0.5 TABLET ORAL
Qty: 900 | Refills: 0 | Status: DISCONTINUED | OUTPATIENT
Start: 2019-07-21 | End: 2019-07-23

## 2019-07-21 RX ADMIN — Medication 70 MILLIGRAM(S): at 15:54

## 2019-07-21 RX ADMIN — Medication 15 MG/MIN: at 19:15

## 2019-07-21 RX ADMIN — Medication 35 MILLIGRAM(S): at 16:12

## 2019-07-21 RX ADMIN — Medication 25 MILLIGRAM(S): at 16:37

## 2019-07-21 RX ADMIN — Medication 600 MILLIGRAM(S): at 06:45

## 2019-07-21 RX ADMIN — Medication 35 MILLIGRAM(S): at 16:20

## 2019-07-21 RX ADMIN — BUDESONIDE AND FORMOTEROL FUMARATE DIHYDRATE 2 PUFF(S): 160; 4.5 AEROSOL RESPIRATORY (INHALATION) at 09:44

## 2019-07-21 RX ADMIN — Medication 25 MILLIGRAM(S): at 18:51

## 2019-07-21 RX ADMIN — APIXABAN 2.5 MILLIGRAM(S): 2.5 TABLET, FILM COATED ORAL at 06:45

## 2019-07-21 RX ADMIN — Medication 35 MILLIGRAM(S): at 15:58

## 2019-07-21 RX ADMIN — Medication 600 MILLIGRAM(S): at 18:51

## 2019-07-21 RX ADMIN — AMIODARONE HYDROCHLORIDE 33.33 MG/MIN: 400 TABLET ORAL at 19:15

## 2019-07-21 RX ADMIN — AMIODARONE HYDROCHLORIDE 618 MILLIGRAM(S): 400 TABLET ORAL at 16:21

## 2019-07-21 RX ADMIN — AMIODARONE HYDROCHLORIDE 200 MILLIGRAM(S): 400 TABLET ORAL at 06:45

## 2019-07-21 RX ADMIN — Medication 40 MILLIGRAM(S): at 06:45

## 2019-07-21 RX ADMIN — APIXABAN 2.5 MILLIGRAM(S): 2.5 TABLET, FILM COATED ORAL at 18:51

## 2019-07-21 RX ADMIN — MONTELUKAST 10 MILLIGRAM(S): 4 TABLET, CHEWABLE ORAL at 13:58

## 2019-07-21 RX ADMIN — ATORVASTATIN CALCIUM 40 MILLIGRAM(S): 80 TABLET, FILM COATED ORAL at 21:36

## 2019-07-21 RX ADMIN — Medication 5 MILLIGRAM(S): at 16:35

## 2019-07-21 RX ADMIN — MEXILETINE HYDROCHLORIDE 200 MILLIGRAM(S): 150 CAPSULE ORAL at 13:59

## 2019-07-21 RX ADMIN — FINASTERIDE 5 MILLIGRAM(S): 5 TABLET, FILM COATED ORAL at 13:58

## 2019-07-21 RX ADMIN — TAMSULOSIN HYDROCHLORIDE 0.4 MILLIGRAM(S): 0.4 CAPSULE ORAL at 21:36

## 2019-07-21 RX ADMIN — AMIODARONE HYDROCHLORIDE 618 MILLIGRAM(S): 400 TABLET ORAL at 16:18

## 2019-07-21 RX ADMIN — Medication 50 MICROGRAM(S): at 06:45

## 2019-07-21 RX ADMIN — Medication 3 MILLILITER(S): at 07:46

## 2019-07-21 RX ADMIN — BUDESONIDE AND FORMOTEROL FUMARATE DIHYDRATE 2 PUFF(S): 160; 4.5 AEROSOL RESPIRATORY (INHALATION) at 21:35

## 2019-07-21 RX ADMIN — MEXILETINE HYDROCHLORIDE 200 MILLIGRAM(S): 150 CAPSULE ORAL at 18:51

## 2019-07-21 NOTE — CONSULT NOTE ADULT - ASSESSMENT
Impression:    CAD (coronary artery disease)  Ventricular tachycardia s/p ablation/AICD  NANDO on CPAP  COPD (chronic obstructive pulmonary disease)  HFrEF s/p CRT-D  Hypothyroidism  Atrial fibrillation  Hypercholesteremia  Possible cardiorenal syndrome?    Plan:   - c/w IV lasix  - monitor I&O and daily weights  - follow Cr. Possible cardiorenal syndrome?  - f/u nephrology evaluation  - c/w home meds Impression:  CAD (coronary artery disease)  Ventricular tachycardia s/p ablation/AICD  NANDO on CPAP  COPD (chronic obstructive pulmonary disease)  HFrEF s/p CRT-D  Hypothyroidism  Atrial fibrillation  Hypercholesteremia  Possible cardiorenal syndrome?    Plan:   - c/w aggressive IV lasix  - monitor I&O and daily weights  - increase mexilitene to 200mg BID  - amiodarone 200mg daily Impression:  CAD (coronary artery disease)  Recurrent sustained   Ventricular tachycardia on telemetry at  130/ min  S/P  ICD BIV      Plan:   ICD Interrogated showed  no events but  numerous V. sensed events due to sustained  slow VT  Programed  pacing rate to 100/min-   increase mexilitene to 200mg BID  upgrade to CCU For possible lidocaine IV  increase metoprolol -	  - c/w aggressive IV lasix  - monitor I&O and daily weights   amiodarone 200mg daily Impression:  CAD (coronary artery disease)  Recurrent sustained   Ventricular tachycardia on telemetry at  130/ min  S/P  ICD BIV      Plan:   ICD Interrogated showed  no events but  numerous V. sensed events due to sustained  slow VT  Programed  pacing rate to 100/min-   increase mexilitene to 200mg BID  upgrade to CCU For possible lidocaine IV  Start on  metoprolol 25 mg bid and titrate up dose if needed-	  - c/w aggressive IV lasix  - monitor I&O and daily weights   amiodarone 200mg daily

## 2019-07-21 NOTE — CONSULT NOTE ADULT - SUBJECTIVE AND OBJECTIVE BOX
Date of Admission: 19    CHIEF COMPLAINT:     HISTORY OF PRESENT ILLNESS: 79yMale with PMHx non-ischemic CMP, HFrEF (s/p CRT-D) EF~25%, CAD, A-fib, V-tach s/p ablation, COPD, NANDO splenic laceration, CKD comes to the ED with complaint of SOB.  The SOB was gradual onset, worsened in the evening yesterday, could not sleep s/t SOB, aggravated on exertion, no relieving factor.  NO chest pain, or wheezing or cough or palpitation.  No fever, headache, recent sick contacts.    Patient was recently admitted for near syncope and DUYEN, discharged on .    In the ED, patient RR 28, , spo2 80 room air, /60 (2019 11:36).    PAST MEDICAL & SURGICAL HISTORY:  CAD (coronary artery disease)  Ventricular tachycardia  NANDO on CPAP  COPD (chronic obstructive pulmonary disease)  Pacemaker  AICD (automatic cardioverter/defibrillator) present  Hypothyroidism  Atrial fibrillation  Congestive heart disease  Hypercholesteremia  AICD (automatic cardioverter/defibrillator) present  History of laparoscopic cholecystectomy    HEALTH ISSUES - PROBLEM Dx:        FAMILY HISTORY:  Family history of acute myocardial infarction (Aunt)    Allergies    morphine (Stomach Upset)    Intolerances    	  Home Medications:  ALPRAZolam 0.25 mg oral tablet: 1 tab(s) orally once a day, As Needed (23 Oct 2018 00:22)  finasteride 5 mg oral tablet: 1 tab(s) orally once a day (23 Oct 2018 00:22)  levothyroxine 50 mcg (0.05 mg) oral capsule: 1 cap(s) orally once a day (23 Oct 2018 00:22)  montelukast 10 mg oral tablet: 1 tab(s) orally once a day (23 Oct 2018 00:22)  pravastatin 20 mg oral tablet: 1 tab(s) orally once a day (23 Oct 2018 00:22)  tamsulosin 0.4 mg oral capsule: 1 cap(s) orally once a day (23 Oct 2018 00:22)    MEDICATIONS  (STANDING):  amiodarone    Tablet 200 milliGRAM(s) Oral every 12 hours  apixaban 2.5 milliGRAM(s) Oral every 12 hours  atorvastatin 40 milliGRAM(s) Oral at bedtime  buDESOnide 160 MICROgram(s)/formoterol 4.5 MICROgram(s) Inhaler 2 Puff(s) Inhalation two times a day  finasteride 5 milliGRAM(s) Oral daily  furosemide   Injectable 40 milliGRAM(s) IV Push daily  guaiFENesin  milliGRAM(s) Oral every 12 hours  levothyroxine  Oral Tab/Cap - Peds 50 MICROGram(s) Oral daily  mexiletine 150 milliGRAM(s) Oral every 12 hours  montelukast 10 milliGRAM(s) Oral daily  tamsulosin 0.4 milliGRAM(s) Oral at bedtime    MEDICATIONS  (PRN):  ALBUTerol/ipratropium for Nebulization 3 milliLiter(s) Nebulizer every 6 hours PRN Bronchospasm  ALPRAZolam 0.25 milliGRAM(s) Oral daily PRN anxiety and insomnia      SOCIAL HISTORY:    [ ] Non-smoker  [ ] Smoker  [ ] Alcohol    REVIEW OF SYSTEMS:  CONSTITUTIONAL: No fever, weight loss, or fatigue  CARDIOLOGY: PAtient denies chest pain, shortness of breath or syncopal episodes.   RESPIRATORY: denies shortness of breath, wheezeing.   NEUROLOGICAL: NO weakness, no focal deficits to report.  ENDOCRINOLOGICAL: no recent change in diabetic medications.   GI: no BRBPR, no N,V,diarrhea.    PSYCHIATRY: normal mood and affect  HEENT: no nasal discharge, no ecchymosis  SKIN: no ecchymosis, no breakdown  MUSCULOSKELETAL: Full range of motion x4.      PHYSICAL EXAM:  T(C): 35.6 (19 @ 05:59), Max: 36.6 (19 @ 20:35)  HR: 94 (19 @ 05:59) (92 - 97)  BP: 98/57 (19 @ 05:59) (86/50 - 98/57)  RR: 18 (19 @ 05:59) (18 - 19)  SpO2: 96% (19 @ 07:48) (96% - 99%)  Wt(kg): --  I&O's Summary    Daily     Daily Weight in k.8 (2019 05:59)    General Appearance: Normal	  Cardiovascular: Normal S1 S2, No JVD, No murmurs, No edema  Respiratory: Lungs clear to auscultation	  Psychiatry: A & O x 3, Mood & affect appropriate  Gastrointestinal:  Soft, Non-tender  Skin: No rashes, No ecchymoses, No cyanosis	  Neurologic: Non-focal  Extremities: Normal range of motion, No clubbing, cyanosis or edema  Vascular: Peripheral pulses palpable 2+ bilaterally    LABS:	 	                          10.8   12.07 )-----------( 348      ( 2019 06:04 )             33.6     07-    139  |  99  |  34<H>  ----------------------------<  141<H>  4.4   |  21  |  2.1<H>    Ca    9.0      2019 06:04  Mg     2.1     -    TPro  5.9<L>  /  Alb  3.4<L>  /  TBili  0.6  /  DBili  x   /  AST  26  /  ALT  27  /  AlkPhos  77  07-    CARDIAC MARKERS ( 2019 16:07 )  x     / 0.02 ng/mL / x     / x     / x      CARDIAC MARKERS ( 2019 03:15 )  x     / 0.02 ng/mL / x     / x     / x              proBNP:   Lipid Profile:   HgA1c:   TSH:       CARDIAC MARKERS:            TELEMETRY EVENTS: 	    ECG:  	  RADIOLOGY:  OTHER: 	    PREVIOUS DIAGNOSTIC TESTING:    [ ] Echocardiogram:  [ ]  Catheterization:  [ ] Stress Test:  	  	  ASSESSMENT/PLAN: Date of Admission: 19    CHIEF COMPLAINT:     HISTORY OF PRESENT ILLNESS: 79yMale with PMHx non-ischemic CMP, HFrEF (s/p CRT-D) EF~25%, CAD, A-fib, V-tach s/p ablation, COPD, NANDO splenic laceration, CKD comes to the ED with complaint of SOB. The SOB was gradual onset, worsened in the evening yesterday, could not sleep s/t SOB, aggravated on exertion, no relieving factor. NO chest pain, or wheezing or cough or palpitation. No fever, headache, recent sick contacts.    Patient was recently admitted for near syncope and DUYEN, discharged on .    In the ED, patient RR 28, , spo2 80 room air, /60 (2019 11:36).    PAST MEDICAL & SURGICAL HISTORY:  CAD (coronary artery disease)  Ventricular tachycardia  NANDO on CPAP  COPD (chronic obstructive pulmonary disease)  Pacemaker  AICD (automatic cardioverter/defibrillator) present  Hypothyroidism  Atrial fibrillation  Congestive heart disease  Hypercholesteremia  AICD (automatic cardioverter/defibrillator) present  History of laparoscopic cholecystectomy    FAMILY HISTORY:  Family history of acute myocardial infarction (Aunt)    Allergies  morphine (Stomach Upset)  	  Home Medications:  ALPRAZolam 0.25 mg oral tablet: 1 tab(s) orally once a day, As Needed (23 Oct 2018 00:22)  finasteride 5 mg oral tablet: 1 tab(s) orally once a day (23 Oct 2018 00:22)  levothyroxine 50 mcg (0.05 mg) oral capsule: 1 cap(s) orally once a day (23 Oct 2018 00:22)  montelukast 10 mg oral tablet: 1 tab(s) orally once a day (23 Oct 2018 00:22)  pravastatin 20 mg oral tablet: 1 tab(s) orally once a day (23 Oct 2018 00:22)  tamsulosin 0.4 mg oral capsule: 1 cap(s) orally once a day (23 Oct 2018 00:22)    MEDICATIONS  (STANDING):  amiodarone    Tablet 200 milliGRAM(s) Oral every 12 hours  apixaban 2.5 milliGRAM(s) Oral every 12 hours  atorvastatin 40 milliGRAM(s) Oral at bedtime  buDESOnide 160 MICROgram(s)/formoterol 4.5 MICROgram(s) Inhaler 2 Puff(s) Inhalation two times a day  finasteride 5 milliGRAM(s) Oral daily  furosemide   Injectable 40 milliGRAM(s) IV Push daily  guaiFENesin  milliGRAM(s) Oral every 12 hours  levothyroxine  Oral Tab/Cap - Peds 50 MICROGram(s) Oral daily  mexiletine 150 milliGRAM(s) Oral every 12 hours  montelukast 10 milliGRAM(s) Oral daily  tamsulosin 0.4 milliGRAM(s) Oral at bedtime    MEDICATIONS  (PRN):  ALBUTerol/ipratropium for Nebulization 3 milliLiter(s) Nebulizer every 6 hours PRN Bronchospasm  ALPRAZolam 0.25 milliGRAM(s) Oral daily PRN anxiety and insomnia      SOCIAL HISTORY:    [ ] Non-smoker  [ ] Smoker  [ ] Alcohol    REVIEW OF SYSTEMS:  CONSTITUTIONAL: No fever, weight loss, or fatigue  CARDIOLOGY: PAtient denies chest pain, shortness of breath or syncopal episodes.   RESPIRATORY: denies shortness of breath, wheezeing.   NEUROLOGICAL: NO weakness, no focal deficits to report.  ENDOCRINOLOGICAL: no recent change in diabetic medications.   GI: no BRBPR, no N,V,diarrhea.    PSYCHIATRY: normal mood and affect  HEENT: no nasal discharge, no ecchymosis  SKIN: no ecchymosis, no breakdown  MUSCULOSKELETAL: Full range of motion x4.      PHYSICAL EXAM:  T(C): 35.6 (19 @ 05:59), Max: 36.6 (19 @ 20:35)  HR: 94 (19 @ 05:59) (92 - 97)  BP: 98/57 (19 @ 05:59) (86/50 - 98/57)  RR: 18 (19 @ 05:59) (18 - 19)  SpO2: 96% (19 @ 07:48) (96% - 99%)  Wt(kg): --  I&O's Summary    Daily     Daily Weight in k.8 (2019 05:59)    General Appearance: Normal	  Cardiovascular: Normal S1 S2, No JVD, No murmurs, No edema  Respiratory: Lungs clear to auscultation	  Psychiatry: A & O x 3, Mood & affect appropriate  Gastrointestinal:  Soft, Non-tender  Skin: No rashes, No ecchymoses, No cyanosis	  Neurologic: Non-focal  Extremities: Normal range of motion, No clubbing, cyanosis or edema  Vascular: Peripheral pulses palpable 2+ bilaterally    LABS:	 	                          10.8   12.07 )-----------( 348      ( 2019 06:04 )             33.6     07-    139  |  99  |  34<H>  ----------------------------<  141<H>  4.4   |  21  |  2.1<H>    Ca    9.0      2019 06:04  Mg     2.1         TPro  5.9<L>  /  Alb  3.4<L>  /  TBili  0.6  /  DBili  x   /  AST  26  /  ALT  27  /  AlkPhos  77  07    CARDIAC MARKERS ( 2019 16:07 )  x     / 0.02 ng/mL / x     / x     / x      CARDIAC MARKERS ( 2019 03:15 )  x     / 0.02 ng/mL / x     / x     / x        proBNP:   Lipid Profile:   HgA1c:   TSH:     CARDIAC MARKERS:  Troponin T, Serum in AM (19 @ 16:07)    Troponin T, Serum: 0.02 ng/mL    Troponin T, Serum (19 @ 03:15)    Troponin T, Serum: 0.02 ng/mL    TELEMETRY EVENTS: none    Interrogation:   Underlying rhythm: NSR    Since :  No treated events   NSVT x6 at >143 bpm    AT/AF 87     AS-VS 11.8%  AS- 10.1%  AP-VS 1.1%  AP- 76.9%    PREVIOUS DIAGNOSTIC TESTING:    CXR: bilateral vascular congestion    TTE:   < from: Transthoracic Echocardiogram (18 @ 07:47) >   1. LV jection Fraction by Amaro's Method with a biplane EF of 25 %.   2. Severely decreased global left ventricular systolic function.   3. Mild mitral regurgitation.   4. Mild aortic regurgitation.    < end of copied text >    CATHETERIZATION: (2016) 70% LCx stenosis. No intervention.     ASSESSMENT/PLAN: Date of Admission: 19    CHIEF COMPLAINT: SOB    HISTORY OF PRESENT ILLNESS: 79yMale with PMHx non-ischemic CMP, HFrEF (s/p CRT-D) EF~25%, CAD, A-fib, V-tach s/p ablation, COPD, NANDO splenic laceration, CKD comes to the ED with complaint of SOB. The SOB was gradual onset, worsened in the evening yesterday, could not sleep s/t SOB, aggravated on exertion, no relieving factor. NO chest pain, or wheezing or cough or palpitation. No fever, headache, recent sick contacts.    Patient was recently admitted for near syncope and DUYEN, discharged on .    In the ED, patient RR 28, , spo2 80 room air, /60 (2019 11:36).    PAST MEDICAL & SURGICAL HISTORY:  CAD (coronary artery disease)  Ventricular tachycardia  NANDO on CPAP  COPD (chronic obstructive pulmonary disease)  Pacemaker  AICD (automatic cardioverter/defibrillator) present  Hypothyroidism  Atrial fibrillation  Congestive heart disease  Hypercholesteremia  AICD (automatic cardioverter/defibrillator) present  History of laparoscopic cholecystectomy    FAMILY HISTORY:  Family history of acute myocardial infarction (Aunt)    Allergies  morphine (Stomach Upset)  	  Home Medications:  ALPRAZolam 0.25 mg oral tablet: 1 tab(s) orally once a day, As Needed (23 Oct 2018 00:22)  finasteride 5 mg oral tablet: 1 tab(s) orally once a day (23 Oct 2018 00:22)  levothyroxine 50 mcg (0.05 mg) oral capsule: 1 cap(s) orally once a day (23 Oct 2018 00:22)  montelukast 10 mg oral tablet: 1 tab(s) orally once a day (23 Oct 2018 00:22)  pravastatin 20 mg oral tablet: 1 tab(s) orally once a day (23 Oct 2018 00:22)  tamsulosin 0.4 mg oral capsule: 1 cap(s) orally once a day (23 Oct 2018 00:22)    MEDICATIONS  (STANDING):  amiodarone    Tablet 200 milliGRAM(s) Oral every 12 hours  apixaban 2.5 milliGRAM(s) Oral every 12 hours  atorvastatin 40 milliGRAM(s) Oral at bedtime  buDESOnide 160 MICROgram(s)/formoterol 4.5 MICROgram(s) Inhaler 2 Puff(s) Inhalation two times a day  finasteride 5 milliGRAM(s) Oral daily  furosemide   Injectable 40 milliGRAM(s) IV Push daily  guaiFENesin  milliGRAM(s) Oral every 12 hours  levothyroxine  Oral Tab/Cap - Peds 50 MICROGram(s) Oral daily  mexiletine 150 milliGRAM(s) Oral every 12 hours  montelukast 10 milliGRAM(s) Oral daily  tamsulosin 0.4 milliGRAM(s) Oral at bedtime    MEDICATIONS  (PRN):  ALBUTerol/ipratropium for Nebulization 3 milliLiter(s) Nebulizer every 6 hours PRN Bronchospasm  ALPRAZolam 0.25 milliGRAM(s) Oral daily PRN anxiety and insomnia      SOCIAL HISTORY:    [ ] Non-smoker  [ ] Smoker  [ ] Alcohol    REVIEW OF SYSTEMS:  CONSTITUTIONAL: No fever, weight loss, or fatigue  CARDIOLOGY: PAtient denies chest pain, shortness of breath or syncopal episodes.   RESPIRATORY: denies shortness of breath, wheezeing.   NEUROLOGICAL: NO weakness, no focal deficits to report.  ENDOCRINOLOGICAL: no recent change in diabetic medications.   GI: no BRBPR, no N,V,diarrhea.    PSYCHIATRY: normal mood and affect  HEENT: no nasal discharge, no ecchymosis  SKIN: no ecchymosis, no breakdown  MUSCULOSKELETAL: Full range of motion x4.      PHYSICAL EXAM:  T(C): 35.6 (19 @ 05:59), Max: 36.6 (19 @ 20:35)  HR: 94 (19 @ 05:59) (92 - 97)  BP: 98/57 (19 @ 05:59) (86/50 - 98/57)  RR: 18 (19 @ 05:59) (18 - 19)  SpO2: 96% (19 @ 07:48) (96% - 99%)  Wt(kg): --  I&O's Summary    Daily     Daily Weight in k.8 (2019 05:59)    General Appearance: Normal	  Cardiovascular: Normal S1 S2, No JVD, No murmurs, No edema  Respiratory: b/l crackles  Psychiatry: A & O x 3, Mood & affect appropriate  Gastrointestinal:  Soft, Non-tender  Skin: No rashes, No ecchymoses, No cyanosis	  Neurologic: Non-focal  Extremities: Normal range of motion, No clubbing, cyanosis or edema  Vascular: Peripheral pulses palpable 2+ bilaterally    LABS:	 	                          10.8   12.07 )-----------( 348      ( 2019 06:04 )             33.6     07-21    139  |  99  |  34<H>  ----------------------------<  141<H>  4.4   |  21  |  2.1<H>    Ca    9.0      2019 06:04  Mg     2.1         TPro  5.9<L>  /  Alb  3.4<L>  /  TBili  0.6  /  DBili  x   /  AST  26  /  ALT  27  /  AlkPhos  77  07    CARDIAC MARKERS ( 2019 16:07 )  x     / 0.02 ng/mL / x     / x     / x      CARDIAC MARKERS ( 2019 03:15 )  x     / 0.02 ng/mL / x     / x     / x        proBNP:   Lipid Profile:   HgA1c:   TSH:     CARDIAC MARKERS:  Troponin T, Serum in AM (19 @ 16:07)    Troponin T, Serum: 0.02 ng/mL    Troponin T, Serum (19 @ 03:15)    Troponin T, Serum: 0.02 ng/mL    TELEMETRY EVENTS: none    Interrogation:   Underlying rhythm: NSR    Since :  No treated events   NSVT x6 at >143 bpm    AT/AF 87     AS-VS 11.8%  AS- 10.1%  AP-VS 1.1%  AP- 76.9%    PREVIOUS DIAGNOSTIC TESTING:    CXR: bilateral vascular congestion    TTE:   < from: Transthoracic Echocardiogram (18 @ 07:47) >   1. LV jection Fraction by Amaro's Method with a biplane EF of 25 %.   2. Severely decreased global left ventricular systolic function.   3. Mild mitral regurgitation.   4. Mild aortic regurgitation.    < end of copied text >    CATHETERIZATION: (2016) 70% LCx stenosis. No intervention.     ASSESSMENT/PLAN:

## 2019-07-21 NOTE — PROGRESS NOTE ADULT - SUBJECTIVE AND OBJECTIVE BOX
CC.  SOB  HPI.  Patient reports SOB and LE edema are improving.  afebrile.  Offers no other complaints  Hx of Chronic Low BP at baseline              Constitutional: No fever, fatigue or weight loss.  Skin: No rash.  Eyes: No recent vision problems or eye pain.  ENT: No congestion, ear pain, or sore throat.  Endocrine: No thyroid problems.  Cardiovascular: No chest pain or palpation.  Respiratory: No cough, shortness of breath, congestion, or wheezing.  Gastrointestinal: No abdominal pain, nausea, vomiting, or diarrhea.  Genitourinary: No dysuria.  Musculoskeletal: No joint swelling.  Neurologic: No headache.      Vital Signs Last 24 Hrs  T(C): 35.6 (07-21-19 @ 05:59), Max: 36.6 (07-20-19 @ 20:35)  T(F): 96.1 (07-21-19 @ 05:59), Max: 97.8 (07-20-19 @ 20:35)  HR: 94 (07-21-19 @ 05:59) (92 - 97)  BP: 98/57 (07-21-19 @ 05:59) (86/50 - 98/57)  BP(mean): --  RR: 18 (07-21-19 @ 05:59) (18 - 19)  SpO2: 96% (07-21-19 @ 07:48) (96% - 99%)        PHYSICAL EXAM-  GENERAL: NAD, chronic ill appearing amle  HEAD:  Atraumatic, Normocephalic  EYES: EOMI, PERRLA, conjunctiva and sclera clear  NECK: Supple, No JVD, Normal thyroid  NERVOUS SYSTEM:  Alert & Oriented X3, Moving all extremities  CHEST/LUNG: Clear to percussion bilaterally; No rales, rhonchi, wheezing, or rubs  HEART: Regular rate and rhythm; No murmurs, rubs, or gallops  ABDOMEN: Soft, Nontender, Nondistended; Bowel sounds present  EXTREMITIES:  , No clubbing, cyanosis, or edema  SKIN: No rashes or lesions                                  10.8   12.07 )-----------( 348      ( 21 Jul 2019 06:04 )             33.6     07-21    139  |  99  |  34<H>  ----------------------------<  141<H>  4.4   |  21  |  2.1<H>    Ca    9.0      21 Jul 2019 06:04  Mg     2.1     07-21    TPro  5.9<L>  /  Alb  3.4<L>  /  TBili  0.6  /  DBili  x   /  AST  26  /  ALT  27  /  AlkPhos  77  07-21    CARDIAC MARKERS ( 20 Jul 2019 16:07 )  x     / 0.02 ng/mL / x     / x     / x      CARDIAC MARKERS ( 20 Jul 2019 03:15 )  x     / 0.02 ng/mL / x     / x     / x                      MEDICATIONS  (STANDING):  amiodarone    Tablet 200 milliGRAM(s) Oral every 12 hours  apixaban 2.5 milliGRAM(s) Oral every 12 hours  atorvastatin 40 milliGRAM(s) Oral at bedtime  buDESOnide 160 MICROgram(s)/formoterol 4.5 MICROgram(s) Inhaler 2 Puff(s) Inhalation two times a day  finasteride 5 milliGRAM(s) Oral daily  furosemide   Injectable 40 milliGRAM(s) IV Push daily  guaiFENesin  milliGRAM(s) Oral every 12 hours  levothyroxine  Oral Tab/Cap - Peds 50 MICROGram(s) Oral daily  mexiletine 150 milliGRAM(s) Oral every 12 hours  montelukast 10 milliGRAM(s) Oral daily  tamsulosin 0.4 milliGRAM(s) Oral at bedtime    MEDICATIONS  (PRN):  ALBUTerol/ipratropium for Nebulization 3 milliLiter(s) Nebulizer every 6 hours PRN Bronchospasm  ALPRAZolam 0.25 milliGRAM(s) Oral daily PRN anxiety and insomnia          Imaging Personally Reviewed:     [x ] YES  [ ] NO    Consultant(s) Notes Reviewed:  [x ] YES  [ ] NO    Care Discussed with Consultants/Other Providers [x ] YES  [ ] No medical contraindication for discharge

## 2019-07-21 NOTE — CHART NOTE - NSCHARTNOTEFT_GEN_A_CORE
Transfer Note    Transfer from: 3-C to CCU    80 yo male with PMH CHFrEF, Afib, Vtach s/p AICD, COPD, NANDO, splenic laceration following fall) presented with complaint of SOB. Admitted to medicine with CHF exacerbation was seen by EP in am and device interrogation showed that pt was in V tach so his AICD settings were changed. Rapid response was called on this pt for tachycardia to 140s in pm. STAT EKG showed wide complex tachycardia consistent with V TACH with heart rate around 140s. Pt was asymptomatic and his BP was 90/50 mmHg (his baseline BP is always low around this area). He was given I/V lidocaine bolus of 70 and started on lidocaine ggt @ 2mg/min but he was still in V Tach storm with HR around 140s. He was given another 2 doses of lidocaine 35 I/V boluses after 10mins with minimal effect on HR, so he was given I/V Amiodarone 150 mg X 2 and started on Amio ggt as well. He was still in V Tach storm, cardiac fellow was consulted and Dr Walker was informed, she changed the device settings at bedside and pt eventually broke to sinus rhythm. CCU were consulted and pt was upgraded to the unit on I/V lidocaine drip @ 2mg/min and Amio drip of 1mg/min. Monitor electrolytes and keep Mg > 2.5 and potassium >4. F/U with EP after 24 hours of infusions. If pt starts to develop hallucinations decrease the lido drip to 1mg/min. Pt was also started on metoprolol 25 mg q8, titrate up metoprolol with close monitoring of BP. Pts DNR/DNI status was reverted for possible intubation if needed during cardioversion. Pt does not want to be intubated or resuscitated if he is in vegitative state. Pt wants to be intubated for airway protection during cardioversion for a day or 2 but does not want to stay on vent for longer period.        Vital Signs Last 24 Hrs  T(C): 35.6 (21 Jul 2019 18:19), Max: 36.6 (20 Jul 2019 20:35)  T(F): 96 (21 Jul 2019 18:19), Max: 97.8 (20 Jul 2019 20:35)  HR: 100 (21 Jul 2019 19:30) (84 - 100)  BP: 83/52 (21 Jul 2019 19:30) (83/52 - 102/67)  BP(mean): 65 (21 Jul 2019 19:30) (65 - 81)  RR: 32 (21 Jul 2019 19:30) (17 - 33)  SpO2: 99% (21 Jul 2019 19:30) (95% - 99%)  I&O's Summary        MEDICATIONS  (STANDING):  amiodarone Infusion 1 mG/Min (33.333 mL/Hr) IV Continuous <Continuous>  amiodarone Infusion 0.5 mG/Min (16.667 mL/Hr) IV Continuous <Continuous>  apixaban 2.5 milliGRAM(s) Oral every 12 hours  atorvastatin 40 milliGRAM(s) Oral at bedtime  buDESOnide 160 MICROgram(s)/formoterol 4.5 MICROgram(s) Inhaler 2 Puff(s) Inhalation two times a day  finasteride 5 milliGRAM(s) Oral daily  furosemide   Injectable 40 milliGRAM(s) IV Push daily  guaiFENesin  milliGRAM(s) Oral every 12 hours  levothyroxine  Oral Tab/Cap - Peds 50 MICROGram(s) Oral daily  lidocaine   Infusion 2 mG/Min (15 mL/Hr) IV Continuous <Continuous>  metoprolol tartrate 50 milliGRAM(s) Oral two times a day  mexiletine 200 milliGRAM(s) Oral every 12 hours  montelukast 10 milliGRAM(s) Oral daily  tamsulosin 0.4 milliGRAM(s) Oral at bedtime    MEDICATIONS  (PRN):  ALBUTerol/ipratropium for Nebulization 3 milliLiter(s) Nebulizer every 6 hours PRN Bronchospasm  ALPRAZolam 0.25 milliGRAM(s) Oral daily PRN anxiety and insomnia        LABS                                            10.8                  Neurophils% (auto):   79.3   (07-21 @ 06:04):    12.07)-----------(348          Lymphocytes% (auto):  9.5                                           33.6                   Eosinphils% (auto):   1.2      Manual%: Neutrophils x    ; Lymphocytes x    ; Eosinophils x    ; Bands%: x    ; Blasts x                                    139    |  99     |  34                  Calcium: 9.0   / iCa: x      (07-21 @ 06:04)    ----------------------------<  141       Magnesium: 2.1                              4.4     |  21     |  2.1              Phosphorous: x        TPro  5.9    /  Alb  3.4    /  TBili  0.6    /  DBili  x      /  AST  26     /  ALT  27     /  AlkPhos  77     21 Jul 2019 06:04      ASSESSMENT & PLAN:     # V Tach Storm  - c/w I/V lidocaine drip @ 2mg/min and Amio drip of 1mg/min.  - Monitor electrolytes and keep Mg > 2.5 and potassium >4.   - F/U with EP after 24 hours of infusions.   - If pt starts to develop hallucinations decrease the lido drip to 1mg/min.   - c/w metoprolol 25mg TID, titrate up metoprolol with close monitoring of BP.   - c/w mexiletine 200mg q12  - Revert DNR/DNI for possible intubation if needed for cardioversion     Acute exacerbation of Systolic CHF  -EKG shows no ischemic changes  -Trop flat  - Continue with lasix IV 40 mg daily.  Monitor I/O.  Keep negative output  - repeat xray chest  -Cardiology to follow up      LL opacity  -Afebrile, no  cough  -Will obtain Chest CT scan, and pulmonary consult     DUYEN on CKD  -Unclear Etiology  -??cardiorenal  -Nephrology consult  -renal US    Afib S/P ablation:  cw eliquis  rate controlled      COPD   -Continue with home medications  -Stable        NANDO:  -on CPAP    DVT ppx:  on eliquis    Diet:  dash/tlc    Activity: AAT    Dispo: from home Transfer Note    Transfer from: 3-C to CCU    80 yo male with PMH CHFrEF, Afib, Vtach s/p AICD, COPD, NANDO, splenic laceration following fall) presented with complaint of SOB. Admitted to medicine with CHF exacerbation was seen by EP in am and device interrogation showed that pt was in V tach so his AICD settings were changed. Rapid response was called on this pt for tachycardia to 140s in pm. STAT EKG showed wide complex tachycardia consistent with V TACH with heart rate around 140s. Pt was asymptomatic and his BP was 90/50 mmHg (his baseline BP is always low around this area). He was given I/V lidocaine bolus of 70 and started on lidocaine ggt @ 2mg/min but he was still in V Tach storm with HR around 140s. He was given another 2 doses of lidocaine 35 I/V boluses after 10mins with minimal effect on HR, so he was given I/V Amiodarone 150 mg X 2 and started on Amio ggt as well. He was still in V Tach storm, cardiac fellow was consulted and Dr Walker was informed, she changed the device settings at bedside and pt eventually broke to sinus rhythm. CCU were consulted and pt was upgraded to the unit on I/V lidocaine drip @ 2mg/min and Amio drip of 1mg/min. Monitor electrolytes and keep Mg > 2.5 and potassium >4. F/U with EP after 24 hours of infusions. If pt starts to develop hallucinations decrease the lido drip to 1mg/min. Pt was also started on metoprolol 25 mg q8, titrate up metoprolol with close monitoring of BP. Pts DNR/DNI status was reverted for possible intubation if needed during cardioversion. Pt does not want to be intubated or resuscitated if he is in vegitative state. Pt wants to be intubated for airway protection during cardioversion for a day or 2 but does not want to stay on vent for longer period.        Vital Signs Last 24 Hrs  T(C): 35.6 (21 Jul 2019 18:19), Max: 36.6 (20 Jul 2019 20:35)  T(F): 96 (21 Jul 2019 18:19), Max: 97.8 (20 Jul 2019 20:35)  HR: 100 (21 Jul 2019 19:30) (84 - 100)  BP: 83/52 (21 Jul 2019 19:30) (83/52 - 102/67)  BP(mean): 65 (21 Jul 2019 19:30) (65 - 81)  RR: 32 (21 Jul 2019 19:30) (17 - 33)  SpO2: 99% (21 Jul 2019 19:30) (95% - 99%)  I&O's Summary        MEDICATIONS  (STANDING):  amiodarone Infusion 1 mG/Min (33.333 mL/Hr) IV Continuous <Continuous>  amiodarone Infusion 0.5 mG/Min (16.667 mL/Hr) IV Continuous <Continuous>  apixaban 2.5 milliGRAM(s) Oral every 12 hours  atorvastatin 40 milliGRAM(s) Oral at bedtime  buDESOnide 160 MICROgram(s)/formoterol 4.5 MICROgram(s) Inhaler 2 Puff(s) Inhalation two times a day  finasteride 5 milliGRAM(s) Oral daily  furosemide   Injectable 40 milliGRAM(s) IV Push daily  guaiFENesin  milliGRAM(s) Oral every 12 hours  levothyroxine  Oral Tab/Cap - Peds 50 MICROGram(s) Oral daily  lidocaine   Infusion 2 mG/Min (15 mL/Hr) IV Continuous <Continuous>  metoprolol tartrate 50 milliGRAM(s) Oral two times a day  mexiletine 200 milliGRAM(s) Oral every 12 hours  montelukast 10 milliGRAM(s) Oral daily  tamsulosin 0.4 milliGRAM(s) Oral at bedtime    MEDICATIONS  (PRN):  ALBUTerol/ipratropium for Nebulization 3 milliLiter(s) Nebulizer every 6 hours PRN Bronchospasm  ALPRAZolam 0.25 milliGRAM(s) Oral daily PRN anxiety and insomnia        LABS                                            10.8                  Neurophils% (auto):   79.3   (07-21 @ 06:04):    12.07)-----------(348          Lymphocytes% (auto):  9.5                                           33.6                   Eosinphils% (auto):   1.2      Manual%: Neutrophils x    ; Lymphocytes x    ; Eosinophils x    ; Bands%: x    ; Blasts x                                    139    |  99     |  34                  Calcium: 9.0   / iCa: x      (07-21 @ 06:04)    ----------------------------<  141       Magnesium: 2.1                              4.4     |  21     |  2.1              Phosphorous: x        TPro  5.9    /  Alb  3.4    /  TBili  0.6    /  DBili  x      /  AST  26     /  ALT  27     /  AlkPhos  77     21 Jul 2019 06:04      ASSESSMENT & PLAN:     # V Tach Storm  - c/w I/V lidocaine drip @ 2mg/min and Amio drip of 1mg/min.  - Monitor electrolytes and keep Mg > 2.5 and potassium >4. F/U TSH  - F/U with EP after 24 hours of infusions.   - If pt starts to develop hallucinations decrease the lido drip to 1mg/min.   - c/w metoprolol 25mg TID, titrate up metoprolol with close monitoring of BP.   - c/w mexiletine 200mg q12  - Revert DNR/DNI for possible intubation if needed for cardioversion     Acute exacerbation of Systolic CHF  -EKG shows no ischemic changes  -Trop flat  - Continue with lasix IV 40 mg daily.  Monitor I/O.  Keep negative output  - repeat xray chest  -Cardiology to follow up      LL opacity  -Afebrile, no  cough  -Will obtain Chest CT scan, and pulmonary consult     DUYEN on CKD  -Unclear Etiology  -??cardiorenal  -Nephrology consult  -renal US    Afib S/P ablation:  cw eliquis  rate controlled      COPD   -Continue with home medications  -Stable        NANDO:  -on CPAP    DVT ppx:  on eliquis    Diet:  dash/tlc    Activity: AAT    Dispo: from home Transfer Note    Transfer from: 3-C to CCU    80 yo male with PMH CHFrEF, Afib, Vtach s/p AICD, COPD, NANDO, splenic laceration following fall) presented with complaint of SOB. Admitted to medicine with CHF exacerbation was seen by EP in am and device interrogation showed that pt was in V tach so his AICD settings were changed. Rapid response was called on this pt for tachycardia to 140s in pm. STAT EKG showed wide complex tachycardia consistent with V TACH with heart rate around 140s. Pt was asymptomatic and his BP was 90/50 mmHg (his baseline BP is always low around this area). He was given I/V lidocaine bolus of 70 and started on lidocaine ggt @ 2mg/min but he was still in V Tach storm with HR around 140s. He was given another 2 doses of lidocaine 35 I/V boluses after 10mins with minimal effect on HR, so he was given I/V Amiodarone 150 mg X 2 and started on Amio ggt as well. He was still in V Tach storm, cardiac fellow was consulted and Dr Walker was informed, she changed the device settings at bedside and pt eventually broke to sinus rhythm. CCU were consulted and pt was upgraded to the unit on I/V lidocaine drip @ 2mg/min and Amio drip of 1mg/min. Monitor electrolytes and keep Mg > 2.5 and potassium >4. F/U with EP after 24 hours of infusions. If pt starts to develop hallucinations decrease the lido drip to 1mg/min. Pt was also started on metoprolol 25 mg q8, titrate up metoprolol with close monitoring of BP. Pts DNR/DNI status was reverted for possible intubation if needed during cardioversion. Pt does not want to be intubated or resuscitated if he is in vegitative state. Pt wants to be intubated for airway protection during cardioversion for a day or 2 but does not want to stay on vent for longer period.        Vital Signs Last 24 Hrs  T(C): 35.6 (21 Jul 2019 18:19), Max: 36.6 (20 Jul 2019 20:35)  T(F): 96 (21 Jul 2019 18:19), Max: 97.8 (20 Jul 2019 20:35)  HR: 100 (21 Jul 2019 19:30) (84 - 100)  BP: 83/52 (21 Jul 2019 19:30) (83/52 - 102/67)  BP(mean): 65 (21 Jul 2019 19:30) (65 - 81)  RR: 32 (21 Jul 2019 19:30) (17 - 33)  SpO2: 99% (21 Jul 2019 19:30) (95% - 99%)  I&O's Summary        MEDICATIONS  (STANDING):  amiodarone Infusion 1 mG/Min (33.333 mL/Hr) IV Continuous <Continuous>  amiodarone Infusion 0.5 mG/Min (16.667 mL/Hr) IV Continuous <Continuous>  apixaban 2.5 milliGRAM(s) Oral every 12 hours  atorvastatin 40 milliGRAM(s) Oral at bedtime  buDESOnide 160 MICROgram(s)/formoterol 4.5 MICROgram(s) Inhaler 2 Puff(s) Inhalation two times a day  finasteride 5 milliGRAM(s) Oral daily  furosemide   Injectable 40 milliGRAM(s) IV Push daily  guaiFENesin  milliGRAM(s) Oral every 12 hours  levothyroxine  Oral Tab/Cap - Peds 50 MICROGram(s) Oral daily  lidocaine   Infusion 2 mG/Min (15 mL/Hr) IV Continuous <Continuous>  metoprolol tartrate 50 milliGRAM(s) Oral two times a day  mexiletine 200 milliGRAM(s) Oral every 12 hours  montelukast 10 milliGRAM(s) Oral daily  tamsulosin 0.4 milliGRAM(s) Oral at bedtime    MEDICATIONS  (PRN):  ALBUTerol/ipratropium for Nebulization 3 milliLiter(s) Nebulizer every 6 hours PRN Bronchospasm  ALPRAZolam 0.25 milliGRAM(s) Oral daily PRN anxiety and insomnia        LABS                                            10.8                  Neurophils% (auto):   79.3   (07-21 @ 06:04):    12.07)-----------(348          Lymphocytes% (auto):  9.5                                           33.6                   Eosinphils% (auto):   1.2      Manual%: Neutrophils x    ; Lymphocytes x    ; Eosinophils x    ; Bands%: x    ; Blasts x                                    139    |  99     |  34                  Calcium: 9.0   / iCa: x      (07-21 @ 06:04)    ----------------------------<  141       Magnesium: 2.1                              4.4     |  21     |  2.1              Phosphorous: x        TPro  5.9    /  Alb  3.4    /  TBili  0.6    /  DBili  x      /  AST  26     /  ALT  27     /  AlkPhos  77     21 Jul 2019 06:04      ASSESSMENT & PLAN:     # V Tach Storm  - c/w I/V lidocaine drip @ 2mg/min and Amio drip of 1mg/min.  - Monitor electrolytes and keep Mg > 2.5 and potassium >4. F/U TSH  - F/U with EP for further w/up, pt failed ablation once in the past, get records, he might need cardiac MRI, EP study  - re-assess the need for infusions after 24 hours.   - If pt starts to develop hallucinations decrease the lido drip to 1mg/min.   - c/w metoprolol 25mg TID, titrate up metoprolol with close monitoring of BP.   - c/w mexiletine 200mg q12  - Revert DNR/DNI for possible intubation if needed for cardioversion     Acute exacerbation of Systolic CHF  -EKG shows no ischemic changes  -Trop flat  - Continue with lasix IV 40 mg daily.  Monitor I/O.  Keep negative output  - repeat xray chest  - f/u with cardio     LL opacity  -Afebrile, no  cough  -Will obtain Chest CT scan, and pulmonary consult     DUYEN on CKD  -Unclear Etiology  -??cardiorenal  -Nephrology consult  -renal US    Afib S/P ablation:  cw eliquis  rate controlled      COPD   -Continue with home medications  -Stable        NANDO:  -on CPAP    DVT ppx:  on eliquis    Diet:  dash/tlc    Activity: AAT    Dispo: from home

## 2019-07-21 NOTE — PROGRESS NOTE ADULT - ASSESSMENT
Patient is 80 yo male with hx of S/P AICD (automatic cardioverter/defibrillator), Atrial fibrillation, CAD (coronary artery disease), Congestive heart disease, COPD (chronic obstructive pulmonary disease), Hypercholesteremia, Hypothyroidism, NANDO on CPAP, Ventricular tachycardia presenting with     1. Acute exacerbation of Systolic CHF  -EKG shows no ischemic changes  -Trop flat  - Continue with lasix IV 40 mg daily.  Monitor I/O.  Keep negative output  - repeat xray chest pending  -Cardiology to follow up     2. LL opacity  -Afebrile, no leukocytosis or cough  -Will obtain Chest CT scan, and pulmonary consult    3. DUYEN on CKD  -Unclear Etiology  -??cardiorenal  -Nephrology consult  -renal US    4. Afib S/P ablation:  cw eliquis  rate controlled    5.  COPD   -Continue with home medications  -Stable     6. AICD:  -Stable  -Electro consult as per cardiology    7. NANDO:  -on CPAP    DVT ppx:  on eliquis    Diet:  dash/tlc    Activity: AAT    Dispo: from home Patient is 78 yo male with hx of S/P AICD (automatic cardioverter/defibrillator), Atrial fibrillation, CAD (coronary artery disease), Congestive heart disease, COPD (chronic obstructive pulmonary disease), Hypercholesteremia, Hypothyroidism, NANDO on CPAP, Ventricular tachycardia presenting with     1. Acute exacerbation of Systolic CHF  -EKG shows no ischemic changes  -Trop flat  - Continue with lasix IV 40 mg daily.  Monitor I/O.  Keep negative output  - repeat xray chest pending  -Cardiology to follow up     2. LL opacity  -Afebrile, no  cough  -Will obtain Chest CT scan, and pulmonary consult    3. DUYEN on CKD  -Unclear Etiology  -??cardiorenal  -Nephrology consult  -renal US    4. Afib S/P ablation:  cw eliquis  rate controlled    5.  COPD   -Continue with home medications  -Stable     6. AICD:  -Stable  -Electro consult as per cardiology    7. NANDO:  -on CPAP    DVT ppx:  on eliquis    Diet:  dash/tlc    Activity: AAT    Dispo: from home

## 2019-07-22 LAB
ALBUMIN SERPL ELPH-MCNC: 3.4 G/DL — LOW (ref 3.5–5.2)
ALP SERPL-CCNC: 74 U/L — SIGNIFICANT CHANGE UP (ref 30–115)
ALT FLD-CCNC: 26 U/L — SIGNIFICANT CHANGE UP (ref 0–41)
ANION GAP SERPL CALC-SCNC: 12 MMOL/L — SIGNIFICANT CHANGE UP (ref 7–14)
AST SERPL-CCNC: 24 U/L — SIGNIFICANT CHANGE UP (ref 0–41)
BILIRUB SERPL-MCNC: 0.7 MG/DL — SIGNIFICANT CHANGE UP (ref 0.2–1.2)
BUN SERPL-MCNC: 31 MG/DL — HIGH (ref 10–20)
CALCIUM SERPL-MCNC: 9.1 MG/DL — SIGNIFICANT CHANGE UP (ref 8.5–10.1)
CHLORIDE SERPL-SCNC: 98 MMOL/L — SIGNIFICANT CHANGE UP (ref 98–110)
CO2 SERPL-SCNC: 28 MMOL/L — SIGNIFICANT CHANGE UP (ref 17–32)
CREAT SERPL-MCNC: 1.8 MG/DL — HIGH (ref 0.7–1.5)
GLUCOSE BLDC GLUCOMTR-MCNC: 150 MG/DL — HIGH (ref 70–99)
GLUCOSE SERPL-MCNC: 178 MG/DL — HIGH (ref 70–99)
HCT VFR BLD CALC: 33.1 % — LOW (ref 42–52)
HGB BLD-MCNC: 10.8 G/DL — LOW (ref 14–18)
MCHC RBC-ENTMCNC: 29.3 PG — SIGNIFICANT CHANGE UP (ref 27–31)
MCHC RBC-ENTMCNC: 32.6 G/DL — SIGNIFICANT CHANGE UP (ref 32–37)
MCV RBC AUTO: 89.7 FL — SIGNIFICANT CHANGE UP (ref 80–94)
NRBC # BLD: 0 /100 WBCS — SIGNIFICANT CHANGE UP (ref 0–0)
PLATELET # BLD AUTO: 325 K/UL — SIGNIFICANT CHANGE UP (ref 130–400)
POTASSIUM SERPL-MCNC: 4.6 MMOL/L — SIGNIFICANT CHANGE UP (ref 3.5–5)
POTASSIUM SERPL-SCNC: 4.6 MMOL/L — SIGNIFICANT CHANGE UP (ref 3.5–5)
PROT SERPL-MCNC: 5.8 G/DL — LOW (ref 6–8)
RBC # BLD: 3.69 M/UL — LOW (ref 4.7–6.1)
RBC # FLD: 15.8 % — HIGH (ref 11.5–14.5)
SODIUM SERPL-SCNC: 138 MMOL/L — SIGNIFICANT CHANGE UP (ref 135–146)
SODIUM UR-SCNC: 75 MMOL/L — SIGNIFICANT CHANGE UP
TSH SERPL-MCNC: 4.57 UIU/ML — HIGH (ref 0.27–4.2)
UUN UR-MCNC: 411 MG/DL — SIGNIFICANT CHANGE UP
WBC # BLD: 10.9 K/UL — HIGH (ref 4.8–10.8)
WBC # FLD AUTO: 10.9 K/UL — HIGH (ref 4.8–10.8)

## 2019-07-22 PROCEDURE — 71045 X-RAY EXAM CHEST 1 VIEW: CPT | Mod: 26

## 2019-07-22 PROCEDURE — 99233 SBSQ HOSP IP/OBS HIGH 50: CPT

## 2019-07-22 PROCEDURE — 93306 TTE W/DOPPLER COMPLETE: CPT | Mod: 26

## 2019-07-22 PROCEDURE — 93010 ELECTROCARDIOGRAM REPORT: CPT

## 2019-07-22 PROCEDURE — 76770 US EXAM ABDO BACK WALL COMP: CPT | Mod: 26

## 2019-07-22 RX ORDER — AMPICILLIN SODIUM AND SULBACTAM SODIUM 250; 125 MG/ML; MG/ML
INJECTION, POWDER, FOR SUSPENSION INTRAMUSCULAR; INTRAVENOUS
Refills: 0 | Status: DISCONTINUED | OUTPATIENT
Start: 2019-07-22 | End: 2019-07-27

## 2019-07-22 RX ORDER — CHLORHEXIDINE GLUCONATE 213 G/1000ML
1 SOLUTION TOPICAL
Refills: 0 | Status: DISCONTINUED | OUTPATIENT
Start: 2019-07-22 | End: 2019-08-04

## 2019-07-22 RX ORDER — AMPICILLIN SODIUM AND SULBACTAM SODIUM 250; 125 MG/ML; MG/ML
1.5 INJECTION, POWDER, FOR SUSPENSION INTRAMUSCULAR; INTRAVENOUS EVERY 6 HOURS
Refills: 0 | Status: DISCONTINUED | OUTPATIENT
Start: 2019-07-22 | End: 2019-07-27

## 2019-07-22 RX ORDER — LIDOCAINE HCL 20 MG/ML
0.5 VIAL (ML) INJECTION
Qty: 2 | Refills: 0 | Status: DISCONTINUED | OUTPATIENT
Start: 2019-07-22 | End: 2019-07-23

## 2019-07-22 RX ORDER — LIDOCAINE HCL 20 MG/ML
1 VIAL (ML) INJECTION
Qty: 2 | Refills: 0 | Status: DISCONTINUED | OUTPATIENT
Start: 2019-07-22 | End: 2019-07-22

## 2019-07-22 RX ORDER — AMPICILLIN SODIUM AND SULBACTAM SODIUM 250; 125 MG/ML; MG/ML
1.5 INJECTION, POWDER, FOR SUSPENSION INTRAMUSCULAR; INTRAVENOUS ONCE
Refills: 0 | Status: COMPLETED | OUTPATIENT
Start: 2019-07-22 | End: 2019-07-22

## 2019-07-22 RX ADMIN — AMPICILLIN SODIUM AND SULBACTAM SODIUM 100 GRAM(S): 250; 125 INJECTION, POWDER, FOR SUSPENSION INTRAMUSCULAR; INTRAVENOUS at 23:47

## 2019-07-22 RX ADMIN — MONTELUKAST 10 MILLIGRAM(S): 4 TABLET, CHEWABLE ORAL at 13:19

## 2019-07-22 RX ADMIN — APIXABAN 2.5 MILLIGRAM(S): 2.5 TABLET, FILM COATED ORAL at 17:18

## 2019-07-22 RX ADMIN — Medication 40 MILLIGRAM(S): at 05:48

## 2019-07-22 RX ADMIN — FINASTERIDE 5 MILLIGRAM(S): 5 TABLET, FILM COATED ORAL at 13:19

## 2019-07-22 RX ADMIN — AMPICILLIN SODIUM AND SULBACTAM SODIUM 100 GRAM(S): 250; 125 INJECTION, POWDER, FOR SUSPENSION INTRAMUSCULAR; INTRAVENOUS at 19:00

## 2019-07-22 RX ADMIN — MEXILETINE HYDROCHLORIDE 200 MILLIGRAM(S): 150 CAPSULE ORAL at 05:48

## 2019-07-22 RX ADMIN — Medication 600 MILLIGRAM(S): at 05:48

## 2019-07-22 RX ADMIN — MEXILETINE HYDROCHLORIDE 200 MILLIGRAM(S): 150 CAPSULE ORAL at 17:18

## 2019-07-22 RX ADMIN — Medication 50 MICROGRAM(S): at 05:48

## 2019-07-22 RX ADMIN — APIXABAN 2.5 MILLIGRAM(S): 2.5 TABLET, FILM COATED ORAL at 05:48

## 2019-07-22 RX ADMIN — Medication 600 MILLIGRAM(S): at 17:18

## 2019-07-22 RX ADMIN — AMPICILLIN SODIUM AND SULBACTAM SODIUM 100 GRAM(S): 250; 125 INJECTION, POWDER, FOR SUSPENSION INTRAMUSCULAR; INTRAVENOUS at 14:20

## 2019-07-22 NOTE — CONSULT NOTE ADULT - ATTENDING COMMENTS
Pt seen and examined  above note reviwed   DUYEN on CKD 3  Cr imporoved w/ diuresis  Likely CRS   ? due to sustained vatach resulting in poor forward flow. now on amio and lidocaine  feels overall improved  cont lasix  though confused today per fmaily baseline is AAOx3  ? ICU delirium?     will follow
Seen / examined and above reviewed.    NICM (severe LV dysfunction)  AF  VT s/p ablation  Chronic Systolic CHF s/p BiV-AICD    Hospitalized with subacute decompensated CHF (volume overload / pulmonary edema).  Breathing improved with IV Lasix.  Now comfortable at rest.    Hemodynamics stable.  Warm, no edema.    Baseline CKD.  Cr stable compared to 6/2019.  Mild stable troponin elevation consistent with CHF / CKD.    RECOMMEND:  - Cont IV Lasix today (goal 1-2 L negative).  - Cont other cardiac Rx above.  - Ambulate.

## 2019-07-22 NOTE — PROGRESS NOTE ADULT - SUBJECTIVE AND OBJECTIVE BOX
LENGTH OF HOSPITAL STAY: 2d    CHIEF COMPLAINT:   Patient is a 79y old  Male who presents with a chief complaint of shortness of breath for few days duration (22 Jul 2019 11:09)      HISTORY OF PRESENTING ILLNESS:    HPI:  80 yo male with PMH CHFrEF, Afib, Vtach s/p AICD,COPD, NANDO, spleenic laceration following fall) comes to the ED with complaint of SOB.  SOB gradual onset, worsened in the evening yesterday, could not sleep s/t SOB, aggravated on exertion, no relieving factor.  NO chest pain, or wheezing or cough or palpitation.  No fever, headache, recent sick contacts.  normal Bowel and bladder habit.    Patient was recently admitted for near syncope and DUYEN, discharged on june 17.    In the ED, patient RR 28, , spo2 80 room air, /60 (20 Jul 2019 11:36)    PAST MEDICAL & SURGICAL HISTORY  PAST MEDICAL & SURGICAL HISTORY:  CAD (coronary artery disease)  Ventricular tachycardia  NANDO on CPAP  COPD (chronic obstructive pulmonary disease)  Pacemaker  AICD (automatic cardioverter/defibrillator) present  Hypothyroidism  Atrial fibrillation  Congestive heart disease  Hypercholesteremia  AICD (automatic cardioverter/defibrillator) present  History of laparoscopic cholecystectomy    SOCIAL HISTORY:    ALLERGIES:  morphine (Stomach Upset)    MEDICATIONS:  STANDING MEDICATIONS  amiodarone Infusion 0.5 mG/Min IV Continuous <Continuous>  ampicillin/sulbactam  IVPB      ampicillin/sulbactam  IVPB 1.5 Gram(s) IV Intermittent once  ampicillin/sulbactam  IVPB 1.5 Gram(s) IV Intermittent every 6 hours  apixaban 2.5 milliGRAM(s) Oral every 12 hours  atorvastatin 40 milliGRAM(s) Oral at bedtime  buDESOnide 160 MICROgram(s)/formoterol 4.5 MICROgram(s) Inhaler 2 Puff(s) Inhalation two times a day  finasteride 5 milliGRAM(s) Oral daily  guaiFENesin  milliGRAM(s) Oral every 12 hours  levothyroxine  Oral Tab/Cap - Peds 50 MICROGram(s) Oral daily  lidocaine   Infusion 2 mG/Min IV Continuous <Continuous>  lidocaine   Infusion 1 mG/Min IV Continuous <Continuous>  metoprolol tartrate 25 milliGRAM(s) Oral two times a day  mexiletine 200 milliGRAM(s) Oral every 12 hours  montelukast 10 milliGRAM(s) Oral daily  tamsulosin 0.4 milliGRAM(s) Oral at bedtime    PRN MEDICATIONS  ALBUTerol/ipratropium for Nebulization 3 milliLiter(s) Nebulizer every 6 hours PRN  ALPRAZolam 0.25 milliGRAM(s) Oral daily PRN    VITALS:   T(F): 96.1  HR: 98  BP: 86/59  RR: 21  SpO2: 95%    LABS:                        10.8   10.90 )-----------( 325      ( 22 Jul 2019 05:56 )             33.1     07-22    138  |  98  |  31<H>  ----------------------------<  178<H>  4.6   |  28  |  1.8<H>    Ca    9.1      22 Jul 2019 05:56  Mg     2.1     07-21    TPro  5.8<L>  /  Alb  3.4<L>  /  TBili  0.7  /  DBili  x   /  AST  24  /  ALT  26  /  AlkPhos  74  07-22              CARDIAC MARKERS ( 20 Jul 2019 16:07 )  x     / 0.02 ng/mL / x     / x     / x          RADIOLOGY:    PHYSICAL EXAM:  GEN: No acute distress  HEENT: AT,NC, PERRLA  LUNGS: Clear to auscultation bilaterally   HEART: S1/S2 present. RRR.   ABD: Soft, non-tender,distended. Bowel sounds present  EXT: edema/clubbing/cyanosis absent  NEURO: AAOX2

## 2019-07-22 NOTE — PROGRESS NOTE ADULT - ASSESSMENT
Patient is 80 yo male with hx of S/P AICD (automatic cardioverter/defibrillator), Atrial fibrillation, CAD (coronary artery disease), Congestive heart disease, COPD (chronic obstructive pulmonary disease), Hypercholesteremia, Hypothyroidism, NANDO on CPAP, Ventricular tachycardia presenting with     Patient appears to be disoriented in person and time, this is not his baseline and was oriented till last night as per son  #) possibly toxicity to lidocaine  -dose decreased from 2 mg to 1mg  -avoid sedatives       1. Acute exacerbation of Systolic CHF  -EKG shows no ischemic changes  -Trop flat  - Continue with lasix IV 40 mg daily.  Monitor I/O.  Keep negative output  - repeat xray chest pending  -Cardiology to follow up     2. Rt lower lobe  opacity  -Afebrile, productive cough for last 2 days with samy sputum  -may be due to aspiration sec to diorientation  -aspiration prercautions  -Will obtain Chest CT scan, and pulmonary consult  -pt started on unasyn    3. DUYEN on CKD  -Unclear Etiology  -??cardiorenal  -Nephrology consult  -renal US    4. Afib S/P ablation:  cw eliquis  rate controlled    5.  COPD   -Continue with home medications  -Stable     6. AICD:  -Stable  -Electro consult as per cardiology    7. Arrythmias  -pt had a VT storm on 7/21  -started on mixeltene, lidocaine, lasix and lopressor  -lopressor was d/c last night because of hypotension  -on bed side usg patient still has some pulmonary edema, lopressor on hold for now  -c/w lasix  -starting cheetah monitoring    7. NANDO:  -on CPAP    DVT ppx:  on eliquis    Diet:  dash/tlc    Activity: AAT    Dispo: from home Patient is 80 yo male with hx of S/P AICD (automatic cardioverter/defibrillator), Atrial fibrillation, CAD (coronary artery disease), Congestive heart disease, COPD (chronic obstructive pulmonary disease), Hypercholesteremia, Hypothyroidism, NANDO on CPAP, Ventricular tachycardia presenting with     Patient appears to be disoriented in person and time, this is not his baseline and was oriented till last night as per son  #) possibly toxicity to lidocaine  -dose decreased from 2 mg to 1mg  -avoid sedatives       1. Acute exacerbation of Systolic CHF  -EKG shows no ischemic changes  -Trop flat  - Continue with lasix IV 40 mg daily.  Monitor I/O.  Keep negative output  - repeat xray chest pending  -Cardiology to follow up     2. Rt lower lobe  opacity  -Afebrile, productive cough for last 2 days with samy sputum  -may be due to aspiration sec to diorientation  -aspiration prercautions  -Will obtain Chest CT scan, and pulmonary consult  -pt started on unasyn    3. DUYEN on CKD  -Unclear Etiology  -??cardiorenal  -Nephrology consult  -renal US    4. Afib S/P ablation:  cw eliquis  rate controlled    5.  COPD   -Continue with home medications  -Stable     6. AICD:  -Stable  -Electro consult as per cardiology    7. Arrythmias  -pt had a VT storm on 7/21  -started on mixeltene, lidocaine, lasix , amiodarone and lopressor  -lopressor was d/c last night because of hypotension  -on bed side usg patient still has some pulmonary edema, lopressor on hold for now  -c/w lasix  -starting cheetah monitoring    7. NANDO:  -on CPAP    DVT ppx:  on eliquis    Diet:  dash/tlc    Activity: AAT    Dispo: from home Patient is 80 yo male with hx of S/P AICD (automatic cardioverter/defibrillator), Atrial fibrillation, CAD (coronary artery disease), Congestive heart disease, COPD (chronic obstructive pulmonary disease), Hypercholesteremia, Hypothyroidism, NANDO on CPAP, Ventricular tachycardia presenting with     Patient appears to be disoriented in person and time, this is not his baseline and was oriented till last night as per son  #) possibly toxicity to lidocaine  -dose decreased from 2 mg to 1mg  -avoid sedatives       1. Acute exacerbation of Systolic CHF  -EKG shows no ischemic changes  -Trop flat  - Continue with lasix IV 40 mg daily.  Monitor I/O.  Keep negative output  - repeat xray chest pending  -Cardiology to follow up     2. Rt lower lobe  opacity  -Afebrile, productive cough for last 2 days with samy sputum  -may be due to aspiration sec to diorientation  -aspiration prercautions  -Will obtain Chest CT scan, and pulmonary consult  -pt started on unasyn    3. DUYEN on CKD  -Unclear Etiology  -??cardiorenal  -Nephrology consult  -renal US    4. Afib S/P ablation:  cw eliquis  rate controlled    5.  COPD   -Continue with home medications  -Stable     6. AICD:  -Stable  -Electro consult as per cardiology    7. Arrythmias  -pt had a VT storm on 7/21  -started on mixeltene, lidocaine, lasix , amiodarone and lopressor  -lopressor was d/c last night because of hypotension  -on bed side usg patient still has some pulmonary edema, lopressor on hold for now  -d/c  lasix due to hypotension  -cheetah monitoring shows pt to be fluid responsive    7. NANDO:  -on CPAP    DVT ppx:  on eliquis    Diet:  dash/tlc    Activity: AAT    Dispo: from home

## 2019-07-22 NOTE — CONSULT NOTE ADULT - SUBJECTIVE AND OBJECTIVE BOX
Patient is a 79y old  Male who presents with a chief complaint of shortness of breath for few days duration (22 Jul 2019 09:42)      HPI:  78 yo male with PMH CHFrEF, Afib, Vtach s/p AICD,COPD, NANDO, spleenic laceration following fall) comes to the ED with complaint of SOB.  SOB gradual onset, worsened in the evening yesterday, could not sleep s/t SOB, aggravated on exertion, no relieving factor.  NO chest pain, or wheezing or cough or palpitation.  No fever, headache, recent sick contacts.  normal Bowel and bladder habit.    Patient was recently admitted for near syncope and DUYEN, discharged on june 17.    In the ED, patient RR 28, , spo2 80 room air, /60 (20 Jul 2019 11:36)    Had VT storm in ED, EPS admitted him to ICU, started him on lidocaine, mexiletine, amiodarone, beta blockers. Last night BB held for hypotension. Was being diuresed   Pulm consulted for aspiration pna vs pleural effusions.   Pt was confused, involuntary limb movements. Thought to be lidocaine toxicity.   No fevers since adm. +ve cough yellow phlegm     PAST MEDICAL & SURGICAL HISTORY:  CAD (coronary artery disease)  Ventricular tachycardia  NANDO on CPAP  COPD (chronic obstructive pulmonary disease)  Pacemaker  AICD (automatic cardioverter/defibrillator) present  Hypothyroidism  Atrial fibrillation  Congestive heart disease  Hypercholesteremia  AICD (automatic cardioverter/defibrillator) present  History of laparoscopic cholecystectomy      SOCIAL HX:   Smoking   ex                    ETOH                            Other    FAMILY HISTORY:  Family history of acute myocardial infarction (Aunt)  :  No known cardiovacular family hisotry     ROS:  See HPI     Allergies    morphine (Stomach Upset)    Intolerances    PHYSICAL EXAM    ICU Vital Signs Last 24 Hrs  T(C): 35.6 (22 Jul 2019 08:00), Max: 36.3 (21 Jul 2019 20:00)  T(F): 96 (22 Jul 2019 08:00), Max: 97.4 (22 Jul 2019 04:00)  HR: 98 (22 Jul 2019 10:00) (84 - 100)  BP: 84/60 (22 Jul 2019 10:00) (78/54 - 102/67)  BP(mean): 67 (22 Jul 2019 10:00) (60 - 81)  RR: 20 (22 Jul 2019 10:00) (17 - 45)  SpO2: 95% (22 Jul 2019 10:00) (90% - 99%)      General: In NAD, AAO x 3, intermittent confusion   HEENT:  DONNA              Lymphatic system: No cervical LN   Lungs: Bilateral BS  Cardiovascular: Regular  Gastrointestinal: Soft, Positive BS  Musculoskeletal: No clubbing.  Moves all extremities.  Full range of motion   Skin: Warm.  Intact  Neurological: No motor or sensory deficit       07-21-19 @ 07:01  -  07-22-19 @ 07:00  --------------------------------------------------------  IN:    amiodarone Infusion: 66.6 mL    amiodarone Infusion: 66.6 mL    amiodarone Infusion: 99.6 mL    lidocaine   Infusion: 150 mL  Total IN: 382.8 mL    OUT:    Voided: 400 mL  Total OUT: 400 mL    Total NET: -17.2 mL    LABS:                          10.8   10.90 )-----------( 325      ( 22 Jul 2019 05:56 )             33.1                                               07-22    138  |  98  |  31<H>  ----------------------------<  178<H>  4.6   |  28  |  1.8<H>    Ca    9.1      22 Jul 2019 05:56  Mg     2.1     07-21    TPro  5.8<L>  /  Alb  3.4<L>  /  TBili  0.7  /  DBili  x   /  AST  24  /  ALT  26  /  AlkPhos  74  07-22                                                 CARDIAC MARKERS ( 20 Jul 2019 16:07 )  x     / 0.02 ng/mL / x     / x     / x                                                LIVER FUNCTIONS - ( 22 Jul 2019 05:56 )  Alb: 3.4 g/dL / Pro: 5.8 g/dL / ALK PHOS: 74 U/L / ALT: 26 U/L / AST: 24 U/L / GGT: x                                                   X-Rays      - bilateral infiltrates - worse since adm                                                                                ECHO -     MEDICATIONS  (STANDING):  amiodarone Infusion 1 mG/Min (33.333 mL/Hr) IV Continuous <Continuous>  amiodarone Infusion 0.5 mG/Min (16.667 mL/Hr) IV Continuous <Continuous>  apixaban 2.5 milliGRAM(s) Oral every 12 hours  atorvastatin 40 milliGRAM(s) Oral at bedtime  buDESOnide 160 MICROgram(s)/formoterol 4.5 MICROgram(s) Inhaler 2 Puff(s) Inhalation two times a day  finasteride 5 milliGRAM(s) Oral daily  furosemide   Injectable 40 milliGRAM(s) IV Push daily  guaiFENesin  milliGRAM(s) Oral every 12 hours  levothyroxine  Oral Tab/Cap - Peds 50 MICROGram(s) Oral daily  lidocaine   Infusion 2 mG/Min (15 mL/Hr) IV Continuous <Continuous>  metoprolol tartrate 25 milliGRAM(s) Oral two times a day  mexiletine 200 milliGRAM(s) Oral every 12 hours  montelukast 10 milliGRAM(s) Oral daily  tamsulosin 0.4 milliGRAM(s) Oral at bedtime    MEDICATIONS  (PRN):  ALBUTerol/ipratropium for Nebulization 3 milliLiter(s) Nebulizer every 6 hours PRN Bronchospasm  ALPRAZolam 0.25 milliGRAM(s) Oral daily PRN anxiety and insomnia      bed side thoracic us : bilateral B line  IVC - no variation, > 2 cm Patient is a 79y old  Male who presents with a chief complaint of shortness of breath (22 Jul 2019 09:42)      HPI:  78 yo male with PMH CHFrEF, Afib, Vtach s/p AICD,COPD, NANDO, spleenic laceration following fall) comes to the ED with complaint of SOB.  SOB gradual onset, worsened in the evening yesterday, could not sleep s/t SOB, aggravated on exertion, no relieving factor.  NO chest pain, or wheezing or cough or palpitation.  No fever, headache, recent sick contacts.  normal Bowel and bladder habit.    Patient was recently admitted for near syncope and DUYEN, discharged on june 17.    In the ED, patient RR 28, , spo2 80 room air, /60 (20 Jul 2019 11:36)    Had VT storm in ED, EPS admitted him to ICU, started him on lidocaine, mexiletine, amiodarone, beta blockers. Last night BB held for hypotension. Was being diuresed   Pulm consulted for aspiration pna vs pleural effusions.   Pt was confused, involuntary limb movements. Thought to be lidocaine toxicity.   No fevers since adm. +ve cough yellow phlegm     PAST MEDICAL & SURGICAL HISTORY:  CAD (coronary artery disease)  Ventricular tachycardia  NANDO on CPAP  COPD (chronic obstructive pulmonary disease)  Pacemaker  AICD (automatic cardioverter/defibrillator) present  Hypothyroidism  Atrial fibrillation  Congestive heart disease  Hypercholesteremia  AICD (automatic cardioverter/defibrillator) present  History of laparoscopic cholecystectomy      SOCIAL HX:   Smoking   ex                     FAMILY HISTORY:  Family history of acute myocardial infarction (Aunt)  :  No known cardiovacular family hisotry     ROS:  See HPI     Allergies    morphine (Stomach Upset)    Intolerances    PHYSICAL EXAM    ICU Vital Signs Last 24 Hrs  T(C): 35.6 (22 Jul 2019 08:00), Max: 36.3 (21 Jul 2019 20:00)  T(F): 96 (22 Jul 2019 08:00), Max: 97.4 (22 Jul 2019 04:00)  HR: 98 (22 Jul 2019 10:00) (84 - 100)  BP: 84/60 (22 Jul 2019 10:00) (78/54 - 102/67)  BP(mean): 67 (22 Jul 2019 10:00) (60 - 81)  RR: 20 (22 Jul 2019 10:00) (17 - 45)  SpO2: 95% (22 Jul 2019 10:00) (90% - 99%)      General: In NAD, AAO x 3, moves 4 extremities  HEENT:  DONNA              Lymphatic system: No cervical LN   Lungs: Bilateral BS. rhocnhi  Cardiovascular: Regular  Gastrointestinal: Soft, Positive BS  Musculoskeletal: No clubbing.  Moves all extremities.  Full range of motion   Skin: Warm.  Intact  Neurological: No motor or sensory deficit       07-21-19 @ 07:01  -  07-22-19 @ 07:00  --------------------------------------------------------  IN:    amiodarone Infusion: 66.6 mL    amiodarone Infusion: 66.6 mL    amiodarone Infusion: 99.6 mL    lidocaine   Infusion: 150 mL  Total IN: 382.8 mL    OUT:    Voided: 400 mL  Total OUT: 400 mL    Total NET: -17.2 mL    LABS:                          10.8   10.90 )-----------( 325      ( 22 Jul 2019 05:56 )             33.1                                               07-22    138  |  98  |  31<H>  ----------------------------<  178<H>  4.6   |  28  |  1.8<H>    Ca    9.1      22 Jul 2019 05:56  Mg     2.1     07-21    TPro  5.8<L>  /  Alb  3.4<L>  /  TBili  0.7  /  DBili  x   /  AST  24  /  ALT  26  /  AlkPhos  74  07-22                                                 CARDIAC MARKERS ( 20 Jul 2019 16:07 )  x     / 0.02 ng/mL / x     / x     / x                                                LIVER FUNCTIONS - ( 22 Jul 2019 05:56 )  Alb: 3.4 g/dL / Pro: 5.8 g/dL / ALK PHOS: 74 U/L / ALT: 26 U/L / AST: 24 U/L / GGT: x                                                   X-Rays      - bilateral infiltrates - worse since adm                                                                                ECHO -     MEDICATIONS  (STANDING):  amiodarone Infusion 1 mG/Min (33.333 mL/Hr) IV Continuous <Continuous>  amiodarone Infusion 0.5 mG/Min (16.667 mL/Hr) IV Continuous <Continuous>  apixaban 2.5 milliGRAM(s) Oral every 12 hours  atorvastatin 40 milliGRAM(s) Oral at bedtime  buDESOnide 160 MICROgram(s)/formoterol 4.5 MICROgram(s) Inhaler 2 Puff(s) Inhalation two times a day  finasteride 5 milliGRAM(s) Oral daily  furosemide   Injectable 40 milliGRAM(s) IV Push daily  guaiFENesin  milliGRAM(s) Oral every 12 hours  levothyroxine  Oral Tab/Cap - Peds 50 MICROGram(s) Oral daily  lidocaine   Infusion 2 mG/Min (15 mL/Hr) IV Continuous <Continuous>  metoprolol tartrate 25 milliGRAM(s) Oral two times a day  mexiletine 200 milliGRAM(s) Oral every 12 hours  montelukast 10 milliGRAM(s) Oral daily  tamsulosin 0.4 milliGRAM(s) Oral at bedtime    MEDICATIONS  (PRN):  ALBUTerol/ipratropium for Nebulization 3 milliLiter(s) Nebulizer every 6 hours PRN Bronchospasm  ALPRAZolam 0.25 milliGRAM(s) Oral daily PRN anxiety and insomnia      bed side thoracic us : bilateral B line  IVC - no variation, > 2 cm

## 2019-07-22 NOTE — CONSULT NOTE ADULT - ASSESSMENT
Patient is a 79y Male with PMH of COPD, CHF w/ rEF, Afib, Vtach s/p AICD, COPD, NANDO, splenic laceration who presented to the hospital with worsening shortness of breath for a few days. SOB onset was slow and was exertional with increasing swelling of the lower extremity. Patient today complains of some fatigue and confusion. Patient also reports increased shortness of breath today and worsening productive cough.    #DUYEN on CKD  -Creatinine elevated to 2.1 on admission, 1.8 today  -Likely prerenal due to CHF exacerbation/cardiorenal    -Monitor BMP, Is and Os, trend creatinine  -U/A to rule out other causes of DUYEN,   -urine electrolytes  -Kidney and bladder U/S shows no hydronephrosis, obstructive nephropathy unlikely  -Continue Lasix for CHF, monitor for overdiuresis    This case to be discussed with attending

## 2019-07-22 NOTE — CONSULT NOTE ADULT - ASSESSMENT
IMPRESSION:    Pneumonia - Possible aspiration   Lidocaine toxicity  Vtach storm   AFib with rvr     PLAN:    CNS: Avoid sedation, frequent neuro checks     HEENT: Oral care    PULMONARY:  HOB @ 45 degrees, aspiration precautions, keep sats > 92%, pulm toilet     CARDIOVASCULAR: Keep MAP > 65, anti arrhythmic per EPS, check Echo. cheetah monitoring, Lasix on hold per cardio - would restart given the USG findings after cheetah    GI: GI prophylaxis. speech and swallow eval     RENAL:  Follow up lytes.  Correct as needed    INFECTIOUS DISEASE: Follow up cultures, start unasyn, cxr in am     HEMATOLOGICaL:  Eliquis     ENDOCRINE:  Follow up FS.  Insulin protocol if needed.    MUSCULOSKELETAL: Bed rest     Lines: peripheral   Johnson: none,   Code status: full   Prognosis: poor   Dispo:  CCU IMPRESSION:    b/l airspace disease/ aspiration/ CHF  Lidocaine toxicity?  Vtach storm   AFib with rvr     PLAN:    CNS: Avoid sedation, frequent neuro checks     HEENT: Oral care    PULMONARY:  HOB @ 45 degrees, aspiration precautions, keep sats > 92%, pulm toilet     CARDIOVASCULAR: Keep MAP > 65, anti arrhythmic per EPS, check Echo. bnp    GI: GI prophylaxis. speech and swallow eval     RENAL:  Follow up lytes.  Correct as needed    INFECTIOUS DISEASE: Follow up cultures, start unasyn, cxr in am     HEMATOLOGICaL:  Eliquis     ENDOCRINE:  Follow up FS.  Insulin protocol if needed.    MUSCULOSKELETAL: Bed rest     Lines: peripheral   Johnson: none,   Code status: full   Prognosis: poor   Dispo:  CCU   advance directives

## 2019-07-22 NOTE — PROGRESS NOTE ADULT - SUBJECTIVE AND OBJECTIVE BOX
post VT storm  no recurrence of VT since 5 pm 7/21/19  currently confused involuntay  limb movement most likely due to lidocaine toxicity  repeat X ray showed large Rlobe consolidation mos likely due to aspiration  plan    reduce dose of lidocaine to 1 mg/min  continue amiodarone 0.5 mg/min  start antibiotics and pumonary follow up

## 2019-07-22 NOTE — CONSULT NOTE ADULT - SUBJECTIVE AND OBJECTIVE BOX
NEPHROLOGY CONSULTATION NOTE    Patient is a 79y Male with PMH of COPD, CHF w/ rEF, Afib, Vtach s/p AICD, COPD, NANDO, splenic laceration who presented to the hospital with worsening shortness of breath for a few days. SOB onset was slow and was exertional with increasing swelling of the lower extremity. Patient today complains of some fatigue and confusion. Patient also reports increased shortness of breath today and worsening productive cough. Patient denies recent illness or sick contacts though admits to being in the hospital recently. Patient was hospitalized in June for near syncope and DUYEN. Patient denies difficulty or pain with urination, fevers, chills, or headache.     In the hospital patient was admitted for a CHF exacerbation and was treated with Lasix. On chest x-ray patient was found to have vascular congestion and left lower lobe opacity, indicating a potential HCAP and patient was started on antibiotics.      PAST MEDICAL & SURGICAL HISTORY:  CAD (coronary artery disease)  Ventricular tachycardia  NANDO on CPAP  COPD (chronic obstructive pulmonary disease)  Pacemaker  AICD (automatic cardioverter/defibrillator) present  Hypothyroidism  Atrial fibrillation  Congestive heart disease  Hypercholesteremia  AICD (automatic cardioverter/defibrillator) present  History of laparoscopic cholecystectomy    Allergies:  morphine (Stomach Upset)    Home Medications:  ALPRAZolam 0.25 mg oral tablet: 1 tab(s) orally once a day, As Needed (23 Oct 2018 00:22)  finasteride 5 mg oral tablet: 1 tab(s) orally once a day (23 Oct 2018 00:22)  levothyroxine 50 mcg (0.05 mg) oral capsule: 1 cap(s) orally once a day (23 Oct 2018 00:22)  montelukast 10 mg oral tablet: 1 tab(s) orally once a day (23 Oct 2018 00:22)  pravastatin 20 mg oral tablet: 1 tab(s) orally once a day (23 Oct 2018 00:22)  tamsulosin 0.4 mg oral capsule: 1 cap(s) orally once a day (23 Oct 2018 00:22)    Hospital Medications:   MEDICATIONS  (STANDING):  amiodarone Infusion 1 mG/Min (33.333 mL/Hr) IV Continuous <Continuous>  amiodarone Infusion 0.5 mG/Min (16.667 mL/Hr) IV Continuous <Continuous>  apixaban 2.5 milliGRAM(s) Oral every 12 hours  atorvastatin 40 milliGRAM(s) Oral at bedtime  buDESOnide 160 MICROgram(s)/formoterol 4.5 MICROgram(s) Inhaler 2 Puff(s) Inhalation two times a day  finasteride 5 milliGRAM(s) Oral daily  furosemide   Injectable 40 milliGRAM(s) IV Push daily  guaiFENesin  milliGRAM(s) Oral every 12 hours  levothyroxine  Oral Tab/Cap - Peds 50 MICROGram(s) Oral daily  lidocaine   Infusion 2 mG/Min (15 mL/Hr) IV Continuous <Continuous>  metoprolol tartrate 25 milliGRAM(s) Oral two times a day  mexiletine 200 milliGRAM(s) Oral every 12 hours  montelukast 10 milliGRAM(s) Oral daily  tamsulosin 0.4 milliGRAM(s) Oral at bedtime      SOCIAL HISTORY:  Denies ETOH,Smoking,   FAMILY HISTORY:  Family history of acute myocardial infarction (Aunt)        REVIEW OF SYSTEMS:  CONSTITUTIONAL: Admits fatigue. Denies fever or chills.  RESPIRATORY: Admits cough and shortness of breath.  CARDIOVASCULAR: No chest pain or palpitations.  GASTROINTESTINAL: Some periumbilical abdominal pain. No nausea, vomiting, or hematemesis; No diarrhea or constipation.  GENITOURINARY: No dysuria, frequency, urinary urgency, incontinence or hematuria   VASCULAR: Admits bilateral lower extremity edema.   All other review of systems is negative unless indicated above.    VITALS:  T(F): 97.4 (07-22-19 @ 04:00), Max: 97.4 (07-22-19 @ 04:00)  HR: 100 (07-22-19 @ 05:41)  BP: 93/61 (07-22-19 @ 05:41)  RR: 30 (07-22-19 @ 05:41)  SpO2: 92% (07-22-19 @ 05:41)    07-21 @ 07:01  -  07-22 @ 07:00  --------------------------------------------------------  IN: 382.8 mL / OUT: 400 mL / NET: -17.2 mL      Height (cm): 172.7 (07-21 @ 18:19)  Weight (kg): 88.5 (07-21 @ 18:19)  BMI (kg/m2): 29.7 (07-21 @ 18:19)  BSA (m2): 2.02 (07-21 @ 18:19)      I&O's Detail    21 Jul 2019 07:01  -  22 Jul 2019 07:00  --------------------------------------------------------  IN:    amiodarone Infusion: 66.6 mL    amiodarone Infusion: 66.6 mL    amiodarone Infusion: 99.6 mL    lidocaine   Infusion: 150 mL  Total IN: 382.8 mL    OUT:    Voided: 400 mL  Total OUT: 400 mL    Total NET: -17.2 mL            PHYSICAL EXAM:  Constitutional: NAD  HEENT: anicteric sclera, oropharynx clear, MMM  Neck: No JVD  Respiratory: CTAB, no wheezes, rales or rhonchi  Cardiovascular: S1, S2, RRR  Gastrointestinal: BS+, soft, NT/ND  Extremities: No cyanosis or clubbing. No peripheral edema  Neurological: A/O x 3, no focal deficits  Psychiatric: Normal mood, normal affect  : No CVA tenderness. No rose.   Skin: No rashes  Vascular Access:    LABS:  07-22    138  |  98  |  31<H>  ----------------------------<  178<H>  4.6   |  28  |  1.8<H>    Ca    9.1      22 Jul 2019 05:56  Mg     2.1     07-21    TPro  5.8<L>  /  Alb  3.4<L>  /  TBili  0.7  /  DBili      /  AST  24  /  ALT  26  /  AlkPhos  74  07-22    Creatinine Trend: 1.8 <--, 2.1 <--, 2.3 <-- Baseline 1.5 in 6/2019                        10.8   10.90 )-----------( 325      ( 22 Jul 2019 05:56 )             33.1                 RADIOLOGY & ADDITIONAL STUDIES:      < from: US Kidney and Bladder (07.22.19 @ 01:48) >  FINDINGS:    RIGHT KIDNEY: Normal in echogenicity, size measuring 8.7 cm in length. No   evidence of hydronephrosis, calculus or solid mass. Vascular flow is   demonstrated at the hilum.    LEFT KIDNEY: Normal in echogenicity, size measuring 10.7 cm in length.   Lower pole cyst measures 2.9 x 2.7 cm. No evidence of hydronephrosis,   calculus or solid mass. Vascular flow is demonstrated at the hilum.     < from: Xray Chest 1 View-PORTABLE IMMEDIATE (07.20.19 @ 06:33) >    Findings:    Support devices: Stable findings of left-sided CRT-D    Cardiac/mediastinum/hilum: Cardiomegaly    Lung parenchyma/Pleura: Interval development of pulmonary vascular   congestion with left lower lobe opacity. No evidence of pneumothorax.    Skeleton/soft tissues: Stable    Impression:      Findings of congestive heart failure    Left lower lobe opacity.    < end of copied text >  URINARY BLADDER: Empty bladder limiting assessment. Patient voided prior   to the examination.    PROSTATE: Markedly enlarged, measuring 116 cc.    IMPRESSION:  No stones or hydronephrosis.    Markedly enlarged prostate, measuring 116 cc.    < end of copied text >      < from: 12 Lead ECG (07.21.19 @ 16:17) >    Ventricular Rate 138 BPM    Atrial Rate 138 BPM    P-R Interval 104 ms    QRS Duration 206 ms    Q-T Interval 354 ms    QTC Calculation(Bezet) 536 ms    P Axis 18 degrees    R Axis 174 degrees    T Axis -56 degrees    Diagnosis Line Ventricular tachycardia  Abnormal ECG    Confirmed by Keyon Ramirez ([id number]) on 7/22/2019 9:05:18 AM    < end of copied text >

## 2019-07-23 LAB
ANION GAP SERPL CALC-SCNC: 13 MMOL/L — SIGNIFICANT CHANGE UP (ref 7–14)
BASOPHILS # BLD AUTO: 0.04 K/UL — SIGNIFICANT CHANGE UP (ref 0–0.2)
BASOPHILS NFR BLD AUTO: 0.3 % — SIGNIFICANT CHANGE UP (ref 0–1)
BUN SERPL-MCNC: 29 MG/DL — HIGH (ref 10–20)
CALCIUM SERPL-MCNC: 8.8 MG/DL — SIGNIFICANT CHANGE UP (ref 8.5–10.1)
CHLORIDE SERPL-SCNC: 98 MMOL/L — SIGNIFICANT CHANGE UP (ref 98–110)
CO2 SERPL-SCNC: 24 MMOL/L — SIGNIFICANT CHANGE UP (ref 17–32)
CREAT SERPL-MCNC: 1.8 MG/DL — HIGH (ref 0.7–1.5)
EOSINOPHIL # BLD AUTO: 0.01 K/UL — SIGNIFICANT CHANGE UP (ref 0–0.7)
EOSINOPHIL NFR BLD AUTO: 0.1 % — SIGNIFICANT CHANGE UP (ref 0–8)
GAS PNL BLDA: SIGNIFICANT CHANGE UP
GLUCOSE SERPL-MCNC: 181 MG/DL — HIGH (ref 70–99)
HCT VFR BLD CALC: 32 % — LOW (ref 42–52)
HGB BLD-MCNC: 10.6 G/DL — LOW (ref 14–18)
IMM GRANULOCYTES NFR BLD AUTO: 0.3 % — SIGNIFICANT CHANGE UP (ref 0.1–0.3)
LYMPHOCYTES # BLD AUTO: 0.37 K/UL — LOW (ref 1.2–3.4)
LYMPHOCYTES # BLD AUTO: 2.6 % — LOW (ref 20.5–51.1)
MAGNESIUM SERPL-MCNC: 1.9 MG/DL — SIGNIFICANT CHANGE UP (ref 1.8–2.4)
MCHC RBC-ENTMCNC: 29.2 PG — SIGNIFICANT CHANGE UP (ref 27–31)
MCHC RBC-ENTMCNC: 33.1 G/DL — SIGNIFICANT CHANGE UP (ref 32–37)
MCV RBC AUTO: 88.2 FL — SIGNIFICANT CHANGE UP (ref 80–94)
MONOCYTES # BLD AUTO: 1.17 K/UL — HIGH (ref 0.1–0.6)
MONOCYTES NFR BLD AUTO: 8.1 % — SIGNIFICANT CHANGE UP (ref 1.7–9.3)
NEUTROPHILS # BLD AUTO: 12.79 K/UL — HIGH (ref 1.4–6.5)
NEUTROPHILS NFR BLD AUTO: 88.6 % — HIGH (ref 42.2–75.2)
NRBC # BLD: 0 /100 WBCS — SIGNIFICANT CHANGE UP (ref 0–0)
PHOSPHATE SERPL-MCNC: 2.9 MG/DL — SIGNIFICANT CHANGE UP (ref 2.1–4.9)
PLATELET # BLD AUTO: 339 K/UL — SIGNIFICANT CHANGE UP (ref 130–400)
POTASSIUM SERPL-MCNC: 3.8 MMOL/L — SIGNIFICANT CHANGE UP (ref 3.5–5)
POTASSIUM SERPL-SCNC: 3.8 MMOL/L — SIGNIFICANT CHANGE UP (ref 3.5–5)
RBC # BLD: 3.63 M/UL — LOW (ref 4.7–6.1)
RBC # FLD: 15.5 % — HIGH (ref 11.5–14.5)
SODIUM SERPL-SCNC: 135 MMOL/L — SIGNIFICANT CHANGE UP (ref 135–146)
WBC # BLD: 14.43 K/UL — HIGH (ref 4.8–10.8)
WBC # FLD AUTO: 14.43 K/UL — HIGH (ref 4.8–10.8)

## 2019-07-23 PROCEDURE — 99233 SBSQ HOSP IP/OBS HIGH 50: CPT | Mod: 25

## 2019-07-23 PROCEDURE — 93284 PRGRMG EVAL IMPLANTABLE DFB: CPT | Mod: 26

## 2019-07-23 PROCEDURE — 99223 1ST HOSP IP/OBS HIGH 75: CPT

## 2019-07-23 PROCEDURE — 71045 X-RAY EXAM CHEST 1 VIEW: CPT | Mod: 26

## 2019-07-23 RX ORDER — SENNA PLUS 8.6 MG/1
1 TABLET ORAL DAILY
Refills: 0 | Status: DISCONTINUED | OUTPATIENT
Start: 2019-07-23 | End: 2019-08-04

## 2019-07-23 RX ORDER — HALOPERIDOL DECANOATE 100 MG/ML
5 INJECTION INTRAMUSCULAR DAILY
Refills: 0 | Status: DISCONTINUED | OUTPATIENT
Start: 2019-07-23 | End: 2019-08-04

## 2019-07-23 RX ORDER — AMIODARONE HYDROCHLORIDE 400 MG/1
0.5 TABLET ORAL
Qty: 900 | Refills: 0 | Status: DISCONTINUED | OUTPATIENT
Start: 2019-07-23 | End: 2019-07-24

## 2019-07-23 RX ORDER — DOCUSATE SODIUM 100 MG
100 CAPSULE ORAL DAILY
Refills: 0 | Status: DISCONTINUED | OUTPATIENT
Start: 2019-07-23 | End: 2019-08-04

## 2019-07-23 RX ORDER — METOPROLOL TARTRATE 50 MG
12.5 TABLET ORAL
Refills: 0 | Status: DISCONTINUED | OUTPATIENT
Start: 2019-07-23 | End: 2019-07-25

## 2019-07-23 RX ORDER — FUROSEMIDE 40 MG
40 TABLET ORAL ONCE
Refills: 0 | Status: COMPLETED | OUTPATIENT
Start: 2019-07-23 | End: 2019-07-23

## 2019-07-23 RX ORDER — DIPHENHYDRAMINE HCL 50 MG
50 CAPSULE ORAL ONCE
Refills: 0 | Status: COMPLETED | OUTPATIENT
Start: 2019-07-23 | End: 2019-07-23

## 2019-07-23 RX ORDER — FUROSEMIDE 40 MG
40 TABLET ORAL
Refills: 0 | Status: DISCONTINUED | OUTPATIENT
Start: 2019-07-23 | End: 2019-07-24

## 2019-07-23 RX ORDER — HALOPERIDOL DECANOATE 100 MG/ML
5 INJECTION INTRAMUSCULAR ONCE
Refills: 0 | Status: DISCONTINUED | OUTPATIENT
Start: 2019-07-23 | End: 2019-07-23

## 2019-07-23 RX ADMIN — APIXABAN 2.5 MILLIGRAM(S): 2.5 TABLET, FILM COATED ORAL at 05:06

## 2019-07-23 RX ADMIN — ATORVASTATIN CALCIUM 40 MILLIGRAM(S): 80 TABLET, FILM COATED ORAL at 21:07

## 2019-07-23 RX ADMIN — AMPICILLIN SODIUM AND SULBACTAM SODIUM 100 GRAM(S): 250; 125 INJECTION, POWDER, FOR SUSPENSION INTRAMUSCULAR; INTRAVENOUS at 17:44

## 2019-07-23 RX ADMIN — Medication 25 MILLIGRAM(S): at 18:17

## 2019-07-23 RX ADMIN — CHLORHEXIDINE GLUCONATE 1 APPLICATION(S): 213 SOLUTION TOPICAL at 05:11

## 2019-07-23 RX ADMIN — BUDESONIDE AND FORMOTEROL FUMARATE DIHYDRATE 2 PUFF(S): 160; 4.5 AEROSOL RESPIRATORY (INHALATION) at 20:40

## 2019-07-23 RX ADMIN — Medication 600 MILLIGRAM(S): at 05:06

## 2019-07-23 RX ADMIN — Medication 40 MILLIGRAM(S): at 17:44

## 2019-07-23 RX ADMIN — Medication 40 MILLIGRAM(S): at 10:00

## 2019-07-23 RX ADMIN — AMPICILLIN SODIUM AND SULBACTAM SODIUM 100 GRAM(S): 250; 125 INJECTION, POWDER, FOR SUSPENSION INTRAMUSCULAR; INTRAVENOUS at 05:11

## 2019-07-23 RX ADMIN — Medication 600 MILLIGRAM(S): at 17:02

## 2019-07-23 RX ADMIN — AMPICILLIN SODIUM AND SULBACTAM SODIUM 100 GRAM(S): 250; 125 INJECTION, POWDER, FOR SUSPENSION INTRAMUSCULAR; INTRAVENOUS at 12:04

## 2019-07-23 RX ADMIN — TAMSULOSIN HYDROCHLORIDE 0.4 MILLIGRAM(S): 0.4 CAPSULE ORAL at 21:07

## 2019-07-23 RX ADMIN — APIXABAN 2.5 MILLIGRAM(S): 2.5 TABLET, FILM COATED ORAL at 17:02

## 2019-07-23 RX ADMIN — Medication 25 MILLIGRAM(S): at 05:06

## 2019-07-23 RX ADMIN — Medication 50 MICROGRAM(S): at 05:06

## 2019-07-23 RX ADMIN — AMIODARONE HYDROCHLORIDE 16.67 MG/MIN: 400 TABLET ORAL at 05:17

## 2019-07-23 RX ADMIN — FINASTERIDE 5 MILLIGRAM(S): 5 TABLET, FILM COATED ORAL at 17:03

## 2019-07-23 RX ADMIN — Medication 1 MILLIGRAM(S): at 09:00

## 2019-07-23 RX ADMIN — AMPICILLIN SODIUM AND SULBACTAM SODIUM 100 GRAM(S): 250; 125 INJECTION, POWDER, FOR SUSPENSION INTRAMUSCULAR; INTRAVENOUS at 23:19

## 2019-07-23 RX ADMIN — MEXILETINE HYDROCHLORIDE 200 MILLIGRAM(S): 150 CAPSULE ORAL at 05:06

## 2019-07-23 RX ADMIN — Medication 50 MILLIGRAM(S): at 07:17

## 2019-07-23 RX ADMIN — Medication 3.75 MG/MIN: at 05:22

## 2019-07-23 RX ADMIN — MEXILETINE HYDROCHLORIDE 200 MILLIGRAM(S): 150 CAPSULE ORAL at 17:01

## 2019-07-23 NOTE — PROGRESS NOTE ADULT - SUBJECTIVE AND OBJECTIVE BOX
Remains  confused with ,hallucination   EXAM :    SINUS RHYTHM 100/MIN  LINGS KY RALES   X RAY CHEST KY CONGESTION  OPACITY ON R BASE POSSIBLE ASPIRATION    RECOMMEND   IV LASIX 40 MG STAT DOSE  REPEAT  DOSE Q 8 HRS  ABG  /BIPAP  IF NEEDED

## 2019-07-23 NOTE — CONSULT NOTE ADULT - SUBJECTIVE AND OBJECTIVE BOX
Neurology Consult    Patient is a 79y old  Male who presents with a chief complaint of shortness of breath for few days duration (23 Jul 2019 09:51)      HPI:  80 yo male with PMH CHFrEF, Afib, Vtach s/p AICD,COPD, NANDO, spleenic laceration following fall) comes to the ED with complaint of SOB.  SOB gradual onset, worsened in the evening yesterday, could not sleep s/t SOB, aggravated on exertion, no relieving factor. NO chest pain, or wheezing or cough or palpitation. No fever, headache, recent sick contacts. Normal Bowel and bladder habit. Patient was recently admitted for near syncope and DUYEN, discharged on june 17.  In the ED, patient RR 28, , spo2 80 room air, /60. Over past day patient has had involuntary limb movements and extrapyramidal symptoms. Patient has also been confused and been experiencing hallucinations. These symptoms come and go, being worse overnight. This morning due to severe agitation patient was given Ativan and Benadryl.       PAST MEDICAL & SURGICAL HISTORY:  CAD (coronary artery disease)  Ventricular tachycardia  NANDO on CPAP  COPD (chronic obstructive pulmonary disease)  Pacemaker  AICD (automatic cardioverter/defibrillator) present  Hypothyroidism  Atrial fibrillation  Congestive heart disease  Hypercholesteremia  AICD (automatic cardioverter/defibrillator) present  History of laparoscopic cholecystectomy      FAMILY HISTORY:  Family history of acute myocardial infarction (Aunt)      Social History: (-) x 3    Allergies    morphine (Stomach Upset)    Intolerances        MEDICATIONS  (STANDING):  amiodarone Infusion 0.5 mG/Min (16.667 mL/Hr) IV Continuous <Continuous>  ampicillin/sulbactam  IVPB      ampicillin/sulbactam  IVPB 1.5 Gram(s) IV Intermittent every 6 hours  apixaban 2.5 milliGRAM(s) Oral every 12 hours  atorvastatin 40 milliGRAM(s) Oral at bedtime  buDESOnide 160 MICROgram(s)/formoterol 4.5 MICROgram(s) Inhaler 2 Puff(s) Inhalation two times a day  chlorhexidine 4% Liquid 1 Application(s) Topical <User Schedule>  finasteride 5 milliGRAM(s) Oral daily  furosemide   Injectable 40 milliGRAM(s) IV Push two times a day  guaiFENesin  milliGRAM(s) Oral every 12 hours  levothyroxine  Oral Tab/Cap - Peds 50 MICROGram(s) Oral daily  metoprolol tartrate 25 milliGRAM(s) Oral two times a day  mexiletine 200 milliGRAM(s) Oral every 12 hours  montelukast 10 milliGRAM(s) Oral daily  tamsulosin 0.4 milliGRAM(s) Oral at bedtime    MEDICATIONS  (PRN):  ALBUTerol/ipratropium for Nebulization 3 milliLiter(s) Nebulizer every 6 hours PRN Bronchospasm      Review of systems:    Constitutional: as per HPI  Eyes: No eye pain or discharge  ENMT:  No difficulty hearing; No sinus or throat pain  Neck: No pain or stiffness  Respiratory: No cough, wheezing, chills or hemoptysis  Cardiovascular: No chest pain, palpitations, shortness of breath, dyspnea on exertion  Gastrointestinal: No abdominal pain, nausea, vomiting or hematemesis; No diarrhea or constipation.   Genitourinary: No dysuria, frequency, hematuria or incontinence  Neurological: As per HPI  Skin: No rashes or lesions   Endocrine: No heat or cold intolerance; No hair loss  Musculoskeletal: No joint pain or swelling  Psychiatric: No depression, anxiety, mood swings  Heme/Lymph: No easy bruising or bleeding gums    Vital Signs Last 24 Hrs  T(C): 36.2 (23 Jul 2019 12:00), Max: 36.2 (23 Jul 2019 12:00)  T(F): 97.2 (23 Jul 2019 12:00), Max: 97.2 (23 Jul 2019 12:00)  HR: 100 (23 Jul 2019 12:00) (98 - 110)  BP: 95/65 (23 Jul 2019 12:00) (73/47 - 137/69)  BP(mean): 71 (23 Jul 2019 12:00) (54 - 95)  RR: 18 (23 Jul 2019 12:00) (16 - 46)  SpO2: 100% (23 Jul 2019 12:00) (79% - 100%)    Examination:  General:  Appearance is consistent with chronologic age.  No abnormal facies.  Gross skin survey within normal limits.  Sedated.   Cognitive/Language:  Sedated. Follows commands.   Eyes: intact VA, VFF.  EOMI w/o nystagmus, skew or reported double vision.  PERRL.  No ptosis/weakness of eyelid closure.    Face:  no facial asymmetry.    Ears/Nose/Throat:  Hearing grossly intact b/l.  Palate elevates midline.  Tongue and uvula midline.   Motor examination:   Normal tone, bulk and range of motion.  No tenderness, twitching, tremors or involuntary movements.  Formal Muscle Strength Testing: (MRC grade R/L) 5/5 UE; 5/5 LE.  No observable drift.  Reflexes:   2+ b/l triceps, brachioradialis, patella and Achilles.      Respiratory:  no audible wheezing or inspiratory stridor.  no use of accessory muscles.   Cardiac: pulse palpable, no audible bruits  Abdomen: supple, no guarding, no TTP    Labs:   CBC Full  -  ( 23 Jul 2019 04:29 )  WBC Count : 14.43 K/uL  RBC Count : 3.63 M/uL  Hemoglobin : 10.6 g/dL  Hematocrit : 32.0 %  Platelet Count - Automated : 339 K/uL  Mean Cell Volume : 88.2 fL  Mean Cell Hemoglobin : 29.2 pg  Mean Cell Hemoglobin Concentration : 33.1 g/dL  Auto Neutrophil # : 12.79 K/uL  Auto Lymphocyte # : 0.37 K/uL  Auto Monocyte # : 1.17 K/uL  Auto Eosinophil # : 0.01 K/uL  Auto Basophil # : 0.04 K/uL  Auto Neutrophil % : 88.6 %  Auto Lymphocyte % : 2.6 %  Auto Monocyte % : 8.1 %  Auto Eosinophil % : 0.1 %  Auto Basophil % : 0.3 %    07-23    135  |  98  |  29<H>  ----------------------------<  181<H>  3.8   |  24  |  1.8<H>    Ca    8.8      23 Jul 2019 04:29  Phos  2.9     07-23  Mg     1.9     07-23    TPro  5.8<L>  /  Alb  3.4<L>  /  TBili  0.7  /  DBili  x   /  AST  24  /  ALT  26  /  AlkPhos  74  07-22    LIVER FUNCTIONS - ( 22 Jul 2019 05:56 )  Alb: 3.4 g/dL / Pro: 5.8 g/dL / ALK PHOS: 74 U/L / ALT: 26 U/L / AST: 24 U/L / GGT: x                   Neuroimaging:  NCHCT:     07-23-19 @ 13:22    < from: CT Head No Cont (06.14.19 @ 20:15) >  Findings:    The ventricular system, basal cisterns sulcal pattern are consistent with   age-related cerebral volume loss. Again seen is encephalomalacia in the   inferior frontal lobes, likely reflecting chronic traumatic injury.    Again seen is focal area of slight hyperdensity in the region of   posterior left temporal lobe, measuring 1.9 cm, stable since prior exam,   with adjacent slight hypoattenuation without significant mass effect,   findings may be extra-axial, representing a meningioma.    There is no evidence of intra or extra-axial hemorrhage or fluid   collection.  No mass effect or midline shift is seen.     The gray-white matter differentiation is preserved.  Mild periventricular   and subcortical white matter hypodensities are noted without mass effect   compatible with microvascular ischemic changes, stable since prior exam.     There is no calvarial fracture.    Visualized paranasal sinuses and mastoid air cells are within normal  limits.    Impression:    No acute intracranial abnormality.    Stable microvascular ischemic changes and sequela of chronic traumatic   change in the inferior frontal lobes bilaterally.    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 Again recommend further evaluation   with MRI of the brain with and without intravenous contrast on an   outpatient basis.    < end of copied text > Neurology Consult    Patient is a 79y old  Male who presents with a chief complaint of shortness of breath for few days duration (23 Jul 2019 09:51)    HPI:  78 yo male with PMH CHFrEF, Afib, Vtach s/p AICD,COPD, NANDO, spleenic laceration following fall) comes to the ED with complaint of SOB.  SOB gradual onset, worsened in the evening yesterday, could not sleep s/t SOB, aggravated on exertion, no relieving factor. NO chest pain, or wheezing or cough or palpitation. No fever, headache, recent sick contacts. Normal Bowel and bladder habit. Patient was recently admitted for near syncope and DUYEN, discharged on june 17.  In the ED, patient RR 28, , spo2 80 room air, /60. Over past day patient has had involuntary limb movements ? abnormal movements. Patient has also been confused and been experiencing hallucinations. These symptoms come and go, being worse overnight. This morning due to severe agitation patient was given Ativan and Benadryl.  Had been on corticosteroids and lidocaine drip while in ICU.      PAST MEDICAL & SURGICAL HISTORY:  CAD (coronary artery disease)  Ventricular tachycardia  NANDO on CPAP  COPD (chronic obstructive pulmonary disease)  Pacemaker  AICD (automatic cardioverter/defibrillator) present  Hypothyroidism  Atrial fibrillation  Congestive heart disease  Hypercholesteremia  AICD (automatic cardioverter/defibrillator) present  History of laparoscopic cholecystectomy    FAMILY HISTORY:  Family history of acute myocardial infarction (Aunt)    Social History: (-) x 3    Allergies    morphine (Stomach Upset)    Intolerances    MEDICATIONS  (STANDING):  amiodarone Infusion 0.5 mG/Min (16.667 mL/Hr) IV Continuous <Continuous>  ampicillin/sulbactam  IVPB      ampicillin/sulbactam  IVPB 1.5 Gram(s) IV Intermittent every 6 hours  apixaban 2.5 milliGRAM(s) Oral every 12 hours  atorvastatin 40 milliGRAM(s) Oral at bedtime  buDESOnide 160 MICROgram(s)/formoterol 4.5 MICROgram(s) Inhaler 2 Puff(s) Inhalation two times a day  chlorhexidine 4% Liquid 1 Application(s) Topical <User Schedule>  finasteride 5 milliGRAM(s) Oral daily  furosemide   Injectable 40 milliGRAM(s) IV Push two times a day  guaiFENesin  milliGRAM(s) Oral every 12 hours  levothyroxine  Oral Tab/Cap - Peds 50 MICROGram(s) Oral daily  metoprolol tartrate 25 milliGRAM(s) Oral two times a day  mexiletine 200 milliGRAM(s) Oral every 12 hours  montelukast 10 milliGRAM(s) Oral daily  tamsulosin 0.4 milliGRAM(s) Oral at bedtime  MEDICATIONS  (PRN):  ALBUTerol/ipratropium for Nebulization 3 milliLiter(s) Nebulizer every 6 hours PRN Bronchospasm    Review of systems:    Constitutional: as per HPI  Eyes: No eye pain or discharge  ENMT:  No difficulty hearing; No sinus or throat pain  Neck: No pain or stiffness  Respiratory: No cough, wheezing, chills or hemoptysis  Cardiovascular: No chest pain, palpitations, shortness of breath, dyspnea on exertion  Gastrointestinal: No abdominal pain, nausea, vomiting or hematemesis; No diarrhea or constipation.   Genitourinary: No dysuria, frequency, hematuria or incontinence  Neurological: As per HPI  Skin: No rashes or lesions   Endocrine: No heat or cold intolerance; No hair loss  Musculoskeletal: No joint pain or swelling  Psychiatric: No depression, anxiety, mood swings  Heme/Lymph: No easy bruising or bleeding gums    Vital Signs Last 24 Hrs  T(C): 36.2 (23 Jul 2019 12:00), Max: 36.2 (23 Jul 2019 12:00)  T(F): 97.2 (23 Jul 2019 12:00), Max: 97.2 (23 Jul 2019 12:00)  HR: 100 (23 Jul 2019 12:00) (98 - 110)  BP: 95/65 (23 Jul 2019 12:00) (73/47 - 137/69)  BP(mean): 71 (23 Jul 2019 12:00) (54 - 95)  RR: 18 (23 Jul 2019 12:00) (16 - 46)  SpO2: 100% (23 Jul 2019 12:00) (79% - 100%)    Examination:  General:  Appearance is consistent with chronologic age.  No abnormal facies.  Gross skin survey within normal limits.  Sedated.   Cognitive/Language:  Sedated. Follows commands with repeated stimuli.   nonverbal currently.    Eyes: intact VA, VFF.  EOMI w/o nystagmus, skew or reported double vision.  PERRL.  No ptosis/weakness of eyelid closure.    Face:  no facial asymmetry.    Ears/Nose/Throat:  Hearing grossly intact b/l.  Palate elevates midline.  Tongue and uvula midline.   Motor examination:   Normal tone, bulk and range of motion.  No tenderness, twitching, tremors or involuntary movements.  Formal Muscle Strength Testing: (MRC grade R/L) 5/5 UE; 5/5 LE.  No observable drift.  Reflexes:   2+ b/l triceps, brachioradialis, patella and Achilles.      Respiratory:  no audible wheezing or inspiratory stridor.  no use of accessory muscles.   Cardiac: pulse palpable, no audible bruits  Abdomen: supple, no guarding, no TTP    Labs:   CBC Full  -  ( 23 Jul 2019 04:29 )  WBC Count : 14.43 K/uL  RBC Count : 3.63 M/uL  Hemoglobin : 10.6 g/dL  Hematocrit : 32.0 %  Platelet Count - Automated : 339 K/uL  Mean Cell Volume : 88.2 fL  Mean Cell Hemoglobin : 29.2 pg  Mean Cell Hemoglobin Concentration : 33.1 g/dL  Auto Neutrophil # : 12.79 K/uL  Auto Lymphocyte # : 0.37 K/uL  Auto Monocyte # : 1.17 K/uL  Auto Eosinophil # : 0.01 K/uL  Auto Basophil # : 0.04 K/uL  Auto Neutrophil % : 88.6 %  Auto Lymphocyte % : 2.6 %  Auto Monocyte % : 8.1 %  Auto Eosinophil % : 0.1 %  Auto Basophil % : 0.3 %    07-23    135  |  98  |  29<H>  ----------------------------<  181<H>  3.8   |  24  |  1.8<H>    Ca    8.8      23 Jul 2019 04:29  Phos  2.9     07-23  Mg     1.9     07-23    TPro  5.8<L>  /  Alb  3.4<L>  /  TBili  0.7  /  DBili  x   /  AST  24  /  ALT  26  /  AlkPhos  74  07-22    LIVER FUNCTIONS - ( 22 Jul 2019 05:56 )  Alb: 3.4 g/dL / Pro: 5.8 g/dL / ALK PHOS: 74 U/L / ALT: 26 U/L / AST: 24 U/L / GGT: x           < from: CT Head No Cont (06.14.19 @ 20:15) >  Findings:    The ventricular system, basal cisterns sulcal pattern are consistent with   age-related cerebral volume loss. Again seen is encephalomalacia in the   inferior frontal lobes, likely reflecting chronic traumatic injury.    Again seen is focal area of slight hyperdensity in the region of   posterior left temporal lobe, measuring 1.9 cm, stable since prior exam,   with adjacent slight hypoattenuation without significant mass effect,   findings may be extra-axial, representing a meningioma.    There is no evidence of intra or extra-axial hemorrhage or fluid   collection.  No mass effect or midline shift is seen.     The gray-white matter differentiation is preserved.  Mild periventricular   and subcortical white matter hypodensities are noted without mass effect   compatible with microvascular ischemic changes, stable since prior exam.     There is no calvarial fracture.    Visualized paranasal sinuses and mastoid air cells are within normal  limits.    Impression:    No acute intracranial abnormality.    Stable microvascular ischemic changes and sequela of chronic traumatic   change in the inferior frontal lobes bilaterally.    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 Again recommend further evaluation   with MRI of the brain with and without intravenous contrast on an   outpatient basis.    < end of copied text >

## 2019-07-23 NOTE — PROGRESS NOTE ADULT - ASSESSMENT
Patient is a 79y Male with PMH of COPD, CHF w/ rEF, Afib, Vtach s/p AICD, COPD, NANDO, splenic laceration who presented to the hospital with worsening shortness of breath for a few days. SOB onset was slow and was exertional with increasing swelling of the lower extremity. Patient today complains of some fatigue and confusion. Patient also reports increased shortness of breath today and worsening productive cough.    #DUYEN on CKD  -Creatinine elevated to 2.1 on admission, 1.8 today  -Likely prerenal due to CHF exacerbation/cardiorenal    -Monitor BMP, Is and Os, trend creatinine  -U/A to rule out other causes of DUYEN,   -urine electrolytes  -Kidney and bladder U/S shows no hydronephrosis, obstructive nephropathy unlikely  -Continue Lasix for CHF, monitor for overdiuresis    This case to be discussed with attending Patient is a 79y Male with PMH of COPD, CHF w/ rEF, Afib, Vtach s/p AICD, COPD, NANDO, splenic laceration who presented to the hospital with worsening shortness of breath for a few days. SOB onset was slow and was exertional with increasing swelling of the lower extremity. Patient today complains of some fatigue and confusion. Patient also reports increased shortness of breath today and worsening productive cough.    #DUYEN on CKD 3  -Creatinine trending down  today at abseline   -continue lasix IV   -Monitor BMP, Is and Os, trend creatinine  -Kidney and bladder U/S shows no hydronephrosis, obstructive nephropathy unlikely  -Continue Lasix for CHF, monitor for overdiuresis  - follow BMP    will sign off recall PRN please

## 2019-07-23 NOTE — PROGRESS NOTE ADULT - ASSESSMENT
Patient is 80 yo male with hx of S/P AICD (automatic cardioverter/defibrillator), Atrial fibrillation, CAD (coronary artery disease), Congestive heart disease, COPD (chronic obstructive pulmonary disease), Hypercholesteremia, Hypothyroidism, NANDO on CPAP, Ventricular tachycardia presenting with     Patient appears to be disoriented in person and time, this is not his baseline and was oriented till last night as per son. Today patient became agitated and wanted to leave, he was given benadryl to sedate him, which caused him to have purpose less movements of limbs. neurology was consulted    #) ICU delerium  - lidocaine held  -avoid sedatives benadryl  -haldol IM 5 mg PRN for agitation ( after consultation with EPS)  -will order CTH and eeg once pt is stable enough       1. Acute exacerbation of Systolic CHF  -EKG shows no ischemic changes  -Trop flat  - Continue with lasix IV 40 mg Bid   - repeat xray chest      2. Rt lower lobe  opacity  -Afebrile, productive cough for last 2 days with samy sputum  -may be due to aspiration sec to diorientation  -aspiration prercautions  -Will obtain Chest CT scan, and pulmonary consult  -pt started on unasyn      4. Afib S/P ablation:  D/c elliquis and start heparin in PM instead    5.  COPD   -Continue with home medications  -Stable     6. AICD:  -Stable  -Electro consult as per cardiology    7. Arrythmias  -pt had a VT storm on 7/21  -started on mixeltene, lidocaine, lasix , amiodarone and lopressor  -lopressor was d/c last night because of hypotension  -on bed side usg patient still has some pulmonary edema, lopressor on hold for now  -lasix resumed 40 mg Bid      DVT ppx:  on heparin    Diet:  dash/tlc    Activity: AAT    Dispo: from home

## 2019-07-23 NOTE — CONSULT NOTE ADULT - REASON FOR ADMISSION
shortness of breath for few days duration
SOB
shortness of breath for few days duration

## 2019-07-23 NOTE — CONSULT NOTE ADULT - ASSESSMENT
Assessment:  79 year old male with PMH of A fib, CHF, V tach s/p AICD was admitted for COPD exacerbation While in CCU patient started experiencing involuntary limb movements and extrapyramidal side effects. Patient also experienced confusion and hallucinations. Overnight patient was extremely agitated so he was given Ativan and Benadryl.      #Delirium   - CT scan Head   - routine EEG  -avoid Benadryl   - switch from ativan to haldol for agitation 79 year old male with PMH of A fib, CHF, V tach s/p AICD was admitted for COPD exacerbation and arrhythmia on amiodarone/lidocaine drip and corticosteroids with ICU delirium with waxing/waning mental status and stereotyped movements.      Plan:  - CT scan Head NC  - routine EEG  - avoid Benadryl  - wean corticosteroids if possible  - switch from ativan to haldol for agitation PRN  - hold lidocaine gtt for now

## 2019-07-23 NOTE — PROGRESS NOTE ADULT - SUBJECTIVE AND OBJECTIVE BOX
Nephrology progress note    Patient is seen and examined, events over the last 24 h noted .    Allergies:  morphine (Stomach Upset)    Hospital Medications:   MEDICATIONS  (STANDING):  amiodarone Infusion 0.5 mG/Min (16.667 mL/Hr) IV Continuous <Continuous>  ampicillin/sulbactam  IVPB      ampicillin/sulbactam  IVPB 1.5 Gram(s) IV Intermittent every 6 hours  apixaban 2.5 milliGRAM(s) Oral every 12 hours  atorvastatin 40 milliGRAM(s) Oral at bedtime  buDESOnide 160 MICROgram(s)/formoterol 4.5 MICROgram(s) Inhaler 2 Puff(s) Inhalation two times a day  chlorhexidine 4% Liquid 1 Application(s) Topical <User Schedule>  finasteride 5 milliGRAM(s) Oral daily  furosemide   Injectable 40 milliGRAM(s) IV Push once  furosemide   Injectable 40 milliGRAM(s) IV Push two times a day  guaiFENesin  milliGRAM(s) Oral every 12 hours  levothyroxine  Oral Tab/Cap - Peds 50 MICROGram(s) Oral daily  LORazepam   Injectable 1 milliGRAM(s) IV Push once  metoprolol tartrate 25 milliGRAM(s) Oral two times a day  mexiletine 200 milliGRAM(s) Oral every 12 hours  montelukast 10 milliGRAM(s) Oral daily  tamsulosin 0.4 milliGRAM(s) Oral at bedtime        VITALS:  T(F): 97 (07-23-19 @ 00:00), Max: 97 (07-23-19 @ 00:00)  HR: 100 (07-23-19 @ 08:00)  BP: 119/80 (07-23-19 @ 08:00)  RR: 32 (07-23-19 @ 08:00)  SpO2: 91% (07-23-19 @ 08:00)  Wt(kg): --    07-21 @ 07:01  -  07-22 @ 07:00  --------------------------------------------------------  IN: 414.5 mL / OUT: 400 mL / NET: 14.5 mL    07-22 @ 07:01 - 07-23 @ 07:00  --------------------------------------------------------  IN: 1435.3 mL / OUT: 1675 mL / NET: -239.7 mL    07-23 @ 07:01 - 07-23 @ 09:51  --------------------------------------------------------  IN: 33.4 mL / OUT: 0 mL / NET: 33.4 mL          PHYSICAL EXAM:  Constitutional: NAD  HEENT: anicteric sclera, oropharynx clear, MMM  Neck: No JVD  Respiratory: CTAB, no wheezes, rales or rhonchi  Cardiovascular: S1, S2, RRR  Gastrointestinal: BS+, soft, NT/ND  Extremities: No cyanosis or clubbing. No peripheral edema  :  No rose.   Skin: No rashes    LABS:  07-23    135  |  98  |  29<H>  ----------------------------<  181<H>  3.8   |  24  |  1.8<H>    Ca    8.8      23 Jul 2019 04:29  Phos  2.9     07-23  Mg     1.9     07-23    TPro  5.8<L>  /  Alb  3.4<L>  /  TBili  0.7  /  DBili      /  AST  24  /  ALT  26  /  AlkPhos  74  07-22                          10.6   14.43 )-----------( 339      ( 23 Jul 2019 04:29 )             32.0       Urine Studies:    Sodium, Random Urine: 75.0 mmoL/L (07-20 @ 13:57)    RADIOLOGY & ADDITIONAL STUDIES: Nephrology progress note    Patient is seen and examined, events over the last 24 h noted .  sleepy   lethargic/ confused     Allergies:  morphine (Stomach Upset)    Hospital Medications:   MEDICATIONS  (STANDING):  amiodarone Infusion 0.5 mG/Min (16.667 mL/Hr) IV Continuous <Continuous>    ampicillin/sulbactam  IVPB 1.5 Gram(s) IV Intermittent every 6 hours  apixaban 2.5 milliGRAM(s) Oral every 12 hours  atorvastatin 40 milliGRAM(s) Oral at bedtime  buDESOnide 160 MICROgram(s)/formoterol 4.5 MICROgram(s) Inhaler 2 Puff(s) Inhalation two times a day  finasteride 5 milliGRAM(s) Oral daily  furosemide   Injectable 40 milliGRAM(s) IV Push once  furosemide   Injectable 40 milliGRAM(s) IV Push two times a day  guaiFENesin  milliGRAM(s) Oral every 12 hours  levothyroxine  Oral Tab/Cap - Peds 50 MICROGram(s) Oral daily  LORazepam   Injectable 1 milliGRAM(s) IV Push once  metoprolol tartrate 25 milliGRAM(s) Oral two times a day  mexiletine 200 milliGRAM(s) Oral every 12 hours  montelukast 10 milliGRAM(s) Oral daily  tamsulosin 0.4 milliGRAM(s) Oral at bedtime        VITALS:  T(F): 97 (07-23-19 @ 00:00), Max: 97 (07-23-19 @ 00:00)  HR: 100 (07-23-19 @ 08:00)  BP: 119/80 (07-23-19 @ 08:00)  RR: 32 (07-23-19 @ 08:00)  SpO2: 91% (07-23-19 @ 08:00)      07-21 @ 07:01  -  07-22 @ 07:00  --------------------------------------------------------  IN: 414.5 mL / OUT: 400 mL / NET: 14.5 mL    07-22 @ 07:01  -  07-23 @ 07:00  --------------------------------------------------------  IN: 1435.3 mL / OUT: 1675 mL / NET: -239.7 mL    07-23 @ 07:01  -  07-23 @ 09:51  --------------------------------------------------------  IN: 33.4 mL / OUT: 0 mL / NET: 33.4 mL          PHYSICAL EXAM:  Constitutional: NAD  HEENT: anicteric sclera, oropharynx clear, MMM  Neck: No JVD  Respiratory: CTAB, no wheezes, rales or rhonchi  Cardiovascular: S1, S2, RRR  Gastrointestinal: BS+, soft, NT/ND  Extremities: No cyanosis or clubbing. No peripheral edema  :  No rose.   Skin: No rashes    LABS:  07-23    135  |  98  |  29<H>  ----------------------------<  181<H>  3.8   |  24  |  1.8<H>    Ca    8.8      23 Jul 2019 04:29  Phos  2.9     07-23  Mg     1.9     07-23    TPro  5.8<L>  /  Alb  3.4<L>  /  TBili  0.7  /  DBili      /  AST  24  /  ALT  26  /  AlkPhos  74  07-22  Creatinine Trend: 1.8<--, 1.8<--, 2.1<--, 2.3<--                   10.6   14.43 )-----------( 339      ( 23 Jul 2019 04:29 )             32.0       Urine Studies:    Sodium, Random Urine: 75.0 mmoL/L (07-20 @ 13:57)    RADIOLOGY & ADDITIONAL STUDIES:

## 2019-07-23 NOTE — PROGRESS NOTE ADULT - SUBJECTIVE AND OBJECTIVE BOX
LENGTH OF HOSPITAL STAY: 3d    CHIEF COMPLAINT:   Patient is a 79y old  Male who presents with a chief complaint of shortness of breath for few days duration (23 Jul 2019 13:22)      HISTORY OF PRESENTING ILLNESS:    HPI:   HPI:  78 yo male with PMH CHFrEF, Afib, Vtach s/p AICD,COPD, NANDO, spleenic laceration following fall) comes to the ED with complaint of SOB.  SOB gradual onset, worsened in the evening yesterday, could not sleep s/t SOB, aggravated on exertion, no relieving factor.  NO chest pain, or wheezing or cough or palpitation.  No fever, headache, recent sick contacts.  normal Bowel and bladder habit.    Patient was recently admitted for near syncope and DUYEN, discharged on june 17.      PAST MEDICAL & SURGICAL HISTORY  PAST MEDICAL & SURGICAL HISTORY:  CAD (coronary artery disease)  Ventricular tachycardia  NANDO on CPAP  COPD (chronic obstructive pulmonary disease)  Pacemaker  AICD (automatic cardioverter/defibrillator) present  Hypothyroidism  Atrial fibrillation  Congestive heart disease  Hypercholesteremia  AICD (automatic cardioverter/defibrillator) present  History of laparoscopic cholecystectomy    SOCIAL HISTORY:    ALLERGIES:  morphine (Stomach Upset)    MEDICATIONS:  STANDING MEDICATIONS  amiodarone Infusion 0.5 mG/Min IV Continuous <Continuous>  ampicillin/sulbactam  IVPB      ampicillin/sulbactam  IVPB 1.5 Gram(s) IV Intermittent every 6 hours  apixaban 2.5 milliGRAM(s) Oral every 12 hours  atorvastatin 40 milliGRAM(s) Oral at bedtime  buDESOnide 160 MICROgram(s)/formoterol 4.5 MICROgram(s) Inhaler 2 Puff(s) Inhalation two times a day  chlorhexidine 4% Liquid 1 Application(s) Topical <User Schedule>  finasteride 5 milliGRAM(s) Oral daily  furosemide   Injectable 40 milliGRAM(s) IV Push two times a day  guaiFENesin  milliGRAM(s) Oral every 12 hours  levothyroxine  Oral Tab/Cap - Peds 50 MICROGram(s) Oral daily  metoprolol tartrate 25 milliGRAM(s) Oral two times a day  mexiletine 200 milliGRAM(s) Oral every 12 hours  montelukast 10 milliGRAM(s) Oral daily  tamsulosin 0.4 milliGRAM(s) Oral at bedtime    PRN MEDICATIONS  ALBUTerol/ipratropium for Nebulization 3 milliLiter(s) Nebulizer every 6 hours PRN  haloperidol    Injectable 5 milliGRAM(s) IntraMuscular daily PRN    VITALS:   T(F): 97.2  HR: 100  BP: 95/65  RR: 18  SpO2: 100%    LABS:                        10.6   14.43 )-----------( 339      ( 23 Jul 2019 04:29 )             32.0     07-23    135  |  98  |  29<H>  ----------------------------<  181<H>  3.8   |  24  |  1.8<H>    Ca    8.8      23 Jul 2019 04:29  Phos  2.9     07-23  Mg     1.9     07-23    TPro  5.8<L>  /  Alb  3.4<L>  /  TBili  0.7  /  DBili  x   /  AST  24  /  ALT  26  /  AlkPhos  74  07-22        ABG - ( 23 Jul 2019 09:08 )  pH, Arterial: 7.45  pH, Blood: x     /  pCO2: 37    /  pO2: 50    / HCO3: 25    / Base Excess: 1.4   /  SaO2: 85                        RADIOLOGY:  < from: Xray Chest 1 View- PORTABLE-Routine (07.23.19 @ 05:01) >    IMPRESSION:     Unchanged bilateral opacities and effusions.        < end of copied text >    PHYSICAL EXAM:  GEN: Patient is confused and agitated  HEENT: AT,NC, PERRLA  LUNGS: Clear to auscultation bilaterally   HEART: S1/S2 present. RRR.   ABD: Soft, non-tender,distended. Bowel sounds present  EXT: edema/clubbing/cyanosis absent  NEURO: AAOX0

## 2019-07-23 NOTE — PROGRESS NOTE ADULT - ASSESSMENT
IMPRESSION:    b/l airspace disease/ aspiration/ CHF  Lidocaine toxicity?  Vtach storm   AFib with rvr     PLAN:    CNS: 1mg ativan x 1 , frequent neuro checks, neuro evaluation     HEENT: Oral care    PULMONARY:  HOB @ 45 degrees, aspiration precautions, keep sats > 92%, pulm toilet     CARDIOVASCULAR: Keep MAP > 65, anti arrhythmic per EPS, bnp, hold lidocaine, IV Lasix bix 40mg, keep -ve, I/O     GI: GI prophylaxis. feeding     RENAL:  Follow up lytes.  Correct as needed    INFECTIOUS DISEASE: Follow up cultures, Cont unasyn, cxr in am     HEMATOLOGICaL:  Eliquis - hold, heparin in pm     ENDOCRINE:  Follow up FS.  Insulin protocol if needed.    MUSCULOSKELETAL: Bed rest     Lines: peripheral   Rose: Keep the rose   Code status: full   Prognosis: poor   Dispo:  CCU

## 2019-07-23 NOTE — PROGRESS NOTE ADULT - SUBJECTIVE AND OBJECTIVE BOX
Patient is a 79y old  Male who presents with a chief complaint of shortness of breath for few days duration (23 Jul 2019 08:27)    Over Night Events:    Involuntary limb movements   extrapyramidal symptoms     ROS:  See HPI    PHYSICAL EXAM    ICU Vital Signs Last 24 Hrs  T(C): 36.1 (23 Jul 2019 00:00), Max: 36.1 (23 Jul 2019 00:00)  T(F): 97 (23 Jul 2019 00:00), Max: 97 (23 Jul 2019 00:00)  HR: 100 (23 Jul 2019 08:00) (98 - 110)  BP: 119/80 (23 Jul 2019 08:00) (72/60 - 137/69)  BP(mean): 92 (23 Jul 2019 08:00) (65 - 95)  RR: 32 (23 Jul 2019 08:00) (20 - 46)  SpO2: 91% (23 Jul 2019 08:00) (79% - 96%)    General: AAO x 1   HEENT: DONNA     akathesia         Lymphatic system: No cervical LN   Lungs: Bilateral BS  Cardiovascular: Regular   Gastrointestinal: Soft, Positive BS  Extremities: No clubbing.  Moves extremities.  Full Range of motion   Skin: Warm, intact  Neurological: No motor or sensory deficit , abnormal limb movements       07-22-19 @ 07:01  -  07-23-19 @ 07:00  --------------------------------------------------------  IN:    amiodarone Infusion: 317.3 mL    amiodarone Infusion: 83.5 mL    IV PiggyBack: 150 mL    lidocaine   Infusion: 38 mL    lidocaine   Infusion: 86.5 mL    Oral Fluid: 760 mL  Total IN: 1435.3 mL    OUT:    Indwelling Catheter - Urethral: 800 mL    Voided: 875 mL  Total OUT: 1675 mL    Total NET: -239.7 mL      07-23-19 @ 07:01  -  07-23-19 @ 09:40  --------------------------------------------------------  IN:    amiodarone Infusion: 33.4 mL  Total IN: 33.4 mL    OUT:  Total OUT: 0 mL    Total NET: 33.4 mL    LABS:                            10.6   14.43 )-----------( 339      ( 23 Jul 2019 04:29 )             32.0                                               07-23    135  |  98  |  29<H>  ----------------------------<  181<H>  3.8   |  24  |  1.8<H>    Ca    8.8      23 Jul 2019 04:29  Phos  2.9     07-23  Mg     1.9     07-23    TPro  5.8<L>  /  Alb  3.4<L>  /  TBili  0.7  /  DBili  x   /  AST  24  /  ALT  26  /  AlkPhos  74  07-22                                                                                           LIVER FUNCTIONS - ( 22 Jul 2019 05:56 )  Alb: 3.4 g/dL / Pro: 5.8 g/dL / ALK PHOS: 74 U/L / ALT: 26 U/L / AST: 24 U/L / GGT: x                                                                                                                                   ABG - ( 23 Jul 2019 09:08 )  pH, Arterial: 7.45  pH, Blood: x     /  pCO2: 37    /  pO2: 50    / HCO3: 25    / Base Excess: 1.4   /  SaO2: 85        MEDICATIONS  (STANDING):  amiodarone Infusion 0.5 mG/Min (16.667 mL/Hr) IV Continuous <Continuous>  ampicillin/sulbactam  IVPB      ampicillin/sulbactam  IVPB 1.5 Gram(s) IV Intermittent every 6 hours  apixaban 2.5 milliGRAM(s) Oral every 12 hours  atorvastatin 40 milliGRAM(s) Oral at bedtime  buDESOnide 160 MICROgram(s)/formoterol 4.5 MICROgram(s) Inhaler 2 Puff(s) Inhalation two times a day  chlorhexidine 4% Liquid 1 Application(s) Topical <User Schedule>  finasteride 5 milliGRAM(s) Oral daily  guaiFENesin  milliGRAM(s) Oral every 12 hours  levothyroxine  Oral Tab/Cap - Peds 50 MICROGram(s) Oral daily  lidocaine   Infusion 0.5 mG/Min (3.75 mL/Hr) IV Continuous <Continuous>  LORazepam     Tablet 1 milliGRAM(s) Oral once  metoprolol tartrate 25 milliGRAM(s) Oral two times a day  mexiletine 200 milliGRAM(s) Oral every 12 hours  montelukast 10 milliGRAM(s) Oral daily  tamsulosin 0.4 milliGRAM(s) Oral at bedtime    MEDICATIONS  (PRN):  ALBUTerol/ipratropium for Nebulization 3 milliLiter(s) Nebulizer every 6 hours PRN Bronchospasm      Xrays:        bilateral vascular markings                                                                                ECHO    < from: Transthoracic Echocardiogram (07.22.19 @ 15:17) >  Summary:   1. Left ventricular ejection fraction, by visual estimation, is <20%.   2. Severely decreased global left ventricular systolic function.   3. Multiple left ventricular regional wall motion abnormalities exist.   See wall motion findings.   4. Elevated left atrial and left ventricular end-diastolic pressures.   5. Spectral Doppler shows restrictive pattern of left ventricular   myocardial filling (Grade III diastolic dysfunction).   6. Mild to moderately enlarged right atrium.   7. Mild aortic regurgitation.   8. Estimated pulmonary artery systolic pressure is 51.1 mmHg assuming a   right atrial pressure of 8 mmHg, which is consistent with moderate   pulmonary hypertension.   9. Pulmonary hypertension is present.  10. LA volume Index is 60.0 ml/m² ml/m2.    < end of copied text >

## 2019-07-24 LAB
-  AMIKACIN: SIGNIFICANT CHANGE UP
-  AMOXICILLIN/CLAVULANIC ACID: SIGNIFICANT CHANGE UP
-  AMPICILLIN/SULBACTAM: SIGNIFICANT CHANGE UP
-  AMPICILLIN: SIGNIFICANT CHANGE UP
-  AZTREONAM: SIGNIFICANT CHANGE UP
-  CEFAZOLIN: SIGNIFICANT CHANGE UP
-  CEFEPIME: SIGNIFICANT CHANGE UP
-  CEFOTAXIME: SIGNIFICANT CHANGE UP
-  CEFOXITIN: SIGNIFICANT CHANGE UP
-  CEFTAZIDIME: SIGNIFICANT CHANGE UP
-  CEFTRIAXONE: SIGNIFICANT CHANGE UP
-  CEFUROXIME: SIGNIFICANT CHANGE UP
-  CEPHALOTHIN: SIGNIFICANT CHANGE UP
-  CIPROFLOXACIN: SIGNIFICANT CHANGE UP
-  ERTAPENEM: SIGNIFICANT CHANGE UP
-  GENTAMICIN: SIGNIFICANT CHANGE UP
-  IMIPENEM: SIGNIFICANT CHANGE UP
-  LEVOFLOXACIN: SIGNIFICANT CHANGE UP
-  MEROPENEM: SIGNIFICANT CHANGE UP
-  NITROFURANTOIN: SIGNIFICANT CHANGE UP
-  PIPERACILLIN/TAZOBACTAM: SIGNIFICANT CHANGE UP
-  TETRACYCLINE: SIGNIFICANT CHANGE UP
-  TIGECYCLINE: SIGNIFICANT CHANGE UP
-  TOBRAMYCIN: SIGNIFICANT CHANGE UP
-  TRIMETHOPRIM/SULFAMETHOXAZOLE: SIGNIFICANT CHANGE UP
ANION GAP SERPL CALC-SCNC: 13 MMOL/L — SIGNIFICANT CHANGE UP (ref 7–14)
ANION GAP SERPL CALC-SCNC: 14 MMOL/L — SIGNIFICANT CHANGE UP (ref 7–14)
ANION GAP SERPL CALC-SCNC: 15 MMOL/L — HIGH (ref 7–14)
BASOPHILS # BLD AUTO: 0.08 K/UL — SIGNIFICANT CHANGE UP (ref 0–0.2)
BASOPHILS NFR BLD AUTO: 0.6 % — SIGNIFICANT CHANGE UP (ref 0–1)
BUN SERPL-MCNC: 30 MG/DL — HIGH (ref 10–20)
BUN SERPL-MCNC: 31 MG/DL — HIGH (ref 10–20)
BUN SERPL-MCNC: 31 MG/DL — HIGH (ref 10–20)
CALCIUM SERPL-MCNC: 8.6 MG/DL — SIGNIFICANT CHANGE UP (ref 8.5–10.1)
CALCIUM SERPL-MCNC: 8.7 MG/DL — SIGNIFICANT CHANGE UP (ref 8.5–10.1)
CALCIUM SERPL-MCNC: 8.8 MG/DL — SIGNIFICANT CHANGE UP (ref 8.5–10.1)
CHLORIDE SERPL-SCNC: 100 MMOL/L — SIGNIFICANT CHANGE UP (ref 98–110)
CHLORIDE SERPL-SCNC: 97 MMOL/L — LOW (ref 98–110)
CHLORIDE SERPL-SCNC: 99 MMOL/L — SIGNIFICANT CHANGE UP (ref 98–110)
CK SERPL-CCNC: 202 U/L — SIGNIFICANT CHANGE UP (ref 0–225)
CO2 SERPL-SCNC: 25 MMOL/L — SIGNIFICANT CHANGE UP (ref 17–32)
CO2 SERPL-SCNC: 26 MMOL/L — SIGNIFICANT CHANGE UP (ref 17–32)
CO2 SERPL-SCNC: 27 MMOL/L — SIGNIFICANT CHANGE UP (ref 17–32)
CREAT SERPL-MCNC: 1.7 MG/DL — HIGH (ref 0.7–1.5)
CREAT SERPL-MCNC: 1.8 MG/DL — HIGH (ref 0.7–1.5)
CREAT SERPL-MCNC: 1.8 MG/DL — HIGH (ref 0.7–1.5)
CULTURE RESULTS: SIGNIFICANT CHANGE UP
EOSINOPHIL # BLD AUTO: 0.17 K/UL — SIGNIFICANT CHANGE UP (ref 0–0.7)
EOSINOPHIL NFR BLD AUTO: 1.3 % — SIGNIFICANT CHANGE UP (ref 0–8)
GLUCOSE SERPL-MCNC: 163 MG/DL — HIGH (ref 70–99)
GLUCOSE SERPL-MCNC: 184 MG/DL — HIGH (ref 70–99)
GLUCOSE SERPL-MCNC: 187 MG/DL — HIGH (ref 70–99)
HCT VFR BLD CALC: 31.1 % — LOW (ref 42–52)
HGB BLD-MCNC: 10 G/DL — LOW (ref 14–18)
IMM GRANULOCYTES NFR BLD AUTO: 0.4 % — HIGH (ref 0.1–0.3)
LYMPHOCYTES # BLD AUTO: 0.94 K/UL — LOW (ref 1.2–3.4)
LYMPHOCYTES # BLD AUTO: 7.1 % — LOW (ref 20.5–51.1)
MAGNESIUM SERPL-MCNC: 1.9 MG/DL — SIGNIFICANT CHANGE UP (ref 1.8–2.4)
MAGNESIUM SERPL-MCNC: 2 MG/DL — SIGNIFICANT CHANGE UP (ref 1.8–2.4)
MCHC RBC-ENTMCNC: 28.5 PG — SIGNIFICANT CHANGE UP (ref 27–31)
MCHC RBC-ENTMCNC: 32.2 G/DL — SIGNIFICANT CHANGE UP (ref 32–37)
MCV RBC AUTO: 88.6 FL — SIGNIFICANT CHANGE UP (ref 80–94)
METHOD TYPE: SIGNIFICANT CHANGE UP
MONOCYTES # BLD AUTO: 1.46 K/UL — HIGH (ref 0.1–0.6)
MONOCYTES NFR BLD AUTO: 11 % — HIGH (ref 1.7–9.3)
NEUTROPHILS # BLD AUTO: 10.61 K/UL — HIGH (ref 1.4–6.5)
NEUTROPHILS NFR BLD AUTO: 79.6 % — HIGH (ref 42.2–75.2)
NRBC # BLD: 0 /100 WBCS — SIGNIFICANT CHANGE UP (ref 0–0)
ORGANISM # SPEC MICROSCOPIC CNT: SIGNIFICANT CHANGE UP
ORGANISM # SPEC MICROSCOPIC CNT: SIGNIFICANT CHANGE UP
PHOSPHATE SERPL-MCNC: 3.4 MG/DL — SIGNIFICANT CHANGE UP (ref 2.1–4.9)
PLATELET # BLD AUTO: 341 K/UL — SIGNIFICANT CHANGE UP (ref 130–400)
POTASSIUM SERPL-MCNC: 3.6 MMOL/L — SIGNIFICANT CHANGE UP (ref 3.5–5)
POTASSIUM SERPL-MCNC: 3.8 MMOL/L — SIGNIFICANT CHANGE UP (ref 3.5–5)
POTASSIUM SERPL-MCNC: 3.8 MMOL/L — SIGNIFICANT CHANGE UP (ref 3.5–5)
POTASSIUM SERPL-SCNC: 3.6 MMOL/L — SIGNIFICANT CHANGE UP (ref 3.5–5)
POTASSIUM SERPL-SCNC: 3.8 MMOL/L — SIGNIFICANT CHANGE UP (ref 3.5–5)
POTASSIUM SERPL-SCNC: 3.8 MMOL/L — SIGNIFICANT CHANGE UP (ref 3.5–5)
RBC # BLD: 3.51 M/UL — LOW (ref 4.7–6.1)
RBC # FLD: 15.7 % — HIGH (ref 11.5–14.5)
SODIUM SERPL-SCNC: 137 MMOL/L — SIGNIFICANT CHANGE UP (ref 135–146)
SODIUM SERPL-SCNC: 139 MMOL/L — SIGNIFICANT CHANGE UP (ref 135–146)
SODIUM SERPL-SCNC: 140 MMOL/L — SIGNIFICANT CHANGE UP (ref 135–146)
SPECIMEN SOURCE: SIGNIFICANT CHANGE UP
WBC # BLD: 13.31 K/UL — HIGH (ref 4.8–10.8)
WBC # FLD AUTO: 13.31 K/UL — HIGH (ref 4.8–10.8)

## 2019-07-24 PROCEDURE — 93010 ELECTROCARDIOGRAM REPORT: CPT

## 2019-07-24 PROCEDURE — 99222 1ST HOSP IP/OBS MODERATE 55: CPT | Mod: 25

## 2019-07-24 PROCEDURE — 93284 PRGRMG EVAL IMPLANTABLE DFB: CPT | Mod: 26

## 2019-07-24 PROCEDURE — 71045 X-RAY EXAM CHEST 1 VIEW: CPT | Mod: 26

## 2019-07-24 RX ORDER — AMIODARONE HYDROCHLORIDE 400 MG/1
1 TABLET ORAL
Qty: 900 | Refills: 0 | Status: DISCONTINUED | OUTPATIENT
Start: 2019-07-24 | End: 2019-07-25

## 2019-07-24 RX ORDER — POTASSIUM CHLORIDE 20 MEQ
20 PACKET (EA) ORAL ONCE
Refills: 0 | Status: COMPLETED | OUTPATIENT
Start: 2019-07-24 | End: 2019-07-24

## 2019-07-24 RX ORDER — METOPROLOL TARTRATE 50 MG
5 TABLET ORAL ONCE
Refills: 0 | Status: COMPLETED | OUTPATIENT
Start: 2019-07-24 | End: 2019-07-24

## 2019-07-24 RX ORDER — ACETAMINOPHEN 500 MG
650 TABLET ORAL ONCE
Refills: 0 | Status: COMPLETED | OUTPATIENT
Start: 2019-07-24 | End: 2019-07-24

## 2019-07-24 RX ORDER — NOREPINEPHRINE BITARTRATE/D5W 8 MG/250ML
0.05 PLASTIC BAG, INJECTION (ML) INTRAVENOUS
Qty: 8 | Refills: 0 | Status: DISCONTINUED | OUTPATIENT
Start: 2019-07-24 | End: 2019-07-24

## 2019-07-24 RX ORDER — PHENYLEPHRINE HYDROCHLORIDE 10 MG/ML
0.5 INJECTION INTRAVENOUS
Qty: 160 | Refills: 0 | Status: DISCONTINUED | OUTPATIENT
Start: 2019-07-24 | End: 2019-08-01

## 2019-07-24 RX ORDER — AMIODARONE HYDROCHLORIDE 400 MG/1
150 TABLET ORAL ONCE
Refills: 0 | Status: COMPLETED | OUTPATIENT
Start: 2019-07-24 | End: 2019-07-24

## 2019-07-24 RX ORDER — LACTULOSE 10 G/15ML
20 SOLUTION ORAL DAILY
Refills: 0 | Status: DISCONTINUED | OUTPATIENT
Start: 2019-07-24 | End: 2019-08-04

## 2019-07-24 RX ORDER — METOPROLOL TARTRATE 50 MG
12.5 TABLET ORAL ONCE
Refills: 0 | Status: COMPLETED | OUTPATIENT
Start: 2019-07-24 | End: 2019-07-24

## 2019-07-24 RX ORDER — LIDOCAINE HCL 20 MG/ML
50 VIAL (ML) INJECTION ONCE
Refills: 0 | Status: DISCONTINUED | OUTPATIENT
Start: 2019-07-24 | End: 2019-07-24

## 2019-07-24 RX ADMIN — Medication 10 MILLIGRAM(S): at 21:19

## 2019-07-24 RX ADMIN — MONTELUKAST 10 MILLIGRAM(S): 4 TABLET, CHEWABLE ORAL at 11:17

## 2019-07-24 RX ADMIN — Medication 12.5 MILLIGRAM(S): at 17:11

## 2019-07-24 RX ADMIN — SENNA PLUS 1 TABLET(S): 8.6 TABLET ORAL at 11:17

## 2019-07-24 RX ADMIN — Medication 5 MILLIGRAM(S): at 16:42

## 2019-07-24 RX ADMIN — AMIODARONE HYDROCHLORIDE 618 MILLIGRAM(S): 400 TABLET ORAL at 09:06

## 2019-07-24 RX ADMIN — Medication 600 MILLIGRAM(S): at 17:08

## 2019-07-24 RX ADMIN — APIXABAN 2.5 MILLIGRAM(S): 2.5 TABLET, FILM COATED ORAL at 05:31

## 2019-07-24 RX ADMIN — ATORVASTATIN CALCIUM 40 MILLIGRAM(S): 80 TABLET, FILM COATED ORAL at 21:18

## 2019-07-24 RX ADMIN — Medication 650 MILLIGRAM(S): at 04:25

## 2019-07-24 RX ADMIN — TAMSULOSIN HYDROCHLORIDE 0.4 MILLIGRAM(S): 0.4 CAPSULE ORAL at 21:18

## 2019-07-24 RX ADMIN — BUDESONIDE AND FORMOTEROL FUMARATE DIHYDRATE 2 PUFF(S): 160; 4.5 AEROSOL RESPIRATORY (INHALATION) at 08:00

## 2019-07-24 RX ADMIN — AMPICILLIN SODIUM AND SULBACTAM SODIUM 100 GRAM(S): 250; 125 INJECTION, POWDER, FOR SUSPENSION INTRAMUSCULAR; INTRAVENOUS at 05:19

## 2019-07-24 RX ADMIN — APIXABAN 2.5 MILLIGRAM(S): 2.5 TABLET, FILM COATED ORAL at 17:08

## 2019-07-24 RX ADMIN — Medication 650 MILLIGRAM(S): at 05:16

## 2019-07-24 RX ADMIN — Medication 5 MILLIGRAM(S): at 18:25

## 2019-07-24 RX ADMIN — AMPICILLIN SODIUM AND SULBACTAM SODIUM 100 GRAM(S): 250; 125 INJECTION, POWDER, FOR SUSPENSION INTRAMUSCULAR; INTRAVENOUS at 17:11

## 2019-07-24 RX ADMIN — FINASTERIDE 5 MILLIGRAM(S): 5 TABLET, FILM COATED ORAL at 11:17

## 2019-07-24 RX ADMIN — Medication 100 MILLIGRAM(S): at 11:17

## 2019-07-24 RX ADMIN — CHLORHEXIDINE GLUCONATE 1 APPLICATION(S): 213 SOLUTION TOPICAL at 05:31

## 2019-07-24 RX ADMIN — Medication 50 MILLIEQUIVALENT(S): at 11:33

## 2019-07-24 RX ADMIN — AMPICILLIN SODIUM AND SULBACTAM SODIUM 100 GRAM(S): 250; 125 INJECTION, POWDER, FOR SUSPENSION INTRAMUSCULAR; INTRAVENOUS at 11:17

## 2019-07-24 RX ADMIN — LACTULOSE 20 GRAM(S): 10 SOLUTION ORAL at 16:42

## 2019-07-24 RX ADMIN — MEXILETINE HYDROCHLORIDE 200 MILLIGRAM(S): 150 CAPSULE ORAL at 05:31

## 2019-07-24 RX ADMIN — Medication 12.5 MILLIGRAM(S): at 10:43

## 2019-07-24 RX ADMIN — BUDESONIDE AND FORMOTEROL FUMARATE DIHYDRATE 2 PUFF(S): 160; 4.5 AEROSOL RESPIRATORY (INHALATION) at 21:17

## 2019-07-24 RX ADMIN — MEXILETINE HYDROCHLORIDE 200 MILLIGRAM(S): 150 CAPSULE ORAL at 17:08

## 2019-07-24 RX ADMIN — Medication 5 MILLIGRAM(S): at 11:18

## 2019-07-24 RX ADMIN — Medication 50 MICROGRAM(S): at 05:31

## 2019-07-24 RX ADMIN — AMIODARONE HYDROCHLORIDE 618 MILLIGRAM(S): 400 TABLET ORAL at 11:34

## 2019-07-24 RX ADMIN — Medication 600 MILLIGRAM(S): at 05:31

## 2019-07-24 NOTE — PROGRESS NOTE ADULT - SUBJECTIVE AND OBJECTIVE BOX
LENGTH OF HOSPITAL STAY: 4d    CHIEF COMPLAINT:   Patient is a 79y old  Male who presents with a chief complaint of shortness of breath for few days duration (24 Jul 2019 09:15)      HISTORY OF PRESENTING ILLNESS:    HPI:  80 yo male with PMH CHFrEF, Afib, Vtach s/p AICD,COPD, NANDO, spleenic laceration following fall) comes to the ED with complaint of SOB.  SOB gradual onset, worsened in the evening yesterday, could not sleep s/t SOB, aggravated on exertion, no relieving factor.  NO chest pain, or wheezing or cough or palpitation.  No fever, headache, recent sick contacts.  normal Bowel and bladder habit.    Patient was recently admitted for near syncope and DUYEN, discharged on june 17.    In the ED, patient RR 28, , spo2 80 room air, /60 (20 Jul 2019 11:36)    PAST MEDICAL & SURGICAL HISTORY  PAST MEDICAL & SURGICAL HISTORY:  CAD (coronary artery disease)  Ventricular tachycardia  NANDO on CPAP  COPD (chronic obstructive pulmonary disease)  Pacemaker  AICD (automatic cardioverter/defibrillator) present  Hypothyroidism  Atrial fibrillation  Congestive heart disease  Hypercholesteremia  AICD (automatic cardioverter/defibrillator) present  History of laparoscopic cholecystectomy    SOCIAL HISTORY:    ALLERGIES:  morphine (Stomach Upset)    MEDICATIONS:  STANDING MEDICATIONS  amiodarone Infusion 1 mG/Min IV Continuous <Continuous>  ampicillin/sulbactam  IVPB      ampicillin/sulbactam  IVPB 1.5 Gram(s) IV Intermittent every 6 hours  apixaban 2.5 milliGRAM(s) Oral every 12 hours  atorvastatin 40 milliGRAM(s) Oral at bedtime  buDESOnide 160 MICROgram(s)/formoterol 4.5 MICROgram(s) Inhaler 2 Puff(s) Inhalation two times a day  chlorhexidine 4% Liquid 1 Application(s) Topical <User Schedule>  docusate sodium 100 milliGRAM(s) Oral daily  finasteride 5 milliGRAM(s) Oral daily  guaiFENesin  milliGRAM(s) Oral every 12 hours  levothyroxine  Oral Tab/Cap - Peds 50 MICROGram(s) Oral daily  metoprolol tartrate 12.5 milliGRAM(s) Oral two times a day  mexiletine 200 milliGRAM(s) Oral every 12 hours  montelukast 10 milliGRAM(s) Oral daily  phenylephrine    Infusion 0.5 MICROgram(s)/kG/Min IV Continuous <Continuous>  senna 1 Tablet(s) Oral daily  tamsulosin 0.4 milliGRAM(s) Oral at bedtime    PRN MEDICATIONS  ALBUTerol/ipratropium for Nebulization 3 milliLiter(s) Nebulizer every 6 hours PRN  haloperidol    Injectable 5 milliGRAM(s) IntraMuscular daily PRN    VITALS:   T(F): 99  HR: 98  BP: 95/65  RR: 22  SpO2: 99%    LABS:                        10.0   13.31 )-----------( 341      ( 24 Jul 2019 05:06 )             31.1     07-24    137  |  97<L>  |  30<H>  ----------------------------<  184<H>  3.6   |  25  |  1.8<H>    Ca    8.7      24 Jul 2019 05:06  Phos  3.4     07-24  Mg     1.9     07-24          ABG - ( 23 Jul 2019 09:08 )  pH, Arterial: 7.45  pH, Blood: x     /  pCO2: 37    /  pO2: 50    / HCO3: 25    / Base Excess: 1.4   /  SaO2: 85                Creatine Kinase, Serum: 202 U/L (07-24-19 @ 05:06)      CARDIAC MARKERS ( 24 Jul 2019 05:06 )  x     / x     / 202 U/L / x     / x          RADIOLOGY:    PHYSICAL EXAM:  GEN: No acute distress,   HEENT: at, nc, ousmane  LUNGS: decreased breath sounds, moreon right  HEART: S1/S2 present. RRR.   ABD: Soft, non-tender, non-distended. Bowel sounds present  EXT: edema absent, clubbing/cyanosis absent  NEURO: CN I-XII intact

## 2019-07-24 NOTE — PROGRESS NOTE ADULT - SUBJECTIVE AND OBJECTIVE BOX
HPI:  78 yo male with PMH CHFrEF, Afib, Vtach s/p AICD,COPD, NANDO, spleenic laceration following fall) comes to the ED with complaint of SOB.  SOB gradual onset, worsened in the evening yesterday, could not sleep s/t SOB, aggravated on exertion, no relieving factor.  NO chest pain, or wheezing or cough or palpitation.  No fever, headache, recent sick contacts.  normal Bowel and bladder habit.    Patient was recently admitted for near syncope and DUYEN, discharged on june 17.    In the ED, patient RR 28, , spo2 80 room air, /60 (20 Jul 2019 11:36)                            10.0   13.31 )-----------( 341      ( 24 Jul 2019 05:06 )             31.1    07-24    137  |  97<L>  |  30<H>  ----------------------------<  184<H>  3.6   |  25  |  1.8<H>    Ca    8.7      24 Jul 2019 05:06  Phos  3.4     07-24  Mg     1.9     07-24         CARDIAC MARKERS ( 24 Jul 2019 05:06 )  x     / x     / 202 U/L / x     / x                    RADIOLOGY    MEDICATIONS  (STANDING):  amiodarone Infusion 0.5 mG/Min (16.667 mL/Hr) IV Continuous <Continuous>  ampicillin/sulbactam  IVPB      ampicillin/sulbactam  IVPB 1.5 Gram(s) IV Intermittent every 6 hours  apixaban 2.5 milliGRAM(s) Oral every 12 hours  atorvastatin 40 milliGRAM(s) Oral at bedtime  buDESOnide 160 MICROgram(s)/formoterol 4.5 MICROgram(s) Inhaler 2 Puff(s) Inhalation two times a day  chlorhexidine 4% Liquid 1 Application(s) Topical <User Schedule>  docusate sodium 100 milliGRAM(s) Oral daily  finasteride 5 milliGRAM(s) Oral daily  guaiFENesin  milliGRAM(s) Oral every 12 hours  levothyroxine  Oral Tab/Cap - Peds 50 MICROGram(s) Oral daily  metoprolol tartrate 12.5 milliGRAM(s) Oral two times a day  mexiletine 200 milliGRAM(s) Oral every 12 hours  montelukast 10 milliGRAM(s) Oral daily  norepinephrine Infusion 0.05 MICROgram(s)/kG/Min (8.297 mL/Hr) IV Continuous <Continuous>  phenylephrine    Infusion 0.5 MICROgram(s)/kG/Min (8.297 mL/Hr) IV Continuous <Continuous>  senna 1 Tablet(s) Oral daily  tamsulosin 0.4 milliGRAM(s) Oral at bedtime    MEDICATIONS  (PRN):  ALBUTerol/ipratropium for Nebulization 3 milliLiter(s) Nebulizer every 6 hours PRN Bronchospasm  haloperidol    Injectable 5 milliGRAM(s) IntraMuscular daily PRN agitation      Vital Signs Last 24 Hrs  T(C): 37.9 (24 Jul 2019 06:00), Max: 38 (24 Jul 2019 04:00)  T(F): 100.2 (24 Jul 2019 06:00), Max: 100.4 (24 Jul 2019 04:00)  HR: 98 (24 Jul 2019 07:00) (98 - 118)  BP: 89/61 (24 Jul 2019 07:00) (56/43 - 100/66)  BP(mean): 66 (24 Jul 2019 07:00) (48 - 88)  RR: 27 (24 Jul 2019 07:00) (13 - 36)  SpO2: 98% (24 Jul 2019 07:00) (96% - 100%)    PHYSICAL EXAM:  GEN: Pt much more alertx3 since lidocaine was stopped  CARDIO: Tachycardia  RESPIRATORY: B/L air entry

## 2019-07-24 NOTE — PROGRESS NOTE ADULT - ASSESSMENT
patient is 80 yo male with hx of S/P AICD (automatic cardioverter/defibrillator), Atrial fibrillation, CAD (coronary artery disease), Congestive heart disease, COPD (chronic obstructive pulmonary disease), Hypercholesteremia, Hypothyroidism, NANDO on CPAP, Ventricular tachycardia presenting with     Patient appears to be oriented to place,person and time today        1. Acute exacerbation of Systolic CHF  -EKG shows no ischemic changes  -Trop flat  - EF 20%    2. Rt lower lobe  opacity  -Afebrile, productive cough for last 2 days with samy sputum  -may be due to aspiration sec to diorientation  -aspiration prercautions  -Will obtain Chest CT scan, and pulmonary consult  -pt started on unasyn, day 3    3. DUYEN on CKD  -Unclear Etiology   Cr stable at 1.8    4. Afib S/P ablation:  cw eliquis  rate controlled    5.  COPD   -Continue with home medications  -Stable    6. Arrythmias  - Patient had was hypotensive with systolic of 69, pt was started on levophed and and a fluid bolus was given. possibly levophed precipitated the VT.  - Dr. Medina saw the patient in morning, bolus of amiodarone (150mg) and lidocaine (50mg) was given that resolved the episode  -again after 1 hour, pt had another run of VT, another bolus of amiodarone was given that resolved VT Levephed is being weaned off      others  D/C lasix and abx    DVT ppx:  on eliquis    Diet:  dash/tlc    Activity: AAT    Dispo: from home

## 2019-07-24 NOTE — PROGRESS NOTE ADULT - ASSESSMENT
IMPRESSION:    b/l airspace disease/ aspiration/ CHF IMPROVED  Lidocaine toxicity?  Vtach storm   AFib with rvr   altered MS imprved    PLAN:    CNS: avoid CNS depressant    HEENT: Oral care    PULMONARY:  HOB @ 45 degrees, aspiration precautions, keep sats > 92%, pulm toilet     CARDIOVASCULAR: Keep MAP > 65, anti arrhythmic per EPS, hold lidocaine, IV as needed, taper pressors    GI: GI prophylaxis. feeding     RENAL:  Follow up lytes.  Correct as needed    INFECTIOUS DISEASE: Follow up cultures, Cont unasyn, finish course    HEMATOLOGICaL:  Eliquis    ENDOCRINE:  Follow up FS.  Insulin protocol if needed.    MUSCULOSKELETAL: Bed rest     Lines: peripheral   Rose: Keep the rose for retention  Code status: full   Prognosis: poor   Dispo:  CCU

## 2019-07-24 NOTE — PROGRESS NOTE ADULT - ASSESSMENT
Assessment:  Vtach recurrence since 2:44 AM  Pt was hypotensive to SBP 70s and levophed was administered at the time    Plan:  Overdriven the sinus rhythm  Rebolused with 150 mg amiodarone IV  Reprogrammed parameter to 122 HR per minute  Wean off levophed  Replete potassium keep around 4, Mg>2 Assessment:  .V, tach recurrence since 2:44 AM    >66 hours   Pt was hypotensive to SBP 70s and levophed was administered at the time    Plan:  Overdriven the sinus rhythm  Re bolused with 150 mg amiodarone IV  Reprogrammed parameter to 122 HR per minute  Wean off levophed  Replete potassium keep around 4, Mg>2

## 2019-07-24 NOTE — CHART NOTE - NSCHARTNOTEFT_GEN_A_CORE
Patient found to be in sustained Vtach with a rate of 118-134. Pt MAP 65-75 during this time. Cardiology fellow was called and arrived promptly with Dr. Holt. VT resolved after ATP performed. Lowered threshold of VT to 115. If VT greater than 115 machine will overdrive patient's heart to correct/pace. Patient denied any chest pain or shortness of breath during this time. Patient found to be in sustained Vtach with a rate of 118-134. Pt MAP 65-75 during this time. Cardiology fellow was called and arrived promptly with Dr. Holt. VT resolved after ATP performed. Changed pacing rate to 95, Lowered threshold of AICD shock to 115. If VT greater than 115 machine will overdrive patient's heart to correct/pace. Patient denied any chest pain or shortness of breath during this time.

## 2019-07-24 NOTE — PROGRESS NOTE ADULT - SUBJECTIVE AND OBJECTIVE BOX
OVERNIGHT EVENTS: feels better, decrease BP, ON LEVOPHED 0.13, STILL ON AMIODARONE, CXR REVIEWED    Vital Signs Last 24 Hrs  T(C): 37.9 (24 Jul 2019 06:00), Max: 38 (24 Jul 2019 04:00)  T(F): 100.2 (24 Jul 2019 06:00), Max: 100.4 (24 Jul 2019 04:00)  HR: 98 (24 Jul 2019 07:00) (98 - 118)  BP: 89/61 (24 Jul 2019 07:00) (56/43 - 101/78)  BP(mean): 66 (24 Jul 2019 07:00) (48 - 93)  RR: 27 (24 Jul 2019 07:00) (13 - 36)  SpO2: 98% (24 Jul 2019 07:00) (89% - 100%)    PHYSICAL EXAMINATION:    GENERAL: AXOX3    HEENT: Head is normocephalic and atraumatic. Extraocular muscles are intact. Mucous membranes are moist.    NECK: Supple.    LUNGS: BIBASILAR CRACKLES    HEART: Regular rate and rhythm without murmur.    ABDOMEN: Soft, nontender, and nondistended.      EXTREMITIES: Without any cyanosis, clubbing, rash, lesions or edema.    NEUROLOGIC: Grossly intact.    SKIN: No ulceration or induration present.      LABS:                        10.0   13.31 )-----------( 341      ( 24 Jul 2019 05:06 )             31.1     07-24    137  |  97<L>  |  30<H>  ----------------------------<  184<H>  3.6   |  25  |  1.8<H>    Ca    8.7      24 Jul 2019 05:06  Phos  3.4     07-24  Mg     1.9     07-24          ABG - ( 23 Jul 2019 09:08 )  pH, Arterial: 7.45  pH, Blood: x     /  pCO2: 37    /  pO2: 50    / HCO3: 25    / Base Excess: 1.4   /  SaO2: 85                CARDIAC MARKERS ( 24 Jul 2019 05:06 )  x     / x     / 202 U/L / x     / x                      07-23-19 @ 07:01  -  07-24-19 @ 07:00  --------------------------------------------------------  IN: 1806.5 mL / OUT: 2550 mL / NET: -743.5 mL        MICROBIOLOGY:  Culture Results:   >100,000 CFU/ml Escherichia coli (07-20 @ 13:57)      MEDICATIONS  (STANDING):  amiodarone Infusion 0.5 mG/Min (16.667 mL/Hr) IV Continuous <Continuous>  ampicillin/sulbactam  IVPB      ampicillin/sulbactam  IVPB 1.5 Gram(s) IV Intermittent every 6 hours  apixaban 2.5 milliGRAM(s) Oral every 12 hours  atorvastatin 40 milliGRAM(s) Oral at bedtime  buDESOnide 160 MICROgram(s)/formoterol 4.5 MICROgram(s) Inhaler 2 Puff(s) Inhalation two times a day  chlorhexidine 4% Liquid 1 Application(s) Topical <User Schedule>  docusate sodium 100 milliGRAM(s) Oral daily  finasteride 5 milliGRAM(s) Oral daily  furosemide   Injectable 40 milliGRAM(s) IV Push two times a day  guaiFENesin  milliGRAM(s) Oral every 12 hours  levothyroxine  Oral Tab/Cap - Peds 50 MICROGram(s) Oral daily  metoprolol tartrate 12.5 milliGRAM(s) Oral two times a day  mexiletine 200 milliGRAM(s) Oral every 12 hours  montelukast 10 milliGRAM(s) Oral daily  norepinephrine Infusion 0.05 MICROgram(s)/kG/Min (8.297 mL/Hr) IV Continuous <Continuous>  senna 1 Tablet(s) Oral daily  tamsulosin 0.4 milliGRAM(s) Oral at bedtime    MEDICATIONS  (PRN):  ALBUTerol/ipratropium for Nebulization 3 milliLiter(s) Nebulizer every 6 hours PRN Bronchospasm  haloperidol    Injectable 5 milliGRAM(s) IntraMuscular daily PRN agitation      RADIOLOGY & ADDITIONAL STUDIES:

## 2019-07-25 LAB
ANION GAP SERPL CALC-SCNC: 14 MMOL/L — SIGNIFICANT CHANGE UP (ref 7–14)
BASOPHILS # BLD AUTO: 0.06 K/UL — SIGNIFICANT CHANGE UP (ref 0–0.2)
BASOPHILS NFR BLD AUTO: 0.4 % — SIGNIFICANT CHANGE UP (ref 0–1)
BUN SERPL-MCNC: 28 MG/DL — HIGH (ref 10–20)
CALCIUM SERPL-MCNC: 8.5 MG/DL — SIGNIFICANT CHANGE UP (ref 8.5–10.1)
CHLORIDE SERPL-SCNC: 99 MMOL/L — SIGNIFICANT CHANGE UP (ref 98–110)
CO2 SERPL-SCNC: 24 MMOL/L — SIGNIFICANT CHANGE UP (ref 17–32)
CREAT SERPL-MCNC: 1.6 MG/DL — HIGH (ref 0.7–1.5)
EOSINOPHIL # BLD AUTO: 0.1 K/UL — SIGNIFICANT CHANGE UP (ref 0–0.7)
EOSINOPHIL NFR BLD AUTO: 0.7 % — SIGNIFICANT CHANGE UP (ref 0–8)
GLUCOSE SERPL-MCNC: 136 MG/DL — HIGH (ref 70–99)
HCT VFR BLD CALC: 31.4 % — LOW (ref 42–52)
HGB BLD-MCNC: 10.1 G/DL — LOW (ref 14–18)
IMM GRANULOCYTES NFR BLD AUTO: 0.6 % — HIGH (ref 0.1–0.3)
LYMPHOCYTES # BLD AUTO: 0.74 K/UL — LOW (ref 1.2–3.4)
LYMPHOCYTES # BLD AUTO: 5.1 % — LOW (ref 20.5–51.1)
MAGNESIUM SERPL-MCNC: 2 MG/DL — SIGNIFICANT CHANGE UP (ref 1.8–2.4)
MCHC RBC-ENTMCNC: 28.5 PG — SIGNIFICANT CHANGE UP (ref 27–31)
MCHC RBC-ENTMCNC: 32.2 G/DL — SIGNIFICANT CHANGE UP (ref 32–37)
MCV RBC AUTO: 88.5 FL — SIGNIFICANT CHANGE UP (ref 80–94)
MONOCYTES # BLD AUTO: 1.13 K/UL — HIGH (ref 0.1–0.6)
MONOCYTES NFR BLD AUTO: 7.8 % — SIGNIFICANT CHANGE UP (ref 1.7–9.3)
NEUTROPHILS # BLD AUTO: 12.41 K/UL — HIGH (ref 1.4–6.5)
NEUTROPHILS NFR BLD AUTO: 85.4 % — HIGH (ref 42.2–75.2)
NRBC # BLD: 0 /100 WBCS — SIGNIFICANT CHANGE UP (ref 0–0)
PHOSPHATE SERPL-MCNC: 3 MG/DL — SIGNIFICANT CHANGE UP (ref 2.1–4.9)
PLATELET # BLD AUTO: 325 K/UL — SIGNIFICANT CHANGE UP (ref 130–400)
POTASSIUM SERPL-MCNC: 4 MMOL/L — SIGNIFICANT CHANGE UP (ref 3.5–5)
POTASSIUM SERPL-SCNC: 4 MMOL/L — SIGNIFICANT CHANGE UP (ref 3.5–5)
RBC # BLD: 3.55 M/UL — LOW (ref 4.7–6.1)
RBC # FLD: 15.3 % — HIGH (ref 11.5–14.5)
SODIUM SERPL-SCNC: 137 MMOL/L — SIGNIFICANT CHANGE UP (ref 135–146)
TSH SERPL-MCNC: 3.32 UIU/ML — SIGNIFICANT CHANGE UP (ref 0.27–4.2)
WBC # BLD: 14.52 K/UL — HIGH (ref 4.8–10.8)
WBC # FLD AUTO: 14.52 K/UL — HIGH (ref 4.8–10.8)

## 2019-07-25 PROCEDURE — 71045 X-RAY EXAM CHEST 1 VIEW: CPT | Mod: 26

## 2019-07-25 PROCEDURE — 99232 SBSQ HOSP IP/OBS MODERATE 35: CPT

## 2019-07-25 RX ORDER — MAGNESIUM SULFATE 500 MG/ML
2 VIAL (ML) INJECTION ONCE
Refills: 0 | Status: COMPLETED | OUTPATIENT
Start: 2019-07-25 | End: 2019-07-25

## 2019-07-25 RX ORDER — METOPROLOL TARTRATE 50 MG
12.5 TABLET ORAL EVERY 8 HOURS
Refills: 0 | Status: DISCONTINUED | OUTPATIENT
Start: 2019-07-25 | End: 2019-07-25

## 2019-07-25 RX ORDER — PROCAINAMIDE HCL 500 MG
1.5 TABLET, EXTENDED RELEASE ORAL
Qty: 2000 | Refills: 0 | Status: DISCONTINUED | OUTPATIENT
Start: 2019-07-25 | End: 2019-07-27

## 2019-07-25 RX ORDER — PROCAINAMIDE HCL 500 MG
1 TABLET, EXTENDED RELEASE ORAL
Qty: 2000 | Refills: 0 | Status: DISCONTINUED | OUTPATIENT
Start: 2019-07-25 | End: 2019-07-25

## 2019-07-25 RX ORDER — POTASSIUM CHLORIDE 20 MEQ
20 PACKET (EA) ORAL ONCE
Refills: 0 | Status: COMPLETED | OUTPATIENT
Start: 2019-07-25 | End: 2019-07-25

## 2019-07-25 RX ORDER — MINERAL OIL
133 OIL (ML) MISCELLANEOUS ONCE
Refills: 0 | Status: COMPLETED | OUTPATIENT
Start: 2019-07-25 | End: 2019-07-25

## 2019-07-25 RX ORDER — METOPROLOL TARTRATE 50 MG
12.5 TABLET ORAL EVERY 6 HOURS
Refills: 0 | Status: DISCONTINUED | OUTPATIENT
Start: 2019-07-25 | End: 2019-08-04

## 2019-07-25 RX ORDER — LACTULOSE 10 G/15ML
200 SOLUTION ORAL ONCE
Refills: 0 | Status: COMPLETED | OUTPATIENT
Start: 2019-07-25 | End: 2019-07-25

## 2019-07-25 RX ORDER — MEXILETINE HYDROCHLORIDE 150 MG/1
200 CAPSULE ORAL EVERY 8 HOURS
Refills: 0 | Status: DISCONTINUED | OUTPATIENT
Start: 2019-07-25 | End: 2019-08-04

## 2019-07-25 RX ADMIN — APIXABAN 2.5 MILLIGRAM(S): 2.5 TABLET, FILM COATED ORAL at 05:22

## 2019-07-25 RX ADMIN — CHLORHEXIDINE GLUCONATE 1 APPLICATION(S): 213 SOLUTION TOPICAL at 05:22

## 2019-07-25 RX ADMIN — BUDESONIDE AND FORMOTEROL FUMARATE DIHYDRATE 2 PUFF(S): 160; 4.5 AEROSOL RESPIRATORY (INHALATION) at 08:00

## 2019-07-25 RX ADMIN — ATORVASTATIN CALCIUM 40 MILLIGRAM(S): 80 TABLET, FILM COATED ORAL at 21:20

## 2019-07-25 RX ADMIN — Medication 12.5 MILLIGRAM(S): at 23:42

## 2019-07-25 RX ADMIN — Medication 15 MG/MIN: at 00:43

## 2019-07-25 RX ADMIN — APIXABAN 2.5 MILLIGRAM(S): 2.5 TABLET, FILM COATED ORAL at 18:29

## 2019-07-25 RX ADMIN — SENNA PLUS 1 TABLET(S): 8.6 TABLET ORAL at 12:50

## 2019-07-25 RX ADMIN — AMPICILLIN SODIUM AND SULBACTAM SODIUM 100 GRAM(S): 250; 125 INJECTION, POWDER, FOR SUSPENSION INTRAMUSCULAR; INTRAVENOUS at 05:22

## 2019-07-25 RX ADMIN — Medication 133 MILLILITER(S): at 01:32

## 2019-07-25 RX ADMIN — MEXILETINE HYDROCHLORIDE 200 MILLIGRAM(S): 150 CAPSULE ORAL at 05:22

## 2019-07-25 RX ADMIN — Medication 50 MICROGRAM(S): at 05:22

## 2019-07-25 RX ADMIN — Medication 600 MILLIGRAM(S): at 18:29

## 2019-07-25 RX ADMIN — AMPICILLIN SODIUM AND SULBACTAM SODIUM 100 GRAM(S): 250; 125 INJECTION, POWDER, FOR SUSPENSION INTRAMUSCULAR; INTRAVENOUS at 18:29

## 2019-07-25 RX ADMIN — MONTELUKAST 10 MILLIGRAM(S): 4 TABLET, CHEWABLE ORAL at 12:50

## 2019-07-25 RX ADMIN — FINASTERIDE 5 MILLIGRAM(S): 5 TABLET, FILM COATED ORAL at 12:50

## 2019-07-25 RX ADMIN — BUDESONIDE AND FORMOTEROL FUMARATE DIHYDRATE 2 PUFF(S): 160; 4.5 AEROSOL RESPIRATORY (INHALATION) at 21:19

## 2019-07-25 RX ADMIN — Medication 12.5 MILLIGRAM(S): at 05:22

## 2019-07-25 RX ADMIN — Medication 600 MILLIGRAM(S): at 05:22

## 2019-07-25 RX ADMIN — Medication 12.5 MILLIGRAM(S): at 18:29

## 2019-07-25 RX ADMIN — AMPICILLIN SODIUM AND SULBACTAM SODIUM 100 GRAM(S): 250; 125 INJECTION, POWDER, FOR SUSPENSION INTRAMUSCULAR; INTRAVENOUS at 00:20

## 2019-07-25 RX ADMIN — MEXILETINE HYDROCHLORIDE 200 MILLIGRAM(S): 150 CAPSULE ORAL at 21:20

## 2019-07-25 RX ADMIN — Medication 100 MILLIGRAM(S): at 12:50

## 2019-07-25 RX ADMIN — TAMSULOSIN HYDROCHLORIDE 0.4 MILLIGRAM(S): 0.4 CAPSULE ORAL at 21:20

## 2019-07-25 RX ADMIN — AMPICILLIN SODIUM AND SULBACTAM SODIUM 100 GRAM(S): 250; 125 INJECTION, POWDER, FOR SUSPENSION INTRAMUSCULAR; INTRAVENOUS at 23:42

## 2019-07-25 RX ADMIN — Medication 50 MILLIEQUIVALENT(S): at 00:50

## 2019-07-25 RX ADMIN — AMPICILLIN SODIUM AND SULBACTAM SODIUM 100 GRAM(S): 250; 125 INJECTION, POWDER, FOR SUSPENSION INTRAMUSCULAR; INTRAVENOUS at 12:56

## 2019-07-25 RX ADMIN — Medication 50 GRAM(S): at 18:34

## 2019-07-25 NOTE — PROGRESS NOTE ADULT - SUBJECTIVE AND OBJECTIVE BOX
Neurology Progress Note    Interval History:      HPI:  80 yo male with PMH CHFrEF, Afib, Vtach s/p AICD,COPD, NANDO, spleenic laceration following fall) comes to the ED with complaint of SOB.  SOB gradual onset, worsened in the evening yesterday, could not sleep s/t SOB, aggravated on exertion, no relieving factor. NO chest pain, or wheezing or cough or palpitation. No fever, headache, recent sick contacts. Normal Bowel and bladder habit. Patient was recently admitted for near syncope and DUYEN, discharged on june 17.  In the ED, patient RR 28, , spo2 80 room air, /60. Over the past few days patient has had involuntary limb movements, abnormal movements. Patient has also been confused and been experiencing hallucinations. These symptoms come and go, being worse overnight. On 7/23 due to severe agitation patient was given Ativan and Benadryl.  Had been on corticosteroids and lidocaine drip while in ICU. This morning the patient was AAOx3 and follows commands. He answered questions but refused to have a neurological evaluation due to being tested to many times already.       PAST MEDICAL & SURGICAL HISTORY:  CAD (coronary artery disease)  Ventricular tachycardia  NANDO on CPAP  COPD (chronic obstructive pulmonary disease)  Pacemaker  AICD (automatic cardioverter/defibrillator) present  Hypothyroidism  Atrial fibrillation  Congestive heart disease  Hypercholesteremia  AICD (automatic cardioverter/defibrillator) present  History of laparoscopic cholecystectomy          Medications:  ALBUTerol/ipratropium for Nebulization 3 milliLiter(s) Nebulizer every 6 hours PRN  ampicillin/sulbactam  IVPB      ampicillin/sulbactam  IVPB 1.5 Gram(s) IV Intermittent every 6 hours  apixaban 2.5 milliGRAM(s) Oral every 12 hours  atorvastatin 40 milliGRAM(s) Oral at bedtime  bisacodyl Suppository 10 milliGRAM(s) Rectal daily PRN  buDESOnide 160 MICROgram(s)/formoterol 4.5 MICROgram(s) Inhaler 2 Puff(s) Inhalation two times a day  chlorhexidine 4% Liquid 1 Application(s) Topical <User Schedule>  docusate sodium 100 milliGRAM(s) Oral daily  finasteride 5 milliGRAM(s) Oral daily  guaiFENesin  milliGRAM(s) Oral every 12 hours  haloperidol    Injectable 5 milliGRAM(s) IntraMuscular daily PRN  lactulose Retention Enema 200 Gram(s) Rectal once  lactulose Syrup 20 Gram(s) Oral daily  levothyroxine  Oral Tab/Cap - Peds 50 MICROGram(s) Oral daily  metoprolol tartrate 12.5 milliGRAM(s) Oral two times a day  mexiletine 200 milliGRAM(s) Oral every 12 hours  montelukast 10 milliGRAM(s) Oral daily  phenylephrine    Infusion 0.5 MICROgram(s)/kG/Min IV Continuous <Continuous>  procainamide   Infusion 1 mG/Min IV Continuous <Continuous>  senna 1 Tablet(s) Oral daily  tamsulosin 0.4 milliGRAM(s) Oral at bedtime      Vital Signs Last 24 Hrs  T(C): 36 (25 Jul 2019 05:00), Max: 37.1 (24 Jul 2019 12:00)  T(F): 96.8 (25 Jul 2019 05:00), Max: 98.7 (24 Jul 2019 12:00)  HR: 94 (25 Jul 2019 06:49) (94 - 118)  BP: 100/65 (25 Jul 2019 06:49) (52/41 - 110/71)  BP(mean): 75 (25 Jul 2019 06:49) (46 - 82)  RR: 23 (25 Jul 2019 06:49) (12 - 40)  SpO2: 99% (25 Jul 2019 06:49) (96% - 100%)    Neurological Exam:   Mental status: Awake, alert and oriented x3.  Recent and remote memory intact. Attention/concentration intact.  No dysarthria, no aphasia.  Fund of knowledge appropriate.    Cranial nerves: refused.  Motor:  refused     Sensation: refused   Reflexes: refused     Labs:  CBC Full  -  ( 25 Jul 2019 04:57 )  WBC Count : 14.52 K/uL  RBC Count : 3.55 M/uL  Hemoglobin : 10.1 g/dL  Hematocrit : 31.4 %  Platelet Count - Automated : 325 K/uL  Mean Cell Volume : 88.5 fL  Mean Cell Hemoglobin : 28.5 pg  Mean Cell Hemoglobin Concentration : 32.2 g/dL  Auto Neutrophil # : 12.41 K/uL  Auto Lymphocyte # : 0.74 K/uL  Auto Monocyte # : 1.13 K/uL  Auto Eosinophil # : 0.10 K/uL  Auto Basophil # : 0.06 K/uL  Auto Neutrophil % : 85.4 %  Auto Lymphocyte % : 5.1 %  Auto Monocyte % : 7.8 %  Auto Eosinophil % : 0.7 %  Auto Basophil % : 0.4 %    07-25    137  |  99  |  28<H>  ----------------------------<  136<H>  4.0   |  24  |  1.6<H>    Ca    8.5      25 Jul 2019 04:57  Phos  3.0     07-25  Mg     2.0     07-25        Imaging   < from: CT Head No Cont (06.14.19 @ 20:15) >  Comparison: October 27, 2018    Findings:    The ventricular system, basal cisterns sulcal pattern are consistent with   age-related cerebral volume loss. Again seen is encephalomalacia in the   inferior frontal lobes, likely reflecting chronic traumatic injury.    Again seen is focal area of slight hyperdensity in the region of   posterior left temporal lobe, measuring 1.9 cm, stable since prior exam,   with adjacent slight hypoattenuation without significant mass effect,   findings may be extra-axial, representing a meningioma.    There is no evidence of intra or extra-axial hemorrhage or fluid   collection.  No mass effect or midline shift is seen.     The gray-white matter differentiation is preserved.  Mild periventricular   and subcortical white matter hypodensities are noted without mass effect   compatible with microvascular ischemic changes, stable since prior exam.     There is no calvarial fracture.    Visualized paranasal sinuses and mastoid air cells are within normal  limits.    Impression:    No acute intracranial abnormality.    Stable microvascular ischemic changes and sequela of chronic traumatic   change in the inferior frontal lobes bilaterally.    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 Again recommend further evaluation   with MRI of the brain with and without intravenous contrast on an   outpatient basis.    < end of copied text >

## 2019-07-25 NOTE — DIETITIAN INITIAL EVALUATION ADULT. - PHYSICAL APPEARANCE
appears to be alert and oriented. BMI: 29.7, IBW: 154#, UBW: ~195-197# per pt, states he lost 45# in the last 6 months, but unable to verify accuracy of pt report as pt's CBW is documented as 160# and 195#, which is pt's stated UBW? (will monitor wt trends closely). no edema noted, and skin intact. pt doesn't display any physical signs of malnutrition/overweight

## 2019-07-25 NOTE — PROGRESS NOTE ADULT - SUBJECTIVE AND OBJECTIVE BOX
LENGTH OF HOSPITAL STAY: 5d    CHIEF COMPLAINT:   Patient is a 79y old  Male who presents with a chief complaint of shortness of breath for few days duration (25 Jul 2019 10:22)      HISTORY OF PRESENTING ILLNESS:    HPI:  78 yo male with PMH CHFrEF, Afib, Vtach s/p AICD,COPD, NANDO, spleenic laceration following fall) comes to the ED with complaint of SOB.  SOB gradual onset, worsened in the evening yesterday, could not sleep s/t SOB, aggravated on exertion, no relieving factor.  NO chest pain, or wheezing or cough or palpitation.  No fever, headache, recent sick contacts.  normal Bowel and bladder habit.    Patient was recently admitted for near syncope and DUYEN, discharged on june 17.    In the ED, patient RR 28, , spo2 80 room air, /60 (20 Jul 2019 11:36)    PAST MEDICAL & SURGICAL HISTORY  PAST MEDICAL & SURGICAL HISTORY:  CAD (coronary artery disease)  Ventricular tachycardia  NANDO on CPAP  COPD (chronic obstructive pulmonary disease)  Pacemaker  AICD (automatic cardioverter/defibrillator) present  Hypothyroidism  Atrial fibrillation  Congestive heart disease  Hypercholesteremia  AICD (automatic cardioverter/defibrillator) present  History of laparoscopic cholecystectomy    SOCIAL HISTORY:    ALLERGIES:  morphine (Stomach Upset)    MEDICATIONS:  STANDING MEDICATIONS  ampicillin/sulbactam  IVPB      ampicillin/sulbactam  IVPB 1.5 Gram(s) IV Intermittent every 6 hours  apixaban 2.5 milliGRAM(s) Oral every 12 hours  atorvastatin 40 milliGRAM(s) Oral at bedtime  buDESOnide 160 MICROgram(s)/formoterol 4.5 MICROgram(s) Inhaler 2 Puff(s) Inhalation two times a day  chlorhexidine 4% Liquid 1 Application(s) Topical <User Schedule>  docusate sodium 100 milliGRAM(s) Oral daily  finasteride 5 milliGRAM(s) Oral daily  guaiFENesin  milliGRAM(s) Oral every 12 hours  lactulose Syrup 20 Gram(s) Oral daily  levothyroxine  Oral Tab/Cap - Peds 50 MICROGram(s) Oral daily  metoprolol tartrate 12.5 milliGRAM(s) Oral two times a day  mexiletine 200 milliGRAM(s) Oral every 12 hours  montelukast 10 milliGRAM(s) Oral daily  phenylephrine    Infusion 0.5 MICROgram(s)/kG/Min IV Continuous <Continuous>  procainamide   Infusion 1 mG/Min IV Continuous <Continuous>  senna 1 Tablet(s) Oral daily  tamsulosin 0.4 milliGRAM(s) Oral at bedtime    PRN MEDICATIONS  ALBUTerol/ipratropium for Nebulization 3 milliLiter(s) Nebulizer every 6 hours PRN  bisacodyl Suppository 10 milliGRAM(s) Rectal daily PRN  haloperidol    Injectable 5 milliGRAM(s) IntraMuscular daily PRN    VITALS:   T(F): 96.8  HR: 94  BP: 100/65  RR: 23  SpO2: 99%    LABS:                        10.1   14.52 )-----------( 325      ( 25 Jul 2019 04:57 )             31.4     07-25    137  |  99  |  28<H>  ----------------------------<  136<H>  4.0   |  24  |  1.6<H>    Ca    8.5      25 Jul 2019 04:57  Phos  3.0     07-25  Mg     2.0     07-25                Culture - Blood (collected 23 Jul 2019 04:29)  Source: .Blood None  Preliminary Report (24 Jul 2019 14:01):    No growth to date.      CARDIAC MARKERS ( 24 Jul 2019 05:06 )  x     / x     / 202 U/L / x     / x          RADIOLOGY:  < from: Xray Chest 1 View- PORTABLE-Routine (07.24.19 @ 04:32) >    Impression:        Improved bilateral opacities and effusions.        < end of copied text >    PHYSICAL EXAM:  GEN: No acute distress.   HEENT: AT, NC, DONNA  LUNGS: decreased breath sound bilaterally   HEART: S1/S2 present. RRR.   ABD: Soft, non-tender, non-distended. Bowel sounds present  EXT: edema/cyanosis/clubbing absent  NEURO: AAOX3

## 2019-07-25 NOTE — PROGRESS NOTE ADULT - SUBJECTIVE AND OBJECTIVE BOX
OVERNIGHT EVENTS: events noted, feels better, on pronestyl drip    Vital Signs Last 24 Hrs  T(C): 36 (25 Jul 2019 05:00), Max: 36.9 (24 Jul 2019 16:00)  T(F): 96.8 (25 Jul 2019 05:00), Max: 98.5 (24 Jul 2019 16:00)  HR: 94 (25 Jul 2019 14:00) (94 - 118)  BP: 81/56 (25 Jul 2019 14:00) (52/41 - 109/69)  BP(mean): 61 (25 Jul 2019 14:00) (46 - 82)  RR: 25 (25 Jul 2019 14:00) (20 - 32)  SpO2: 99% (25 Jul 2019 14:00) (96% - 100%)    PHYSICAL EXAMINATION:    GENERAL: The patient is awake and alert in no apparent distress.     HEENT: Head is normocephalic and atraumatic. Extraocular muscles are intact. Mucous membranes are moist.    NECK: Supple.    LUNGS: dec bs both abses    HEART: Regular rate and rhythm without murmur.    ABDOMEN: Soft, nontender, and nondistended.      EXTREMITIES: Without any cyanosis, clubbing, rash, lesions or edema.    NEUROLOGIC: Grossly intact.    SKIN: No ulceration or induration present.      LABS:                        10.1   14.52 )-----------( 325      ( 25 Jul 2019 04:57 )             31.4     07-25    137  |  99  |  28<H>  ----------------------------<  136<H>  4.0   |  24  |  1.6<H>    Ca    8.5      25 Jul 2019 04:57  Phos  3.0     07-25  Mg     2.0     07-25            CARDIAC MARKERS ( 24 Jul 2019 05:06 )  x     / x     / 202 U/L / x     / x                      07-24-19 @ 07:01  -  07-25-19 @ 07:00  --------------------------------------------------------  IN: 1516.3 mL / OUT: 1073 mL / NET: 443.3 mL    07-25-19 @ 07:01  -  07-25-19 @ 14:36  --------------------------------------------------------  IN: 282.5 mL / OUT: 135 mL / NET: 147.5 mL        MICROBIOLOGY:  Culture Results:   No growth to date. (07-24 @ 06:00)  Culture Results:   No growth to date. (07-23 @ 04:29)      MEDICATIONS  (STANDING):  ampicillin/sulbactam  IVPB      ampicillin/sulbactam  IVPB 1.5 Gram(s) IV Intermittent every 6 hours  apixaban 2.5 milliGRAM(s) Oral every 12 hours  atorvastatin 40 milliGRAM(s) Oral at bedtime  buDESOnide 160 MICROgram(s)/formoterol 4.5 MICROgram(s) Inhaler 2 Puff(s) Inhalation two times a day  chlorhexidine 4% Liquid 1 Application(s) Topical <User Schedule>  docusate sodium 100 milliGRAM(s) Oral daily  finasteride 5 milliGRAM(s) Oral daily  guaiFENesin  milliGRAM(s) Oral every 12 hours  lactulose Syrup 20 Gram(s) Oral daily  levothyroxine  Oral Tab/Cap - Peds 50 MICROGram(s) Oral daily  metoprolol tartrate 12.5 milliGRAM(s) Oral two times a day  mexiletine 200 milliGRAM(s) Oral every 12 hours  montelukast 10 milliGRAM(s) Oral daily  phenylephrine    Infusion 0.5 MICROgram(s)/kG/Min (8.297 mL/Hr) IV Continuous <Continuous>  procainamide   Infusion 1 mG/Min (15 mL/Hr) IV Continuous <Continuous>  senna 1 Tablet(s) Oral daily  tamsulosin 0.4 milliGRAM(s) Oral at bedtime    MEDICATIONS  (PRN):  ALBUTerol/ipratropium for Nebulization 3 milliLiter(s) Nebulizer every 6 hours PRN Bronchospasm  bisacodyl Suppository 10 milliGRAM(s) Rectal daily PRN Constipation  haloperidol    Injectable 5 milliGRAM(s) IntraMuscular daily PRN agitation      RADIOLOGY & ADDITIONAL STUDIES:

## 2019-07-25 NOTE — PROGRESS NOTE ADULT - ASSESSMENT
IMPRESSION:    b/l airspace disease/ aspiration/ CHF IMPROVED  Lidocaine toxicity?  Vtach storm   AFib with rvr   altered MS improved    PLAN:    CNS: avoid CNS depressant    HEENT: Oral care    PULMONARY:  HOB @ 45 degrees, aspiration precautions, keep sats > 92%, pulm toilet     CARDIOVASCULAR: Keep MAP > 65, anti arrhythmic per EPS    GI: GI prophylaxis. feeding     RENAL:  Follow up lytes.  Correct as needed    INFECTIOUS DISEASE: Follow up cultures, Cont unasyn, finish course 7 dasy    HEMATOLOGICaL:  Eliquis    ENDOCRINE:  Follow up FS.  Insulin protocol if needed.    MUSCULOSKELETAL: Bed rest     Lines: peripheral   Rose: Keep the rose for retention  Code status: full   Prognosis: poor   Dispo:  CCU

## 2019-07-25 NOTE — DIETITIAN INITIAL EVALUATION ADULT. - OTHER INFO
Nutrition Hx PTA: Pt is [place], wears hearing aids, states that he has a good appetite and generally consumes 2 meals daily. Pt is not a big meat eater, doesn't really eat it or like it. Pt states he has tried Ensure in the past and likes it. NKFA.     Pt was admitted for COPD exacerbation and arrhythmia on amiodarone/lidocaine drip and corticosteroids with ICU delirium with waxing/waning mental status and stereotyped movements. Will obtain Chest CT scan, and pulmonary consult.

## 2019-07-25 NOTE — DIETITIAN INITIAL EVALUATION ADULT. - ENERGY NEEDS
Calories: 0281-5080 kcal/day (25-30 kcal/kg IBW)--will readjust once more accurate/stable wt trends noted  Protein: 70-84 gm/day (1-1.2 gm/kg IBW)--same as mentioned above   Fluid: per CCU team

## 2019-07-25 NOTE — PROGRESS NOTE ADULT - ASSESSMENT
Assessment   79 year old male with PMH of A fib, CHF, V tach s/p AICD was admitted for COPD exacerbation and arrhythmia on amiodarone/lidocaine drip and corticosteroids with ICU delirium with waxing/waning mental status and stereotyped movements. As of today the patient is AAOx3 and follows commands. Patient answered questions but refused a neurological evaluation.     Recommendations  -Call back Neurology if needed

## 2019-07-25 NOTE — PROGRESS NOTE ADULT - ASSESSMENT
Recurrent VT    Plan:  Currently AV paced 95 BPM  Replete potassium keep around 4, Mg>2  Cont procainamide, increase to 1.5 mg/min  Increase mexilitine to 200 mg Q 8  Monitor QTc  Start coumadin 7/26  Wean neosynephrine as tolerated  Increase lopressor to 12.5 mg PO Q 6  Cont rose  Palliative care consult

## 2019-07-25 NOTE — DIETITIAN INITIAL EVALUATION ADULT. - FEEDING SKILL
independent/Per pt, he is eating well, about half of his meal trays. Agreeable to trying oral supplement for adequate nutrition.

## 2019-07-25 NOTE — PROGRESS NOTE ADULT - SUBJECTIVE AND OBJECTIVE BOX
INTERVAL HPI/OVERNIGHT EVENTS:  Overnight with recurrent VT, started on procainamide. Had another episode on procainamide, responded to ATP therapy today. Cont to have episodes of VT this evening, lasting 13 minutes.    MEDICATIONS  (STANDING):  ampicillin/sulbactam  IVPB      ampicillin/sulbactam  IVPB 1.5 Gram(s) IV Intermittent every 6 hours  apixaban 2.5 milliGRAM(s) Oral every 12 hours  atorvastatin 40 milliGRAM(s) Oral at bedtime  buDESOnide 160 MICROgram(s)/formoterol 4.5 MICROgram(s) Inhaler 2 Puff(s) Inhalation two times a day  chlorhexidine 4% Liquid 1 Application(s) Topical <User Schedule>  docusate sodium 100 milliGRAM(s) Oral daily  finasteride 5 milliGRAM(s) Oral daily  guaiFENesin  milliGRAM(s) Oral every 12 hours  lactulose Syrup 20 Gram(s) Oral daily  levothyroxine  Oral Tab/Cap - Peds 50 MICROGram(s) Oral daily  metoprolol tartrate 12.5 milliGRAM(s) Oral two times a day  mexiletine 200 milliGRAM(s) Oral every 12 hours  montelukast 10 milliGRAM(s) Oral daily  phenylephrine    Infusion 0.5 MICROgram(s)/kG/Min (8.297 mL/Hr) IV Continuous <Continuous>  procainamide   Infusion 1 mG/Min (15 mL/Hr) IV Continuous <Continuous>  senna 1 Tablet(s) Oral daily  tamsulosin 0.4 milliGRAM(s) Oral at bedtime    MEDICATIONS  (PRN):  ALBUTerol/ipratropium for Nebulization 3 milliLiter(s) Nebulizer every 6 hours PRN Bronchospasm  bisacodyl Suppository 10 milliGRAM(s) Rectal daily PRN Constipation  haloperidol    Injectable 5 milliGRAM(s) IntraMuscular daily PRN agitation      Allergies    morphine (Stomach Upset)    Intolerances        REVIEW OF SYSTEMS  A ten-point review of systems was otherwise negative except as noted.    Vital Signs Last 24 Hrs  T(C): 35.4 (25 Jul 2019 16:00), Max: 36.7 (24 Jul 2019 19:30)  T(F): 95.8 (25 Jul 2019 16:00), Max: 98 (24 Jul 2019 19:30)  HR: 94 (25 Jul 2019 16:45) (94 - 118)  BP: 92/63 (25 Jul 2019 16:45) (52/41 - 123/96)  BP(mean): 71 (25 Jul 2019 16:45) (46 - 117)  RR: 24 (25 Jul 2019 16:45) (20 - 37)  SpO2: 100% (25 Jul 2019 16:45) (96% - 100%)    07-24-19 @ 07:01  -  07-25-19 @ 07:00  --------------------------------------------------------  IN: 1516.3 mL / OUT: 1073 mL / NET: 443.3 mL    07-25-19 @ 07:01  -  07-25-19 @ 17:25  --------------------------------------------------------  IN: 622.5 mL / OUT: 385 mL / NET: 237.5 mL        Physical Exam    GENERAL: In no apparent distress, well nourished, and hydrated.  HEAD:  Atraumatic, Normocephalic  EYES: EOMI, PERRLA, conjunctiva and sclera clear  ENMT: No tonsillar erythema, exudates, or enlargements; ist mucous membranes, Good dentition, No lesions  NECK: Supple and normal thyroid.  No JVD or carotid bruit.  Carotid pulse is 2+ bilaterally.  HEART: Regular rate and rhythm; No murmurs, rubs, or gallops.  PULMONARY: Clear to auscultation and perfusion.  No rales, wheezing, or rhonchi bilaterally.  ABDOMEN: Soft, Nontender, Nondistended; Bowel sounds present  EXTREMITIES:  2+ Peripheral Pulses, No clubbing, cyanosis, or edema  LYMPH: No lymphadenopathy noted  NEUROLOGICAL: Grossly nonfocal    LABS:                        10.1   14.52 )-----------( 325      ( 25 Jul 2019 04:57 )             31.4     07-25    137  |  99  |  28<H>  ----------------------------<  136<H>  4.0   |  24  |  1.6<H>    Ca    8.5      25 Jul 2019 04:57  Phos  3.0     07-25  Mg     2.0     07-25            RADIOLOGY & ADDITIONAL TESTS:

## 2019-07-25 NOTE — CHART NOTE - NSCHARTNOTEFT_GEN_A_CORE
Called by CCU resident that pt was back in Vtach at HR 110s, however, pt is   Spoke with Dr. Holt and pt's ventricular beats at overdriven to break vtach  Change IV amiodarone to IV procainamide as per EP Called by CCU resident that pt was back in Vtach at HR 110s, however, pt is   Spoke with Dr. Holt and pt's ventricular beats at overdriven to break vtach  VT interval decreased from 16 to 12  Change IV amiodarone to IV procainamide 1mg/min without bolus as per Dr. Holt.  Pt was repleted 20 MEq IV potassium.    Pt had multiple Vtach episodes from midnight to 1:40 am that resolved by pt's ATP but occasionally requires manual overdrive. Pt had another episode again past 3 am which resolved.

## 2019-07-25 NOTE — PROGRESS NOTE ADULT - ASSESSMENT
· Assessment		  patient is 80 yo male with hx of S/P AICD (automatic cardioverter/defibrillator), Atrial fibrillation, CAD (coronary artery disease), Congestive heart disease, COPD (chronic obstructive pulmonary disease), Hypercholesteremia, Hypothyroidism, NANDO on CPAP, Ventricular tachycardia presenting with     Patient appears to be oriented to place,person and time today. Last night patient had a run of VT again. EP was called , pt's ventricular beats were overdriven to break vtach. IV amiodarone was changed to IV procainamide 1mg/min without bolus as per Dr. Holt.  Pt was repleted 20 MEq IV potassium.        1. Acute exacerbation of Systolic CHF  -EKG shows no ischemic changes  -Trop flat  - EF 20%    2. Rt lower lobe  opacity  -Afebrile, productive cough for last 2 days with samy sputum  -may be due to aspiration sec to diorientation  -aspiration prercautions  -Will obtain Chest CT scan, and pulmonary consult  -pt started on unasyn, day 4  - WBCs still elevated to 14.5    3. DUYEN on CKD  -Unclear Etiology   Cr stable at 1.6    4. Afib S/P ablation:  cw eliquis  rate controlled    5.  COPD   -Continue with home medications  -Stable    6. Arrythmias  - Patient is currently on procainamide, lopressor and mixeltine  - Dr. coffey said that she might add quinidine to medications if VT remains uncontrolled.   -Pt is not a good candidate for LVAd  - HR ais controlled with systolic in 90s    Dr. Abraham will be talking to patient about the goals of care today.    others  D/C lasix and abx    DVT ppx:  on eliquis    Diet:  dash/tlc    Activity: AAT    Dispo: from home

## 2019-07-26 LAB
ANION GAP SERPL CALC-SCNC: 8 MMOL/L — SIGNIFICANT CHANGE UP (ref 7–14)
BASOPHILS # BLD AUTO: 0.13 K/UL — SIGNIFICANT CHANGE UP (ref 0–0.2)
BASOPHILS NFR BLD AUTO: 1.2 % — HIGH (ref 0–1)
BUN SERPL-MCNC: 26 MG/DL — HIGH (ref 10–20)
CALCIUM SERPL-MCNC: 8.6 MG/DL — SIGNIFICANT CHANGE UP (ref 8.5–10.1)
CHLORIDE SERPL-SCNC: 103 MMOL/L — SIGNIFICANT CHANGE UP (ref 98–110)
CO2 SERPL-SCNC: 30 MMOL/L — SIGNIFICANT CHANGE UP (ref 17–32)
CREAT SERPL-MCNC: 1.5 MG/DL — SIGNIFICANT CHANGE UP (ref 0.7–1.5)
EOSINOPHIL # BLD AUTO: 0.67 K/UL — SIGNIFICANT CHANGE UP (ref 0–0.7)
EOSINOPHIL NFR BLD AUTO: 6.2 % — SIGNIFICANT CHANGE UP (ref 0–8)
GLUCOSE SERPL-MCNC: 112 MG/DL — HIGH (ref 70–99)
HCT VFR BLD CALC: 31.7 % — LOW (ref 42–52)
HGB BLD-MCNC: 10.2 G/DL — LOW (ref 14–18)
IMM GRANULOCYTES NFR BLD AUTO: 0.4 % — HIGH (ref 0.1–0.3)
LYMPHOCYTES # BLD AUTO: 0.9 K/UL — LOW (ref 1.2–3.4)
LYMPHOCYTES # BLD AUTO: 8.3 % — LOW (ref 20.5–51.1)
MAGNESIUM SERPL-MCNC: 2.4 MG/DL — SIGNIFICANT CHANGE UP (ref 1.8–2.4)
MCHC RBC-ENTMCNC: 28.9 PG — SIGNIFICANT CHANGE UP (ref 27–31)
MCHC RBC-ENTMCNC: 32.2 G/DL — SIGNIFICANT CHANGE UP (ref 32–37)
MCV RBC AUTO: 89.8 FL — SIGNIFICANT CHANGE UP (ref 80–94)
MONOCYTES # BLD AUTO: 1.05 K/UL — HIGH (ref 0.1–0.6)
MONOCYTES NFR BLD AUTO: 9.7 % — HIGH (ref 1.7–9.3)
NEUTROPHILS # BLD AUTO: 8.01 K/UL — HIGH (ref 1.4–6.5)
NEUTROPHILS NFR BLD AUTO: 74.2 % — SIGNIFICANT CHANGE UP (ref 42.2–75.2)
NRBC # BLD: 0 /100 WBCS — SIGNIFICANT CHANGE UP (ref 0–0)
PHOSPHATE SERPL-MCNC: 3 MG/DL — SIGNIFICANT CHANGE UP (ref 2.1–4.9)
PLATELET # BLD AUTO: 365 K/UL — SIGNIFICANT CHANGE UP (ref 130–400)
POTASSIUM SERPL-MCNC: 4.2 MMOL/L — SIGNIFICANT CHANGE UP (ref 3.5–5)
POTASSIUM SERPL-SCNC: 4.2 MMOL/L — SIGNIFICANT CHANGE UP (ref 3.5–5)
RBC # BLD: 3.53 M/UL — LOW (ref 4.7–6.1)
RBC # FLD: 15.9 % — HIGH (ref 11.5–14.5)
SODIUM SERPL-SCNC: 141 MMOL/L — SIGNIFICANT CHANGE UP (ref 135–146)
TSH SERPL-MCNC: 4.8 UIU/ML — HIGH (ref 0.27–4.2)
WBC # BLD: 10.8 K/UL — SIGNIFICANT CHANGE UP (ref 4.8–10.8)
WBC # FLD AUTO: 10.8 K/UL — SIGNIFICANT CHANGE UP (ref 4.8–10.8)

## 2019-07-26 PROCEDURE — 71045 X-RAY EXAM CHEST 1 VIEW: CPT | Mod: 26

## 2019-07-26 PROCEDURE — 93010 ELECTROCARDIOGRAM REPORT: CPT | Mod: 76

## 2019-07-26 RX ORDER — QUINIDINE SULFATE 200 MG
200 TABLET ORAL
Refills: 0 | Status: DISCONTINUED | OUTPATIENT
Start: 2019-07-26 | End: 2019-07-26

## 2019-07-26 RX ORDER — QUINIDINE SULFATE 200 MG
324 TABLET ORAL
Refills: 0 | Status: DISCONTINUED | OUTPATIENT
Start: 2019-07-27 | End: 2019-08-04

## 2019-07-26 RX ORDER — QUINIDINE SULFATE 200 MG
200 TABLET ORAL
Refills: 0 | Status: COMPLETED | OUTPATIENT
Start: 2019-07-26 | End: 2019-07-27

## 2019-07-26 RX ADMIN — MEXILETINE HYDROCHLORIDE 200 MILLIGRAM(S): 150 CAPSULE ORAL at 05:29

## 2019-07-26 RX ADMIN — ATORVASTATIN CALCIUM 40 MILLIGRAM(S): 80 TABLET, FILM COATED ORAL at 23:09

## 2019-07-26 RX ADMIN — Medication 100 MILLIGRAM(S): at 11:51

## 2019-07-26 RX ADMIN — Medication 200 MILLIGRAM(S): at 13:00

## 2019-07-26 RX ADMIN — AMPICILLIN SODIUM AND SULBACTAM SODIUM 100 GRAM(S): 250; 125 INJECTION, POWDER, FOR SUSPENSION INTRAMUSCULAR; INTRAVENOUS at 05:28

## 2019-07-26 RX ADMIN — Medication 12.5 MILLIGRAM(S): at 11:52

## 2019-07-26 RX ADMIN — AMPICILLIN SODIUM AND SULBACTAM SODIUM 100 GRAM(S): 250; 125 INJECTION, POWDER, FOR SUSPENSION INTRAMUSCULAR; INTRAVENOUS at 18:24

## 2019-07-26 RX ADMIN — Medication 12.5 MILLIGRAM(S): at 23:09

## 2019-07-26 RX ADMIN — BUDESONIDE AND FORMOTEROL FUMARATE DIHYDRATE 2 PUFF(S): 160; 4.5 AEROSOL RESPIRATORY (INHALATION) at 20:24

## 2019-07-26 RX ADMIN — FINASTERIDE 5 MILLIGRAM(S): 5 TABLET, FILM COATED ORAL at 11:52

## 2019-07-26 RX ADMIN — CHLORHEXIDINE GLUCONATE 1 APPLICATION(S): 213 SOLUTION TOPICAL at 05:29

## 2019-07-26 RX ADMIN — TAMSULOSIN HYDROCHLORIDE 0.4 MILLIGRAM(S): 0.4 CAPSULE ORAL at 23:09

## 2019-07-26 RX ADMIN — Medication 12.5 MILLIGRAM(S): at 05:28

## 2019-07-26 RX ADMIN — LACTULOSE 20 GRAM(S): 10 SOLUTION ORAL at 11:51

## 2019-07-26 RX ADMIN — AMPICILLIN SODIUM AND SULBACTAM SODIUM 100 GRAM(S): 250; 125 INJECTION, POWDER, FOR SUSPENSION INTRAMUSCULAR; INTRAVENOUS at 23:10

## 2019-07-26 RX ADMIN — MEXILETINE HYDROCHLORIDE 200 MILLIGRAM(S): 150 CAPSULE ORAL at 15:19

## 2019-07-26 RX ADMIN — Medication 12.5 MILLIGRAM(S): at 18:24

## 2019-07-26 RX ADMIN — Medication 50 MICROGRAM(S): at 05:28

## 2019-07-26 RX ADMIN — Medication 200 MILLIGRAM(S): at 23:09

## 2019-07-26 RX ADMIN — MEXILETINE HYDROCHLORIDE 200 MILLIGRAM(S): 150 CAPSULE ORAL at 23:09

## 2019-07-26 RX ADMIN — APIXABAN 2.5 MILLIGRAM(S): 2.5 TABLET, FILM COATED ORAL at 18:24

## 2019-07-26 RX ADMIN — Medication 600 MILLIGRAM(S): at 18:24

## 2019-07-26 RX ADMIN — SENNA PLUS 1 TABLET(S): 8.6 TABLET ORAL at 11:52

## 2019-07-26 RX ADMIN — AMPICILLIN SODIUM AND SULBACTAM SODIUM 100 GRAM(S): 250; 125 INJECTION, POWDER, FOR SUSPENSION INTRAMUSCULAR; INTRAVENOUS at 11:51

## 2019-07-26 RX ADMIN — APIXABAN 2.5 MILLIGRAM(S): 2.5 TABLET, FILM COATED ORAL at 05:28

## 2019-07-26 RX ADMIN — Medication 200 MILLIGRAM(S): at 18:24

## 2019-07-26 RX ADMIN — BUDESONIDE AND FORMOTEROL FUMARATE DIHYDRATE 2 PUFF(S): 160; 4.5 AEROSOL RESPIRATORY (INHALATION) at 08:00

## 2019-07-26 RX ADMIN — Medication 600 MILLIGRAM(S): at 05:28

## 2019-07-26 RX ADMIN — MONTELUKAST 10 MILLIGRAM(S): 4 TABLET, CHEWABLE ORAL at 11:52

## 2019-07-26 NOTE — PROGRESS NOTE ADULT - SUBJECTIVE AND OBJECTIVE BOX
INTERVAL HPI/OVERNIGHT EVENTS:  No further VT since last episode of 13 minutes yesterday which ended 5:16 pm. Tolerating IV procainamide but still requiring pressor.                         10.2   10.80 )-----------( 365      ( 26 Jul 2019 05:13 )             31.7    07-26    141  |  103  |  26<H>  ----------------------------<  112<H>  4.2   |  30  |  1.5    Ca    8.6      26 Jul 2019 05:13  Phos  3.0     07-26  Mg     2.4     07-26    Culture - Blood (collected 24 Jul 2019 06:00)  Source: .Blood Blood-Venous  Preliminary Report (25 Jul 2019 14:01):    No growth to date.    RADIOLOGY    MEDICATIONS  (STANDING):  ampicillin/sulbactam  IVPB      ampicillin/sulbactam  IVPB 1.5 Gram(s) IV Intermittent every 6 hours  apixaban 2.5 milliGRAM(s) Oral every 12 hours  atorvastatin 40 milliGRAM(s) Oral at bedtime  buDESOnide 160 MICROgram(s)/formoterol 4.5 MICROgram(s) Inhaler 2 Puff(s) Inhalation two times a day  chlorhexidine 4% Liquid 1 Application(s) Topical <User Schedule>  docusate sodium 100 milliGRAM(s) Oral daily  finasteride 5 milliGRAM(s) Oral daily  guaiFENesin  milliGRAM(s) Oral every 12 hours  lactulose Syrup 20 Gram(s) Oral daily  levothyroxine  Oral Tab/Cap - Peds 50 MICROGram(s) Oral daily  metoprolol tartrate 12.5 milliGRAM(s) Oral every 6 hours  mexiletine 200 milliGRAM(s) Oral every 8 hours  montelukast 10 milliGRAM(s) Oral daily  phenylephrine    Infusion 0.5 MICROgram(s)/kG/Min (8.297 mL/Hr) IV Continuous <Continuous>  procainamide   Infusion 1.5 mG/Min (22.5 mL/Hr) IV Continuous <Continuous>  senna 1 Tablet(s) Oral daily  tamsulosin 0.4 milliGRAM(s) Oral at bedtime    MEDICATIONS  (PRN):  ALBUTerol/ipratropium for Nebulization 3 milliLiter(s) Nebulizer every 6 hours PRN Bronchospasm  bisacodyl Suppository 10 milliGRAM(s) Rectal daily PRN Constipation  haloperidol    Injectable 5 milliGRAM(s) IntraMuscular daily PRN agitation      Vital Signs Last 24 Hrs  T(C): 36.2 (26 Jul 2019 05:00), Max: 36.2 (26 Jul 2019 05:00)  T(F): 97.1 (26 Jul 2019 05:00), Max: 97.1 (26 Jul 2019 05:00)  HR: 94 (26 Jul 2019 09:00) (94 - 94)  BP: 75/53 (26 Jul 2019 09:00) (75/53 - 100/67)  BP(mean): 58 (26 Jul 2019 09:00) (56 - 85)  RR: 19 (26 Jul 2019 09:00) (11 - 37)  SpO2: 99% (26 Jul 2019 09:00) (98% - 100%)    I&O's Detail    25 Jul 2019 07:01  -  26 Jul 2019 07:00  --------------------------------------------------------  IN:    IV PiggyBack: 150 mL    Oral Fluid: 360 mL    phenylephrine   Infusion: 147.5 mL    procainamide   Infusion: 270 mL    procainamide   Infusion: 165 mL  Total IN: 1092.5 mL    OUT:    Indwelling Catheter - Urethral: 795 mL  Total OUT: 795 mL    Total NET: 297.5 mL    PHYSICAL EXAM:  CONSTITUTIONAL: NAD, well-groomed, well-developed.  HEAD:  Atraumatic, Normocephalic.  EYES: EOMI, PERRLA, conjunctiva and sclera clear.  ENMT: Moist mucous membranes, No lesions.  NERVOUS SYSTEM:  Alert & Oriented X3, Good concentration; No limb weakness or numbness.  RESPIRATORY:   CARDIOVASCULAR: Pace rhythm.  GASTROINTESTINAL: Soft, Nontender, Nondistended;   MUSCULOSKELETAL: No joint swelling or tenderness. No neck or back pain.  EXTREMITIES: No clubbing, cyanosis, or edema.  LYMPH: No cervical lymphadenopathy noted.  SKIN: No rashes or lesions. INTERVAL HPI/OVERNIGHT EVENTS:  No further VT since last episode of 13 minutes yesterday which ended 5:16 pm. Tolerating IV procainamide but still requiring pressor. Pt is sitting comfortably in bed eating breakfast.    ROS: Pt denies fever, chills, SOB, chest pain, palpitations, nausea, vomiting, abdominal pain, dysuria, diarrhea, constipation, rash, muscle or joint pain.                        10.2   10.80 )-----------( 365      ( 26 Jul 2019 05:13 )             31.7    07-26    141  |  103  |  26<H>  ----------------------------<  112<H>  4.2   |  30  |  1.5    Ca    8.6      26 Jul 2019 05:13  Phos  3.0     07-26  Mg     2.4     07-26    Culture - Blood (collected 24 Jul 2019 06:00)  Source: .Blood Blood-Venous  Preliminary Report (25 Jul 2019 14:01):    No growth to date.    RADIOLOGY    MEDICATIONS  (STANDING):  ampicillin/sulbactam  IVPB      ampicillin/sulbactam  IVPB 1.5 Gram(s) IV Intermittent every 6 hours  apixaban 2.5 milliGRAM(s) Oral every 12 hours  atorvastatin 40 milliGRAM(s) Oral at bedtime  buDESOnide 160 MICROgram(s)/formoterol 4.5 MICROgram(s) Inhaler 2 Puff(s) Inhalation two times a day  chlorhexidine 4% Liquid 1 Application(s) Topical <User Schedule>  docusate sodium 100 milliGRAM(s) Oral daily  finasteride 5 milliGRAM(s) Oral daily  guaiFENesin  milliGRAM(s) Oral every 12 hours  lactulose Syrup 20 Gram(s) Oral daily  levothyroxine  Oral Tab/Cap - Peds 50 MICROGram(s) Oral daily  metoprolol tartrate 12.5 milliGRAM(s) Oral every 6 hours  mexiletine 200 milliGRAM(s) Oral every 8 hours  montelukast 10 milliGRAM(s) Oral daily  phenylephrine    Infusion 0.5 MICROgram(s)/kG/Min (8.297 mL/Hr) IV Continuous <Continuous>  procainamide   Infusion 1.5 mG/Min (22.5 mL/Hr) IV Continuous <Continuous>  senna 1 Tablet(s) Oral daily  tamsulosin 0.4 milliGRAM(s) Oral at bedtime    MEDICATIONS  (PRN):  ALBUTerol/ipratropium for Nebulization 3 milliLiter(s) Nebulizer every 6 hours PRN Bronchospasm  bisacodyl Suppository 10 milliGRAM(s) Rectal daily PRN Constipation  haloperidol    Injectable 5 milliGRAM(s) IntraMuscular daily PRN agitation      Vital Signs Last 24 Hrs  T(C): 36.2 (26 Jul 2019 05:00), Max: 36.2 (26 Jul 2019 05:00)  T(F): 97.1 (26 Jul 2019 05:00), Max: 97.1 (26 Jul 2019 05:00)  HR: 94 (26 Jul 2019 09:00) (94 - 94)  BP: 75/53 (26 Jul 2019 09:00) (75/53 - 100/67)  BP(mean): 58 (26 Jul 2019 09:00) (56 - 85)  RR: 19 (26 Jul 2019 09:00) (11 - 37)  SpO2: 99% (26 Jul 2019 09:00) (98% - 100%)    I&O's Detail    25 Jul 2019 07:01  -  26 Jul 2019 07:00  --------------------------------------------------------  IN:    IV PiggyBack: 150 mL    Oral Fluid: 360 mL    phenylephrine   Infusion: 147.5 mL    procainamide   Infusion: 270 mL    procainamide   Infusion: 165 mL  Total IN: 1092.5 mL    OUT:    Indwelling Catheter - Urethral: 795 mL  Total OUT: 795 mL    Total NET: 297.5 mL    PHYSICAL EXAM:  CONSTITUTIONAL: NAD, well-groomed, well-developed.  HEAD:  Atraumatic, Normocephalic.  EYES: EOMI, PERRLA, conjunctiva and sclera clear.  ENMT: Moist mucous membranes, No lesions.  NERVOUS SYSTEM:  Alert & Oriented X3, Good concentration; No limb weakness or numbness.  RESPIRATORY:   CARDIOVASCULAR: Pace rhythm.  GASTROINTESTINAL: Soft, Nontender, Nondistended;   MUSCULOSKELETAL: No joint swelling or tenderness. No neck or back pain.  EXTREMITIES: No clubbing, cyanosis, or edema.  LYMPH: No cervical lymphadenopathy noted.  SKIN: No rashes or lesions. INTERVAL HPI/OVERNIGHT EVENTS:  No further VT since last episode of 13 minutes yesterday which ended 5:16 pm. Tolerating IV procainamide but still requiring pressor. Pt is sitting comfortably in bed eating breakfast.    ROS: Pt denies fever, chills, SOB, chest pain, palpitations, nausea, vomiting, abdominal pain, dysuria, diarrhea, constipation, rash, muscle or joint pain.                        10.2   10.80 )-----------( 365      ( 26 Jul 2019 05:13 )             31.7    07-26    141  |  103  |  26<H>  ----------------------------<  112<H>  4.2   |  30  |  1.5    Ca    8.6      26 Jul 2019 05:13  Phos  3.0     07-26  Mg     2.4     07-26    Culture - Blood (collected 24 Jul 2019 06:00)  Source: .Blood Blood-Venous  Preliminary Report (25 Jul 2019 14:01):    No growth to date.    RADIOLOGY    MEDICATIONS  (STANDING):  ampicillin/sulbactam  IVPB      ampicillin/sulbactam  IVPB 1.5 Gram(s) IV Intermittent every 6 hours  apixaban 2.5 milliGRAM(s) Oral every 12 hours  atorvastatin 40 milliGRAM(s) Oral at bedtime  buDESOnide 160 MICROgram(s)/formoterol 4.5 MICROgram(s) Inhaler 2 Puff(s) Inhalation two times a day  chlorhexidine 4% Liquid 1 Application(s) Topical <User Schedule>  docusate sodium 100 milliGRAM(s) Oral daily  finasteride 5 milliGRAM(s) Oral daily  guaiFENesin  milliGRAM(s) Oral every 12 hours  lactulose Syrup 20 Gram(s) Oral daily  levothyroxine  Oral Tab/Cap - Peds 50 MICROGram(s) Oral daily  metoprolol tartrate 12.5 milliGRAM(s) Oral every 6 hours  mexiletine 200 milliGRAM(s) Oral every 8 hours  montelukast 10 milliGRAM(s) Oral daily  phenylephrine    Infusion 0.5 MICROgram(s)/kG/Min (8.297 mL/Hr) IV Continuous <Continuous>  procainamide   Infusion 1.5 mG/Min (22.5 mL/Hr) IV Continuous <Continuous>  senna 1 Tablet(s) Oral daily  tamsulosin 0.4 milliGRAM(s) Oral at bedtime    MEDICATIONS  (PRN):  ALBUTerol/ipratropium for Nebulization 3 milliLiter(s) Nebulizer every 6 hours PRN Bronchospasm  bisacodyl Suppository 10 milliGRAM(s) Rectal daily PRN Constipation  haloperidol    Injectable 5 milliGRAM(s) IntraMuscular daily PRN agitation      Vital Signs Last 24 Hrs  T(C): 36.2 (26 Jul 2019 05:00), Max: 36.2 (26 Jul 2019 05:00)  T(F): 97.1 (26 Jul 2019 05:00), Max: 97.1 (26 Jul 2019 05:00)  HR: 94 (26 Jul 2019 09:00) (94 - 94)  BP: 75/53 (26 Jul 2019 09:00) (75/53 - 100/67)  BP(mean): 58 (26 Jul 2019 09:00) (56 - 85)  RR: 19 (26 Jul 2019 09:00) (11 - 37)  SpO2: 99% (26 Jul 2019 09:00) (98% - 100%)    I&O's Detail    25 Jul 2019 07:01  -  26 Jul 2019 07:00  --------------------------------------------------------  IN:    IV PiggyBack: 150 mL    Oral Fluid: 360 mL    phenylephrine   Infusion: 147.5 mL    procainamide   Infusion: 270 mL    procainamide   Infusion: 165 mL  Total IN: 1092.5 mL    OUT:    Indwelling Catheter - Urethral: 795 mL  Total OUT: 795 mL    Total NET: 297.5 mL    PHYSICAL EXAM:  CONSTITUTIONAL: NAD, well-groomed, well-developed.  HEAD:  Atraumatic, Normocephalic.  EYES: EOMI, PERRLA, conjunctiva and sclera clear.  ENMT: Moist mucous membranes, No lesions.  NERVOUS SYSTEM:  Alert & Oriented X3, Good concentration; No limb weakness or numbness.  RESPIRATORY: Right sided crackles. No wheezes.  CARDIOVASCULAR: Paced rhythm.  GASTROINTESTINAL: Soft, Nontender, Nondistended;   MUSCULOSKELETAL: No joint swelling or tenderness. No neck or back pain.  EXTREMITIES: No clubbing, cyanosis, or edema. INTERVAL HPI/OVERNIGHT EVENTS:  No further VT since last episode of 13 minutes yesterday which ended 5:16 pm. Tolerating IV procainamide but still requiring pressor. Pt is sitting comfortably in bed eating breakfast.    ROS: Pt denies fever, chills, SOB, chest pain, palpitations, nausea, vomiting, abdominal pain, dysuria, diarrhea, constipation, rash, muscle or joint pain.                        10.2   10.80 )-----------( 365      ( 26 Jul 2019 05:13 )             31.7    07-26    141  |  103  |  26<H>  ----------------------------<  112<H>  4.2   |  30  |  1.5    Ca    8.6      26 Jul 2019 05:13  Phos  3.0     07-26  Mg     2.4     07-26    Culture - Blood (collected 24 Jul 2019 06:00)  Source: .Blood Blood-Venous  Preliminary Report (25 Jul 2019 14:01):    No growth to date.    RADIOLOGY    MEDICATIONS  (STANDING):  ampicillin/sulbactam  IVPB      ampicillin/sulbactam  IVPB 1.5 Gram(s) IV Intermittent every 6 hours  apixaban 2.5 milliGRAM(s) Oral every 12 hours  atorvastatin 40 milliGRAM(s) Oral at bedtime  buDESOnide 160 MICROgram(s)/formoterol 4.5 MICROgram(s) Inhaler 2 Puff(s) Inhalation two times a day  chlorhexidine 4% Liquid 1 Application(s) Topical <User Schedule>  docusate sodium 100 milliGRAM(s) Oral daily  finasteride 5 milliGRAM(s) Oral daily  guaiFENesin  milliGRAM(s) Oral every 12 hours  lactulose Syrup 20 Gram(s) Oral daily  levothyroxine  Oral Tab/Cap - Peds 50 MICROGram(s) Oral daily  metoprolol tartrate 12.5 milliGRAM(s) Oral every 6 hours  mexiletine 200 milliGRAM(s) Oral every 8 hours  montelukast 10 milliGRAM(s) Oral daily  phenylephrine    Infusion 0.5 MICROgram(s)/kG/Min (8.297 mL/Hr) IV Continuous <Continuous>  procainamide   Infusion 1.5 mG/Min (22.5 mL/Hr) IV Continuous <Continuous>  senna 1 Tablet(s) Oral daily  tamsulosin 0.4 milliGRAM(s) Oral at bedtime    MEDICATIONS  (PRN):  ALBUTerol/ipratropium for Nebulization 3 milliLiter(s) Nebulizer every 6 hours PRN Bronchospasm  bisacodyl Suppository 10 milliGRAM(s) Rectal daily PRN Constipation  haloperidol    Injectable 5 milliGRAM(s) IntraMuscular daily PRN agitation      Vital Signs Last 24 Hrs  T(C): 36.2 (26 Jul 2019 05:00), Max: 36.2 (26 Jul 2019 05:00)  T(F): 97.1 (26 Jul 2019 05:00), Max: 97.1 (26 Jul 2019 05:00)  HR: 94 (26 Jul 2019 09:00) (94 - 94)  BP: 75/53 (26 Jul 2019 09:00) (75/53 - 100/67)  BP(mean): 58 (26 Jul 2019 09:00) (56 - 85)  RR: 19 (26 Jul 2019 09:00) (11 - 37)  SpO2: 99% (26 Jul 2019 09:00) (98% - 100%)    I&O's Detail    25 Jul 2019 07:01  -  26 Jul 2019 07:00  --------------------------------------------------------  IN:    IV PiggyBack: 150 mL    Oral Fluid: 360 mL    phenylephrine   Infusion: 147.5 mL    procainamide   Infusion: 270 mL    procainamide   Infusion: 165 mL  Total IN: 1092.5 mL    OUT:    Indwelling Catheter - Urethral: 795 mL  Total OUT: 795 mL    Total NET: 297.5 mL    PHYSICAL EXAM:  CONSTITUTIONAL: NAD, well-groomed, well-developed.  HEAD:  Atraumatic, Normocephalic.  EYES: EOMI, PERRLA, conjunctiva and sclera clear.  ENMT: Moist mucous membranes, No lesions.  NERVOUS SYSTEM:  Alert & Oriented X3, Good concentration; No limb weakness or numbness.   RESPIRATORY: Right sided rales. Decreased breath sounds at bases. No wheezes.  CARDIOVASCULAR: Regular paced rhythm.  GASTROINTESTINAL: Soft, Nontender, Nondistended;   MUSCULOSKELETAL: No joint swelling or tenderness. No neck or back pain.  EXTREMITIES: No clubbing, cyanosis, or edema.

## 2019-07-26 NOTE — PROGRESS NOTE ADULT - SUBJECTIVE AND OBJECTIVE BOX
LENGTH OF HOSPITAL STAY: 6d    CHIEF COMPLAINT:   Patient is a 79y old  Male who presents with a chief complaint of shortness of breath for few days duration (26 Jul 2019 09:49)      HISTORY OF PRESENTING ILLNESS:    HPI:  80 yo male with PMH CHFrEF, Afib, Vtach s/p AICD,COPD, NANDO, spleenic laceration following fall) comes to the ED with complaint of SOB.  SOB gradual onset, worsened in the evening yesterday, could not sleep s/t SOB, aggravated on exertion, no relieving factor.  NO chest pain, or wheezing or cough or palpitation.  No fever, headache, recent sick contacts.  normal Bowel and bladder habit.    Patient was recently admitted for near syncope and DUYEN, discharged on june 17.    In the ED, patient RR 28, , spo2 80 room air, /60 (20 Jul 2019 11:36)    PAST MEDICAL & SURGICAL HISTORY  PAST MEDICAL & SURGICAL HISTORY:  CAD (coronary artery disease)  Ventricular tachycardia  NANDO on CPAP  COPD (chronic obstructive pulmonary disease)  Pacemaker  AICD (automatic cardioverter/defibrillator) present  Hypothyroidism  Atrial fibrillation  Congestive heart disease  Hypercholesteremia  AICD (automatic cardioverter/defibrillator) present  History of laparoscopic cholecystectomy    SOCIAL HISTORY:    ALLERGIES:  morphine (Stomach Upset)    MEDICATIONS:  STANDING MEDICATIONS  ampicillin/sulbactam  IVPB      ampicillin/sulbactam  IVPB 1.5 Gram(s) IV Intermittent every 6 hours  apixaban 2.5 milliGRAM(s) Oral every 12 hours  atorvastatin 40 milliGRAM(s) Oral at bedtime  buDESOnide 160 MICROgram(s)/formoterol 4.5 MICROgram(s) Inhaler 2 Puff(s) Inhalation two times a day  chlorhexidine 4% Liquid 1 Application(s) Topical <User Schedule>  docusate sodium 100 milliGRAM(s) Oral daily  finasteride 5 milliGRAM(s) Oral daily  guaiFENesin  milliGRAM(s) Oral every 12 hours  lactulose Syrup 20 Gram(s) Oral daily  levothyroxine  Oral Tab/Cap - Peds 50 MICROGram(s) Oral daily  metoprolol tartrate 12.5 milliGRAM(s) Oral every 6 hours  mexiletine 200 milliGRAM(s) Oral every 8 hours  montelukast 10 milliGRAM(s) Oral daily  phenylephrine    Infusion 0.5 MICROgram(s)/kG/Min IV Continuous <Continuous>  procainamide   Infusion 1.5 mG/Min IV Continuous <Continuous>  quiNIDine sulfate 200 milliGRAM(s) Oral four times a day  senna 1 Tablet(s) Oral daily  tamsulosin 0.4 milliGRAM(s) Oral at bedtime    PRN MEDICATIONS  ALBUTerol/ipratropium for Nebulization 3 milliLiter(s) Nebulizer every 6 hours PRN  bisacodyl Suppository 10 milliGRAM(s) Rectal daily PRN  haloperidol    Injectable 5 milliGRAM(s) IntraMuscular daily PRN    VITALS:   T(F): 96.7  HR: 94  BP: 80/61  RR: 37  SpO2: 98%    LABS:                        10.2   10.80 )-----------( 365      ( 26 Jul 2019 05:13 )             31.7     07-26    141  |  103  |  26<H>  ----------------------------<  112<H>  4.2   |  30  |  1.5    Ca    8.6      26 Jul 2019 05:13  Phos  3.0     07-26  Mg     2.4     07-26                Culture - Blood (collected 24 Jul 2019 06:00)  Source: .Blood Blood-Venous  Preliminary Report (25 Jul 2019 14:01):    No growth to date.          RADIOLOGY:  < from: Xray Chest 1 View- PORTABLE-Routine (07.26.19 @ 05:14) >  Impression:      Stable right pleural effusion and right mid lung field opacity          < end of copied text >    PHYSICAL EXAM:  GEN: No acute distress.   HEENT: AT, NC, DONNA  LUNGS: decreased breath sound bilaterally   HEART: S1/S2 present. RRR.   ABD: Soft, non-tender, non-distended. Bowel sounds present  EXT: edema/cyanosis/clubbing absent  NEURO: [id number]

## 2019-07-26 NOTE — PROGRESS NOTE ADULT - ASSESSMENT
patient is 80 yo male with hx of S/P AICD (automatic cardioverter/defibrillator), Atrial fibrillation, CAD (coronary artery disease), Congestive heart disease, COPD (chronic obstructive pulmonary disease), Hypercholesteremia, Hypothyroidism, NANDO on CPAP, Ventricular tachycardia presenting with     Patient appears to be oriented to place,person and time today. Last night patient had a run of VT again. EP was called , pt's ventricular beats were overdriven to break vtach. IV amiodarone was changed to IV procainamide 1mg/min without bolus as per Dr. Holt.  Pt was repleted 20 MEq IV potassium.        1. Acute exacerbation of Systolic CHF  -EKG shows no ischemic changes  -Trop flat  - EF 20%    2. Rt lower lobe  opacity  -Afebrile, cough has resolved  -may be due to aspiration sec to diorientation  -aspiration prercautions  -Will obtain Chest CT scan, and pulmonary consult  -pt started on unasyn, day 5  - WBCs within NL range    3. DUYEN on CKD  -Unclear Etiology   Cr stable at 1.6    4. Afib S/P ablation:  cw eliquis  rate controlled    5.  COPD   -Continue with home medications  -Stable    6. Arrythmias  - D/C procainamide , c/w  lopressor and mixeltine  - start with quinidine sulfate 200mg PO Q6 hrs for 1 day, followed by Quinidine gluconate ER 324mg PO Q 12 hrs.  -Monitor QTc  -Wean off Renzo-Synephrine as tolerated  -Pt is not a good candidate for LVAd  - HR is controlled with systolic in 90s  -if he is stable with quinidine , he will be discharged to home              DVT ppx:  on eliquis    Diet:  dash/tlc    Activity: AAT    Dispo: from home

## 2019-07-26 NOTE — PROGRESS NOTE ADULT - ASSESSMENT
Assessment:  Recurrent VT    Plan:  Currently AV paced 95 BPM  Replete potassium keep around 4, Mg>2  Continue procainamide at rate of1.5 mg/min  Continue mexiletine to 200 mg po q8h  Monitor QTc  Start coumadin   Wean off Renzo-Synephrine as tolerated  Continue lopressor to 12.5 mg po q6h  Continue rose  Palliative care consult Assessment:  Recurrent VT    Plan:  Currently AV paced 95 BPM  Replete potassium keep around 4, Mg>2  Continue procainamide at rate of1.5 mg/min  Continue mexiletine to 200 mg po q8h  Monitor QTc  discontinue procainamide for 2 hours then start quinidine   Wean off Renzo-Synephrine as tolerated  Continue lopressor to 12.5 mg po q6h  Continue rose  Palliative care consult Assessment:  Recurrent VT    Plan:  Discontinue IV procainamide for 1 hour then start quinidine sulfate 200 mg po q6h for 1 day, then quinidine gluconate  mg po q12h starting tomorrow  Currently AV paced 95 BPM  Replete potassium keep around 4, Mg>2  Continue mexiletine to 200 mg po q8h and lopressor to 12.5 mg po q6h  Monitor QTc  Wean off Renzo-Synephrine as tolerated  Continue rose  Palliative care consult Assessment:  Recurrent VT, Currently AV paced 95 BPM    Plan:  Discontinue IV procainamide for 1 hour then start quinidine sulfate 200 mg po q6h for 1 day, then quinidine gluconate  mg po q12h starting tomorrow  Obtain EKG and check Qtc 2 hours after first dose of quinidine sulfate  Replete potassium keep around 4, Mg>2  Continue mexiletine to 200 mg po q8h and lopressor to 12.5 mg po q6h  Monitor QTc  Wean off Renzo-Synephrine as tolerated  Continue [place]  Palliative care consult

## 2019-07-27 LAB
ANION GAP SERPL CALC-SCNC: 12 MMOL/L — SIGNIFICANT CHANGE UP (ref 7–14)
BUN SERPL-MCNC: 28 MG/DL — HIGH (ref 10–20)
CALCIUM SERPL-MCNC: 8.4 MG/DL — LOW (ref 8.5–10.1)
CHLORIDE SERPL-SCNC: 102 MMOL/L — SIGNIFICANT CHANGE UP (ref 98–110)
CO2 SERPL-SCNC: 27 MMOL/L — SIGNIFICANT CHANGE UP (ref 17–32)
CREAT SERPL-MCNC: 1.6 MG/DL — HIGH (ref 0.7–1.5)
GLUCOSE SERPL-MCNC: 129 MG/DL — HIGH (ref 70–99)
HCT VFR BLD CALC: 30.1 % — LOW (ref 42–52)
HGB BLD-MCNC: 9.6 G/DL — LOW (ref 14–18)
MAGNESIUM SERPL-MCNC: 2.3 MG/DL — SIGNIFICANT CHANGE UP (ref 1.8–2.4)
MCHC RBC-ENTMCNC: 28.8 PG — SIGNIFICANT CHANGE UP (ref 27–31)
MCHC RBC-ENTMCNC: 31.9 G/DL — LOW (ref 32–37)
MCV RBC AUTO: 90.4 FL — SIGNIFICANT CHANGE UP (ref 80–94)
NRBC # BLD: 0 /100 WBCS — SIGNIFICANT CHANGE UP (ref 0–0)
PHOSPHATE SERPL-MCNC: 3.4 MG/DL — SIGNIFICANT CHANGE UP (ref 2.1–4.9)
PLATELET # BLD AUTO: 368 K/UL — SIGNIFICANT CHANGE UP (ref 130–400)
POTASSIUM SERPL-MCNC: 4 MMOL/L — SIGNIFICANT CHANGE UP (ref 3.5–5)
POTASSIUM SERPL-SCNC: 4 MMOL/L — SIGNIFICANT CHANGE UP (ref 3.5–5)
RBC # BLD: 3.33 M/UL — LOW (ref 4.7–6.1)
RBC # FLD: 15.8 % — HIGH (ref 11.5–14.5)
SODIUM SERPL-SCNC: 141 MMOL/L — SIGNIFICANT CHANGE UP (ref 135–146)
WBC # BLD: 11.28 K/UL — HIGH (ref 4.8–10.8)
WBC # FLD AUTO: 11.28 K/UL — HIGH (ref 4.8–10.8)

## 2019-07-27 PROCEDURE — 71045 X-RAY EXAM CHEST 1 VIEW: CPT | Mod: 26

## 2019-07-27 PROCEDURE — 99232 SBSQ HOSP IP/OBS MODERATE 35: CPT

## 2019-07-27 PROCEDURE — 93010 ELECTROCARDIOGRAM REPORT: CPT | Mod: 76

## 2019-07-27 RX ORDER — FUROSEMIDE 40 MG
40 TABLET ORAL DAILY
Refills: 0 | Status: DISCONTINUED | OUTPATIENT
Start: 2019-07-27 | End: 2019-08-04

## 2019-07-27 RX ADMIN — MONTELUKAST 10 MILLIGRAM(S): 4 TABLET, CHEWABLE ORAL at 11:36

## 2019-07-27 RX ADMIN — APIXABAN 2.5 MILLIGRAM(S): 2.5 TABLET, FILM COATED ORAL at 18:08

## 2019-07-27 RX ADMIN — Medication 50 MICROGRAM(S): at 05:08

## 2019-07-27 RX ADMIN — MEXILETINE HYDROCHLORIDE 200 MILLIGRAM(S): 150 CAPSULE ORAL at 05:11

## 2019-07-27 RX ADMIN — Medication 12.5 MILLIGRAM(S): at 05:11

## 2019-07-27 RX ADMIN — Medication 40 MILLIGRAM(S): at 08:57

## 2019-07-27 RX ADMIN — Medication 100 MILLIGRAM(S): at 11:35

## 2019-07-27 RX ADMIN — Medication 12.5 MILLIGRAM(S): at 11:35

## 2019-07-27 RX ADMIN — CHLORHEXIDINE GLUCONATE 1 APPLICATION(S): 213 SOLUTION TOPICAL at 05:13

## 2019-07-27 RX ADMIN — SENNA PLUS 1 TABLET(S): 8.6 TABLET ORAL at 11:36

## 2019-07-27 RX ADMIN — APIXABAN 2.5 MILLIGRAM(S): 2.5 TABLET, FILM COATED ORAL at 05:08

## 2019-07-27 RX ADMIN — Medication 200 MILLIGRAM(S): at 05:11

## 2019-07-27 RX ADMIN — Medication 600 MILLIGRAM(S): at 18:07

## 2019-07-27 RX ADMIN — BUDESONIDE AND FORMOTEROL FUMARATE DIHYDRATE 2 PUFF(S): 160; 4.5 AEROSOL RESPIRATORY (INHALATION) at 08:58

## 2019-07-27 RX ADMIN — Medication 324 MILLIGRAM(S): at 10:20

## 2019-07-27 RX ADMIN — AMPICILLIN SODIUM AND SULBACTAM SODIUM 100 GRAM(S): 250; 125 INJECTION, POWDER, FOR SUSPENSION INTRAMUSCULAR; INTRAVENOUS at 05:11

## 2019-07-27 RX ADMIN — LACTULOSE 20 GRAM(S): 10 SOLUTION ORAL at 11:35

## 2019-07-27 RX ADMIN — Medication 1 MILLIGRAM(S): at 18:30

## 2019-07-27 RX ADMIN — Medication 12.5 MILLIGRAM(S): at 18:07

## 2019-07-27 RX ADMIN — Medication 600 MILLIGRAM(S): at 05:11

## 2019-07-27 RX ADMIN — MEXILETINE HYDROCHLORIDE 200 MILLIGRAM(S): 150 CAPSULE ORAL at 14:30

## 2019-07-27 RX ADMIN — PHENYLEPHRINE HYDROCHLORIDE 8.3 MICROGRAM(S)/KG/MIN: 10 INJECTION INTRAVENOUS at 05:12

## 2019-07-27 RX ADMIN — FINASTERIDE 5 MILLIGRAM(S): 5 TABLET, FILM COATED ORAL at 11:36

## 2019-07-27 NOTE — PROGRESS NOTE ADULT - SUBJECTIVE AND OBJECTIVE BOX
INTERVAL HPI/OVERNIGHT EVENTS:    One short episode of VT while on quinidine    MEDICATIONS  (STANDING):  apixaban 2.5 milliGRAM(s) Oral every 12 hours  atorvastatin 40 milliGRAM(s) Oral at bedtime  buDESOnide 160 MICROgram(s)/formoterol 4.5 MICROgram(s) Inhaler 2 Puff(s) Inhalation two times a day  chlorhexidine 4% Liquid 1 Application(s) Topical <User Schedule>  docusate sodium 100 milliGRAM(s) Oral daily  finasteride 5 milliGRAM(s) Oral daily  furosemide   Injectable 40 milliGRAM(s) IV Push daily  guaiFENesin  milliGRAM(s) Oral every 12 hours  lactulose Syrup 20 Gram(s) Oral daily  levothyroxine  Oral Tab/Cap - Peds 50 MICROGram(s) Oral daily  metoprolol tartrate 12.5 milliGRAM(s) Oral every 6 hours  mexiletine 200 milliGRAM(s) Oral every 8 hours  montelukast 10 milliGRAM(s) Oral daily  phenylephrine    Infusion 0.5 MICROgram(s)/kG/Min (8.297 mL/Hr) IV Continuous <Continuous>  procainamide   Infusion 1.5 mG/Min (22.5 mL/Hr) IV Continuous <Continuous>  quiNIDine gluconate 324 milliGRAM(s) Oral <User Schedule>  senna 1 Tablet(s) Oral daily  tamsulosin 0.4 milliGRAM(s) Oral at bedtime    MEDICATIONS  (PRN):  ALBUTerol/ipratropium for Nebulization 3 milliLiter(s) Nebulizer every 6 hours PRN Bronchospasm  bisacodyl Suppository 10 milliGRAM(s) Rectal daily PRN Constipation  haloperidol    Injectable 5 milliGRAM(s) IntraMuscular daily PRN agitation      Allergies    morphine (Stomach Upset)    Intolerances    EKG in am a paced and v paced QTc 580    REVIEW OF SYSTEMS      Vital Signs Last 24 Hrs  T(C): 36.9 (27 Jul 2019 08:00), Max: 36.9 (27 Jul 2019 08:00)  T(F): 98.5 (27 Jul 2019 08:00), Max: 98.5 (27 Jul 2019 08:00)  HR: 94 (27 Jul 2019 09:00) (94 - 108)  BP: 101/65 (27 Jul 2019 09:00) (70/54 - 101/65)  BP(mean): 74 (27 Jul 2019 09:00) (54 - 84)  RR: 24 (27 Jul 2019 09:00) (20 - 42)  SpO2: 95% (27 Jul 2019 09:00) (95% - 100%)    07-26-19 @ 07:01  -  07-27-19 @ 07:00  --------------------------------------------------------  IN: 1695.5 mL / OUT: 740 mL / NET: 955.5 mL    07-27-19 @ 07:01  -  07-27-19 @ 10:22  --------------------------------------------------------  IN: 240 mL / OUT: 240 mL / NET: 0 mL        Physical Exam    GENERAL: In no apparent distress, well nourished, and hydrated.  HEAD:  Atraumatic, Normocephalic  HEART: Regular rate and rhythm; No murmurs, rubs, or gallops.  PULMONARY: Clear to auscultation and perfusion.  No rales, wheezing, or rhonchi bilaterally.  ABDOMEN: Soft, Nontender, Nondistended; Bowel sounds present  EXTREMITIES:  2+ Peripheral Pulses, No clubbing, cyanosis, or edema  NEUROLOGICAL: Grossly nonfocal    LABS:                        9.6    11.28 )-----------( 368      ( 27 Jul 2019 05:08 )             30.1     07-27    141  |  102  |  28<H>  ----------------------------<  129<H>  4.0   |  27  |  1.6<H>    Ca    8.4<L>      27 Jul 2019 05:08  Phos  3.4     07-27  Mg     2.3     07-27            RADIOLOGY & ADDITIONAL TESTS:

## 2019-07-27 NOTE — PROGRESS NOTE ADULT - SUBJECTIVE AND OBJECTIVE BOX
OVERNIGHT EVENTS: no events overnight, po quinidine, jose 0.6, NC 3 l nc    Vital Signs Last 24 Hrs  T(C): 35.8 (26 Jul 2019 16:00), Max: 35.9 (26 Jul 2019 12:00)  T(F): 96.5 (26 Jul 2019 16:00), Max: 96.7 (26 Jul 2019 12:00)  HR: 94 (27 Jul 2019 06:00) (94 - 108)  BP: 91/62 (27 Jul 2019 06:00) (70/54 - 97/61)  BP(mean): 75 (27 Jul 2019 06:00) (54 - 78)  RR: 21 (27 Jul 2019 06:00) (19 - 42)  SpO2: 100% (27 Jul 2019 06:00) (95% - 100%)    PHYSICAL EXAMINATION:    GENERAL: The patient is awake and alert in no apparent distress.     HEENT: Head is normocephalic and atraumatic. Extraocular muscles are intact. Mucous membranes are moist.    NECK: Supple.    LUNGS: r side crackles    HEART: LAVON 3/6    ABDOMEN: Soft, nontender, and nondistended.      EXTREMITIES: Without any cyanosis, clubbing, rash, lesions or edema.    NEUROLOGIC: Grossly intact.    SKIN: No ulceration or induration present.      LABS:                        9.6    11.28 )-----------( 368      ( 27 Jul 2019 05:08 )             30.1     07-27    141  |  102  |  28<H>  ----------------------------<  129<H>  4.0   |  27  |  1.6<H>    Ca    8.4<L>      27 Jul 2019 05:08  Phos  3.4     07-27  Mg     2.3     07-27 07-26-19 @ 07:01  -  07-27-19 @ 07:00  --------------------------------------------------------  IN: 1695.5 mL / OUT: 740 mL / NET: 955.5 mL        MICROBIOLOGY:  Culture Results:   No growth to date. (07-24 @ 06:00)      MEDICATIONS  (STANDING):  ampicillin/sulbactam  IVPB      ampicillin/sulbactam  IVPB 1.5 Gram(s) IV Intermittent every 6 hours  apixaban 2.5 milliGRAM(s) Oral every 12 hours  atorvastatin 40 milliGRAM(s) Oral at bedtime  buDESOnide 160 MICROgram(s)/formoterol 4.5 MICROgram(s) Inhaler 2 Puff(s) Inhalation two times a day  chlorhexidine 4% Liquid 1 Application(s) Topical <User Schedule>  docusate sodium 100 milliGRAM(s) Oral daily  finasteride 5 milliGRAM(s) Oral daily  guaiFENesin  milliGRAM(s) Oral every 12 hours  lactulose Syrup 20 Gram(s) Oral daily  levothyroxine  Oral Tab/Cap - Peds 50 MICROGram(s) Oral daily  metoprolol tartrate 12.5 milliGRAM(s) Oral every 6 hours  mexiletine 200 milliGRAM(s) Oral every 8 hours  montelukast 10 milliGRAM(s) Oral daily  phenylephrine    Infusion 0.5 MICROgram(s)/kG/Min (8.297 mL/Hr) IV Continuous <Continuous>  procainamide   Infusion 1.5 mG/Min (22.5 mL/Hr) IV Continuous <Continuous>  quiNIDine gluconate 324 milliGRAM(s) Oral <User Schedule>  senna 1 Tablet(s) Oral daily  tamsulosin 0.4 milliGRAM(s) Oral at bedtime    MEDICATIONS  (PRN):  ALBUTerol/ipratropium for Nebulization 3 milliLiter(s) Nebulizer every 6 hours PRN Bronchospasm  bisacodyl Suppository 10 milliGRAM(s) Rectal daily PRN Constipation  haloperidol    Injectable 5 milliGRAM(s) IntraMuscular daily PRN agitation      RADIOLOGY & ADDITIONAL STUDIES:

## 2019-07-27 NOTE — PROGRESS NOTE ADULT - ASSESSMENT
IMPRESSION:    b/l airspace disease/ CHF CXR reviewed/ worsening  Vtach storm   AFib with rvr   altered MS improved    PLAN:    CNS: avoid CNS depressant    HEENT: Oral care    PULMONARY:  HOB @ 45 degrees, aspiration precautions, keep sats > 92%, pulm toilet     CARDIOVASCULAR: Keep MAP > 65, anti arrhythmic per EPS, iv lasix daily    GI: GI prophylaxis. feeding     RENAL:  Follow up lytes.  Correct as needed    INFECTIOUS DISEASE: Follow up cultures, dc abx    HEMATOLOGICaL:  Eliquis    ENDOCRINE:  Follow up FS.  Insulin protocol if needed.    MUSCULOSKELETAL: Bed rest     Lines: peripheral   Rose: Keep the rose for retention  Code status: full   Prognosis: poor   Dispo:  CCU

## 2019-07-27 NOTE — PROGRESS NOTE ADULT - ASSESSMENT
VT - improvment in VT  - cont quinidine  - cont maxilatine  - K>4 and Mg >2    I have discussed with patient the different options He want to be DNR and does not want LVAD  - therefore will treat medically without major invasive therapies

## 2019-07-28 LAB
ALBUMIN SERPL ELPH-MCNC: 3 G/DL — LOW (ref 3.5–5.2)
ALP SERPL-CCNC: 71 U/L — SIGNIFICANT CHANGE UP (ref 30–115)
ALT FLD-CCNC: 32 U/L — SIGNIFICANT CHANGE UP (ref 0–41)
ANION GAP SERPL CALC-SCNC: 11 MMOL/L — SIGNIFICANT CHANGE UP (ref 7–14)
AST SERPL-CCNC: 25 U/L — SIGNIFICANT CHANGE UP (ref 0–41)
BILIRUB SERPL-MCNC: 0.4 MG/DL — SIGNIFICANT CHANGE UP (ref 0.2–1.2)
BUN SERPL-MCNC: 28 MG/DL — HIGH (ref 10–20)
CALCIUM SERPL-MCNC: 8.7 MG/DL — SIGNIFICANT CHANGE UP (ref 8.5–10.1)
CHLORIDE SERPL-SCNC: 102 MMOL/L — SIGNIFICANT CHANGE UP (ref 98–110)
CO2 SERPL-SCNC: 26 MMOL/L — SIGNIFICANT CHANGE UP (ref 17–32)
CREAT SERPL-MCNC: 1.5 MG/DL — SIGNIFICANT CHANGE UP (ref 0.7–1.5)
CULTURE RESULTS: SIGNIFICANT CHANGE UP
GLUCOSE SERPL-MCNC: 140 MG/DL — HIGH (ref 70–99)
HCT VFR BLD CALC: 30.4 % — LOW (ref 42–52)
HGB BLD-MCNC: 9.8 G/DL — LOW (ref 14–18)
MAGNESIUM SERPL-MCNC: 2.1 MG/DL — SIGNIFICANT CHANGE UP (ref 1.8–2.4)
MCHC RBC-ENTMCNC: 28.8 PG — SIGNIFICANT CHANGE UP (ref 27–31)
MCHC RBC-ENTMCNC: 32.2 G/DL — SIGNIFICANT CHANGE UP (ref 32–37)
MCV RBC AUTO: 89.4 FL — SIGNIFICANT CHANGE UP (ref 80–94)
NAPA SERPL-MCNC: 2.3 MG/L — SIGNIFICANT CHANGE UP
NRBC # BLD: 0 /100 WBCS — SIGNIFICANT CHANGE UP (ref 0–0)
PHOSPHATE SERPL-MCNC: 3 MG/DL — SIGNIFICANT CHANGE UP (ref 2.1–4.9)
PLATELET # BLD AUTO: 418 K/UL — HIGH (ref 130–400)
POTASSIUM SERPL-MCNC: 4 MMOL/L — SIGNIFICANT CHANGE UP (ref 3.5–5)
POTASSIUM SERPL-SCNC: 4 MMOL/L — SIGNIFICANT CHANGE UP (ref 3.5–5)
PROCAINAMIDE FLD-MCNC: 4.4 MG/L — SIGNIFICANT CHANGE UP (ref 4–10)
PROCAINAMIDE+NAPA SERPL-MCNC: 6.7 MG/L — SIGNIFICANT CHANGE UP (ref 5–30)
PROT SERPL-MCNC: 5.3 G/DL — LOW (ref 6–8)
RBC # BLD: 3.4 M/UL — LOW (ref 4.7–6.1)
RBC # FLD: 15.4 % — HIGH (ref 11.5–14.5)
SODIUM SERPL-SCNC: 139 MMOL/L — SIGNIFICANT CHANGE UP (ref 135–146)
SPECIMEN SOURCE: SIGNIFICANT CHANGE UP
WBC # BLD: 11.52 K/UL — HIGH (ref 4.8–10.8)
WBC # FLD AUTO: 11.52 K/UL — HIGH (ref 4.8–10.8)

## 2019-07-28 PROCEDURE — 99233 SBSQ HOSP IP/OBS HIGH 50: CPT

## 2019-07-28 PROCEDURE — 93010 ELECTROCARDIOGRAM REPORT: CPT

## 2019-07-28 PROCEDURE — 99232 SBSQ HOSP IP/OBS MODERATE 35: CPT

## 2019-07-28 PROCEDURE — 71045 X-RAY EXAM CHEST 1 VIEW: CPT | Mod: 26

## 2019-07-28 RX ORDER — AMIODARONE HYDROCHLORIDE 400 MG/1
0.5 TABLET ORAL
Qty: 900 | Refills: 0 | Status: DISCONTINUED | OUTPATIENT
Start: 2019-07-28 | End: 2019-07-29

## 2019-07-28 RX ORDER — AMIODARONE HYDROCHLORIDE 400 MG/1
150 TABLET ORAL ONCE
Refills: 0 | Status: COMPLETED | OUTPATIENT
Start: 2019-07-28 | End: 2019-07-28

## 2019-07-28 RX ORDER — AMIODARONE HYDROCHLORIDE 400 MG/1
0.5 TABLET ORAL
Qty: 900 | Refills: 0 | Status: DISCONTINUED | OUTPATIENT
Start: 2019-07-28 | End: 2019-07-28

## 2019-07-28 RX ORDER — AMIODARONE HYDROCHLORIDE 400 MG/1
1 TABLET ORAL
Qty: 900 | Refills: 0 | Status: DISCONTINUED | OUTPATIENT
Start: 2019-07-28 | End: 2019-07-28

## 2019-07-28 RX ADMIN — FINASTERIDE 5 MILLIGRAM(S): 5 TABLET, FILM COATED ORAL at 12:22

## 2019-07-28 RX ADMIN — PHENYLEPHRINE HYDROCHLORIDE 8.3 MICROGRAM(S)/KG/MIN: 10 INJECTION INTRAVENOUS at 05:57

## 2019-07-28 RX ADMIN — MEXILETINE HYDROCHLORIDE 200 MILLIGRAM(S): 150 CAPSULE ORAL at 23:04

## 2019-07-28 RX ADMIN — Medication 12.5 MILLIGRAM(S): at 18:04

## 2019-07-28 RX ADMIN — AMIODARONE HYDROCHLORIDE 16.67 MG/MIN: 400 TABLET ORAL at 19:34

## 2019-07-28 RX ADMIN — ATORVASTATIN CALCIUM 40 MILLIGRAM(S): 80 TABLET, FILM COATED ORAL at 23:03

## 2019-07-28 RX ADMIN — MEXILETINE HYDROCHLORIDE 200 MILLIGRAM(S): 150 CAPSULE ORAL at 05:56

## 2019-07-28 RX ADMIN — Medication 324 MILLIGRAM(S): at 10:16

## 2019-07-28 RX ADMIN — Medication 600 MILLIGRAM(S): at 05:56

## 2019-07-28 RX ADMIN — TAMSULOSIN HYDROCHLORIDE 0.4 MILLIGRAM(S): 0.4 CAPSULE ORAL at 23:03

## 2019-07-28 RX ADMIN — AMIODARONE HYDROCHLORIDE 618 MILLIGRAM(S): 400 TABLET ORAL at 00:36

## 2019-07-28 RX ADMIN — Medication 324 MILLIGRAM(S): at 23:03

## 2019-07-28 RX ADMIN — BUDESONIDE AND FORMOTEROL FUMARATE DIHYDRATE 2 PUFF(S): 160; 4.5 AEROSOL RESPIRATORY (INHALATION) at 19:34

## 2019-07-28 RX ADMIN — AMIODARONE HYDROCHLORIDE 16.67 MG/MIN: 400 TABLET ORAL at 07:01

## 2019-07-28 RX ADMIN — APIXABAN 2.5 MILLIGRAM(S): 2.5 TABLET, FILM COATED ORAL at 18:04

## 2019-07-28 RX ADMIN — Medication 40 MILLIGRAM(S): at 05:56

## 2019-07-28 RX ADMIN — Medication 12.5 MILLIGRAM(S): at 05:56

## 2019-07-28 RX ADMIN — MONTELUKAST 10 MILLIGRAM(S): 4 TABLET, CHEWABLE ORAL at 12:20

## 2019-07-28 RX ADMIN — Medication 100 MILLIGRAM(S): at 12:21

## 2019-07-28 RX ADMIN — BUDESONIDE AND FORMOTEROL FUMARATE DIHYDRATE 2 PUFF(S): 160; 4.5 AEROSOL RESPIRATORY (INHALATION) at 09:27

## 2019-07-28 RX ADMIN — Medication 50 MICROGRAM(S): at 05:56

## 2019-07-28 RX ADMIN — Medication 600 MILLIGRAM(S): at 18:04

## 2019-07-28 RX ADMIN — CHLORHEXIDINE GLUCONATE 1 APPLICATION(S): 213 SOLUTION TOPICAL at 05:56

## 2019-07-28 RX ADMIN — Medication 12.5 MILLIGRAM(S): at 23:03

## 2019-07-28 RX ADMIN — APIXABAN 2.5 MILLIGRAM(S): 2.5 TABLET, FILM COATED ORAL at 05:56

## 2019-07-28 RX ADMIN — HALOPERIDOL DECANOATE 5 MILLIGRAM(S): 100 INJECTION INTRAMUSCULAR at 09:24

## 2019-07-28 RX ADMIN — LACTULOSE 20 GRAM(S): 10 SOLUTION ORAL at 12:22

## 2019-07-28 RX ADMIN — MEXILETINE HYDROCHLORIDE 200 MILLIGRAM(S): 150 CAPSULE ORAL at 14:32

## 2019-07-28 RX ADMIN — SENNA PLUS 1 TABLET(S): 8.6 TABLET ORAL at 12:22

## 2019-07-28 RX ADMIN — AMIODARONE HYDROCHLORIDE 33.33 MG/MIN: 400 TABLET ORAL at 00:45

## 2019-07-28 NOTE — PROGRESS NOTE ADULT - ASSESSMENT
· Assessment		  patient is 78 yo male with hx of S/P AICD (automatic cardioverter/defibrillator), Atrial fibrillation, CAD (coronary artery disease), Congestive heart disease, COPD (chronic obstructive pulmonary disease), Hypercholesteremia, Hypothyroidism, NANDO on CPAP, Ventricular tachycardia presenting with     Patient appears to be dioriented to place,person,time. Appears agitated, almost kicked one of the nurses. he was given one dose of Haldol to calm him down this morning. Last night patient had a run of VT again , which was cardioverted by the pacemaker. EPS was called, who started the patient on Amiodrone drip.       1. Acute exacerbation of Systolic CHF  -EKG shows no ischemic changes  -Trop flat  - EF 20%    2. Rt lower lobe  opacity  -Afebrile, cough has resolved  -may be due to aspiration sec to diorientation  -aspiration prercautions  -Will obtain Chest CT scan, and pulmonary consult  - WBCs within NL range    3. DUYEN on CKD  -Unclear Etiology   Cr stable at 1.6    4. Afib S/P ablation:  cw eliquis  rate controlled    5.  COPD   -Continue with home medications  -Stable    6. Arrythmias  - pt on amiodarone , mixelitene, quinidine, lopressor and lasix  -Monitor QTc  -Wean off Renzo-Synephrine as tolerated  -Pt is not a good candidate for LVAd  - HR is controlled with systolic in 90s  -if he is stable with quinidine , he will be discharged to home    Goals of care : were discussed with family, patient was oriented and agreed to medications and just the comfort measures, DNR/DNI. But afterwards family is insisting full aggressive measures for patient resucitation          DVT ppx:  on eliquis    Diet:  dash/tlc    Activity: AAT    Dispo: from home

## 2019-07-28 NOTE — CHART NOTE - NSCHARTNOTEFT_GEN_A_CORE
Received call from nurse at 12:20 am that patient had sustained episode of v tach which turned into v fib resulting in 1 shock from his AICD . Spoke with Cardio fellow on call who agreed to start patient on Amiodarone 150 mg IV followed by 1 mg/min IV infusion x 6 hours and then 0.5 mg IV infusion x 18 hours

## 2019-07-28 NOTE — PROGRESS NOTE ADULT - SUBJECTIVE AND OBJECTIVE BOX
OVERNIGHT EVENTS: v tach, on amiodarone, agitated, s/p hadol iv    Vital Signs Last 24 Hrs  T(C): 36.2 (28 Jul 2019 08:07), Max: 37.2 (27 Jul 2019 12:00)  T(F): 97.2 (28 Jul 2019 08:07), Max: 98.9 (27 Jul 2019 12:00)  HR: 94 (28 Jul 2019 08:07) (84 - 122)  BP: 100/69 (28 Jul 2019 08:07) (71/50 - 118/70)  BP(mean): 79 (28 Jul 2019 08:07) (53 - 86)  RR: 24 (28 Jul 2019 08:07) (16 - 41)  SpO2: 96% (28 Jul 2019 08:07) (86% - 100%)    PHYSICAL EXAMINATION:    GENERAL: sleepy    HEENT: Head is normocephalic and atraumatic. Extraocular muscles are intact. Mucous membranes are moist.    NECK: Supple.    LUNGS: dec bs both bases    HEART: Regular rate and rhythm without murmur.    ABDOMEN: Soft, nontender, and nondistended.      EXTREMITIES: Without any cyanosis, clubbing, rash, lesions or edema.    NEUROLOGIC: Grossly intact.    SKIN: No ulceration or induration present.      LABS:                        9.8    11.52 )-----------( 418      ( 28 Jul 2019 05:24 )             30.4     07-28    139  |  102  |  28<H>  ----------------------------<  140<H>  4.0   |  26  |  1.5    Ca    8.7      28 Jul 2019 05:24  Phos  3.0     07-28  Mg     2.1     07-28    TPro  5.3<L>  /  Alb  3.0<L>  /  TBili  0.4  /  DBili  x   /  AST  25  /  ALT  32  /  AlkPhos  71  07-28 07-27-19 @ 07:01  -  07-28-19 @ 07:00  --------------------------------------------------------  IN: 1133.8 mL / OUT: 1285 mL / NET: -151.2 mL    07-28-19 @ 07:01  -  07-28-19 @ 08:54  --------------------------------------------------------  IN: 16.7 mL / OUT: 0 mL / NET: 16.7 mL        MICROBIOLOGY:      MEDICATIONS  (STANDING):  amiodarone Infusion 1 mG/Min (33.333 mL/Hr) IV Continuous <Continuous>  amiodarone Infusion 0.5 mG/Min (16.667 mL/Hr) IV Continuous <Continuous>  apixaban 2.5 milliGRAM(s) Oral every 12 hours  atorvastatin 40 milliGRAM(s) Oral at bedtime  buDESOnide 160 MICROgram(s)/formoterol 4.5 MICROgram(s) Inhaler 2 Puff(s) Inhalation two times a day  chlorhexidine 4% Liquid 1 Application(s) Topical <User Schedule>  docusate sodium 100 milliGRAM(s) Oral daily  finasteride 5 milliGRAM(s) Oral daily  furosemide   Injectable 40 milliGRAM(s) IV Push daily  guaiFENesin  milliGRAM(s) Oral every 12 hours  lactulose Syrup 20 Gram(s) Oral daily  levothyroxine  Oral Tab/Cap - Peds 50 MICROGram(s) Oral daily  metoprolol tartrate 12.5 milliGRAM(s) Oral every 6 hours  mexiletine 200 milliGRAM(s) Oral every 8 hours  montelukast 10 milliGRAM(s) Oral daily  phenylephrine    Infusion 0.5 MICROgram(s)/kG/Min (8.297 mL/Hr) IV Continuous <Continuous>  quiNIDine gluconate 324 milliGRAM(s) Oral <User Schedule>  senna 1 Tablet(s) Oral daily  tamsulosin 0.4 milliGRAM(s) Oral at bedtime    MEDICATIONS  (PRN):  ALBUTerol/ipratropium for Nebulization 3 milliLiter(s) Nebulizer every 6 hours PRN Bronchospasm  bisacodyl Suppository 10 milliGRAM(s) Rectal daily PRN Constipation  haloperidol    Injectable 5 milliGRAM(s) IntraMuscular daily PRN agitation      RADIOLOGY & ADDITIONAL STUDIES:

## 2019-07-28 NOTE — PROGRESS NOTE ADULT - SUBJECTIVE AND OBJECTIVE BOX
LENGTH OF HOSPITAL STAY: 8d    CHIEF COMPLAINT:   Patient is a 79y old  Male who presents with a chief complaint of shortness of breath for few days duration (28 Jul 2019 08:53)      HISTORY OF PRESENTING ILLNESS:    HPI:  78 yo male with PMH CHFrEF, Afib, Vtach s/p AICD,COPD, NANDO, spleenic laceration following fall) comes to the ED with complaint of SOB.  SOB gradual onset, worsened in the evening yesterday, could not sleep s/t SOB, aggravated on exertion, no relieving factor.  NO chest pain, or wheezing or cough or palpitation.  No fever, headache, recent sick contacts.  normal Bowel and bladder habit.    Patient was recently admitted for near syncope and DUYEN, discharged on june 17.    In the ED, patient RR 28, , spo2 80 room air, /60 (20 Jul 2019 11:36)    PAST MEDICAL & SURGICAL HISTORY  PAST MEDICAL & SURGICAL HISTORY:  CAD (coronary artery disease)  Ventricular tachycardia  NANDO on CPAP  COPD (chronic obstructive pulmonary disease)  Pacemaker  AICD (automatic cardioverter/defibrillator) present  Hypothyroidism  Atrial fibrillation  Congestive heart disease  Hypercholesteremia  AICD (automatic cardioverter/defibrillator) present  History of laparoscopic cholecystectomy    SOCIAL HISTORY:    ALLERGIES:  morphine (Stomach Upset)    MEDICATIONS:  STANDING MEDICATIONS  amiodarone Infusion 1 mG/Min IV Continuous <Continuous>  amiodarone Infusion 0.5 mG/Min IV Continuous <Continuous>  apixaban 2.5 milliGRAM(s) Oral every 12 hours  atorvastatin 40 milliGRAM(s) Oral at bedtime  buDESOnide 160 MICROgram(s)/formoterol 4.5 MICROgram(s) Inhaler 2 Puff(s) Inhalation two times a day  chlorhexidine 4% Liquid 1 Application(s) Topical <User Schedule>  docusate sodium 100 milliGRAM(s) Oral daily  finasteride 5 milliGRAM(s) Oral daily  furosemide   Injectable 40 milliGRAM(s) IV Push daily  guaiFENesin  milliGRAM(s) Oral every 12 hours  lactulose Syrup 20 Gram(s) Oral daily  levothyroxine  Oral Tab/Cap - Peds 50 MICROGram(s) Oral daily  metoprolol tartrate 12.5 milliGRAM(s) Oral every 6 hours  mexiletine 200 milliGRAM(s) Oral every 8 hours  montelukast 10 milliGRAM(s) Oral daily  phenylephrine    Infusion 0.5 MICROgram(s)/kG/Min IV Continuous <Continuous>  quiNIDine gluconate 324 milliGRAM(s) Oral <User Schedule>  senna 1 Tablet(s) Oral daily  tamsulosin 0.4 milliGRAM(s) Oral at bedtime    PRN MEDICATIONS  ALBUTerol/ipratropium for Nebulization 3 milliLiter(s) Nebulizer every 6 hours PRN  bisacodyl Suppository 10 milliGRAM(s) Rectal daily PRN  haloperidol    Injectable 5 milliGRAM(s) IntraMuscular daily PRN    VITALS:   T(F): 97.2  HR: 94  BP: 102/62  RR: 22  SpO2: 97%    LABS:                        9.8    11.52 )-----------( 418      ( 28 Jul 2019 05:24 )             30.4     07-28    139  |  102  |  28<H>  ----------------------------<  140<H>  4.0   |  26  |  1.5    Ca    8.7      28 Jul 2019 05:24  Phos  3.0     07-28  Mg     2.1     07-28    TPro  5.3<L>  /  Alb  3.0<L>  /  TBili  0.4  /  DBili  x   /  AST  25  /  ALT  32  /  AlkPhos  71  07-28                  RADIOLOGY:  < from: Xray Chest 1 View- PORTABLE-Routine (07.27.19 @ 04:09) >  Impression:     Right opacity/pleural effusion, increased. Left opacity/pleural   effusion.. Stable cardiomegaly        < end of copied text >    PHYSICAL EXAM:  GEN: No acute distress.   HEENT: AT, NC, DONNA  LUNGS: decreased breath sound bilaterally   HEART: S1/S2 present. RRR.   ABD: Soft, non-tender, non-distended. Bowel sounds present  EXT: edema/cyanosis/clubbing absent  NEURO: AAOX1

## 2019-07-28 NOTE — PROGRESS NOTE ADULT - SUBJECTIVE AND OBJECTIVE BOX
INTERVAL HPI/OVERNIGHT EVENTS:    Patient had more VT over night despite AAM. Amio was restarted. He is somnolent after dose of haldol     MEDICATIONS  (STANDING):  amiodarone Infusion 1 mG/Min (33.333 mL/Hr) IV Continuous <Continuous>  amiodarone Infusion 0.5 mG/Min (16.667 mL/Hr) IV Continuous <Continuous>  apixaban 2.5 milliGRAM(s) Oral every 12 hours  atorvastatin 40 milliGRAM(s) Oral at bedtime  buDESOnide 160 MICROgram(s)/formoterol 4.5 MICROgram(s) Inhaler 2 Puff(s) Inhalation two times a day  chlorhexidine 4% Liquid 1 Application(s) Topical <User Schedule>  docusate sodium 100 milliGRAM(s) Oral daily  finasteride 5 milliGRAM(s) Oral daily  furosemide   Injectable 40 milliGRAM(s) IV Push daily  guaiFENesin  milliGRAM(s) Oral every 12 hours  lactulose Syrup 20 Gram(s) Oral daily  levothyroxine  Oral Tab/Cap - Peds 50 MICROGram(s) Oral daily  metoprolol tartrate 12.5 milliGRAM(s) Oral every 6 hours  mexiletine 200 milliGRAM(s) Oral every 8 hours  montelukast 10 milliGRAM(s) Oral daily  phenylephrine    Infusion 0.5 MICROgram(s)/kG/Min (8.297 mL/Hr) IV Continuous <Continuous>  quiNIDine gluconate 324 milliGRAM(s) Oral <User Schedule>  senna 1 Tablet(s) Oral daily  tamsulosin 0.4 milliGRAM(s) Oral at bedtime    MEDICATIONS  (PRN):  ALBUTerol/ipratropium for Nebulization 3 milliLiter(s) Nebulizer every 6 hours PRN Bronchospasm  bisacodyl Suppository 10 milliGRAM(s) Rectal daily PRN Constipation  haloperidol    Injectable 5 milliGRAM(s) IntraMuscular daily PRN agitation      Allergies    morphine (Stomach Upset)    Intolerances        Allergic/Immunologic:	    Vital Signs Last 24 Hrs  T(C): 36.3 (28 Jul 2019 12:00), Max: 36.4 (27 Jul 2019 20:00)  T(F): 97.4 (28 Jul 2019 12:00), Max: 97.6 (27 Jul 2019 20:00)  HR: 84 (28 Jul 2019 14:00) (84 - 122)  BP: 104/69 (28 Jul 2019 14:00) (61/48 - 118/70)  BP(mean): 79 (28 Jul 2019 14:00) (53 - 86)  RR: 17 (28 Jul 2019 14:00) (16 - 39)  SpO2: 100% (28 Jul 2019 14:00) (90% - 100%)    07-27-19 @ 07:01  -  07-28-19 @ 07:00  --------------------------------------------------------  IN: 1133.8 mL / OUT: 1285 mL / NET: -151.2 mL    07-28-19 @ 07:01  -  07-28-19 @ 17:07  --------------------------------------------------------  IN: 36.7 mL / OUT: 1000 mL / NET: -963.3 mL        Physical Exam    GENERAL: In no apparent distress, well nourished, and hydrated.  HEAD:  Atraumatic, Normocephalic  HEART: Regular rate and rhythm; No murmurs, rubs, or gallops.  PULMONARY: Clear to auscultation and perfusion.  No rales, wheezing, or rhonchi bilaterally.  ABDOMEN: Soft, Nontender, Nondistended; Bowel sounds present  EXTREMITIES:  2+ Peripheral Pulses, No clubbing, cyanosis, or edema      LABS:                        9.8    11.52 )-----------( 418      ( 28 Jul 2019 05:24 )             30.4     07-28    139  |  102  |  28<H>  ----------------------------<  140<H>  4.0   |  26  |  1.5    Ca    8.7      28 Jul 2019 05:24  Phos  3.0     07-28  Mg     2.1     07-28    TPro  5.3<L>  /  Alb  3.0<L>  /  TBili  0.4  /  DBili  x   /  AST  25  /  ALT  32  /  AlkPhos  71  07-28          RADIOLOGY & ADDITIONAL TESTS:

## 2019-07-28 NOTE — PROGRESS NOTE ADULT - ASSESSMENT
IMPRESSION:    b/l airspace disease/ CHF CXR reviewed/ worsening  Vtach storm recurrent  AFib with rvr   altered MS improved    PLAN:    CNS: avoid CNS depressant    HEENT: Oral care    PULMONARY:  HOB @ 45 degrees, aspiration precautions, keep sats > 92%, pulm toilet     CARDIOVASCULAR: Keep MAP > 65, anti arrhythmic per EPS, iv lasix daily    GI: GI prophylaxis. feeding     RENAL:  Follow up lytes.  Correct as needed    INFECTIOUS DISEASE: Follow up cultures, dc abx    HEMATOLOGICaL:  Eliquis    ENDOCRINE:  Follow up FS.  Insulin protocol if needed.    MUSCULOSKELETAL: Bed rest     Lines: peripheral   Rose: Keep the rose for retention  Code status: full   Prognosis: poor   Dispo:  CCU   palliative care eval

## 2019-07-29 LAB
ANION GAP SERPL CALC-SCNC: 13 MMOL/L — SIGNIFICANT CHANGE UP (ref 7–14)
BASOPHILS # BLD AUTO: 0.1 K/UL — SIGNIFICANT CHANGE UP (ref 0–0.2)
BASOPHILS NFR BLD AUTO: 0.9 % — SIGNIFICANT CHANGE UP (ref 0–1)
BUN SERPL-MCNC: 23 MG/DL — HIGH (ref 10–20)
CALCIUM SERPL-MCNC: 9 MG/DL — SIGNIFICANT CHANGE UP (ref 8.5–10.1)
CHLORIDE SERPL-SCNC: 101 MMOL/L — SIGNIFICANT CHANGE UP (ref 98–110)
CO2 SERPL-SCNC: 27 MMOL/L — SIGNIFICANT CHANGE UP (ref 17–32)
CREAT SERPL-MCNC: 1.4 MG/DL — SIGNIFICANT CHANGE UP (ref 0.7–1.5)
CULTURE RESULTS: SIGNIFICANT CHANGE UP
EOSINOPHIL # BLD AUTO: 0.37 K/UL — SIGNIFICANT CHANGE UP (ref 0–0.7)
EOSINOPHIL NFR BLD AUTO: 3.2 % — SIGNIFICANT CHANGE UP (ref 0–8)
GLUCOSE SERPL-MCNC: 139 MG/DL — HIGH (ref 70–99)
HCT VFR BLD CALC: 31.7 % — LOW (ref 42–52)
HGB BLD-MCNC: 10.3 G/DL — LOW (ref 14–18)
IMM GRANULOCYTES NFR BLD AUTO: 0.5 % — HIGH (ref 0.1–0.3)
LYMPHOCYTES # BLD AUTO: 0.99 K/UL — LOW (ref 1.2–3.4)
LYMPHOCYTES # BLD AUTO: 8.5 % — LOW (ref 20.5–51.1)
MAGNESIUM SERPL-MCNC: 2.2 MG/DL — SIGNIFICANT CHANGE UP (ref 1.8–2.4)
MCHC RBC-ENTMCNC: 28.8 PG — SIGNIFICANT CHANGE UP (ref 27–31)
MCHC RBC-ENTMCNC: 32.5 G/DL — SIGNIFICANT CHANGE UP (ref 32–37)
MCV RBC AUTO: 88.5 FL — SIGNIFICANT CHANGE UP (ref 80–94)
MONOCYTES # BLD AUTO: 1.15 K/UL — HIGH (ref 0.1–0.6)
MONOCYTES NFR BLD AUTO: 9.8 % — HIGH (ref 1.7–9.3)
NEUTROPHILS # BLD AUTO: 9.02 K/UL — HIGH (ref 1.4–6.5)
NEUTROPHILS NFR BLD AUTO: 77.1 % — HIGH (ref 42.2–75.2)
NRBC # BLD: 0 /100 WBCS — SIGNIFICANT CHANGE UP (ref 0–0)
PHOSPHATE SERPL-MCNC: 3.1 MG/DL — SIGNIFICANT CHANGE UP (ref 2.1–4.9)
PLATELET # BLD AUTO: 484 K/UL — HIGH (ref 130–400)
POTASSIUM SERPL-MCNC: 4 MMOL/L — SIGNIFICANT CHANGE UP (ref 3.5–5)
POTASSIUM SERPL-SCNC: 4 MMOL/L — SIGNIFICANT CHANGE UP (ref 3.5–5)
RBC # BLD: 3.58 M/UL — LOW (ref 4.7–6.1)
RBC # FLD: 15.4 % — HIGH (ref 11.5–14.5)
SODIUM SERPL-SCNC: 141 MMOL/L — SIGNIFICANT CHANGE UP (ref 135–146)
SPECIMEN SOURCE: SIGNIFICANT CHANGE UP
WBC # BLD: 11.69 K/UL — HIGH (ref 4.8–10.8)
WBC # FLD AUTO: 11.69 K/UL — HIGH (ref 4.8–10.8)

## 2019-07-29 PROCEDURE — 71045 X-RAY EXAM CHEST 1 VIEW: CPT | Mod: 26

## 2019-07-29 PROCEDURE — 93010 ELECTROCARDIOGRAM REPORT: CPT

## 2019-07-29 PROCEDURE — 99233 SBSQ HOSP IP/OBS HIGH 50: CPT

## 2019-07-29 RX ORDER — AMIODARONE HYDROCHLORIDE 400 MG/1
400 TABLET ORAL DAILY
Refills: 0 | Status: DISCONTINUED | OUTPATIENT
Start: 2019-07-30 | End: 2019-07-31

## 2019-07-29 RX ADMIN — Medication 600 MILLIGRAM(S): at 05:39

## 2019-07-29 RX ADMIN — MEXILETINE HYDROCHLORIDE 200 MILLIGRAM(S): 150 CAPSULE ORAL at 14:05

## 2019-07-29 RX ADMIN — BUDESONIDE AND FORMOTEROL FUMARATE DIHYDRATE 2 PUFF(S): 160; 4.5 AEROSOL RESPIRATORY (INHALATION) at 20:00

## 2019-07-29 RX ADMIN — SENNA PLUS 1 TABLET(S): 8.6 TABLET ORAL at 12:07

## 2019-07-29 RX ADMIN — FINASTERIDE 5 MILLIGRAM(S): 5 TABLET, FILM COATED ORAL at 12:06

## 2019-07-29 RX ADMIN — APIXABAN 2.5 MILLIGRAM(S): 2.5 TABLET, FILM COATED ORAL at 05:39

## 2019-07-29 RX ADMIN — TAMSULOSIN HYDROCHLORIDE 0.4 MILLIGRAM(S): 0.4 CAPSULE ORAL at 22:59

## 2019-07-29 RX ADMIN — Medication 100 MILLIGRAM(S): at 12:06

## 2019-07-29 RX ADMIN — Medication 12.5 MILLIGRAM(S): at 17:52

## 2019-07-29 RX ADMIN — Medication 40 MILLIGRAM(S): at 05:39

## 2019-07-29 RX ADMIN — BUDESONIDE AND FORMOTEROL FUMARATE DIHYDRATE 2 PUFF(S): 160; 4.5 AEROSOL RESPIRATORY (INHALATION) at 10:35

## 2019-07-29 RX ADMIN — APIXABAN 2.5 MILLIGRAM(S): 2.5 TABLET, FILM COATED ORAL at 17:52

## 2019-07-29 RX ADMIN — Medication 12.5 MILLIGRAM(S): at 05:39

## 2019-07-29 RX ADMIN — MONTELUKAST 10 MILLIGRAM(S): 4 TABLET, CHEWABLE ORAL at 12:07

## 2019-07-29 RX ADMIN — MEXILETINE HYDROCHLORIDE 200 MILLIGRAM(S): 150 CAPSULE ORAL at 23:08

## 2019-07-29 RX ADMIN — Medication 324 MILLIGRAM(S): at 22:59

## 2019-07-29 RX ADMIN — ATORVASTATIN CALCIUM 40 MILLIGRAM(S): 80 TABLET, FILM COATED ORAL at 22:59

## 2019-07-29 RX ADMIN — Medication 50 MICROGRAM(S): at 05:39

## 2019-07-29 RX ADMIN — Medication 12.5 MILLIGRAM(S): at 23:36

## 2019-07-29 RX ADMIN — MEXILETINE HYDROCHLORIDE 200 MILLIGRAM(S): 150 CAPSULE ORAL at 05:40

## 2019-07-29 RX ADMIN — Medication 600 MILLIGRAM(S): at 17:52

## 2019-07-29 RX ADMIN — CHLORHEXIDINE GLUCONATE 1 APPLICATION(S): 213 SOLUTION TOPICAL at 05:39

## 2019-07-29 RX ADMIN — Medication 324 MILLIGRAM(S): at 10:52

## 2019-07-29 NOTE — PROGRESS NOTE ADULT - SUBJECTIVE AND OBJECTIVE BOX
79yMale with diagnosis: CHF EXACERBATION      Patient seen for palliative care assessment      PHYSICAL EXAM  Pt alert conversant oriented x 3  Pt with tachypnea on O2 2liters NC  Pt oob in chair   no edema noted    T(C): , Max: 36.7 (16:01)  T(F): 97.1  HR: 84 (84 - 88)  BP: 81/56 (62/48 - 113/72)  RR: 35 (17 - 37)  SpO2: 96% (93% - 100%)              LABS:                          10.3   11.69 )-----------( 484      ( 29 Jul 2019 06:05 )             31.7                                                                                      07-29    141  |  101  |  23<H>  ----------------------------<  139<H>  4.0   |  27  |  1.4    Ca    9.0      29 Jul 2019 06:05  Phos  3.1     07-29  Mg     2.2     07-29    TPro  5.3<L>  /  Alb  3.0<L>  /  TBili  0.4  /  DBili  x   /  AST  25  /  ALT  32  /  AlkPhos  71  07-28                                                      MEDICATIONS  (STANDING):  amiodarone Infusion 0.5 mG/Min (16.667 mL/Hr) IV Continuous <Continuous>  apixaban 2.5 milliGRAM(s) Oral every 12 hours  atorvastatin 40 milliGRAM(s) Oral at bedtime  buDESOnide 160 MICROgram(s)/formoterol 4.5 MICROgram(s) Inhaler 2 Puff(s) Inhalation two times a day  chlorhexidine 4% Liquid 1 Application(s) Topical <User Schedule>  docusate sodium 100 milliGRAM(s) Oral daily  finasteride 5 milliGRAM(s) Oral daily  furosemide   Injectable 40 milliGRAM(s) IV Push daily  guaiFENesin  milliGRAM(s) Oral every 12 hours  lactulose Syrup 20 Gram(s) Oral daily  levothyroxine  Oral Tab/Cap - Peds 50 MICROGram(s) Oral daily  metoprolol tartrate 12.5 milliGRAM(s) Oral every 6 hours  mexiletine 200 milliGRAM(s) Oral every 8 hours  montelukast 10 milliGRAM(s) Oral daily  phenylephrine    Infusion 0.5 MICROgram(s)/kG/Min (8.297 mL/Hr) IV Continuous <Continuous>  quiNIDine gluconate 324 milliGRAM(s) Oral <User Schedule>  senna 1 Tablet(s) Oral daily  tamsulosin 0.4 milliGRAM(s) Oral at bedtime    MEDICATIONS  (PRN):  ALBUTerol/ipratropium for Nebulization 3 milliLiter(s) Nebulizer every 6 hours PRN Bronchospasm  bisacodyl Suppository 10 milliGRAM(s) Rectal daily PRN Constipation  haloperidol    Injectable 5 milliGRAM(s) IntraMuscular daily PRN agitation

## 2019-07-29 NOTE — PROGRESS NOTE ADULT - ASSESSMENT
IMPRESSION:    b/l airspace disease/ CHF CXR reviewed/ worsening  Vtach storm recurrent  AFib with rvr   altered MS improved    PLAN:    CNS: avoid CNS depressant    HEENT: Oral care    PULMONARY:  HOB @ 45 degrees, aspiration precautions, keep sats > 92%, pulm toilet, repeat cx r     CARDIOVASCULAR: Keep MAP > 65, anti arrhythmic per EPS, iv lasix daily. if CXR still worse would check non-invasive hemodynamic monitoring     GI: GI prophylaxis. feeding     RENAL:  Follow up lytes.  Correct as needed    INFECTIOUS DISEASE: Follow up cultures, dc abx    HEMATOLOGICaL:  Eliquis    ENDOCRINE:  Follow up FS.  Insulin protocol if needed.    MUSCULOSKELETAL: OOB to chair     Lines: peripheral   Johnson: TOV   Code status: DNR/DNI   Prognosis: poor   Dispo:  CCU   Recall prn. IMPRESSION:    b/l airspace disease/ CHF CXR reviewed/  Vtach storm recurrent  AFib with rvr   altered MS improved    PLAN:    CNS: avoid CNS depressant    HEENT: Oral care    PULMONARY:  HOB @ 45 degrees, aspiration precautions, keep sats > 92%, pulm toilet, repeat cx r     CARDIOVASCULAR: Keep MAP > 65, anti arrhythmic per EPS, iv lasix daily. if CXR still worse would check non-invasive hemodynamic monitoring     GI: GI prophylaxis. feeding     RENAL:  Follow up lytes.  Correct as needed    INFECTIOUS DISEASE: Follow up cultures, dc abx    HEMATOLOGICaL:  Eliquis    ENDOCRINE:  Follow up FS.  Insulin protocol if needed.    MUSCULOSKELETAL: OOB to chair     Lines: peripheral   Johnson: TOV   Code status: DNR/DNI   Prognosis: poor   Dispo:  CCU

## 2019-07-29 NOTE — PROGRESS NOTE ADULT - ASSESSMENT
79yMale being evaluated for goals of care and symptom management. Pt has advanced heart failure EF >20%, diffuse hypokinesis. Pt with AICD/PPM, adjusted by Dr Holt. Pt in CCU on antiarrythmic gtts, and pressors. Overall poor prognosis. Pt is DNR/DNI. Asked to discussed goals of care with family. Currently pt is alert visiting with two friends is OOB in chair. Pt is tachypneic at rest with nasal cannula. Pt denies dyspnea, denies pain. Says he has had a poor appetite for the last few months. t had episode of agitation over the weekend and got Haldol.     Pt has a spouse and son who visit daily. He said we could call them. Palliative SW called pt's son and he will bring spouse tomorrow to be introduced to palliative care      Recommendations:  DNR/DNI  ongoing medical management  pressors/antiarrythmics  AICD/PPM  palliative care will meet with spouse and son and patient at 11am 7/30/19

## 2019-07-29 NOTE — PROGRESS NOTE ADULT - SUBJECTIVE AND OBJECTIVE BOX
LENGTH OF HOSPITAL STAY: 9d    CHIEF COMPLAINT:   Patient is a 79y old  Male who presents with a chief complaint of shortness of breath for few days duration (29 Jul 2019 10:12)      HISTORY OF PRESENTING ILLNESS:    HPI:  78 yo male with PMH CHFrEF, Afib, Vtach s/p AICD,COPD, NANDO, spleenic laceration following fall) comes to the ED with complaint of SOB.  SOB gradual onset, worsened in the evening yesterday, could not sleep s/t SOB, aggravated on exertion, no relieving factor.  NO chest pain, or wheezing or cough or palpitation.  No fever, headache, recent sick contacts.  normal Bowel and bladder habit.    Patient was recently admitted for near syncope and DUYEN, discharged on june 17.    In the ED, patient RR 28, , spo2 80 room air, /60 (20 Jul 2019 11:36)    PAST MEDICAL & SURGICAL HISTORY  PAST MEDICAL & SURGICAL HISTORY:  CAD (coronary artery disease)  Ventricular tachycardia  NANDO on CPAP  COPD (chronic obstructive pulmonary disease)  Pacemaker  AICD (automatic cardioverter/defibrillator) present  Hypothyroidism  Atrial fibrillation  Congestive heart disease  Hypercholesteremia  AICD (automatic cardioverter/defibrillator) present  History of laparoscopic cholecystectomy    SOCIAL HISTORY:    ALLERGIES:  morphine (Stomach Upset)    MEDICATIONS:  STANDING MEDICATIONS  amiodarone Infusion 0.5 mG/Min IV Continuous <Continuous>  apixaban 2.5 milliGRAM(s) Oral every 12 hours  atorvastatin 40 milliGRAM(s) Oral at bedtime  buDESOnide 160 MICROgram(s)/formoterol 4.5 MICROgram(s) Inhaler 2 Puff(s) Inhalation two times a day  chlorhexidine 4% Liquid 1 Application(s) Topical <User Schedule>  docusate sodium 100 milliGRAM(s) Oral daily  finasteride 5 milliGRAM(s) Oral daily  furosemide   Injectable 40 milliGRAM(s) IV Push daily  guaiFENesin  milliGRAM(s) Oral every 12 hours  lactulose Syrup 20 Gram(s) Oral daily  levothyroxine  Oral Tab/Cap - Peds 50 MICROGram(s) Oral daily  metoprolol tartrate 12.5 milliGRAM(s) Oral every 6 hours  mexiletine 200 milliGRAM(s) Oral every 8 hours  montelukast 10 milliGRAM(s) Oral daily  phenylephrine    Infusion 0.5 MICROgram(s)/kG/Min IV Continuous <Continuous>  quiNIDine gluconate 324 milliGRAM(s) Oral <User Schedule>  senna 1 Tablet(s) Oral daily  tamsulosin 0.4 milliGRAM(s) Oral at bedtime    PRN MEDICATIONS  ALBUTerol/ipratropium for Nebulization 3 milliLiter(s) Nebulizer every 6 hours PRN  bisacodyl Suppository 10 milliGRAM(s) Rectal daily PRN  haloperidol    Injectable 5 milliGRAM(s) IntraMuscular daily PRN    VITALS:   T(F): 97.1  HR: 84  BP: 81/56  RR: 35  SpO2: 96%    LABS:                        10.3   11.69 )-----------( 484      ( 29 Jul 2019 06:05 )             31.7     07-29    141  |  101  |  23<H>  ----------------------------<  139<H>  4.0   |  27  |  1.4    Ca    9.0      29 Jul 2019 06:05  Phos  3.1     07-29  Mg     2.2     07-29    TPro  5.3<L>  /  Alb  3.0<L>  /  TBili  0.4  /  DBili  x   /  AST  25  /  ALT  32  /  AlkPhos  71  07-28                  RADIOLOGY:  < from: Xray Chest 1 View- PORTABLE-Urgent (07.29.19 @ 11:04) >  Impression:      Stable bibasilar opacities/effusions.      < end of copied text >    PHYSICAL EXAM:  GEN: No acute distress.   HEENT: AT, NC, DONNA  LUNGS: decreased breath sound bilaterally   HEART: S1/S2 present. RRR.   ABD: Soft, non-tender, non-distended. Bowel sounds present  EXT: edema/cyanosis/clubbing absent  NEURO: AAOX3

## 2019-07-29 NOTE — PROGRESS NOTE ADULT - ASSESSMENT
patient is 80 yo male with hx of S/P AICD (automatic cardioverter/defibrillator), Atrial fibrillation, CAD (coronary artery disease), Congestive heart disease, COPD (chronic obstructive pulmonary disease), Hypercholesteremia, Hypothyroidism, NANDO on CPAP, Ventricular tachycardia.    Patient appears to be oriented to place,person,time today. Patient is on amiodarone, mixelitine and       1. Acute exacerbation of Systolic CHF  -EKG shows no ischemic changes  -Trop flat  - EF 20%    2. Rt lower lobe  opacity  -Afebrile, cough has resolved  -may be due to aspiration sec to diorientation  -aspiration prercautions  -Will obtain Chest CT scan, and pulmonary consult  - WBCs within NL range    3. DUYEN on CKD  -Unclear Etiology   Cr stable at 1.6    4. Afib S/P ablation:  cw eliquis  rate controlled    5.  COPD   -Continue with home medications  -Stable    6. Arrythmias  - pt on amiodarone , mixelitene, quinidine, lopressor and lasix  -Monitor QTc  -Wean off Renzo-Synephrine as tolerated  -Pt is not a good candidate for LVAd  - HR is controlled with systolic in 90s  -if he is stable with quinidine , he will be discharged to home    Goals of care : were discussed with family, patient was oriented and agreed to medications and just the comfort measures, DNR/DNI. But afterwards family is insisting full aggressive measures for patient resucitation          DVT ppx:  on eliquis    Diet:  dash/tlc    Activity: AAT    Dispo: from home patient is 78 yo male with hx of S/P AICD (automatic cardioverter/defibrillator), Atrial fibrillation, CAD (coronary artery disease), Congestive heart disease, COPD (chronic obstructive pulmonary disease), Hypercholesteremia, Hypothyroidism, NANDO on CPAP, Ventricular tachycardia.    Patient appears to be oriented to place,person,time today. Patient is on amiodarone, mixelitine and quinidine. No significant over-night events.      1. Acute exacerbation of Systolic CHF  -EKG shows no ischemic changes  -Trop flat  - EF 20%    2. Rt lower lobe  opacity  -Afebrile, cough has resolved  -may be due to aspiration sec to diorientation  - CXR has improved basal consolidation on Rt side  -aspiration prercautions  - speech and swallow consult placed again tomorow  - WBCs within NL range    3. DUYEN on CKD  -Unclear Etiology   Cr stable at 1.6    4. Afib S/P ablation:  cw eliquis  rate controlled    5.  COPD   -Continue with home medications  -Stable    6. Arrythmias  - pt on amiodarone , mixelitene, quinidine, lopressor and lasix  -Monitor QTc  -Wean off Renzo-Synephrine as tolerated  -Pt is not a good candidate for LVAd  - HR is controlled with systolic in 90s  -if he is stable with quinidine , he will be discharged to home    Goals of care : were discussed with family, patient was oriented and agreed to medications and just the comfort measures, DNR/DNI.   palliative care consulted.        DVT ppx:  on eliquis    Diet:  dash/tlc    Activity: AAT    Dispo: from home

## 2019-07-29 NOTE — PROGRESS NOTE ADULT - SUBJECTIVE AND OBJECTIVE BOX
Patient is a 79y old  Male who presents with a chief complaint of shortness of breath for few days duration (28 Jul 2019 17:06)    Over Night Events:    Agitated over the weekend. Had intermittent VTach on the monitor   Currently on Amio, Mexiletine, Quinidine   On Renzo     ROS:  See HPI    PHYSICAL EXAM    ICU Vital Signs Last 24 Hrs  T(C): 35.7 (29 Jul 2019 08:00), Max: 36.7 (28 Jul 2019 16:01)  T(F): 96.2 (29 Jul 2019 08:00), Max: 98 (28 Jul 2019 16:01)  HR: 84 (29 Jul 2019 09:30) (84 - 114)  BP: 77/52 (29 Jul 2019 09:30) (61/48 - 113/72)  BP(mean): 58 (29 Jul 2019 09:30) (49 - 83)  RR: 17 (29 Jul 2019 09:30) (16 - 37)  SpO2: 100% (29 Jul 2019 09:00) (93% - 100%)      General: AAO x 3   HEENT: DONNA             Lymphatic system: No cervical LN   Lungs: Bilateral BS , bilateral rales   Cardiovascular: Regular   Gastrointestinal: Soft, Positive BS  Extremities: No clubbing.  Moves extremities.  Full Range of motion   Skin: Warm, intact  Neurological: No motor or sensory deficit       07-28-19 @ 07:01  -  07-29-19 @ 07:00  --------------------------------------------------------  IN:    amiodarone Infusion: 183.7 mL    amiodarone Infusion: 200.4 mL    Oral Fluid: 840 mL    phenylephrine   Infusion: 55 mL  Total IN: 1279.1 mL    OUT:    Indwelling Catheter - Urethral: 1610 mL  Total OUT: 1610 mL    Total NET: -330.9 mL      07-29-19 @ 07:01  -  07-29-19 @ 10:12  --------------------------------------------------------  IN:    amiodarone Infusion: 33.4 mL    phenylephrine   Infusion: 4 mL  Total IN: 37.4 mL    OUT:    Indwelling Catheter - Urethral: 135 mL  Total OUT: 135 mL    Total NET: -97.6 mL    LABS:                     10.3   11.69 )-----------( 484      ( 29 Jul 2019 06:05 )             31.7                                               07-29    141  |  101  |  23<H>  ----------------------------<  139<H>  4.0   |  27  |  1.4    Ca    9.0      29 Jul 2019 06:05  Phos  3.1     07-29  Mg     2.2     07-29    TPro  5.3<L>  /  Alb  3.0<L>  /  TBili  0.4  /  DBili  x   /  AST  25  /  ALT  32  /  AlkPhos  71  07-28                                            LIVER FUNCTIONS - ( 28 Jul 2019 05:24 )  Alb: 3.0 g/dL / Pro: 5.3 g/dL / ALK PHOS: 71 U/L / ALT: 32 U/L / AST: 25 U/L / GGT: x                                                                              MEDICATIONS  (STANDING):  amiodarone Infusion 0.5 mG/Min (16.667 mL/Hr) IV Continuous <Continuous>  apixaban 2.5 milliGRAM(s) Oral every 12 hours  atorvastatin 40 milliGRAM(s) Oral at bedtime  buDESOnide 160 MICROgram(s)/formoterol 4.5 MICROgram(s) Inhaler 2 Puff(s) Inhalation two times a day  chlorhexidine 4% Liquid 1 Application(s) Topical <User Schedule>  docusate sodium 100 milliGRAM(s) Oral daily  finasteride 5 milliGRAM(s) Oral daily  furosemide   Injectable 40 milliGRAM(s) IV Push daily  guaiFENesin  milliGRAM(s) Oral every 12 hours  lactulose Syrup 20 Gram(s) Oral daily  levothyroxine  Oral Tab/Cap - Peds 50 MICROGram(s) Oral daily  metoprolol tartrate 12.5 milliGRAM(s) Oral every 6 hours  mexiletine 200 milliGRAM(s) Oral every 8 hours  montelukast 10 milliGRAM(s) Oral daily  phenylephrine    Infusion 0.5 MICROgram(s)/kG/Min (8.297 mL/Hr) IV Continuous <Continuous>  quiNIDine gluconate 324 milliGRAM(s) Oral <User Schedule>  senna 1 Tablet(s) Oral daily  tamsulosin 0.4 milliGRAM(s) Oral at bedtime    MEDICATIONS  (PRN):  ALBUTerol/ipratropium for Nebulization 3 milliLiter(s) Nebulizer every 6 hours PRN Bronchospasm  bisacodyl Suppository 10 milliGRAM(s) Rectal daily PRN Constipation  haloperidol    Injectable 5 milliGRAM(s) IntraMuscular daily PRN agitation      Xrays:        no new imaging                                                                              ECHO Patient is a 79y old  Male who presents with a chief complaint of shortness of breath for few days duration (28 Jul 2019 17:06)    Over Night Events:    Agitated over the weekend. Had intermittent VTach on the monitor   Currently on Amio, Mexiletine, Quinidine   On Renzo low dose    ROS:  See HPI    PHYSICAL EXAM    ICU Vital Signs Last 24 Hrs  T(C): 35.7 (29 Jul 2019 08:00), Max: 36.7 (28 Jul 2019 16:01)  T(F): 96.2 (29 Jul 2019 08:00), Max: 98 (28 Jul 2019 16:01)  HR: 84 (29 Jul 2019 09:30) (84 - 114)  BP: 77/52 (29 Jul 2019 09:30) (61/48 - 113/72)  BP(mean): 58 (29 Jul 2019 09:30) (49 - 83)  RR: 17 (29 Jul 2019 09:30) (16 - 37)  SpO2: 100% (29 Jul 2019 09:00) (93% - 100%)      General: AAO x 3   HEENT: DONNA             Lymphatic system: No cervical LN   Lungs: Bilateral BS , bilateral rales   Cardiovascular: Regular   Gastrointestinal: Soft, Positive BS  Extremities: No clubbing.  Moves extremities.  Full Range of motion   Skin: Warm, intact  Neurological: No motor or sensory deficit       07-28-19 @ 07:01  -  07-29-19 @ 07:00  --------------------------------------------------------  IN:    amiodarone Infusion: 183.7 mL    amiodarone Infusion: 200.4 mL    Oral Fluid: 840 mL    phenylephrine   Infusion: 55 mL  Total IN: 1279.1 mL    OUT:    Indwelling Catheter - Urethral: 1610 mL  Total OUT: 1610 mL    Total NET: -330.9 mL      07-29-19 @ 07:01  -  07-29-19 @ 10:12  --------------------------------------------------------  IN:    amiodarone Infusion: 33.4 mL    phenylephrine   Infusion: 4 mL  Total IN: 37.4 mL    OUT:    Indwelling Catheter - Urethral: 135 mL  Total OUT: 135 mL    Total NET: -97.6 mL    LABS:                     10.3   11.69 )-----------( 484      ( 29 Jul 2019 06:05 )             31.7                                               07-29    141  |  101  |  23<H>  ----------------------------<  139<H>  4.0   |  27  |  1.4    Ca    9.0      29 Jul 2019 06:05  Phos  3.1     07-29  Mg     2.2     07-29    TPro  5.3<L>  /  Alb  3.0<L>  /  TBili  0.4  /  DBili  x   /  AST  25  /  ALT  32  /  AlkPhos  71  07-28                                            LIVER FUNCTIONS - ( 28 Jul 2019 05:24 )  Alb: 3.0 g/dL / Pro: 5.3 g/dL / ALK PHOS: 71 U/L / ALT: 32 U/L / AST: 25 U/L / GGT: x                                                                              MEDICATIONS  (STANDING):  amiodarone Infusion 0.5 mG/Min (16.667 mL/Hr) IV Continuous <Continuous>  apixaban 2.5 milliGRAM(s) Oral every 12 hours  atorvastatin 40 milliGRAM(s) Oral at bedtime  buDESOnide 160 MICROgram(s)/formoterol 4.5 MICROgram(s) Inhaler 2 Puff(s) Inhalation two times a day  chlorhexidine 4% Liquid 1 Application(s) Topical <User Schedule>  docusate sodium 100 milliGRAM(s) Oral daily  finasteride 5 milliGRAM(s) Oral daily  furosemide   Injectable 40 milliGRAM(s) IV Push daily  guaiFENesin  milliGRAM(s) Oral every 12 hours  lactulose Syrup 20 Gram(s) Oral daily  levothyroxine  Oral Tab/Cap - Peds 50 MICROGram(s) Oral daily  metoprolol tartrate 12.5 milliGRAM(s) Oral every 6 hours  mexiletine 200 milliGRAM(s) Oral every 8 hours  montelukast 10 milliGRAM(s) Oral daily  phenylephrine    Infusion 0.5 MICROgram(s)/kG/Min (8.297 mL/Hr) IV Continuous <Continuous>  quiNIDine gluconate 324 milliGRAM(s) Oral <User Schedule>  senna 1 Tablet(s) Oral daily  tamsulosin 0.4 milliGRAM(s) Oral at bedtime    MEDICATIONS  (PRN):  ALBUTerol/ipratropium for Nebulization 3 milliLiter(s) Nebulizer every 6 hours PRN Bronchospasm  bisacodyl Suppository 10 milliGRAM(s) Rectal daily PRN Constipation  haloperidol    Injectable 5 milliGRAM(s) IntraMuscular daily PRN agitation      Xrays:  reviewed

## 2019-07-29 NOTE — CHART NOTE - NSCHARTNOTEFT_GEN_A_CORE
Palliative Care Biopsychosocial Assessment:       Pt is an 80 y/o  male admitted from home with  CHF exacerbation.   PMHx of  CHFrEF, Afib, Vtach s/p AICD, COPD, NANDO, spleenic laceration following fall) comes to the ED with complaint of SOB.  SOB gradual onset, worsened in the evening yesterday, could not sleep s/t SOB, aggravated on exertion, no relieving factor.     Patient was recently admitted for near syncope and DUYEN, discharged on june 17.  Pt lives at home with spouse and was independent prior to admission.  Pt currently in CCU, on pressor support, our of bed to chair.  Pt presented as alert but staff reports pt is confused at times, agitated at times.  Unclear if patient aware of overall poor prognosis.      Pt is DNR/DNI    Family meeting set to discuss Goals of Care with patient and family.  Meeting set up for 7/22/2019 @ 11am.             Patient Coping Status:       [   ]   coping well        [ x  ]    coping with  some difficulty       [   ]   difficulty coping     [   ]   other                                                       Patient Emotional Status:     [ x  ]   anxious         [   ]   depressed           [   ]  overwhelmed          [   ]   angry         [   ]accepting       [   ]   not accepting           [   ]   other     Patient Mental Status:      [x   ]   alert              [   ]   oriented         [  x  ]   confused         [   ] lethargic         [   ]   non-responsive   [   ]   other     Advance Directives:     [   ]    Health Care Surrogate: Name:                             [   ]    Health Care Proxy: Name:   [ x  ]    MOLST  [   ]    Living Will  [   ]    DNR  [   ]    DNI    Patient Needs:     [   ]   Supportive Counseling                  [   ]   Family Meeting            [   ]    Education                           [ x  ]   Advance Care Planning         Caregiver Name:   Caregiver needs:     [   ]   Supportive Counseling      [   ]   Family Conference      [ x  ]   Education    [   ]   Other     Referral:      [   ]   Community Resources         [   ]   Cancer Support Group     [   ]    Hospice       [   ]   Bereavement support     [   ]   Pastoral Care      [   ]  [facility] [place]       [   ]  Child Life Services     [   ]   Other                    Spectra #: [phone]

## 2019-07-29 NOTE — CDI QUERY NOTE - NSCDIOTHERTXTBX_GEN_ALL_CORE_HH
Pt came c/o difficulty breathing, pt is in tripod position and using accessory muscles to breath in triage. RR 28 O2 sat 80%, Placed on Bipap 40%,  IPAP 3   ED provider Note> 79y M w/ PMH of CHF, Afib s/p ablation, HLD, and HTN, COPD presents with acute episode of SOB was found to be hypoxic to 70s on initial pulse ox.   Admitted for Acute on chronic CHFrEF  7/23 ABG's pH 7.45 pCO2 37 pO2 50 O2 sat 50, sat 80%  Based on above clinical indicators , please indicate if you are treating the following :    > Acute Respiratory failure  Specify if hypoxia or hypercapnea    > Acute on chronic resp failure    > Other, Please Specify    > Unable to Determine

## 2019-07-29 NOTE — CHART NOTE - NSCHARTNOTEFT_GEN_A_CORE
Registered Dietitian Follow-Up     Patient Profile Reviewed                           Yes [x]   No []     Nutrition History Previously Obtained        Yes [x]  No []       Pertinent Subjective Information:      Pertinent Medical Interventions: . Acute exacerbation of Systolic CHF: s/p EKG.  Rt lower lobe  opacity: aspiration precautions. . DUYEN on CKD: cr stable. . Afib S/P ablation: rate controlled.  Arrythmia: HR controlled.     Diet order: DASH/TLC, Ensure enlive BID     Anthropometrics:  - Ht. 172.7cm   - Wt. 68.1kg on 7/29 vs. 88.5kg on 7/21- ?bed scale discrepancy, pt. noted to be on Lasix, will continue to monitor wt trends during LOS   - %wt change  - BMI 29.7- using 88.5kg and 22.8 using 68.1kg   - IBW 70kg      Pertinent Lab Data: (7/29) WBC 11.69, RBC 3.58, Hg 10.3, BUN 23, glu 139, eGFR 47     Pertinent Meds: Lasix, Metoprolol, Dulcolax, Lactulose, Colace, Senna      Physical Findings:  - Appearance: alert&oriented, [place]   - GI function: no symptoms noted, last BM 7/29  - Tubes: none noted  - Oral/Mouth cavity: no symptoms noted  - Skin: tear (BS 18)      Nutrition Requirements (from RD note on 7/25)  Weight Used: 70kg      Estimated Energy Needs    Continue [x]  Adjust [] 6555-4163 kcal/day (25-30 kcal/kg IBW)--will readjust once more accurate/stable wt trends noted  Adjusted Energy Recommendations:   kcal/day        Estimated Protein Needs    Continue [x]  Adjust []  70-84 gm/day (1-1.2 gm/kg IBW)--same as mentioned above  Adjusted Protein Recommendations:   gm/day        Estimated Fluid Needs        Continue [x]  Adjust [] per CCU team  Adjusted Fluid Recommendations:   mL/day     Nutrient Intake:         [] Previous Nutrition Diagnosis:  Inadequate Oral Intake            [] Ongoing          [] Resolved    [] No active nutrition diagnosis identified at this time        Nutrition Intervention: meals and snacks, medical food supplement    Rec:      Goal/Expected Outcome: In      Indicator/Monitoring: diet order, energy intake, body composition, renal/glucose profile, NFPF Registered Dietitian Follow-Up     Patient Profile Reviewed                           Yes [x]   No []     Nutrition History Previously Obtained        Yes [x]  No []       Pertinent Subjective Information: :Pt. working on his lunch during visit, per family appetite and PO intake remains suboptimal. Pt. not very fond of Ensure enlive at this time, as he was told to watch fluid intake. Pt. enjoys pudding and agreeable to try Ensure pudding- provided samples today.      Pertinent Medical Interventions: . Acute exacerbation of Systolic CHF: s/p EKG.  Rt lower lobe  opacity: aspiration precautions. . DUYEN on CKD: cr stable. . Afib S/P ablation: rate controlled.  Arrythmia: HR controlled.     Diet order: DASH/TLC, Ensure enlive BID     Anthropometrics:  - Ht. 172.7cm   - Wt. 68.1kg on 7/29 vs. 88.5kg on 7/21- ?bed scale discrepancy, pt. noted to be on Lasix, will continue to monitor wt trends during LOS.    - %wt change  - BMI 29.7- using 88.5kg and 22.8 using 68.1kg   - IBW 70kg      Pertinent Lab Data: (7/29) WBC 11.69, RBC 3.58, Hg 10.3, BUN 23, glu 139, eGFR 47     Pertinent Meds: Lasix, Metoprolol, Dulcolax, Lactulose, Colace, Senna      Physical Findings:  - Appearance: alert&oriented, [place]   - GI function: no symptoms noted, last BM 7/29  - Tubes: none noted  - Oral/Mouth cavity: no symptoms noted  - Skin: tear (BS 18)      Nutrition Requirements (from RD note on 7/25)  Weight Used: 70kg      Estimated Energy Needs    Continue [x]  Adjust [] 7039-2196 kcal/day (25-30 kcal/kg IBW)--will readjust once more accurate/stable wt trends noted  Adjusted Energy Recommendations:   kcal/day        Estimated Protein Needs    Continue [x]  Adjust []  70-84 gm/day (1-1.2 gm/kg IBW)--same as mentioned above  Adjusted Protein Recommendations:   gm/day        Estimated Fluid Needs        Continue [x]  Adjust [] per CCU team  Adjusted Fluid Recommendations:   mL/day     Nutrient Intake: 25-50% PO at meals         [] Previous Nutrition Diagnosis:  Inadequate Oral Intake            [x] Ongoing          [] Resolved            Nutrition Intervention: meals and snacks, medical food supplement    Rec: Continue DASH/TLC diet and add Ensure pudding BID, Prosource gelatein daily      Goal/Expected Outcome: In 3 days pt. to consume at least 50-75% PO and supplement      Indicator/Monitoring: diet order, energy intake, body composition, renal/glucose profile, NFPF

## 2019-07-30 LAB
ANION GAP SERPL CALC-SCNC: 14 MMOL/L — SIGNIFICANT CHANGE UP (ref 7–14)
BASOPHILS # BLD AUTO: 0.08 K/UL — SIGNIFICANT CHANGE UP (ref 0–0.2)
BASOPHILS NFR BLD AUTO: 0.6 % — SIGNIFICANT CHANGE UP (ref 0–1)
BUN SERPL-MCNC: 31 MG/DL — HIGH (ref 10–20)
CALCIUM SERPL-MCNC: 8.6 MG/DL — SIGNIFICANT CHANGE UP (ref 8.5–10.1)
CHLORIDE SERPL-SCNC: 97 MMOL/L — LOW (ref 98–110)
CO2 SERPL-SCNC: 27 MMOL/L — SIGNIFICANT CHANGE UP (ref 17–32)
CREAT SERPL-MCNC: 1.6 MG/DL — HIGH (ref 0.7–1.5)
EOSINOPHIL # BLD AUTO: 0.46 K/UL — SIGNIFICANT CHANGE UP (ref 0–0.7)
EOSINOPHIL NFR BLD AUTO: 3.5 % — SIGNIFICANT CHANGE UP (ref 0–8)
GLUCOSE SERPL-MCNC: 143 MG/DL — HIGH (ref 70–99)
HCT VFR BLD CALC: 30.9 % — LOW (ref 42–52)
HGB BLD-MCNC: 10.1 G/DL — LOW (ref 14–18)
IMM GRANULOCYTES NFR BLD AUTO: 0.6 % — HIGH (ref 0.1–0.3)
LYMPHOCYTES # BLD AUTO: 0.88 K/UL — LOW (ref 1.2–3.4)
LYMPHOCYTES # BLD AUTO: 6.7 % — LOW (ref 20.5–51.1)
MAGNESIUM SERPL-MCNC: 2.1 MG/DL — SIGNIFICANT CHANGE UP (ref 1.8–2.4)
MCHC RBC-ENTMCNC: 29 PG — SIGNIFICANT CHANGE UP (ref 27–31)
MCHC RBC-ENTMCNC: 32.7 G/DL — SIGNIFICANT CHANGE UP (ref 32–37)
MCV RBC AUTO: 88.8 FL — SIGNIFICANT CHANGE UP (ref 80–94)
MONOCYTES # BLD AUTO: 1.35 K/UL — HIGH (ref 0.1–0.6)
MONOCYTES NFR BLD AUTO: 10.4 % — HIGH (ref 1.7–9.3)
NEUTROPHILS # BLD AUTO: 10.19 K/UL — HIGH (ref 1.4–6.5)
NEUTROPHILS NFR BLD AUTO: 78.2 % — HIGH (ref 42.2–75.2)
NRBC # BLD: 0 /100 WBCS — SIGNIFICANT CHANGE UP (ref 0–0)
PHOSPHATE SERPL-MCNC: 3 MG/DL — SIGNIFICANT CHANGE UP (ref 2.1–4.9)
PLATELET # BLD AUTO: 472 K/UL — HIGH (ref 130–400)
POTASSIUM SERPL-MCNC: 4 MMOL/L — SIGNIFICANT CHANGE UP (ref 3.5–5)
POTASSIUM SERPL-SCNC: 4 MMOL/L — SIGNIFICANT CHANGE UP (ref 3.5–5)
RBC # BLD: 3.48 M/UL — LOW (ref 4.7–6.1)
RBC # FLD: 15.4 % — HIGH (ref 11.5–14.5)
SODIUM SERPL-SCNC: 138 MMOL/L — SIGNIFICANT CHANGE UP (ref 135–146)
WBC # BLD: 13.04 K/UL — HIGH (ref 4.8–10.8)
WBC # FLD AUTO: 13.04 K/UL — HIGH (ref 4.8–10.8)

## 2019-07-30 PROCEDURE — 71045 X-RAY EXAM CHEST 1 VIEW: CPT | Mod: 26

## 2019-07-30 PROCEDURE — 99233 SBSQ HOSP IP/OBS HIGH 50: CPT

## 2019-07-30 PROCEDURE — 99497 ADVNCD CARE PLAN 30 MIN: CPT | Mod: 25

## 2019-07-30 RX ORDER — MIDODRINE HYDROCHLORIDE 2.5 MG/1
5 TABLET ORAL EVERY 8 HOURS
Refills: 0 | Status: DISCONTINUED | OUTPATIENT
Start: 2019-07-30 | End: 2019-07-31

## 2019-07-30 RX ADMIN — MIDODRINE HYDROCHLORIDE 5 MILLIGRAM(S): 2.5 TABLET ORAL at 13:28

## 2019-07-30 RX ADMIN — CHLORHEXIDINE GLUCONATE 1 APPLICATION(S): 213 SOLUTION TOPICAL at 05:32

## 2019-07-30 RX ADMIN — Medication 12.5 MILLIGRAM(S): at 05:31

## 2019-07-30 RX ADMIN — MONTELUKAST 10 MILLIGRAM(S): 4 TABLET, CHEWABLE ORAL at 12:11

## 2019-07-30 RX ADMIN — MEXILETINE HYDROCHLORIDE 200 MILLIGRAM(S): 150 CAPSULE ORAL at 05:32

## 2019-07-30 RX ADMIN — Medication 600 MILLIGRAM(S): at 17:47

## 2019-07-30 RX ADMIN — Medication 600 MILLIGRAM(S): at 05:31

## 2019-07-30 RX ADMIN — MEXILETINE HYDROCHLORIDE 200 MILLIGRAM(S): 150 CAPSULE ORAL at 21:42

## 2019-07-30 RX ADMIN — ATORVASTATIN CALCIUM 40 MILLIGRAM(S): 80 TABLET, FILM COATED ORAL at 21:42

## 2019-07-30 RX ADMIN — BUDESONIDE AND FORMOTEROL FUMARATE DIHYDRATE 2 PUFF(S): 160; 4.5 AEROSOL RESPIRATORY (INHALATION) at 07:28

## 2019-07-30 RX ADMIN — APIXABAN 2.5 MILLIGRAM(S): 2.5 TABLET, FILM COATED ORAL at 17:47

## 2019-07-30 RX ADMIN — Medication 324 MILLIGRAM(S): at 21:42

## 2019-07-30 RX ADMIN — FINASTERIDE 5 MILLIGRAM(S): 5 TABLET, FILM COATED ORAL at 12:11

## 2019-07-30 RX ADMIN — Medication 40 MILLIGRAM(S): at 05:31

## 2019-07-30 RX ADMIN — Medication 324 MILLIGRAM(S): at 09:58

## 2019-07-30 RX ADMIN — TAMSULOSIN HYDROCHLORIDE 0.4 MILLIGRAM(S): 0.4 CAPSULE ORAL at 21:42

## 2019-07-30 RX ADMIN — BUDESONIDE AND FORMOTEROL FUMARATE DIHYDRATE 2 PUFF(S): 160; 4.5 AEROSOL RESPIRATORY (INHALATION) at 20:00

## 2019-07-30 RX ADMIN — AMIODARONE HYDROCHLORIDE 400 MILLIGRAM(S): 400 TABLET ORAL at 05:30

## 2019-07-30 RX ADMIN — Medication 50 MICROGRAM(S): at 05:31

## 2019-07-30 RX ADMIN — APIXABAN 2.5 MILLIGRAM(S): 2.5 TABLET, FILM COATED ORAL at 05:30

## 2019-07-30 RX ADMIN — MEXILETINE HYDROCHLORIDE 200 MILLIGRAM(S): 150 CAPSULE ORAL at 13:29

## 2019-07-30 RX ADMIN — MIDODRINE HYDROCHLORIDE 5 MILLIGRAM(S): 2.5 TABLET ORAL at 21:42

## 2019-07-30 NOTE — PROGRESS NOTE ADULT - SUBJECTIVE AND OBJECTIVE BOX
LENGTH OF HOSPITAL STAY: 10d    CHIEF COMPLAINT:   Patient is a 79y old  Male who presents with a chief complaint of shortness of breath for few days duration (30 Jul 2019 09:41)      HISTORY OF PRESENTING ILLNESS:    HPI:  80 yo male with PMH CHFrEF, Afib, Vtach s/p AICD,COPD, NANDO, spleenic laceration following fall) comes to the ED with complaint of SOB.  SOB gradual onset, worsened in the evening yesterday, could not sleep s/t SOB, aggravated on exertion, no relieving factor.  NO chest pain, or wheezing or cough or palpitation.  No fever, headache, recent sick contacts.  normal Bowel and bladder habit.    Patient was recently admitted for near syncope and DUYEN, discharged on june 17.    In the ED, patient RR 28, , spo2 80 room air, /60 (20 Jul 2019 11:36)    PAST MEDICAL & SURGICAL HISTORY  PAST MEDICAL & SURGICAL HISTORY:  CAD (coronary artery disease)  Ventricular tachycardia  NANDO on CPAP  COPD (chronic obstructive pulmonary disease)  Pacemaker  AICD (automatic cardioverter/defibrillator) present  Hypothyroidism  Atrial fibrillation  Congestive heart disease  Hypercholesteremia  AICD (automatic cardioverter/defibrillator) present  History of laparoscopic cholecystectomy    SOCIAL HISTORY:    ALLERGIES:  morphine (Stomach Upset)    MEDICATIONS:  STANDING MEDICATIONS  amiodarone    Tablet 400 milliGRAM(s) Oral daily  apixaban 2.5 milliGRAM(s) Oral every 12 hours  atorvastatin 40 milliGRAM(s) Oral at bedtime  buDESOnide 160 MICROgram(s)/formoterol 4.5 MICROgram(s) Inhaler 2 Puff(s) Inhalation two times a day  chlorhexidine 4% Liquid 1 Application(s) Topical <User Schedule>  docusate sodium 100 milliGRAM(s) Oral daily  finasteride 5 milliGRAM(s) Oral daily  furosemide   Injectable 40 milliGRAM(s) IV Push daily  guaiFENesin  milliGRAM(s) Oral every 12 hours  lactulose Syrup 20 Gram(s) Oral daily  levothyroxine  Oral Tab/Cap - Peds 50 MICROGram(s) Oral daily  metoprolol tartrate 12.5 milliGRAM(s) Oral every 6 hours  mexiletine 200 milliGRAM(s) Oral every 8 hours  midodrine 5 milliGRAM(s) Oral every 8 hours  montelukast 10 milliGRAM(s) Oral daily  phenylephrine    Infusion 0.5 MICROgram(s)/kG/Min IV Continuous <Continuous>  quiNIDine gluconate 324 milliGRAM(s) Oral <User Schedule>  senna 1 Tablet(s) Oral daily  tamsulosin 0.4 milliGRAM(s) Oral at bedtime    PRN MEDICATIONS  ALBUTerol/ipratropium for Nebulization 3 milliLiter(s) Nebulizer every 6 hours PRN  bisacodyl Suppository 10 milliGRAM(s) Rectal daily PRN  haloperidol    Injectable 5 milliGRAM(s) IntraMuscular daily PRN    VITALS:   T(F): 97.9  HR: 84  BP: 93/52  RR: 22  SpO2: 98%    LABS:                        10.1   13.04 )-----------( 472      ( 30 Jul 2019 05:11 )             30.9     07-30    138  |  97<L>  |  31<H>  ----------------------------<  143<H>  4.0   |  27  |  1.6<H>    Ca    8.6      30 Jul 2019 05:11  Phos  3.0     07-30  Mg     2.1     07-30                    RADIOLOGY:  < from: Xray Chest 1 View- PORTABLE-Routine (07.30.19 @ 06:49) >  Impression:      Stable bibasilar opacities.      < end of copied text >    PHYSICAL EXAM:  GEN: No acute distress  HEENT:   LUNGS: Clear to auscultation bilaterally   HEART: S1/S2 present. RRR.   ABD: Soft, non-tender, non-distended. Bowel sounds present  EXT:  NEURO: AAOX3 LENGTH OF HOSPITAL STAY: 10d    CHIEF COMPLAINT:   Patient is a 79y old  Male who presents with a chief complaint of shortness of breath for few days duration (30 Jul 2019 09:41)      HISTORY OF PRESENTING ILLNESS:    HPI:  78 yo male with PMH CHFrEF, Afib, Vtach s/p AICD,COPD, NANDO, spleenic laceration following fall) comes to the ED with complaint of SOB.  SOB gradual onset, worsened in the evening yesterday, could not sleep s/t SOB, aggravated on exertion, no relieving factor.  NO chest pain, or wheezing or cough or palpitation.  No fever, headache, recent sick contacts.  normal Bowel and bladder habit.    Patient was recently admitted for near syncope and DUYEN, discharged on june 17.    In the ED, patient RR 28, , spo2 80 room air, /60 (20 Jul 2019 11:36)    PAST MEDICAL & SURGICAL HISTORY  PAST MEDICAL & SURGICAL HISTORY:  CAD (coronary artery disease)  Ventricular tachycardia  NANDO on CPAP  COPD (chronic obstructive pulmonary disease)  Pacemaker  AICD (automatic cardioverter/defibrillator) present  Hypothyroidism  Atrial fibrillation  Congestive heart disease  Hypercholesteremia  AICD (automatic cardioverter/defibrillator) present  History of laparoscopic cholecystectomy    SOCIAL HISTORY:    ALLERGIES:  morphine (Stomach Upset)    MEDICATIONS:  STANDING MEDICATIONS  amiodarone    Tablet 400 milliGRAM(s) Oral daily  apixaban 2.5 milliGRAM(s) Oral every 12 hours  atorvastatin 40 milliGRAM(s) Oral at bedtime  buDESOnide 160 MICROgram(s)/formoterol 4.5 MICROgram(s) Inhaler 2 Puff(s) Inhalation two times a day  chlorhexidine 4% Liquid 1 Application(s) Topical <User Schedule>  docusate sodium 100 milliGRAM(s) Oral daily  finasteride 5 milliGRAM(s) Oral daily  furosemide   Injectable 40 milliGRAM(s) IV Push daily  guaiFENesin  milliGRAM(s) Oral every 12 hours  lactulose Syrup 20 Gram(s) Oral daily  levothyroxine  Oral Tab/Cap - Peds 50 MICROGram(s) Oral daily  metoprolol tartrate 12.5 milliGRAM(s) Oral every 6 hours  mexiletine 200 milliGRAM(s) Oral every 8 hours  midodrine 5 milliGRAM(s) Oral every 8 hours  montelukast 10 milliGRAM(s) Oral daily  phenylephrine    Infusion 0.5 MICROgram(s)/kG/Min IV Continuous <Continuous>  quiNIDine gluconate 324 milliGRAM(s) Oral <User Schedule>  senna 1 Tablet(s) Oral daily  tamsulosin 0.4 milliGRAM(s) Oral at bedtime    PRN MEDICATIONS  ALBUTerol/ipratropium for Nebulization 3 milliLiter(s) Nebulizer every 6 hours PRN  bisacodyl Suppository 10 milliGRAM(s) Rectal daily PRN  haloperidol    Injectable 5 milliGRAM(s) IntraMuscular daily PRN    VITALS:   T(F): 97.9  HR: 84  BP: 93/52  RR: 22  SpO2: 98%    LABS:                        10.1   13.04 )-----------( 472      ( 30 Jul 2019 05:11 )             30.9     07-30    138  |  97<L>  |  31<H>  ----------------------------<  143<H>  4.0   |  27  |  1.6<H>    Ca    8.6      30 Jul 2019 05:11  Phos  3.0     07-30  Mg     2.1     07-30                    RADIOLOGY:  < from: Xray Chest 1 View- PORTABLE-Routine (07.30.19 @ 06:49) >  Impression:      Stable bibasilar opacities.      < end of copied text >    PHYSICAL EXAM:  GEN: No acute distress.   HEENT: AT, NC, DONNA  LUNGS: decreased breath sound bilaterally   HEART: S1/S2 present. RRR.   ABD: Soft, non-tender, non-distended. Bowel sounds present  EXT: edema/cyanosis/clubbing absent  NEURO: AAOX3

## 2019-07-30 NOTE — PROGRESS NOTE ADULT - SUBJECTIVE AND OBJECTIVE BOX
OVERNIGHT EVENTS: events noted, cxr improved, s/p palliative care    Vital Signs Last 24 Hrs  T(C): 36.2 (30 Jul 2019 08:00), Max: 37.2 (29 Jul 2019 20:00)  T(F): 97.1 (30 Jul 2019 08:00), Max: 98.9 (29 Jul 2019 20:00)  HR: 84 (30 Jul 2019 09:15) (84 - 90)  BP: 92/55 (30 Jul 2019 09:15) (66/46 - 101/70)  BP(mean): 68 (30 Jul 2019 09:15) (51 - 87)  RR: 23 (30 Jul 2019 08:40) (16 - 36)  SpO2: 96% (30 Jul 2019 08:40) (91% - 99%)    PHYSICAL EXAMINATION:    GENERAL: The patient is awake and alert in no apparent distress.     HEENT: Head is normocephalic and atraumatic. Extraocular muscles are intact. Mucous membranes are moist.    NECK: Supple.    LUNGS: dec both bases    HEART: LAVON 3/6    ABDOMEN: Soft, nontender, and nondistended.      EXTREMITIES: Without any cyanosis, clubbing, rash, lesions or edema.    NEUROLOGIC: Grossly intact.    SKIN: No ulceration or induration present.      LABS:                        10.1   13.04 )-----------( 472      ( 30 Jul 2019 05:11 )             30.9     07-30    138  |  97<L>  |  31<H>  ----------------------------<  143<H>  4.0   |  27  |  1.6<H>    Ca    8.6      30 Jul 2019 05:11  Phos  3.0     07-30  Mg     2.1     07-30 07-29-19 @ 07:01  -  07-30-19 @ 07:00  --------------------------------------------------------  IN: 557 mL / OUT: 1065 mL / NET: -508 mL        MICROBIOLOGY:      MEDICATIONS  (STANDING):  amiodarone    Tablet 400 milliGRAM(s) Oral daily  apixaban 2.5 milliGRAM(s) Oral every 12 hours  atorvastatin 40 milliGRAM(s) Oral at bedtime  buDESOnide 160 MICROgram(s)/formoterol 4.5 MICROgram(s) Inhaler 2 Puff(s) Inhalation two times a day  chlorhexidine 4% Liquid 1 Application(s) Topical <User Schedule>  docusate sodium 100 milliGRAM(s) Oral daily  finasteride 5 milliGRAM(s) Oral daily  furosemide   Injectable 40 milliGRAM(s) IV Push daily  guaiFENesin  milliGRAM(s) Oral every 12 hours  lactulose Syrup 20 Gram(s) Oral daily  levothyroxine  Oral Tab/Cap - Peds 50 MICROGram(s) Oral daily  metoprolol tartrate 12.5 milliGRAM(s) Oral every 6 hours  mexiletine 200 milliGRAM(s) Oral every 8 hours  midodrine 5 milliGRAM(s) Oral every 8 hours  montelukast 10 milliGRAM(s) Oral daily  phenylephrine    Infusion 0.5 MICROgram(s)/kG/Min (8.297 mL/Hr) IV Continuous <Continuous>  quiNIDine gluconate 324 milliGRAM(s) Oral <User Schedule>  senna 1 Tablet(s) Oral daily  tamsulosin 0.4 milliGRAM(s) Oral at bedtime    MEDICATIONS  (PRN):  ALBUTerol/ipratropium for Nebulization 3 milliLiter(s) Nebulizer every 6 hours PRN Bronchospasm  bisacodyl Suppository 10 milliGRAM(s) Rectal daily PRN Constipation  haloperidol    Injectable 5 milliGRAM(s) IntraMuscular daily PRN agitation      RADIOLOGY & ADDITIONAL STUDIES:

## 2019-07-30 NOTE — SWALLOW BEDSIDE ASSESSMENT ADULT - SLP GENERAL OBSERVATIONS
Pt received In bed awake, confused, reportedly tolerated reg lunch tray w/o difficulty
pt awake some confusion noted. no c/o pain

## 2019-07-30 NOTE — SWALLOW BEDSIDE ASSESSMENT ADULT - SWALLOW EVAL: DIAGNOSIS
minimal symptoms of penetration/aspiration for small bites/sips and slow pacing for regular and thins
+toleration of reg solids and thin liquids w/o overt s/s of penetration/aspiration

## 2019-07-30 NOTE — PROGRESS NOTE ADULT - ASSESSMENT
IMPRESSION:    b/l airspace disease/ CHF CXR reviewed/  Vtach storm recurrent  AFib with rvr   altered MS improved    PLAN:    CNS: avoid CNS depressant    HEENT: Oral care    PULMONARY:  HOB @ 45 degrees, aspiration precautions, keep sats > 92%, pulm toilet, repeat cx r     CARDIOVASCULAR: Keep MAP > 65, anti arrhythmic per EPS, iv lasix daily. MIDODRINE 10 Q 8    GI: GI prophylaxis. feeding     RENAL:  Follow up lytes.  Correct as needed    INFECTIOUS DISEASE: Follow up cultures, dc abx    HEMATOLOGICaL:  Eliquis    ENDOCRINE:  Follow up FS.  Insulin protocol if needed.    MUSCULOSKELETAL: OOB to chair     Lines: peripheral   Johnson: TOV   Code status: DNR/DNI   Prognosis: poor   Dispo:  CCU

## 2019-07-30 NOTE — PROGRESS NOTE ADULT - ASSESSMENT
79yMale being evaluated for goals of care and symptom management. Pt has advanced HF, and family is aware of poor prognosis. Both pt's spouse and son verbalized that they know patient is on life support and critically ill. They want him home. Discussed hospice care and they want him to come home and be comfortable at home. They know he is on medication "pressors" that artificially raise his BP and they cannot leave the CCU with these. We talked about if he comes off these meds he may decline in the hospital and die here. They are aware and said he has been declining for 2 years but worst in last 3months. They feel he has very poor quality of life. Pt is alert at times has been clear about DNR/DNI. Presently patients mental status waxes and wanes. Pt wife decision maker.     Palliative care discussed home hospice and they requested a consult. We will keep checking in with them and will discuss stopping AICD and pressors. Pt is comfortable today. No symptoms to management. May need medication for anxiety if he declines      Recommendations:  DNR/DNI  continue medical management  hospice consult for end stage HF EF less than20%  [phone] for any questions

## 2019-07-30 NOTE — PROGRESS NOTE ADULT - ASSESSMENT
patient is 80 yo male with hx of S/P AICD (automatic cardioverter/defibrillator), Atrial fibrillation, CAD (coronary artery disease), Congestive heart disease, COPD (chronic obstructive pulmonary disease), Hypercholesteremia, Hypothyroidism, NANDO on CPAP, Ventricular tachycardia.    Patient appears to be oriented to place,person,time today. Patient is on amiodarone, mixelitine and quinidine. No significant over-night events.      1. Acute exacerbation of Systolic CHF  -EKG shows no ischemic changes  -Trop flat  - EF 20%    2. Rt lower lobe  opacity  -Afebrile, cough has resolved  -may be due to aspiration sec to diorientation  - CXR has improved basal consolidation on Rt side  -aspiration prercautions  - speech and swallow consult placed again tomorow  - WBCs within NL range    3. DUYEN on CKD  -Unclear Etiology   Cr stable at 1.6    4. Afib S/P ablation:  cw eliquis  rate controlled    5.  COPD   -Continue with home medications  -Stable    6. Arrythmias  - pt on amiodarone , mixelitene, quinidine, lopressor and lasix  -Monitor QTc  -Wean off Renzo-Synephrine as tolerated  -Pt is not a good candidate for LVAd  - HR is controlled with systolic in 90s      Goals of care : were discussed with family, patient was oriented and agreed to medications and just the comfort measures, DNR/DNI.   palliative had the family meeting today. They've reached to the conclusion that the patient wants to go home and he will be needing palliative measures. Hospice is consulted.        DVT ppx:  on eliquis    Diet:  dash/tlc    Activity: AAT    Dispo: from home patient is 80 yo male with hx of S/P AICD (automatic cardioverter/defibrillator), Atrial fibrillation, CAD (coronary artery disease), Congestive heart disease, COPD (chronic obstructive pulmonary disease), Hypercholesteremia, Hypothyroidism, NANDO on CPAP, Ventricular tachycardia.    Patient appears to be oriented to place,person,time today. Patient is on amiodarone, mixelitine and quinidine. No significant over-night events.      1. Acute exacerbation of Systolic CHF  -EKG shows no ischemic changes  -Trop flat  - EF 20%    2. Rt lower lobe  opacity  -Afebrile, cough has resolved  -may be due to aspiration sec to diorientation  - CXR has improved basal consolidation on Rt side  -aspiration prercautions  - speech and swallow consult placed again tomorow  - WBCs within NL range    3. DUYEN on CKD  -Unclear Etiology   Cr stable at 1.6    4. Afib S/P ablation:  cw eliquis  rate controlled    5.  COPD   -Continue with home medications  -Stable    6. Arrythmias  - pt on amiodarone , mixelitene, quinidine, lopressor and lasix  -Monitor QTc  -Wean off Renzo-Synephrine as tolerated  -Pt is not a good candidate for LVAd  - HR is controlled with systolic in 90s    7. Acute Hypoxic Respiratory Failure  - sec to CHF, patient was maintained on Bipap  - Pt on Nasal canula at 2 L  - refused Bipap        Goals of care : were discussed with family, patient was oriented and agreed to medications and just the comfort measures, DNR/DNI.   palliative had the family meeting today. They've reached to the conclusion that the patient wants to go home and he will be needing palliative measures. Hospice is consulted.        DVT ppx:  on eliquis    Diet:  dash/tlc    Activity: AAT    Dispo: from home

## 2019-07-30 NOTE — SWALLOW BEDSIDE ASSESSMENT ADULT - SLP PERTINENT HISTORY OF CURRENT PROBLEM
Pt admitted w/ SOB, now w/ AMS, ? lidocaine toxicity. PMH: COPD, CAD, AICD, Afib, known to ST service from Jan 2019 w/ reccs fo reg w/ thin
Pt admitted w/ SOB, now w/ AMS, ? lidocaine toxicity. PMH: COPD, CAD, AICD, Afib, known to ST service from Jan 2019 w/ reccs fo reg w/ thin

## 2019-07-31 LAB
ANION GAP SERPL CALC-SCNC: 13 MMOL/L — SIGNIFICANT CHANGE UP (ref 7–14)
BASOPHILS # BLD AUTO: 0.11 K/UL — SIGNIFICANT CHANGE UP (ref 0–0.2)
BASOPHILS NFR BLD AUTO: 0.7 % — SIGNIFICANT CHANGE UP (ref 0–1)
BUN SERPL-MCNC: 35 MG/DL — HIGH (ref 10–20)
CALCIUM SERPL-MCNC: 8.9 MG/DL — SIGNIFICANT CHANGE UP (ref 8.5–10.1)
CHLORIDE SERPL-SCNC: 99 MMOL/L — SIGNIFICANT CHANGE UP (ref 98–110)
CO2 SERPL-SCNC: 26 MMOL/L — SIGNIFICANT CHANGE UP (ref 17–32)
CREAT SERPL-MCNC: 1.8 MG/DL — HIGH (ref 0.7–1.5)
EOSINOPHIL # BLD AUTO: 0.3 K/UL — SIGNIFICANT CHANGE UP (ref 0–0.7)
EOSINOPHIL NFR BLD AUTO: 1.9 % — SIGNIFICANT CHANGE UP (ref 0–8)
GLUCOSE SERPL-MCNC: 152 MG/DL — HIGH (ref 70–99)
HCT VFR BLD CALC: 32.1 % — LOW (ref 42–52)
HGB BLD-MCNC: 10.3 G/DL — LOW (ref 14–18)
IMM GRANULOCYTES NFR BLD AUTO: 0.8 % — HIGH (ref 0.1–0.3)
LYMPHOCYTES # BLD AUTO: 0.89 K/UL — LOW (ref 1.2–3.4)
LYMPHOCYTES # BLD AUTO: 5.8 % — LOW (ref 20.5–51.1)
MAGNESIUM SERPL-MCNC: 2.1 MG/DL — SIGNIFICANT CHANGE UP (ref 1.8–2.4)
MCHC RBC-ENTMCNC: 28.5 PG — SIGNIFICANT CHANGE UP (ref 27–31)
MCHC RBC-ENTMCNC: 32.1 G/DL — SIGNIFICANT CHANGE UP (ref 32–37)
MCV RBC AUTO: 88.7 FL — SIGNIFICANT CHANGE UP (ref 80–94)
MONOCYTES # BLD AUTO: 1.36 K/UL — HIGH (ref 0.1–0.6)
MONOCYTES NFR BLD AUTO: 8.8 % — SIGNIFICANT CHANGE UP (ref 1.7–9.3)
NEUTROPHILS # BLD AUTO: 12.65 K/UL — HIGH (ref 1.4–6.5)
NEUTROPHILS NFR BLD AUTO: 82 % — HIGH (ref 42.2–75.2)
NRBC # BLD: 0 /100 WBCS — SIGNIFICANT CHANGE UP (ref 0–0)
PHOSPHATE SERPL-MCNC: 3.3 MG/DL — SIGNIFICANT CHANGE UP (ref 2.1–4.9)
PLATELET # BLD AUTO: 438 K/UL — HIGH (ref 130–400)
POTASSIUM SERPL-MCNC: 4.6 MMOL/L — SIGNIFICANT CHANGE UP (ref 3.5–5)
POTASSIUM SERPL-SCNC: 4.6 MMOL/L — SIGNIFICANT CHANGE UP (ref 3.5–5)
RBC # BLD: 3.62 M/UL — LOW (ref 4.7–6.1)
RBC # FLD: 15.4 % — HIGH (ref 11.5–14.5)
SODIUM SERPL-SCNC: 138 MMOL/L — SIGNIFICANT CHANGE UP (ref 135–146)
WBC # BLD: 15.43 K/UL — HIGH (ref 4.8–10.8)
WBC # FLD AUTO: 15.43 K/UL — HIGH (ref 4.8–10.8)

## 2019-07-31 PROCEDURE — 99233 SBSQ HOSP IP/OBS HIGH 50: CPT

## 2019-07-31 PROCEDURE — 71045 X-RAY EXAM CHEST 1 VIEW: CPT | Mod: 26

## 2019-07-31 PROCEDURE — 99232 SBSQ HOSP IP/OBS MODERATE 35: CPT

## 2019-07-31 RX ORDER — MIDODRINE HYDROCHLORIDE 2.5 MG/1
10 TABLET ORAL EVERY 8 HOURS
Refills: 0 | Status: DISCONTINUED | OUTPATIENT
Start: 2019-07-31 | End: 2019-08-04

## 2019-07-31 RX ORDER — AMIODARONE HYDROCHLORIDE 400 MG/1
200 TABLET ORAL DAILY
Refills: 0 | Status: DISCONTINUED | OUTPATIENT
Start: 2019-08-01 | End: 2019-08-04

## 2019-07-31 RX ADMIN — TAMSULOSIN HYDROCHLORIDE 0.4 MILLIGRAM(S): 0.4 CAPSULE ORAL at 22:00

## 2019-07-31 RX ADMIN — MONTELUKAST 10 MILLIGRAM(S): 4 TABLET, CHEWABLE ORAL at 12:43

## 2019-07-31 RX ADMIN — Medication 12.5 MILLIGRAM(S): at 06:37

## 2019-07-31 RX ADMIN — Medication 12.5 MILLIGRAM(S): at 01:46

## 2019-07-31 RX ADMIN — Medication 50 MICROGRAM(S): at 06:37

## 2019-07-31 RX ADMIN — Medication 40 MILLIGRAM(S): at 06:37

## 2019-07-31 RX ADMIN — Medication 324 MILLIGRAM(S): at 12:43

## 2019-07-31 RX ADMIN — Medication 100 MILLIGRAM(S): at 12:43

## 2019-07-31 RX ADMIN — MIDODRINE HYDROCHLORIDE 5 MILLIGRAM(S): 2.5 TABLET ORAL at 06:37

## 2019-07-31 RX ADMIN — CHLORHEXIDINE GLUCONATE 1 APPLICATION(S): 213 SOLUTION TOPICAL at 06:37

## 2019-07-31 RX ADMIN — BUDESONIDE AND FORMOTEROL FUMARATE DIHYDRATE 2 PUFF(S): 160; 4.5 AEROSOL RESPIRATORY (INHALATION) at 21:00

## 2019-07-31 RX ADMIN — Medication 600 MILLIGRAM(S): at 06:37

## 2019-07-31 RX ADMIN — ATORVASTATIN CALCIUM 40 MILLIGRAM(S): 80 TABLET, FILM COATED ORAL at 22:00

## 2019-07-31 RX ADMIN — APIXABAN 2.5 MILLIGRAM(S): 2.5 TABLET, FILM COATED ORAL at 18:35

## 2019-07-31 RX ADMIN — MEXILETINE HYDROCHLORIDE 200 MILLIGRAM(S): 150 CAPSULE ORAL at 22:01

## 2019-07-31 RX ADMIN — MIDODRINE HYDROCHLORIDE 10 MILLIGRAM(S): 2.5 TABLET ORAL at 22:00

## 2019-07-31 RX ADMIN — Medication 600 MILLIGRAM(S): at 18:35

## 2019-07-31 RX ADMIN — MEXILETINE HYDROCHLORIDE 200 MILLIGRAM(S): 150 CAPSULE ORAL at 14:59

## 2019-07-31 RX ADMIN — APIXABAN 2.5 MILLIGRAM(S): 2.5 TABLET, FILM COATED ORAL at 06:37

## 2019-07-31 RX ADMIN — AMIODARONE HYDROCHLORIDE 400 MILLIGRAM(S): 400 TABLET ORAL at 06:37

## 2019-07-31 RX ADMIN — MEXILETINE HYDROCHLORIDE 200 MILLIGRAM(S): 150 CAPSULE ORAL at 06:37

## 2019-07-31 RX ADMIN — Medication 12.5 MILLIGRAM(S): at 18:35

## 2019-07-31 RX ADMIN — SENNA PLUS 1 TABLET(S): 8.6 TABLET ORAL at 12:44

## 2019-07-31 RX ADMIN — MIDODRINE HYDROCHLORIDE 10 MILLIGRAM(S): 2.5 TABLET ORAL at 14:58

## 2019-07-31 RX ADMIN — FINASTERIDE 5 MILLIGRAM(S): 5 TABLET, FILM COATED ORAL at 12:43

## 2019-07-31 RX ADMIN — Medication 12.5 MILLIGRAM(S): at 12:43

## 2019-07-31 RX ADMIN — Medication 324 MILLIGRAM(S): at 22:01

## 2019-07-31 RX ADMIN — BUDESONIDE AND FORMOTEROL FUMARATE DIHYDRATE 2 PUFF(S): 160; 4.5 AEROSOL RESPIRATORY (INHALATION) at 12:44

## 2019-07-31 NOTE — GOALS OF CARE CONVERSATION - PERSONAL ADVANCE DIRECTIVE - CONVERSATION DETAILS
Pt is an 80 y/o  male admitted from home with  CHF exacerbation.   PMHx of  CHFrEF, Afib, Vtach s/p AICD, COPD, NANDO, spleenic laceration following fall) comes to the ED with complaint of SOB.  SOB gradual onset, worsened in the evening yesterday, could not sleep s/t SOB, aggravated on exertion, no relieving factor.     Patient was recently admitted for near syncope and DUYEN, discharged on june 17.  Pt lives at home with spouse and was independent prior to admission.  Pt currently in CCU, on pressor support, our of bed to chair.  Pt presented as alert but staff reports pt is confused at times, agitated at times.  Unclear if patient aware of overall poor prognosis.  Pt denies s/s of distress.     Pt is DNR/DNI    Palliative team met with spouse and son who are want to take patient home on Hospice care if he is able to wean from pressor support.  Palliative will continue to follow for symptom management/provide support.

## 2019-07-31 NOTE — PROGRESS NOTE ADULT - ASSESSMENT
79yMale being evaluated for goals of care and symptom management. Pt seen by hospice today. Awaiting ween off vasopressors. Spoke to EPS and they will turn off AICD if family wants. Hospice RN said AICD can be turned off at home also. Will let hospice follow up w pt's wife and son. Pt comfortable today, still on low dose of pressors and midodrine.          Recommendations:  DNR/DNI  ongoing medical care  wants d/c home on hospice  ween off pressors

## 2019-07-31 NOTE — PROGRESS NOTE ADULT - SUBJECTIVE AND OBJECTIVE BOX
79yMale with diagnosis: CHF EXACERBATION      Patient seen for follow up      PHYSICAL EXAM  Pt alert and conversant  Ambulating a few steps with assistance and walker    T(C): , Max: 36.7 (20:00)  T(F): 97  HR: 94 (84 - 96)  BP: 92/60 (70/48 - 102/66)  RR: 18 (16 - 29)  SpO2: 94% (94% - 100%)              LABS:                          10.3   15.43 )-----------( 438      ( 31 Jul 2019 05:03 )             32.1                                                                                      07-31    138  |  99  |  35<H>  ----------------------------<  152<H>  4.6   |  26  |  1.8<H>    Ca    8.9      31 Jul 2019 05:03  Phos  3.3     07-31  Mg     2.1     07-31                                                        MEDICATIONS  (STANDING):  apixaban 2.5 milliGRAM(s) Oral every 12 hours  atorvastatin 40 milliGRAM(s) Oral at bedtime  buDESOnide 160 MICROgram(s)/formoterol 4.5 MICROgram(s) Inhaler 2 Puff(s) Inhalation two times a day  chlorhexidine 4% Liquid 1 Application(s) Topical <User Schedule>  docusate sodium 100 milliGRAM(s) Oral daily  finasteride 5 milliGRAM(s) Oral daily  furosemide   Injectable 40 milliGRAM(s) IV Push daily  guaiFENesin  milliGRAM(s) Oral every 12 hours  lactulose Syrup 20 Gram(s) Oral daily  levothyroxine  Oral Tab/Cap - Peds 50 MICROGram(s) Oral daily  metoprolol tartrate 12.5 milliGRAM(s) Oral every 6 hours  mexiletine 200 milliGRAM(s) Oral every 8 hours  midodrine 10 milliGRAM(s) Oral every 8 hours  montelukast 10 milliGRAM(s) Oral daily  phenylephrine    Infusion 0.5 MICROgram(s)/kG/Min (8.297 mL/Hr) IV Continuous <Continuous>  quiNIDine gluconate 324 milliGRAM(s) Oral <User Schedule>  senna 1 Tablet(s) Oral daily  tamsulosin 0.4 milliGRAM(s) Oral at bedtime    MEDICATIONS  (PRN):  ALBUTerol/ipratropium for Nebulization 3 milliLiter(s) Nebulizer every 6 hours PRN Bronchospasm  bisacodyl Suppository 10 milliGRAM(s) Rectal daily PRN Constipation  haloperidol    Injectable 5 milliGRAM(s) IntraMuscular daily PRN agitation

## 2019-07-31 NOTE — PROGRESS NOTE ADULT - ASSESSMENT
IMPRESSION:    b/l airspace disease/ CHF CXR reviewed/ IMPROVED  Vtach storm recurrent  AFib with rvr   altered MS improved    PLAN:    CNS: avoid CNS depressant    HEENT: Oral care    PULMONARY:  HOB @ 45 degrees, aspiration precautions, keep sats > 92%, pulm toilet     CARDIOVASCULAR: Keep MAP > 65, anti arrhythmic per EPS, lasix daily. MIDODRINE 10 Q 8    GI: GI prophylaxis. feeding     RENAL:  Follow up lytes.  Correct as needed    INFECTIOUS DISEASE: Follow up cultures    HEMATOLOGICaL:  Eliquis    ENDOCRINE:  Follow up FS.  Insulin protocol if needed.    MUSCULOSKELETAL: OOB to chair     Lines: peripheral   Johnson: TOV   Code status: DNR/DNI   Prognosis: poor   Dispo:  CCU PER CARDIO

## 2019-07-31 NOTE — PROGRESS NOTE ADULT - SUBJECTIVE AND OBJECTIVE BOX
LENGTH OF HOSPITAL STAY: 11d    CHIEF COMPLAINT:   Patient is a 79y old  Male who presents with a chief complaint of shortness of breath for few days duration (31 Jul 2019 10:37)      HISTORY OF PRESENTING ILLNESS:    HPI:  78 yo male with PMH CHFrEF, Afib, Vtach s/p AICD,COPD, NANDO, spleenic laceration following fall) comes to the ED with complaint of SOB.  SOB gradual onset, worsened in the evening yesterday, could not sleep s/t SOB, aggravated on exertion, no relieving factor.  NO chest pain, or wheezing or cough or palpitation.  No fever, headache, recent sick contacts.  normal Bowel and bladder habit.    Patient was recently admitted for near syncope and DUYEN, discharged on june 17.    In the ED, patient RR 28, , spo2 80 room air, /60 (20 Jul 2019 11:36)    PAST MEDICAL & SURGICAL HISTORY  PAST MEDICAL & SURGICAL HISTORY:  CAD (coronary artery disease)  Ventricular tachycardia  NANDO on CPAP  COPD (chronic obstructive pulmonary disease)  Pacemaker  AICD (automatic cardioverter/defibrillator) present  Hypothyroidism  Atrial fibrillation  Congestive heart disease  Hypercholesteremia  AICD (automatic cardioverter/defibrillator) present  History of laparoscopic cholecystectomy    SOCIAL HISTORY:    ALLERGIES:  morphine (Stomach Upset)    MEDICATIONS:  STANDING MEDICATIONS  amiodarone    Tablet 400 milliGRAM(s) Oral daily  apixaban 2.5 milliGRAM(s) Oral every 12 hours  atorvastatin 40 milliGRAM(s) Oral at bedtime  buDESOnide 160 MICROgram(s)/formoterol 4.5 MICROgram(s) Inhaler 2 Puff(s) Inhalation two times a day  chlorhexidine 4% Liquid 1 Application(s) Topical <User Schedule>  docusate sodium 100 milliGRAM(s) Oral daily  finasteride 5 milliGRAM(s) Oral daily  furosemide   Injectable 40 milliGRAM(s) IV Push daily  guaiFENesin  milliGRAM(s) Oral every 12 hours  lactulose Syrup 20 Gram(s) Oral daily  levothyroxine  Oral Tab/Cap - Peds 50 MICROGram(s) Oral daily  metoprolol tartrate 12.5 milliGRAM(s) Oral every 6 hours  mexiletine 200 milliGRAM(s) Oral every 8 hours  midodrine 10 milliGRAM(s) Oral every 8 hours  montelukast 10 milliGRAM(s) Oral daily  phenylephrine    Infusion 0.5 MICROgram(s)/kG/Min IV Continuous <Continuous>  quiNIDine gluconate 324 milliGRAM(s) Oral <User Schedule>  senna 1 Tablet(s) Oral daily  tamsulosin 0.4 milliGRAM(s) Oral at bedtime    PRN MEDICATIONS  ALBUTerol/ipratropium for Nebulization 3 milliLiter(s) Nebulizer every 6 hours PRN  bisacodyl Suppository 10 milliGRAM(s) Rectal daily PRN  haloperidol    Injectable 5 milliGRAM(s) IntraMuscular daily PRN    VITALS:   T(F): 97.1  HR: 86  BP: 82/57  RR: 20  SpO2: 100%    LABS:                        10.3   15.43 )-----------( 438      ( 31 Jul 2019 05:03 )             32.1     07-31    138  |  99  |  35<H>  ----------------------------<  152<H>  4.6   |  26  |  1.8<H>    Ca    8.9      31 Jul 2019 05:03  Phos  3.3     07-31  Mg     2.1     07-31                    RADIOLOGY:  < from: Xray Chest 1 View- PORTABLE-Routine (07.31.19 @ 06:29) >  Impression: Stable bibasilar opacities.        < end of copied text >    PHYSICAL EXAM:  GEN: No acute distress.   HEENT: AT, NC, DONNA  LUNGS: decreased breath sound bilaterally   HEART: S1/S2 present. RRR.   ABD: Soft, non-tender, non-distended. Bowel sounds present  EXT: edema/cyanosis/clubbing absent  NEURO: AAOX3

## 2019-07-31 NOTE — PROGRESS NOTE ADULT - SUBJECTIVE AND OBJECTIVE BOX
OVERNIGHT EVENTS: events noted, sitting chair. want to walk    Vital Signs Last 24 Hrs  T(C): 36.2 (31 Jul 2019 04:00), Max: 36.7 (30 Jul 2019 20:00)  T(F): 97.1 (31 Jul 2019 04:00), Max: 98 (30 Jul 2019 20:00)  HR: 86 (31 Jul 2019 06:00) (84 - 96)  BP: 82/57 (31 Jul 2019 06:00) (70/48 - 98/66)  BP(mean): 62 (31 Jul 2019 06:00) (53 - 78)  RR: 20 (31 Jul 2019 06:00) (18 - 27)  SpO2: 100% (31 Jul 2019 06:00) (94% - 100%)    PHYSICAL EXAMINATION:    GENERAL: The patient is awake and alert in no apparent distress.     HEENT: Head is normocephalic and atraumatic. Extraocular muscles are intact. Mucous membranes are moist.    NECK: Supple.    LUNGS: dec bs both bases    HEART: LAVON 4/6    ABDOMEN: Soft, nontender, and nondistended.      EXTREMITIES: Without any cyanosis, clubbing, rash, lesions or edema.    NEUROLOGIC: Grossly intact.    SKIN: No ulceration or induration present.      LABS:                        10.3   15.43 )-----------( 438      ( 31 Jul 2019 05:03 )             32.1     07-31    138  |  99  |  35<H>  ----------------------------<  152<H>  4.6   |  26  |  1.8<H>    Ca    8.9      31 Jul 2019 05:03  Phos  3.3     07-31  Mg     2.1     07-31 07-30-19 @ 07:01  -  07-31-19 @ 07:00  --------------------------------------------------------  IN: 273 mL / OUT: 1250 mL / NET: -977 mL        MICROBIOLOGY:      MEDICATIONS  (STANDING):  amiodarone    Tablet 400 milliGRAM(s) Oral daily  apixaban 2.5 milliGRAM(s) Oral every 12 hours  atorvastatin 40 milliGRAM(s) Oral at bedtime  buDESOnide 160 MICROgram(s)/formoterol 4.5 MICROgram(s) Inhaler 2 Puff(s) Inhalation two times a day  chlorhexidine 4% Liquid 1 Application(s) Topical <User Schedule>  docusate sodium 100 milliGRAM(s) Oral daily  finasteride 5 milliGRAM(s) Oral daily  furosemide   Injectable 40 milliGRAM(s) IV Push daily  guaiFENesin  milliGRAM(s) Oral every 12 hours  lactulose Syrup 20 Gram(s) Oral daily  levothyroxine  Oral Tab/Cap - Peds 50 MICROGram(s) Oral daily  metoprolol tartrate 12.5 milliGRAM(s) Oral every 6 hours  mexiletine 200 milliGRAM(s) Oral every 8 hours  midodrine 10 milliGRAM(s) Oral every 8 hours  montelukast 10 milliGRAM(s) Oral daily  phenylephrine    Infusion 0.5 MICROgram(s)/kG/Min (8.297 mL/Hr) IV Continuous <Continuous>  quiNIDine gluconate 324 milliGRAM(s) Oral <User Schedule>  senna 1 Tablet(s) Oral daily  tamsulosin 0.4 milliGRAM(s) Oral at bedtime    MEDICATIONS  (PRN):  ALBUTerol/ipratropium for Nebulization 3 milliLiter(s) Nebulizer every 6 hours PRN Bronchospasm  bisacodyl Suppository 10 milliGRAM(s) Rectal daily PRN Constipation  haloperidol    Injectable 5 milliGRAM(s) IntraMuscular daily PRN agitation      RADIOLOGY & ADDITIONAL STUDIES:

## 2019-07-31 NOTE — GOALS OF CARE CONVERSATION - PERSONAL ADVANCE DIRECTIVE - TREATMENT GUIDELINE COMMENT
DNR/DNI, family interested in Hospice at home if he is able to come off pressors.  Palliative will follow for ongoing symptom management/support. x [phone]

## 2019-07-31 NOTE — PROGRESS NOTE ADULT - ASSESSMENT
patient is 78 yo male with hx of S/P AICD (automatic cardioverter/defibrillator), Atrial fibrillation, CAD (coronary artery disease), Congestive heart disease, COPD (chronic obstructive pulmonary disease), Hypercholesteremia, Hypothyroidism, NANDO on CPAP, Ventricular tachycardia.    Patient appears to be oriented to place,person,time today. Patient is on amiodarone, mixelitine and quinidine. No significant over-night events.      1. Acute exacerbation of Systolic CHF  -EKG shows no ischemic changes  -Trop flat  - EF 20%    2. Rt lower lobe  opacity  -Afebrile, cough has resolved  -may be due to aspiration sec to diorientation  - CXR has improved basal consolidation on Rt side  -aspiration prercautions  - speech and swallow consult placed again tomorow  - WBCs within NL range    3. DUYEN on CKD  -Unclear Etiology   Cr stable at 1.6    4. Afib S/P ablation:  cw eliquis  rate controlled    5.  COPD   -Continue with home medications  -Stable    6. Arrythmias  - pt on amiodarone , mixelitene, quinidine, lopressor and lasix  -Monitor QTc  -Wean off Renzo-Synephrine as tolerated  -Pt is not a good candidate for LVAd  - HR is controlled with systolic in 90s    7. Acute Hypoxic Respiratory Failure  - sec to CHF, patient was maintained on Bipap  - Pt on Nasal canula at 2 L  - refused Bipap        Goals of care : were discussed with family, patient was oriented and agreed to medications and just the comfort measures, DNR/DNI.   palliative had the family meeting today. They've reached to the conclusion that the patient wants to go home and he will be needing palliative measures.  F/U hospice        DVT ppx:  on eliquis    Diet:  dash/tlc    Activity: AAT    Dispo: from home patient is 78 yo male with hx of S/P AICD (automatic cardioverter/defibrillator), Atrial fibrillation, CAD (coronary artery disease), Congestive heart disease, COPD (chronic obstructive pulmonary disease), Hypercholesteremia, Hypothyroidism, NANDO on CPAP, Ventricular tachycardia.    Patient appears to be oriented to place,person,time today. Patient is on amiodarone, mixelitine and quinidine. No significant over-night events.      1. Acute exacerbation of Systolic CHF  -EKG shows no ischemic changes  -Trop flat  - EF 20%  - on lasix for diuresis   - in negative balance    2. Rt lower lobe  opacity  -Afebrile, cough has resolved  -may be due to aspiration sec to disorientation  - CXR has improved - stable b/l opacities  - aspiration precautions  -seen by speech and swallow - regular diet with thin liquids with supplements    3. DUYEN on CKD  monitor BMP  relatively stable 1.5 to 1.8    4. Afib S/P ablation:  cw eliquis  rate controlled    5.  COPD   -Continue with home medications  -Stable    6. Ventricular tachycardia  - pt on amiodarone , mexiletine, quinidine, lopressor   -Monitor QTc  -Wean off Renzo-Synephrine as tolerated  -Pt is not a good candidate for LVAD    7. Acute Hypoxic Respiratory Failure  - sec to CHF, patient was maintained on Bipap  - Pt on Nasal canula at 2 L  - refused Bipap        Goals of care : were discussed with family, patient was oriented and agreed to medications and just the comfort measures, DNR/DNI.   palliative had the family meeting today. They've reached to the conclusion that the patient wants to go home and he will be needing palliative measures.  F/U hospice        DVT ppx:  on eliquis    Diet:  dash/tlc    Activity: AAT    Dispo: from home

## 2019-08-01 LAB
ANION GAP SERPL CALC-SCNC: 11 MMOL/L — SIGNIFICANT CHANGE UP (ref 7–14)
BUN SERPL-MCNC: 32 MG/DL — HIGH (ref 10–20)
CALCIUM SERPL-MCNC: 8.5 MG/DL — SIGNIFICANT CHANGE UP (ref 8.5–10.1)
CHLORIDE SERPL-SCNC: 99 MMOL/L — SIGNIFICANT CHANGE UP (ref 98–110)
CO2 SERPL-SCNC: 29 MMOL/L — SIGNIFICANT CHANGE UP (ref 17–32)
CREAT SERPL-MCNC: 1.6 MG/DL — HIGH (ref 0.7–1.5)
GLUCOSE SERPL-MCNC: 118 MG/DL — HIGH (ref 70–99)
HCT VFR BLD CALC: 29.3 % — LOW (ref 42–52)
HGB BLD-MCNC: 9.5 G/DL — LOW (ref 14–18)
MCHC RBC-ENTMCNC: 28.5 PG — SIGNIFICANT CHANGE UP (ref 27–31)
MCHC RBC-ENTMCNC: 32.4 G/DL — SIGNIFICANT CHANGE UP (ref 32–37)
MCV RBC AUTO: 88 FL — SIGNIFICANT CHANGE UP (ref 80–94)
NRBC # BLD: 0 /100 WBCS — SIGNIFICANT CHANGE UP (ref 0–0)
PLATELET # BLD AUTO: 469 K/UL — HIGH (ref 130–400)
POTASSIUM SERPL-MCNC: 4.4 MMOL/L — SIGNIFICANT CHANGE UP (ref 3.5–5)
POTASSIUM SERPL-SCNC: 4.4 MMOL/L — SIGNIFICANT CHANGE UP (ref 3.5–5)
RBC # BLD: 3.33 M/UL — LOW (ref 4.7–6.1)
RBC # FLD: 15.6 % — HIGH (ref 11.5–14.5)
SODIUM SERPL-SCNC: 139 MMOL/L — SIGNIFICANT CHANGE UP (ref 135–146)
WBC # BLD: 11.49 K/UL — HIGH (ref 4.8–10.8)
WBC # FLD AUTO: 11.49 K/UL — HIGH (ref 4.8–10.8)

## 2019-08-01 PROCEDURE — 71045 X-RAY EXAM CHEST 1 VIEW: CPT | Mod: 26

## 2019-08-01 PROCEDURE — 99233 SBSQ HOSP IP/OBS HIGH 50: CPT

## 2019-08-01 PROCEDURE — 99232 SBSQ HOSP IP/OBS MODERATE 35: CPT

## 2019-08-01 PROCEDURE — 93010 ELECTROCARDIOGRAM REPORT: CPT

## 2019-08-01 RX ADMIN — Medication 12.5 MILLIGRAM(S): at 05:58

## 2019-08-01 RX ADMIN — MONTELUKAST 10 MILLIGRAM(S): 4 TABLET, CHEWABLE ORAL at 14:28

## 2019-08-01 RX ADMIN — APIXABAN 2.5 MILLIGRAM(S): 2.5 TABLET, FILM COATED ORAL at 18:52

## 2019-08-01 RX ADMIN — FINASTERIDE 5 MILLIGRAM(S): 5 TABLET, FILM COATED ORAL at 14:28

## 2019-08-01 RX ADMIN — APIXABAN 2.5 MILLIGRAM(S): 2.5 TABLET, FILM COATED ORAL at 05:58

## 2019-08-01 RX ADMIN — Medication 324 MILLIGRAM(S): at 21:16

## 2019-08-01 RX ADMIN — MIDODRINE HYDROCHLORIDE 10 MILLIGRAM(S): 2.5 TABLET ORAL at 21:17

## 2019-08-01 RX ADMIN — SENNA PLUS 1 TABLET(S): 8.6 TABLET ORAL at 14:29

## 2019-08-01 RX ADMIN — Medication 600 MILLIGRAM(S): at 05:58

## 2019-08-01 RX ADMIN — Medication 50 MICROGRAM(S): at 05:58

## 2019-08-01 RX ADMIN — Medication 600 MILLIGRAM(S): at 18:52

## 2019-08-01 RX ADMIN — Medication 324 MILLIGRAM(S): at 10:21

## 2019-08-01 RX ADMIN — AMIODARONE HYDROCHLORIDE 200 MILLIGRAM(S): 400 TABLET ORAL at 05:58

## 2019-08-01 RX ADMIN — MEXILETINE HYDROCHLORIDE 200 MILLIGRAM(S): 150 CAPSULE ORAL at 21:17

## 2019-08-01 RX ADMIN — Medication 12.5 MILLIGRAM(S): at 18:52

## 2019-08-01 RX ADMIN — TAMSULOSIN HYDROCHLORIDE 0.4 MILLIGRAM(S): 0.4 CAPSULE ORAL at 21:17

## 2019-08-01 RX ADMIN — MIDODRINE HYDROCHLORIDE 10 MILLIGRAM(S): 2.5 TABLET ORAL at 14:28

## 2019-08-01 RX ADMIN — BUDESONIDE AND FORMOTEROL FUMARATE DIHYDRATE 2 PUFF(S): 160; 4.5 AEROSOL RESPIRATORY (INHALATION) at 21:16

## 2019-08-01 RX ADMIN — Medication 12.5 MILLIGRAM(S): at 00:39

## 2019-08-01 RX ADMIN — MEXILETINE HYDROCHLORIDE 200 MILLIGRAM(S): 150 CAPSULE ORAL at 14:30

## 2019-08-01 RX ADMIN — ATORVASTATIN CALCIUM 40 MILLIGRAM(S): 80 TABLET, FILM COATED ORAL at 21:16

## 2019-08-01 RX ADMIN — Medication 100 MILLIGRAM(S): at 14:28

## 2019-08-01 RX ADMIN — LACTULOSE 20 GRAM(S): 10 SOLUTION ORAL at 14:27

## 2019-08-01 RX ADMIN — MIDODRINE HYDROCHLORIDE 10 MILLIGRAM(S): 2.5 TABLET ORAL at 05:58

## 2019-08-01 RX ADMIN — Medication 12.5 MILLIGRAM(S): at 14:29

## 2019-08-01 RX ADMIN — CHLORHEXIDINE GLUCONATE 1 APPLICATION(S): 213 SOLUTION TOPICAL at 05:58

## 2019-08-01 RX ADMIN — MEXILETINE HYDROCHLORIDE 200 MILLIGRAM(S): 150 CAPSULE ORAL at 05:59

## 2019-08-01 RX ADMIN — BUDESONIDE AND FORMOTEROL FUMARATE DIHYDRATE 2 PUFF(S): 160; 4.5 AEROSOL RESPIRATORY (INHALATION) at 08:25

## 2019-08-01 RX ADMIN — Medication 40 MILLIGRAM(S): at 05:58

## 2019-08-01 NOTE — PROGRESS NOTE ADULT - SUBJECTIVE AND OBJECTIVE BOX
LENGTH OF HOSPITAL STAY: 12d    CHIEF COMPLAINT:   Patient is a 79y old  Male who presents with a chief complaint of shortness of breath for few days duration (01 Aug 2019 13:22)      HISTORY OF PRESENTING ILLNESS:    HPI:  78 yo male with PMH CHFrEF, Afib, Vtach s/p AICD,COPD, NANDO, spleenic laceration following fall) comes to the ED with complaint of SOB.  SOB gradual onset, worsened in the evening yesterday, could not sleep s/t SOB, aggravated on exertion, no relieving factor.  NO chest pain, or wheezing or cough or palpitation.  No fever, headache, recent sick contacts.  normal Bowel and bladder habit.    Patient was recently admitted for near syncope and DUYEN, discharged on june 17.    In the ED, patient RR 28, , spo2 80 room air, /60 (20 Jul 2019 11:36)    PAST MEDICAL & SURGICAL HISTORY  PAST MEDICAL & SURGICAL HISTORY:  CAD (coronary artery disease)  Ventricular tachycardia  NANDO on CPAP  COPD (chronic obstructive pulmonary disease)  Pacemaker  AICD (automatic cardioverter/defibrillator) present  Hypothyroidism  Atrial fibrillation  Congestive heart disease  Hypercholesteremia  AICD (automatic cardioverter/defibrillator) present  History of laparoscopic cholecystectomy    SOCIAL HISTORY:    ALLERGIES:  morphine (Stomach Upset)    MEDICATIONS:  STANDING MEDICATIONS  amiodarone    Tablet 200 milliGRAM(s) Oral daily  apixaban 2.5 milliGRAM(s) Oral every 12 hours  atorvastatin 40 milliGRAM(s) Oral at bedtime  buDESOnide 160 MICROgram(s)/formoterol 4.5 MICROgram(s) Inhaler 2 Puff(s) Inhalation two times a day  chlorhexidine 4% Liquid 1 Application(s) Topical <User Schedule>  docusate sodium 100 milliGRAM(s) Oral daily  finasteride 5 milliGRAM(s) Oral daily  furosemide   Injectable 40 milliGRAM(s) IV Push daily  guaiFENesin  milliGRAM(s) Oral every 12 hours  lactulose Syrup 20 Gram(s) Oral daily  levothyroxine  Oral Tab/Cap - Peds 50 MICROGram(s) Oral daily  metoprolol tartrate 12.5 milliGRAM(s) Oral every 6 hours  mexiletine 200 milliGRAM(s) Oral every 8 hours  midodrine 10 milliGRAM(s) Oral every 8 hours  montelukast 10 milliGRAM(s) Oral daily  quiNIDine gluconate 324 milliGRAM(s) Oral <User Schedule>  senna 1 Tablet(s) Oral daily  tamsulosin 0.4 milliGRAM(s) Oral at bedtime    PRN MEDICATIONS  ALBUTerol/ipratropium for Nebulization 3 milliLiter(s) Nebulizer every 6 hours PRN  bisacodyl Suppository 10 milliGRAM(s) Rectal daily PRN  haloperidol    Injectable 5 milliGRAM(s) IntraMuscular daily PRN    VITALS:   T(F): 96.6  HR: 94  BP: 83/55  RR: 18  SpO2: 100%    LABS:                        9.5    11.49 )-----------( 469      ( 01 Aug 2019 05:05 )             29.3     08-01    139  |  99  |  32<H>  ----------------------------<  118<H>  4.4   |  29  |  1.6<H>    Ca    8.5      01 Aug 2019 05:05  Phos  3.3     07-31  Mg     2.1     07-31                    RADIOLOGY:  < from: Xray Chest 1 View- PORTABLE-Routine (08.01.19 @ 03:41) >    Impression:      Stable right basilar, diminishing left upper lobe opacities.    Stable blunting right costophrenic angle. No pneumothorax       < end of copied text >    PHYSICAL EXAM:  GEN: No acute distress.   HEENT: AT, NC, DONNA  LUNGS: decreased breath sound bilaterally   HEART: S1/S2 present. RRR.   ABD: Soft, non-tender, non-distended. Bowel sounds present  EXT: edema/cyanosis/clubbing absent  NEURO: AAOX3 LENGTH OF HOSPITAL STAY: 12d    CHIEF COMPLAINT:   Patient is a 79y old  Male who presents with a chief complaint of shortness of breath for few days duration (01 Aug 2019 13:22)      HISTORY OF PRESENTING ILLNESS:    HPI:  78 yo male with PMH CHFrEF, Afib, Vtach s/p AICD,COPD, NANDO, splenic laceration following fall) comes to the ED with complaint of SOB.  SOB gradual onset, worsened in the evening yesterday, could not sleep s/t SOB, aggravated on exertion, no relieving factor.  NO chest pain, or wheezing or cough or palpitation.  No fever, headache, recent sick contacts.  normal Bowel and bladder habit.    Patient was recently admitted for near syncope and DUYEN, discharged on june 17.    In the ED, patient RR 28, , spo2 80 room air, /60 (20 Jul 2019 11:36)    PAST MEDICAL & SURGICAL HISTORY  PAST MEDICAL & SURGICAL HISTORY:  CAD (coronary artery disease)  Ventricular tachycardia  NANDO on CPAP  COPD (chronic obstructive pulmonary disease)  Pacemaker  AICD (automatic cardioverter/defibrillator) present  Hypothyroidism  Atrial fibrillation  Congestive heart disease  Hypercholesteremia  AICD (automatic cardioverter/defibrillator) present  History of laparoscopic cholecystectomy    SOCIAL HISTORY:    ALLERGIES:  morphine (Stomach Upset)    MEDICATIONS:  STANDING MEDICATIONS  amiodarone    Tablet 200 milliGRAM(s) Oral daily  apixaban 2.5 milliGRAM(s) Oral every 12 hours  atorvastatin 40 milliGRAM(s) Oral at bedtime  buDESOnide 160 MICROgram(s)/formoterol 4.5 MICROgram(s) Inhaler 2 Puff(s) Inhalation two times a day  chlorhexidine 4% Liquid 1 Application(s) Topical <User Schedule>  docusate sodium 100 milliGRAM(s) Oral daily  finasteride 5 milliGRAM(s) Oral daily  furosemide   Injectable 40 milliGRAM(s) IV Push daily  guaiFENesin  milliGRAM(s) Oral every 12 hours  lactulose Syrup 20 Gram(s) Oral daily  levothyroxine  Oral Tab/Cap - Peds 50 MICROGram(s) Oral daily  metoprolol tartrate 12.5 milliGRAM(s) Oral every 6 hours  mexiletine 200 milliGRAM(s) Oral every 8 hours  midodrine 10 milliGRAM(s) Oral every 8 hours  montelukast 10 milliGRAM(s) Oral daily  quiNIDine gluconate 324 milliGRAM(s) Oral <User Schedule>  senna 1 Tablet(s) Oral daily  tamsulosin 0.4 milliGRAM(s) Oral at bedtime    PRN MEDICATIONS  ALBUTerol/ipratropium for Nebulization 3 milliLiter(s) Nebulizer every 6 hours PRN  bisacodyl Suppository 10 milliGRAM(s) Rectal daily PRN  haloperidol    Injectable 5 milliGRAM(s) IntraMuscular daily PRN    VITALS:   T(F): 96.6  HR: 94  BP: 83/55  RR: 18  SpO2: 100% on RA    LABS:                        9.5    11.49 )-----------( 469      ( 01 Aug 2019 05:05 )             29.3     08-01    139  |  99  |  32<H>  ----------------------------<  118<H>  4.4   |  29  |  1.6<H>    Ca    8.5      01 Aug 2019 05:05  Phos  3.3     07-31  Mg     2.1     07-31                    RADIOLOGY:  < from: Xray Chest 1 View- PORTABLE-Routine (08.01.19 @ 03:41) >    Impression:      Stable right basilar, diminishing left upper lobe opacities.    Stable blunting right costophrenic angle. No pneumothorax       < end of copied text >    PHYSICAL EXAM:  GEN: No acute distress.   HEENT: AT, NC, DONNA  LUNGS: decreased breath sounds bilaterally at bases  HEART: S1/S2 present. RRR.   ABD: Soft, non-tender, non-distended. Bowel sounds present  EXT: edema/cyanosis/clubbing absent  NEURO: AAOX3

## 2019-08-01 NOTE — PROGRESS NOTE ADULT - ASSESSMENT
patient is 78 yo male with hx of S/P AICD (automatic cardioverter/defibrillator), Atrial fibrillation, CAD (coronary artery disease), Congestive heart disease, COPD (chronic obstructive pulmonary disease), Hypercholesteremia, Hypothyroidism, NANDO on CPAP, Ventricular tachycardia.    Patient appears to be oriented to place,person,time today. Patient is on PO amiodarone, mixelitine and quinidine. No significant over-night events. Pt is off pressors and ambulating.      1. Acute exacerbation of Systolic CHF  -EKG shows no ischemic changes  -Trop flat  - EF 20%  - on lasix for diuresis   - in negative balance    2. Rt lower lobe  opacity  -Afebrile, cough has resolved  -may be due to aspiration sec to disorientation  - CXR has improved - stable b/l opacities  - aspiration precautions      3. DUYEN on CKD  monitor BMP  relatively stable 1.5 to 1.8    4. Afib S/P ablation:  cw eliquis  rate controlled    5.  COPD   -Continue with home medications  -Stable    6. Ventricular tachycardia  - pt on amiodarone , mexiletine, quinidine, lopressor   -Monitor QTc  - Pt is off pressor  -Pt is not a good candidate for LVAD    7. Acute Hypoxic Respiratory Failure  - sec to CHF, patient was maintained on Bipap  - Pt on Nasal canula at 2 L  - refused Bipap        Goals of care : were discussed with family, patient was oriented and agreed to medications and just the comfort measures, DNR/DNI.   palliative had the family meeting today. They've reached to the conclusion that the patient wants to go home and he will be needing palliative measures.  F/U hospice      Others:  will follow up patient for one day. If he stays stable without pressors , anticipated downgrade for tomorow.      DVT ppx:  on eliquis    Diet:  dash/tlc    Activity: AAT    Dispo: from home patient is 80 yo male with hx of S/P AICD (automatic cardioverter/defibrillator), Atrial fibrillation, CAD (coronary artery disease), Congestive heart disease, COPD (chronic obstructive pulmonary disease), Hypercholesteremia, Hypothyroidism, NANDO on CPAP, Ventricular tachycardia.    Patient appears to be oriented to place,person,time today. Patient is on PO amiodarone, mexiletine and quinidine. No significant over-night events. Pt is off pressors and ambulating.      1. Acute exacerbation of Systolic CHF  -EKG shows no ischemic changes  -Trop flat  - EF 20%  - on lasix for diuresis     2. Rt lower lobe  opacity  -Afebrile, cough has resolved  -may be due to aspiration sec to disorientation  - CXR has improved - stable b/l opacities  - aspiration precautions      3. DUYEN on CKD  monitor BMP  relatively stable 1.5 to 1.8    4. Afib S/P ablation:  cw eliquis  rate controlled    5.  COPD   -Continue with home medications  -Stable    6. Ventricular tachycardia  - pt on amiodarone , mexiletine, quinidine, lopressor   -Monitor QTc  - Pt is off pressor  -Pt is not a good candidate for LVAD  - EP f/u appreciated - continue current management and downgrade to telemetry tomorrow if stable    7. Acute Hypoxic Respiratory Failure  - sec to CHF, patient was maintained on Bipap  - pt now on room air  - refused Bipap        Goals of care : were discussed with family, patient was oriented and agreed to medications and just the comfort measures, DNR/DNI.   palliative had the family meeting. They've reached to the conclusion that the patient wants to go home and he will be needing palliative measures.  F/U hospice      Others:  will follow up patient for one day. If he stays stable without pressors , anticipated downgrade for tomorrow.      DVT ppx:  on eliquis    Diet:  dash/tlc    Activity: AAT    Dispo: from home

## 2019-08-01 NOTE — CHART NOTE - NSCHARTNOTEFT_GEN_A_CORE
Registered Dietitian Follow-Up     Patient Profile Reviewed                           Yes [x]   No []     Nutrition History Previously Obtained        Yes [x]  No []       Pertinent Subjective Information: Pt remains with ~50% po intake, doesn't like the prosource jello, but is consuming Ensure pudding BID at this time.      Pertinent Medical Interventions: Goals of care : were discussed with family, patient was oriented and agreed to medications and just the comfort measures, DNR/DNI. Palliative had the family meeting today. They've reached to the conclusion that the patient wants to go home and he will be needing palliative measures. F/U hospice.    Diet order: DASH/TLC + Prosource jello once daily + Ensure Pudding BID      Anthropometrics:  - Ht. 172.7cm   - Wt. 72kg on 8/1 vs 68.1kg on 7/29 vs. 88.5kg on 7/21--pt likely stable b/w 68-72kg throughout LOS, as pt noted to be on diuretic therapy   - %wt change  - BMI 29.7- using 88.5kg and 22.8 using 68.1kg   - IBW 70kg      Pertinent Lab Data: (8/1): H/H 9.5/29.3, BUN 32, Cr. 1.6, Glu 118      Pertinent Meds: Eliquis, Amiodarone, Lasix, Metoprolol, Dulcolax, Lactulose, Colace, Senna, Proamatine, Tamsulosin, Albuterol      Physical Findings:  - Appearance: alert & disoriented to time, [place]   - GI function: no symptoms noted, last BM 7/31  - Tubes: none noted  - Oral/Mouth cavity: no symptoms noted  - Skin: tear (BS 17)      Nutrition Requirements (from RD note on 7/25)  Weight Used: 70kg      Estimated Energy Needs    Continue [x]  Adjust [] 9562-7460 kcal/day (25-30 kcal/kg IBW)--will readjust once more accurate/stable wt trends noted     Estimated Protein Needs    Continue [x]  Adjust []  70-84 gm/day (1-1.2 gm/kg IBW)--same as mentioned above     Estimated Fluid Needs        Continue [x]  Adjust [] per CCU team    Nutrient Intake: not meeting kcal/pro needs at this time, however 50% po intake seems to be pt's baseline      Previous Nutrition Diagnosis:  Inadequate Oral Intake            [x] Ongoing          [] Resolved    Nutrition Intervention: meals and snacks, medical food supplement    Recommendations:  1. Continue current diet order and oral supplementation; f/u hospice POC      Goal/Expected Outcome: Pt to maintain >50% of meals, snacks, and supplements within 4 days      Indicator/Monitoring: RD to monitor energy intake, body composition, renal/glucose profile, NFPF.

## 2019-08-01 NOTE — PROGRESS NOTE ADULT - NSHPATTENDINGPLANDISCUSS_GEN_ALL_CORE
med team
team
40
CCU team
House staff
med team
NURSES AND MED TEAM
patient, family, housestaff
med team

## 2019-08-01 NOTE — PROGRESS NOTE ADULT - ASSESSMENT
Recurrent VT, now improved    - Off all pressors  - Cont current meds  - K > 4 and mag > 2  - Palliative care following, seen by hospice    Plan discussed with EP attending

## 2019-08-01 NOTE — PROGRESS NOTE ADULT - ATTENDING COMMENTS
Covering for Dr. Holt    Pt admitted with VT.     Recommend  - Cont antiarrhythmic meds  - Monitor lytes and keep K>4 and Mg>2  - pt off of pressors since 11pm last night. Appreciate palliative care recs  - Currently overdrive pacing with HR set at 95 BPM.   - If no further arrhythmias, may downgrade pt to tele tomorrow morning
Pt seen and examined independently. He feels well despite SBP of 73. No chest pain or SOB at rest.  No arrythmias overnight.  Awaiting hospice eval.  Overall very poor prognosis.  Case discussed with EP today.  Family wants home with hospice if pt can be weaned off levophed.  Chest - crackles at bases  CVS - RRR (paced on monitor)  abdomen - soft, NT, ND  extremities - no edema  Neuro - awake, alert, motivated to do therapy    I discussed the case with the resident and I reviewed his note. Agree with the physical exam, assessment and plan with additions and corrections as above.      PROGRESS NOTE HANDOFF    Pending: wean off pressor as tolerated, hospice eval    Disposition: home with hospice if can be weaned off pressor
Patient seen and examined independently. I agree with the resident's note, physical exam, and plan except as below.  Vital Signs Last 24 Hrs  T(C): 36.4 (30 Jul 2019 18:00), Max: 37.2 (30 Jul 2019 00:00)  T(F): 97.5 (30 Jul 2019 18:00), Max: 98.9 (30 Jul 2019 00:00)  HR: 84 (30 Jul 2019 18:59) (84 - 90)  BP: 90/56 (30 Jul 2019 18:59) (66/46 - 101/70)  BP(mean): 78 (30 Jul 2019 18:59) (51 - 87)  RR: 24 (30 Jul 2019 18:59) (18 - 25)  SpO2: 99% (30 Jul 2019 18:59) (94% - 100%)  pt seen in ccu @ 1500  PE  nad  aaox3  walking with assistance to commode  [id number]  ctabl  soft ntnd+bs  no     #acute on chronic systolic CHF  #hypotension due to cardiogenic shock - try to wean off Renoz-Synephrine   #DUYEN on CKD III  #afib sp ablation on eliquis  #chronic copd  #vtach - AICD adjusted by EPS - cont current meds  MEDICATIONS  (STANDING):  amiodarone    Tablet 400 milliGRAM(s) Oral daily  apixaban 2.5 milliGRAM(s) Oral every 12 hours  atorvastatin 40 milliGRAM(s) Oral at bedtime  buDESOnide 160 MICROgram(s)/formoterol 4.5 MICROgram(s) Inhaler 2 Puff(s) Inhalation two times a day  chlorhexidine 4% Liquid 1 Application(s) Topical <User Schedule>  docusate sodium 100 milliGRAM(s) Oral daily  finasteride 5 milliGRAM(s) Oral daily  furosemide   Injectable 40 milliGRAM(s) IV Push daily  guaiFENesin  milliGRAM(s) Oral every 12 hours  lactulose Syrup 20 Gram(s) Oral daily  levothyroxine  Oral Tab/Cap - Peds 50 MICROGram(s) Oral daily  metoprolol tartrate 12.5 milliGRAM(s) Oral every 6 hours  mexiletine 200 milliGRAM(s) Oral every 8 hours  midodrine 5 milliGRAM(s) Oral every 8 hours  montelukast 10 milliGRAM(s) Oral daily  phenylephrine    Infusion 0.5 MICROgram(s)/kG/Min (8.297 mL/Hr) IV Continuous <Continuous>  quiNIDine gluconate 324 milliGRAM(s) Oral <User Schedule>  senna 1 Tablet(s) Oral daily  tamsulosin 0.4 milliGRAM(s) Oral at bedtime    MEDICATIONS  (PRN):  ALBUTerol/ipratropium for Nebulization 3 milliLiter(s) Nebulizer every 6 hours PRN Bronchospasm  bisacodyl Suppository 10 milliGRAM(s) Rectal daily PRN Constipation  haloperidol    Injectable 5 milliGRAM(s) IntraMuscular daily PRN agitation      family meeting with palliative - hospice consult   dnr/i  poor overall prognosis
Pt seen and examined independently. Son and wife at bedside. He was ambulating in the unit with walker.  No complaints.   EP f/u appreciated.  Monitor in CCU tonight and downgrade to telemetry tomorrow if stable.  Plan - home with hospice  Overall very poor prognosis    I discussed the case with the resident and I reviewed his note. Agree with the physical exam, assessment and plan with additions and corrections as above.

## 2019-08-02 ENCOUNTER — TRANSCRIPTION ENCOUNTER (OUTPATIENT)
Age: 79
End: 2019-08-02

## 2019-08-02 PROCEDURE — 71045 X-RAY EXAM CHEST 1 VIEW: CPT | Mod: 26

## 2019-08-02 PROCEDURE — 93010 ELECTROCARDIOGRAM REPORT: CPT

## 2019-08-02 PROCEDURE — 93010 ELECTROCARDIOGRAM REPORT: CPT | Mod: 77

## 2019-08-02 PROCEDURE — 99233 SBSQ HOSP IP/OBS HIGH 50: CPT

## 2019-08-02 RX ORDER — ALPRAZOLAM 0.25 MG
0.25 TABLET ORAL AT BEDTIME
Refills: 0 | Status: DISCONTINUED | OUTPATIENT
Start: 2019-08-02 | End: 2019-08-04

## 2019-08-02 RX ORDER — MEXILETINE HYDROCHLORIDE 150 MG/1
1 CAPSULE ORAL
Qty: 0 | Refills: 0 | DISCHARGE
Start: 2019-08-02

## 2019-08-02 RX ORDER — APIXABAN 2.5 MG/1
1 TABLET, FILM COATED ORAL
Qty: 0 | Refills: 0 | DISCHARGE
Start: 2019-08-02

## 2019-08-02 RX ORDER — AMIODARONE HYDROCHLORIDE 400 MG/1
1 TABLET ORAL
Qty: 0 | Refills: 0 | DISCHARGE
Start: 2019-08-02

## 2019-08-02 RX ORDER — LEVOTHYROXINE SODIUM 125 MCG
1 TABLET ORAL
Qty: 0 | Refills: 0 | DISCHARGE

## 2019-08-02 RX ORDER — MIDODRINE HYDROCHLORIDE 2.5 MG/1
1 TABLET ORAL
Qty: 90 | Refills: 0
Start: 2019-08-02

## 2019-08-02 RX ORDER — SENNA PLUS 8.6 MG/1
1 TABLET ORAL
Qty: 30 | Refills: 0
Start: 2019-08-02

## 2019-08-02 RX ORDER — BUDESONIDE AND FORMOTEROL FUMARATE DIHYDRATE 160; 4.5 UG/1; UG/1
2 AEROSOL RESPIRATORY (INHALATION)
Qty: 1 | Refills: 0
Start: 2019-08-02

## 2019-08-02 RX ORDER — LEVOTHYROXINE SODIUM 125 MCG
1 TABLET ORAL
Qty: 0 | Refills: 0 | DISCHARGE
Start: 2019-08-02

## 2019-08-02 RX ORDER — LACTULOSE 10 G/15ML
30 SOLUTION ORAL
Qty: 400 | Refills: 0
Start: 2019-08-02

## 2019-08-02 RX ORDER — QUINIDINE SULFATE 200 MG
1 TABLET ORAL
Qty: 60 | Refills: 0
Start: 2019-08-02

## 2019-08-02 RX ORDER — METOPROLOL TARTRATE 50 MG
0.5 TABLET ORAL
Qty: 60 | Refills: 0
Start: 2019-08-02

## 2019-08-02 RX ADMIN — Medication 12.5 MILLIGRAM(S): at 05:53

## 2019-08-02 RX ADMIN — Medication 600 MILLIGRAM(S): at 05:54

## 2019-08-02 RX ADMIN — Medication 600 MILLIGRAM(S): at 18:14

## 2019-08-02 RX ADMIN — Medication 324 MILLIGRAM(S): at 09:55

## 2019-08-02 RX ADMIN — LACTULOSE 20 GRAM(S): 10 SOLUTION ORAL at 11:51

## 2019-08-02 RX ADMIN — Medication 40 MILLIGRAM(S): at 05:54

## 2019-08-02 RX ADMIN — Medication 0.25 MILLIGRAM(S): at 21:05

## 2019-08-02 RX ADMIN — MIDODRINE HYDROCHLORIDE 10 MILLIGRAM(S): 2.5 TABLET ORAL at 05:54

## 2019-08-02 RX ADMIN — CHLORHEXIDINE GLUCONATE 1 APPLICATION(S): 213 SOLUTION TOPICAL at 05:54

## 2019-08-02 RX ADMIN — MEXILETINE HYDROCHLORIDE 200 MILLIGRAM(S): 150 CAPSULE ORAL at 14:07

## 2019-08-02 RX ADMIN — BUDESONIDE AND FORMOTEROL FUMARATE DIHYDRATE 2 PUFF(S): 160; 4.5 AEROSOL RESPIRATORY (INHALATION) at 08:31

## 2019-08-02 RX ADMIN — MONTELUKAST 10 MILLIGRAM(S): 4 TABLET, CHEWABLE ORAL at 11:51

## 2019-08-02 RX ADMIN — Medication 100 MILLIGRAM(S): at 11:50

## 2019-08-02 RX ADMIN — APIXABAN 2.5 MILLIGRAM(S): 2.5 TABLET, FILM COATED ORAL at 05:54

## 2019-08-02 RX ADMIN — SENNA PLUS 1 TABLET(S): 8.6 TABLET ORAL at 11:51

## 2019-08-02 RX ADMIN — BUDESONIDE AND FORMOTEROL FUMARATE DIHYDRATE 2 PUFF(S): 160; 4.5 AEROSOL RESPIRATORY (INHALATION) at 21:05

## 2019-08-02 RX ADMIN — Medication 50 MICROGRAM(S): at 05:54

## 2019-08-02 RX ADMIN — APIXABAN 2.5 MILLIGRAM(S): 2.5 TABLET, FILM COATED ORAL at 18:14

## 2019-08-02 RX ADMIN — MIDODRINE HYDROCHLORIDE 10 MILLIGRAM(S): 2.5 TABLET ORAL at 21:05

## 2019-08-02 RX ADMIN — Medication 12.5 MILLIGRAM(S): at 11:51

## 2019-08-02 RX ADMIN — MEXILETINE HYDROCHLORIDE 200 MILLIGRAM(S): 150 CAPSULE ORAL at 21:05

## 2019-08-02 RX ADMIN — MEXILETINE HYDROCHLORIDE 200 MILLIGRAM(S): 150 CAPSULE ORAL at 05:55

## 2019-08-02 RX ADMIN — MIDODRINE HYDROCHLORIDE 10 MILLIGRAM(S): 2.5 TABLET ORAL at 14:07

## 2019-08-02 RX ADMIN — ATORVASTATIN CALCIUM 40 MILLIGRAM(S): 80 TABLET, FILM COATED ORAL at 21:05

## 2019-08-02 RX ADMIN — TAMSULOSIN HYDROCHLORIDE 0.4 MILLIGRAM(S): 0.4 CAPSULE ORAL at 21:05

## 2019-08-02 RX ADMIN — Medication 324 MILLIGRAM(S): at 21:05

## 2019-08-02 RX ADMIN — Medication 12.5 MILLIGRAM(S): at 00:09

## 2019-08-02 RX ADMIN — AMIODARONE HYDROCHLORIDE 200 MILLIGRAM(S): 400 TABLET ORAL at 05:54

## 2019-08-02 RX ADMIN — FINASTERIDE 5 MILLIGRAM(S): 5 TABLET, FILM COATED ORAL at 11:50

## 2019-08-02 RX ADMIN — Medication 12.5 MILLIGRAM(S): at 18:14

## 2019-08-02 NOTE — DISCHARGE NOTE PROVIDER - NSDCFUSCHEDAPPT_GEN_ALL_CORE_FT
GUIDO VILCHIS ; 09/13/2019 ; [facility] [address] GUIDO VILCHIS ; 09/13/2019 ; [facility] [address]

## 2019-08-02 NOTE — PROGRESS NOTE ADULT - ASSESSMENT
80 yo man with PMH of s/p AICD, Atrial fibrillation, CAD, HFrEF, COPD, Hypercholesteremia, Hypothyroidism, NANDO on CPAP and Ventricular tachycardia was admitted for acute over chronic HFrEF. Hospital course complicated by VT despite multiple medications. Pt and family in agreement for home hospice at this time.  May downgrade to medical floor today.  Medication reconciliation started by me and scripts sent to pharmacy.    1. Acute over chronic HFrEF  change lasix to PO  on metoprolol    2. Chronic hypotension  on midodrine now  weaned off pressor this week    3. Vtach on amiodarone, mexiletine and quinidine   s/p AICD interrogation and currently overdrive pacing with HR set at 95  pt/family discussion if they want AICD turned off (but leave the pacing on) - this can be done inpt or at home    4. COPD - continue current management    5. Hypothyroid on synthroid    6. Afib on apixaban  h/o ablation      PROGRESS NOTE HANDOFF    Pending: transfer to medical floor    Disposition: HOME WITH HOSPICE ON RETA MORNING

## 2019-08-02 NOTE — CHART NOTE - NSCHARTNOTEFT_GEN_A_CORE
Patient remains stable and has been cleared for transition home with hospice.  Pt out of bed to chair, walked around the unit.  Hospice will set up equipment for discharge.  Palliative team will remain available to provide ongoing support.  [phone]

## 2019-08-02 NOTE — DISCHARGE NOTE PROVIDER - NSDCCPCAREPLAN_GEN_ALL_CORE_FT
PRINCIPAL DISCHARGE DIAGNOSIS  Diagnosis: Ventricular tachycardia  Assessment and Plan of Treatment: VTach difficult to control, patient discharged to home with hospice

## 2019-08-02 NOTE — DISCHARGE NOTE PROVIDER - HOSPITAL COURSE
78 yo man with PMH of s/p AICD, Atrial fibrillation, CAD, HFrEF, COPD, Hypercholesteremia, Hypothyroidism, NANDO on CPAP and Ventricular tachycardia was admitted for acute over chronic HFrEF. Hospital course complicated by VT despite multiple medications. Pt and family in agreement for home hospice at this time.

## 2019-08-02 NOTE — PROGRESS NOTE ADULT - SUBJECTIVE AND OBJECTIVE BOX
GIUDO VILCHIS  79y Male    INTERVAL HPI/OVERNIGHT EVENTS:    Pt feels well - no complaints.  No overnight events.  Case discussed with hospice - plan for discharge home with hospice on early Sunday morning, 8/4    T(F): 96.5 (08-02-19 @ 12:00), Max: 96.7 (08-01-19 @ 20:00)  HR: 94 (08-02-19 @ 14:00) (80 - 108)  BP: 78/50 (08-02-19 @ 14:00) (78/50 - 110/75)  RR: 24 (08-02-19 @ 12:00) (17 - 24)  SpO2: 98% (08-02-19 @ 12:00) (93% - 98%) on 2L NC    I&O's Summary    01 Aug 2019 07:01  -  02 Aug 2019 07:00  --------------------------------------------------------  IN: 900 mL / OUT: 575 mL / NET: 325 mL    02 Aug 2019 07:01  -  02 Aug 2019 15:00  --------------------------------------------------------  IN: 540 mL / OUT: 325 mL / NET: 215 mL        PHYSICAL EXAM:  GENERAL: NAD  HEAD:  Normocephalic  EYES:  conjunctiva and sclera clear  ENMT: Moist mucous membranes  NECK: Supple  NERVOUS SYSTEM:  Alert, awake, Good concentration  CHEST/LUNG: crackles at bases otherwise CTA  HEART: Regular rate and rhythm  ABDOMEN: Soft, Nontender, Nondistended  EXTREMITIES:   No edema      Consultant(s) Notes Reviewed:  [x ] YES  [ ] NO  Care Discussed with Consultants/Other Providers [ x] YES  [ ] NO    MEDICATIONS  (STANDING):  amiodarone    Tablet 200 milliGRAM(s) Oral daily  apixaban 2.5 milliGRAM(s) Oral every 12 hours  atorvastatin 40 milliGRAM(s) Oral at bedtime  buDESOnide 160 MICROgram(s)/formoterol 4.5 MICROgram(s) Inhaler 2 Puff(s) Inhalation two times a day  chlorhexidine 4% Liquid 1 Application(s) Topical <User Schedule>  docusate sodium 100 milliGRAM(s) Oral daily  finasteride 5 milliGRAM(s) Oral daily  furosemide   Injectable 40 milliGRAM(s) IV Push daily  guaiFENesin  milliGRAM(s) Oral every 12 hours  lactulose Syrup 20 Gram(s) Oral daily  levothyroxine  Oral Tab/Cap - Peds 50 MICROGram(s) Oral daily  metoprolol tartrate 12.5 milliGRAM(s) Oral every 6 hours  mexiletine 200 milliGRAM(s) Oral every 8 hours  midodrine 10 milliGRAM(s) Oral every 8 hours  montelukast 10 milliGRAM(s) Oral daily  quiNIDine gluconate 324 milliGRAM(s) Oral <User Schedule>  senna 1 Tablet(s) Oral daily  tamsulosin 0.4 milliGRAM(s) Oral at bedtime    MEDICATIONS  (PRN):  ALBUTerol/ipratropium for Nebulization 3 milliLiter(s) Nebulizer every 6 hours PRN Bronchospasm  bisacodyl Suppository 10 milliGRAM(s) Rectal daily PRN Constipation  haloperidol    Injectable 5 milliGRAM(s) IntraMuscular daily PRN agitation    Telemetry reviewed    LABS:                        9.5    11.49 )-----------( 469      ( 01 Aug 2019 05:05 )             29.3     08-01    139  |  99  |  32<H>  ----------------------------<  118<H>  4.4   |  29  |  1.6<H>    Ca    8.5      01 Aug 2019 05:05        Case discussed with resident    Care discussed with pt

## 2019-08-02 NOTE — DISCHARGE NOTE PROVIDER - CARE PROVIDER_API CALL
[facility],   [phone]  Phone: (   )    -  Fax: (   )    -  Follow Up Time:

## 2019-08-02 NOTE — CHART NOTE - NSCHARTNOTEFT_GEN_A_CORE
ICU DOWNGRADE NOTE:    79y Male transferred to floor from ICU    Patient is a 79y old Male who presents with a chief complaint of shortness of breath for few days duration (02 Aug 2019 11:23)    The patient is currently admitted for the primary diagnosis of CHF exacerbation    The patient was admitted to the unit for 13 Days.    The patient never intubated , and was on pressors for 11 days.    Indwelling vascular catheters: patient had IV line which he lost today. Patient does not need one, since he'll be disposed to hospice possibly on sunday and he can be maintained one PO meds    Urinary Catheter: keep rose cath    Disposition: home hospice    Code Status: DNR/DNI    ICU COURSE OF EVENTS:  -------------------------------------------------------------------------------------------  78 yo male with PMH CHFrEF, Afib, Vtach s/p AICD,COPD, NANDO, splenic laceration following fall) came to the ED with complaint of SOB.  SOB gradual onset, worsened in the evening yesterday, could not sleep s/t SOB, aggravated on exertion, no relieving factor.  NO chest pain, or wheezing or cough or palpitation.  No fever, headache, recent sick contacts.  normal Bowel and bladder habit.    Patient has severe CHF with EF < 20%. Throughout his course in CCU patient has been maintained on various anti-arrythmics including amiodarone,quinidine,mixelitine,lidocaine ( which caused severe alteration in mentation) and lopressor. But none of the medications really helped the patient and he continued to have low blood pressure and slow Vent Tachys throughout his stay in CCU. AICD was programmed to different settings at a number of times without any benefit. The only option left for the patient was heart transplant or LAVD but pt is a poor candidate for both of them.    Patient with his family had meeting with palliative care team where he decided to opt for the hospice for end of life care. Since past two days patient is off pressors and he is able to maintain BP. His baseline BP is around 80/ 60 approx.      -------------------------------------------------------------------------------------------    Current workup in progress: nothing tof/u    SIGN OUT AT 08-02-19 @ 14:50 GIVEN TO:

## 2019-08-03 LAB
ANION GAP SERPL CALC-SCNC: 11 MMOL/L — SIGNIFICANT CHANGE UP (ref 7–14)
BUN SERPL-MCNC: 28 MG/DL — HIGH (ref 10–20)
CALCIUM SERPL-MCNC: 8.5 MG/DL — SIGNIFICANT CHANGE UP (ref 8.5–10.1)
CHLORIDE SERPL-SCNC: 101 MMOL/L — SIGNIFICANT CHANGE UP (ref 98–110)
CO2 SERPL-SCNC: 30 MMOL/L — SIGNIFICANT CHANGE UP (ref 17–32)
CREAT SERPL-MCNC: 1.5 MG/DL — SIGNIFICANT CHANGE UP (ref 0.7–1.5)
GLUCOSE SERPL-MCNC: 103 MG/DL — HIGH (ref 70–99)
HCT VFR BLD CALC: 28.9 % — LOW (ref 42–52)
HGB BLD-MCNC: 9.3 G/DL — LOW (ref 14–18)
MCHC RBC-ENTMCNC: 28.4 PG — SIGNIFICANT CHANGE UP (ref 27–31)
MCHC RBC-ENTMCNC: 32.2 G/DL — SIGNIFICANT CHANGE UP (ref 32–37)
MCV RBC AUTO: 88.4 FL — SIGNIFICANT CHANGE UP (ref 80–94)
NRBC # BLD: 0 /100 WBCS — SIGNIFICANT CHANGE UP (ref 0–0)
PLATELET # BLD AUTO: 473 K/UL — HIGH (ref 130–400)
POTASSIUM SERPL-MCNC: 3.8 MMOL/L — SIGNIFICANT CHANGE UP (ref 3.5–5)
POTASSIUM SERPL-SCNC: 3.8 MMOL/L — SIGNIFICANT CHANGE UP (ref 3.5–5)
RBC # BLD: 3.27 M/UL — LOW (ref 4.7–6.1)
RBC # FLD: 15.6 % — HIGH (ref 11.5–14.5)
SODIUM SERPL-SCNC: 142 MMOL/L — SIGNIFICANT CHANGE UP (ref 135–146)
WBC # BLD: 9.3 K/UL — SIGNIFICANT CHANGE UP (ref 4.8–10.8)
WBC # FLD AUTO: 9.3 K/UL — SIGNIFICANT CHANGE UP (ref 4.8–10.8)

## 2019-08-03 PROCEDURE — 99233 SBSQ HOSP IP/OBS HIGH 50: CPT

## 2019-08-03 PROCEDURE — 71045 X-RAY EXAM CHEST 1 VIEW: CPT | Mod: 26

## 2019-08-03 RX ORDER — ALPRAZOLAM 0.25 MG
0.25 TABLET ORAL ONCE
Refills: 0 | Status: DISCONTINUED | OUTPATIENT
Start: 2019-08-03 | End: 2019-08-03

## 2019-08-03 RX ADMIN — MIDODRINE HYDROCHLORIDE 10 MILLIGRAM(S): 2.5 TABLET ORAL at 13:02

## 2019-08-03 RX ADMIN — Medication 0.25 MILLIGRAM(S): at 21:14

## 2019-08-03 RX ADMIN — MONTELUKAST 10 MILLIGRAM(S): 4 TABLET, CHEWABLE ORAL at 11:44

## 2019-08-03 RX ADMIN — TAMSULOSIN HYDROCHLORIDE 0.4 MILLIGRAM(S): 0.4 CAPSULE ORAL at 21:17

## 2019-08-03 RX ADMIN — BUDESONIDE AND FORMOTEROL FUMARATE DIHYDRATE 2 PUFF(S): 160; 4.5 AEROSOL RESPIRATORY (INHALATION) at 21:15

## 2019-08-03 RX ADMIN — Medication 0.25 MILLIGRAM(S): at 13:01

## 2019-08-03 RX ADMIN — Medication 324 MILLIGRAM(S): at 21:18

## 2019-08-03 RX ADMIN — MEXILETINE HYDROCHLORIDE 200 MILLIGRAM(S): 150 CAPSULE ORAL at 21:18

## 2019-08-03 RX ADMIN — MIDODRINE HYDROCHLORIDE 10 MILLIGRAM(S): 2.5 TABLET ORAL at 05:28

## 2019-08-03 RX ADMIN — BUDESONIDE AND FORMOTEROL FUMARATE DIHYDRATE 2 PUFF(S): 160; 4.5 AEROSOL RESPIRATORY (INHALATION) at 08:07

## 2019-08-03 RX ADMIN — ATORVASTATIN CALCIUM 40 MILLIGRAM(S): 80 TABLET, FILM COATED ORAL at 21:17

## 2019-08-03 RX ADMIN — MEXILETINE HYDROCHLORIDE 200 MILLIGRAM(S): 150 CAPSULE ORAL at 05:30

## 2019-08-03 RX ADMIN — MIDODRINE HYDROCHLORIDE 10 MILLIGRAM(S): 2.5 TABLET ORAL at 21:17

## 2019-08-03 RX ADMIN — Medication 600 MILLIGRAM(S): at 17:33

## 2019-08-03 RX ADMIN — Medication 50 MICROGRAM(S): at 05:29

## 2019-08-03 RX ADMIN — CHLORHEXIDINE GLUCONATE 1 APPLICATION(S): 213 SOLUTION TOPICAL at 05:28

## 2019-08-03 RX ADMIN — APIXABAN 2.5 MILLIGRAM(S): 2.5 TABLET, FILM COATED ORAL at 17:33

## 2019-08-03 RX ADMIN — APIXABAN 2.5 MILLIGRAM(S): 2.5 TABLET, FILM COATED ORAL at 05:28

## 2019-08-03 RX ADMIN — MEXILETINE HYDROCHLORIDE 200 MILLIGRAM(S): 150 CAPSULE ORAL at 13:02

## 2019-08-03 RX ADMIN — AMIODARONE HYDROCHLORIDE 200 MILLIGRAM(S): 400 TABLET ORAL at 05:28

## 2019-08-03 RX ADMIN — Medication 600 MILLIGRAM(S): at 05:28

## 2019-08-03 RX ADMIN — FINASTERIDE 5 MILLIGRAM(S): 5 TABLET, FILM COATED ORAL at 11:44

## 2019-08-03 RX ADMIN — Medication 324 MILLIGRAM(S): at 11:44

## 2019-08-03 NOTE — PROGRESS NOTE ADULT - SUBJECTIVE AND OBJECTIVE BOX
SUBJECTIVE:  78 yo male with PMH CHFrEF, Afib, Vtach s/p AICD,COPD, NANDO, spleenic laceration following fall) comes to the ED with complaint of SOB.  SOB gradual onset, worsened in the evening yesterday, could not sleep s/t SOB, aggravated on exertion, no relieving factor.  NO chest pain, or wheezing or cough or palpitation.  No fever, headache, recent sick contacts.  normal Bowel and bladder habit.    Patient was recently admitted for near syncope and DUYEN, discharged on june 17.  In the ED, patient RR 28, , spo2 80 room air, /60 (20 Jul 2019 11:36)  Patient is a 79y old Male who presents with a chief complaint of shortness of breath for few days duration (02 Aug 2019 15:00)  Currently admitted to medicine with the primary diagnosis of CHF exacerbation  Today is hospital day 14d. This morning he is resting comfortably in bed and reports no new issues or overnight events. Pt to be d/c tommorow with hospice care. Pt is not having much PO intake as per nurse.    PAST MEDICAL & SURGICAL HISTORY  CAD (coronary artery disease)  Ventricular tachycardia  NANDO on CPAP  COPD (chronic obstructive pulmonary disease)  Pacemaker  AICD (automatic cardioverter/defibrillator) present  Hypothyroidism  Atrial fibrillation  Congestive heart disease  Hypercholesteremia  AICD (automatic cardioverter/defibrillator) present  History of laparoscopic cholecystectomy      ALLERGIES:  morphine (Stomach Upset)    MEDICATIONS:  STANDING MEDICATIONS  ALPRAZolam 0.25 milliGRAM(s) Oral at bedtime  amiodarone    Tablet 200 milliGRAM(s) Oral daily  apixaban 2.5 milliGRAM(s) Oral every 12 hours  atorvastatin 40 milliGRAM(s) Oral at bedtime  buDESOnide 160 MICROgram(s)/formoterol 4.5 MICROgram(s) Inhaler 2 Puff(s) Inhalation two times a day  chlorhexidine 4% Liquid 1 Application(s) Topical <User Schedule>  docusate sodium 100 milliGRAM(s) Oral daily  finasteride 5 milliGRAM(s) Oral daily  furosemide   Injectable 40 milliGRAM(s) IV Push daily  guaiFENesin  milliGRAM(s) Oral every 12 hours  lactulose Syrup 20 Gram(s) Oral daily  levothyroxine  Oral Tab/Cap - Peds 50 MICROGram(s) Oral daily  metoprolol tartrate 12.5 milliGRAM(s) Oral every 6 hours  mexiletine 200 milliGRAM(s) Oral every 8 hours  midodrine 10 milliGRAM(s) Oral every 8 hours  montelukast 10 milliGRAM(s) Oral daily  quiNIDine gluconate 324 milliGRAM(s) Oral <User Schedule>  senna 1 Tablet(s) Oral daily  tamsulosin 0.4 milliGRAM(s) Oral at bedtime    PRN MEDICATIONS  ALBUTerol/ipratropium for Nebulization 3 milliLiter(s) Nebulizer every 6 hours PRN  bisacodyl Suppository 10 milliGRAM(s) Rectal daily PRN  haloperidol    Injectable 5 milliGRAM(s) IntraMuscular daily PRN    VITALS:   T(F): 96.6  HR: 94  BP: 89/54  RR: 18  SpO2: 95%    LABS:                        9.3    9.30  )-----------( 473      ( 03 Aug 2019 09:37 )             28.9       PHYSICAL EXAM:  GEN: No acute distress  LUNGS :vesicular breath sounds  HEART: S1/S2 present.   ABD: Soft, non-tender, non-distended.  EXT: skin intact  PSYCH: flat affect    Johnson cathter: Indwelling Urethral Catheter:     Connect To:  Straight Drainage/[place]    Indication:  Urinary Retention / Obstruction (07-23-19 @ 00:34) (not performed) [active]  Indwelling Urethral Catheter:     Connect To:  Straight Drainage/[place]    Indication:  Urinary Retention / Obstruction (07-23-19 @ 06:14) (not performed) [active]  Indwelling Urethral Catheter:     Connect To:  Straight Drainage/[place]    Indication:  Urinary Retention / Obstruction (07-24-19 @ 06:41) (not performed) [active]  Indwelling Urethral Catheter:     Connect To:  Straight Drainage/[place]    Indication:  Urine Output Monitoring in Critically Ill (07-25-19 @ 06:43) (not performed) [active]  Indwelling Urethral Catheter:     Connect To:  Straight Drainage/Gravity    Indication:  Urine Output Monitoring in Critically Ill (07-25-19 @ 17:44) (not performed) [active]  Indwelling Urethral Catheter:     Connect To:  Straight Drainage/Gravity    Indication:  Urine Output Monitoring in Critically Ill (07-26-19 @ 07:03) (not performed) [active]  Indwelling Urethral Catheter:     Connect To:  Straight Drainage/Gravity    Indication:  Urine Output Monitoring in Critically Ill (07-27-19 @ 07:19) (not performed) [active] SUBJECTIVE:  78 yo male with PMH CHFrEF, Afib, Vtach s/p AICD,COPD, NANDO, spleenic laceration following fall) comes to the ED with complaint of SOB.  SOB gradual onset, worsened in the evening yesterday, could not sleep s/t SOB, aggravated on exertion, no relieving factor.  NO chest pain, or wheezing or cough or palpitation.  No fever, headache, recent sick contacts.  normal Bowel and bladder habit.    Patient was recently admitted for near syncope and DUYEN, discharged on june 17.  In the ED, patient RR 28, , spo2 80 room air, /60 (20 Jul 2019 11:36)  Patient is a 79y old Male who presents with a chief complaint of shortness of breath for few days duration (02 Aug 2019 15:00)  Currently admitted to medicine with the primary diagnosis of CHF exacerbation  Today is hospital day 14d. This morning he is resting comfortably in bed and reports no new issues or overnight events. Pt to be d/c tommorow with hospice care. Pt is not having much PO intake as per nurse.    ROS  Denies SOB, chest pain. ROS otherwise negative    PAST MEDICAL & SURGICAL HISTORY  CAD (coronary artery disease)  Ventricular tachycardia  NANDO on CPAP  COPD (chronic obstructive pulmonary disease)  Pacemaker  AICD (automatic cardioverter/defibrillator) present  Hypothyroidism  Atrial fibrillation  Congestive heart disease  Hypercholesteremia  AICD (automatic cardioverter/defibrillator) present  History of laparoscopic cholecystectomy      ALLERGIES:  morphine (Stomach Upset)    MEDICATIONS:  STANDING MEDICATIONS  ALPRAZolam 0.25 milliGRAM(s) Oral at bedtime  amiodarone    Tablet 200 milliGRAM(s) Oral daily  apixaban 2.5 milliGRAM(s) Oral every 12 hours  atorvastatin 40 milliGRAM(s) Oral at bedtime  buDESOnide 160 MICROgram(s)/formoterol 4.5 MICROgram(s) Inhaler 2 Puff(s) Inhalation two times a day  chlorhexidine 4% Liquid 1 Application(s) Topical <User Schedule>  docusate sodium 100 milliGRAM(s) Oral daily  finasteride 5 milliGRAM(s) Oral daily  furosemide   Injectable 40 milliGRAM(s) IV Push daily  guaiFENesin  milliGRAM(s) Oral every 12 hours  lactulose Syrup 20 Gram(s) Oral daily  levothyroxine  Oral Tab/Cap - Peds 50 MICROGram(s) Oral daily  metoprolol tartrate 12.5 milliGRAM(s) Oral every 6 hours  mexiletine 200 milliGRAM(s) Oral every 8 hours  midodrine 10 milliGRAM(s) Oral every 8 hours  montelukast 10 milliGRAM(s) Oral daily  quiNIDine gluconate 324 milliGRAM(s) Oral <User Schedule>  senna 1 Tablet(s) Oral daily  tamsulosin 0.4 milliGRAM(s) Oral at bedtime    PRN MEDICATIONS  ALBUTerol/ipratropium for Nebulization 3 milliLiter(s) Nebulizer every 6 hours PRN  bisacodyl Suppository 10 milliGRAM(s) Rectal daily PRN  haloperidol    Injectable 5 milliGRAM(s) IntraMuscular daily PRN    VITALS:   T(F): 96.6  HR: 94  BP: 89/54  RR: 18  SpO2: 95%    LABS:                        9.3    9.30  )-----------( 473      ( 03 Aug 2019 09:37 )             28.9       PHYSICAL EXAM:  PHYSICAL EXAM:  GENERAL: NAD, speaks in full sentences, no signs of respiratory distress  HEAD:  Atraumatic, Normocephalic  EYES: Anicteric  NECK: Supple, No JVD  CHEST/LUNG: Clear to auscultation bilaterally; No wheeze; No crackles; No accessory muscles used  HEART: Regular rate and rhythm; No murmurs;   ABDOMEN: Soft, Nontender, Nondistended; Bowel sounds present; No guarding  EXTREMITIES:  2+ Peripheral Pulses, No cyanosis or edema  PSYCH: AAOx3, is not aware of current situation, however.  NEUROLOGY: non-focal  SKIN: No rashes or lesions

## 2019-08-03 NOTE — PROGRESS NOTE ADULT - REASON FOR ADMISSION
shortness of breath for few days duration

## 2019-08-03 NOTE — PROGRESS NOTE ADULT - ASSESSMENT
Assessment: 80 yo man with PMH of s/p AICD, Atrial fibrillation, CAD, HFrEF, COPD, Hypercholesteremia, Hypothyroidism, NANDO on CPAP and Ventricular tachycardia was admitted for acute over chronic HFrEF. Hospital course complicated by VT despite multiple medications. Pt and family in agreement for home hospice at this time. Pt will be d/c tommorow home with hospice care  Downgrade to medical floor   Medication reconciliation started by me and scripts sent to pharmacy.    1. Acute over chronic HFrEF  change lasix to PO  on metoprolol    2. Chronic hypotension  on midodrine now  weaned off pressor this week    3. Vtach on amiodarone, mexiletine and quinidine   s/p AICD interrogation and currently overdrive pacing with HR set at 95  pt/family discussion if they want AICD turned off (but leave the pacing on) - this can be done inpt or at home    4. COPD - continue current management    5. Hypothyroid on synthroid    6. Afib on apixaban  h/o ablation      PROGRESS NOTE HANDOFF    Pending: transfer to medical floor    Disposition: HOME WITH HOSPICE ON RETA MORNING Assessment: 78 yo man with PMH of s/p AICD, Atrial fibrillation, CAD, HFrEF, COPD, Hypercholesteremia, Hypothyroidism, NANDO on CPAP and Ventricular tachycardia was admitted for acute over chronic HFrEF. Hospital course complicated by VT despite multiple medications. Pt and family in agreement for home hospice at this time. Pt will be d/c tommorow home with hospice care  Downgraded to medical floor     Acute on chronic HFrEF  -c/w metoprolol and lasix for symptom management    Chronic hypotension  -c/w midodrine    Vtach   -c/w  amiodarone, mexiletine and quinidine   s- /p AICD interrogation and currently overdrive pacing with HR set at 95  -family wants to keep AICD as is-if they change their mind they will d/w hospice/ EP to change/turn off AICD    COPD   - continue with inhalers    Hypothyroid  -c/w synthroid    Afib   -c/w apixaban      **Discussed discontinuing medications with family. They would like to c/w current management unless otherwise directed by hospice when they get home***    #Progress Note Handoff:  Pending (specify):  hospice in AM  Family discussion: d/w family at bedside  Disposition: Home___/SNF___/Other___hospice_____/Unknown at this time________

## 2019-08-04 ENCOUNTER — TRANSCRIPTION ENCOUNTER (OUTPATIENT)
Age: 79
End: 2019-08-04

## 2019-08-04 VITALS — OXYGEN SATURATION: 96 %

## 2019-08-04 PROCEDURE — 99238 HOSP IP/OBS DSCHRG MGMT 30/<: CPT

## 2019-08-04 RX ADMIN — AMIODARONE HYDROCHLORIDE 200 MILLIGRAM(S): 400 TABLET ORAL at 05:27

## 2019-08-04 RX ADMIN — Medication 600 MILLIGRAM(S): at 05:27

## 2019-08-04 RX ADMIN — CHLORHEXIDINE GLUCONATE 1 APPLICATION(S): 213 SOLUTION TOPICAL at 05:27

## 2019-08-04 RX ADMIN — BUDESONIDE AND FORMOTEROL FUMARATE DIHYDRATE 2 PUFF(S): 160; 4.5 AEROSOL RESPIRATORY (INHALATION) at 07:57

## 2019-08-04 RX ADMIN — MIDODRINE HYDROCHLORIDE 10 MILLIGRAM(S): 2.5 TABLET ORAL at 05:27

## 2019-08-04 RX ADMIN — Medication 50 MICROGRAM(S): at 05:27

## 2019-08-04 RX ADMIN — APIXABAN 2.5 MILLIGRAM(S): 2.5 TABLET, FILM COATED ORAL at 05:27

## 2019-08-04 RX ADMIN — MEXILETINE HYDROCHLORIDE 200 MILLIGRAM(S): 150 CAPSULE ORAL at 05:28

## 2019-08-04 NOTE — DISCHARGE NOTE NURSING/CASE MANAGEMENT/SOCIAL WORK - NSDCDPATPORTLINK_GEN_ALL_CORE
You can access the [facility] Patient Portal, offered by [facility], by registering with the following website: [email or web address]

## 2019-08-04 NOTE — CHART NOTE - NSCHARTNOTEFT_GEN_A_CORE
Pt seen and examined at bedside with Rn and hospice nurse. Pt feels well. Medically stable for d/c home    PHYSICAL EXAM:  GENERAL: NAD, speaks in full sentences, no signs of respiratory distress  HEAD:  Atraumatic, Normocephalic  EYES: Anicteric  NECK: Supple, No JVD  CHEST/LUNG: Clear to auscultation bilaterally; No wheeze; No crackles; No accessory muscles used  HEART: Regular rate and rhythm; No murmurs;   ABDOMEN: Soft, Nontender, Nondistended; Bowel sounds present; No guarding  EXTREMITIES:   No cyanosis or edema  PSYCH: AAOx3  NEUROLOGY: non-focal  SKIN: No rashes or lesions

## 2019-08-09 DIAGNOSIS — I50.23 ACUTE ON CHRONIC SYSTOLIC (CONGESTIVE) HEART FAILURE: ICD-10-CM

## 2019-08-09 DIAGNOSIS — R44.3 HALLUCINATIONS, UNSPECIFIED: ICD-10-CM

## 2019-08-09 DIAGNOSIS — N17.9 ACUTE KIDNEY FAILURE, UNSPECIFIED: ICD-10-CM

## 2019-08-09 DIAGNOSIS — I47.2 VENTRICULAR TACHYCARDIA: ICD-10-CM

## 2019-08-09 DIAGNOSIS — I48.91 UNSPECIFIED ATRIAL FIBRILLATION: ICD-10-CM

## 2019-08-09 DIAGNOSIS — R06.02 SHORTNESS OF BREATH: ICD-10-CM

## 2019-08-09 DIAGNOSIS — I25.10 ATHEROSCLEROTIC HEART DISEASE OF NATIVE CORONARY ARTERY WITHOUT ANGINA PECTORIS: ICD-10-CM

## 2019-08-09 DIAGNOSIS — G47.33 OBSTRUCTIVE SLEEP APNEA (ADULT) (PEDIATRIC): ICD-10-CM

## 2019-08-09 DIAGNOSIS — E03.9 HYPOTHYROIDISM, UNSPECIFIED: ICD-10-CM

## 2019-08-09 DIAGNOSIS — N18.3 CHRONIC KIDNEY DISEASE, STAGE 3 (MODERATE): ICD-10-CM

## 2019-08-09 DIAGNOSIS — R41.0 DISORIENTATION, UNSPECIFIED: ICD-10-CM

## 2019-08-09 DIAGNOSIS — E78.5 HYPERLIPIDEMIA, UNSPECIFIED: ICD-10-CM

## 2019-08-09 DIAGNOSIS — Z95.810 PRESENCE OF AUTOMATIC (IMPLANTABLE) CARDIAC DEFIBRILLATOR: ICD-10-CM

## 2019-08-09 DIAGNOSIS — R45.1 RESTLESSNESS AND AGITATION: ICD-10-CM

## 2019-08-09 DIAGNOSIS — J96.01 ACUTE RESPIRATORY FAILURE WITH HYPOXIA: ICD-10-CM

## 2019-08-09 DIAGNOSIS — J44.9 CHRONIC OBSTRUCTIVE PULMONARY DISEASE, UNSPECIFIED: ICD-10-CM

## 2019-08-09 DIAGNOSIS — Z66 DO NOT RESUSCITATE: ICD-10-CM

## 2019-08-12 NOTE — SWALLOW BEDSIDE ASSESSMENT ADULT - ORAL PREPARATORY PHASE
[id number] - [place] [address] [place] [address] RD AT [place] [address] (Pharmacy) [phone]       Pharmacy called stating that they haven't had the medication of Aluminum Chloride in stock for a while? Didn't know if you wanted to send in a alternate? Please advise? Thanks!
Patient called and detailed message left on voicemail that this medication would not be filled per Neyda Coker DNP. She was also advised to can find something similar OTC or order online. [facility] pharmacy was called and I spoke with Balwinder Altamirano and advised him to discontinue the order for this cream per Neyda Coker DNP.   Poly Moore, LUISN
Please call patient and have her get something OTC that is similar. She can order this online.  Then please call the pharmacy and tell them to discontinue this order
Within functional limits
Within functional limits

## 2019-09-13 ENCOUNTER — APPOINTMENT (OUTPATIENT)
Dept: CARDIOLOGY | Facility: CLINIC | Age: 79
End: 2019-09-13

## 2019-11-21 ENCOUNTER — APPOINTMENT (OUTPATIENT)
Dept: CARDIOLOGY | Facility: CLINIC | Age: 79
End: 2019-11-21

## 2019-12-05 ENCOUNTER — APPOINTMENT (OUTPATIENT)
Dept: CARDIOLOGY | Facility: CLINIC | Age: 79
End: 2019-12-05

## 2019-12-31 NOTE — ED ADULT TRIAGE NOTE - SPO2 (%)
BEHAVIORAL TEAM DISCUSSION    Participants:   Dr. Fady Ko, Attending Psychiatrist  Myla Anders MD, PGY-1  JOSEPHINE Medina, [facility], [facility], CTC  Bob Dunaway OT  Progress: Initial  Anticipated length of stay: Undetermined  Continued Stay Criteria/Rationale: Patient was admitted for psychosis in context of discontinuing medications  Medical/Physical: No acute issues was cleared by ED for psychiatric admission  Precautions:   Behavioral Orders   Procedures    Code 1 - Restrict to Unit    Routine Programming     As clinically indicated    Self Injury Precaution    Single Room    Status 15     Every 15 minutes.    Suicide precautions     Patients on Suicide Precautions should have a Combination Diet ordered that includes a Diet selection(s) AND a Behavioral Tray selection for Safe Tray - with utensils, or Safe Tray - NO utensils     Plan: The plan is to assess and patient for mental health and medication needs.  She will restarted on medications. Will discharge home and follow up with ACT team.  Rationale for change in precautions or plan: No change at present-will continue to monitor and adjust as needed.           95

## 2020-01-07 NOTE — PATIENT PROFILE ADULT - INFORMATION PROVIDED TO:
Hip Pain: Care Instructions  Your Care Instructions    Hip pain may be caused by many things, including overuse, a fall, or a twisting movement. Another cause of hip pain is arthritis. Your pain may increase when you stand up, walk, or squat. The pain may come and go or may be constant. Home treatment can help relieve hip pain, swelling, and stiffness. If your pain is ongoing, you may need more tests and treatment. Follow-up care is a key part of your treatment and safety. Be sure to make and go to all appointments, and call your doctor if you are having problems. It's also a good idea to know your test results and keep a list of the medicines you take. How can you care for yourself at home? · Take pain medicines exactly as directed. ? If the doctor gave you a prescription medicine for pain, take it as prescribed. ? If you are not taking a prescription pain medicine, ask your doctor if you can take an over-the-counter medicine. · Rest and protect your hip. Take a break from any activity, including standing or walking, that may cause pain. · Put ice or a cold pack against your hip for 10 to 20 minutes at a time. Try to do this every 1 to 2 hours for the next 3 days (when you are awake) or until the swelling goes down. Put a thin cloth between the ice and your skin. · Sleep on your healthy side with a pillow between your knees, or sleep on your back with pillows under your knees. · If there is no swelling, you can put moist heat, a heating pad, or a warm cloth on your hip. Do gentle stretching exercises to help keep your hip flexible. · Learn how to prevent falls. Have your vision and hearing checked regularly. Wear slippers or shoes with a nonskid sole. · Stay at a healthy weight. · Wear comfortable shoes. When should you call for help? Call 911 anytime you think you may need emergency care.  For example, call if:    · You have sudden chest pain and shortness of breath, or you cough up blood.     · You are not able to stand or walk or bear weight.     · Your buttocks, legs, or feet feel numb or tingly.     · Your leg or foot is cool or pale or changes color.     · You have severe pain.    Call your doctor now or seek immediate medical care if:    · You have signs of infection, such as:  ? Increased pain, swelling, warmth, or redness in the hip area. ? Red streaks leading from the hip area. ? Pus draining from the hip area. ? A fever.     · You have signs of a blood clot, such as:  ? Pain in your calf, back of the knee, thigh, or groin. ? Redness and swelling in your leg or groin.     · You are not able to bend, straighten, or move your leg normally.     · You have trouble urinating or having bowel movements.    Watch closely for changes in your health, and be sure to contact your doctor if:    · You do not get better as expected. Where can you learn more? Go to http://steve-lizzie.info/. Enter Q174 in the search box to learn more about \"Hip Pain: Care Instructions. \"  Current as of: June 26, 2019  Content Version: 12.2  © 2636-7722 [employer]. Care instructions adapted under license by [employer] (which disclaims liability or warranty for this information). If you have questions about a medical condition or this instruction, always ask your healthcare professional. [place] [address] any warranty or liability for your use of this information. patient

## 2020-07-09 NOTE — DIETITIAN INITIAL EVALUATION ADULT. - CONSUMED DIET AT
Sadaf Graff, [address] [place] [address] Road 
normal sinus rhythm
3

## 2020-07-28 NOTE — PROGRESS NOTE ADULT - PROVIDER SPECIALTY LIST ADULT
CCU
Cardiology
Cardiology
Critical Care
Critical Care
Electrophysiology
Hospitalist
CCU
No

## 2020-08-01 NOTE — PATIENT PROFILE ADULT - DO YOU FEEL LIKE HURTING YOURSELF OR OTHERS?
Group Topic: BH Check-in/Symptom Rating    Date: 7/31/2020  Start Time: 2035  End Time: 2100  Facilitators: PAUL Park    Focus: Wrap Up Group  Number in attendance: 6    Method: Group  Attendance: Did Not Attend  Participation: Refused  Patient Evaluation: Invited - refused to attend   no

## 2020-10-16 NOTE — H&P ADULT - NSHPLABSRESULTS_GEN_ALL_CORE
LABS:               11.7   10.91 )-----------( 280      ( 30 Dec 2018 05:24 )             36.7       Auto Neutrophil %: 84.7 % (12-30-18 @ 05:24)  Auto Immature Granulocyte %: 0.7 % (12-30-18 @ 05:24)    12-30    135  |  96<L>  |  44<H>  ----------------------------<  283<H>  5.3<H>   |  21  |  2.6<H>      Calcium, Total Serum: 9.0 mg/dL (12-30-18 @ 05:24)    LFTs:             6.1  | 0.3  | 24       ------------------[77      ( 30 Dec 2018 05:24 )  3.5  | x    | 30          Lipase:24     Amylase:x         Blood Gas Arterial, Lactate: 2.2 mmoL/L (12-30-18 @ 11:06)  Lactate, Blood: 1.5 mmol/L (12-30-18 @ 05:24)    ABG - ( 30 Dec 2018 11:06 )  pH: 7.29  /  pCO2: 36    /  pO2: 462   / HCO3: 18    / Base Excess: -8.3  /  SaO2: 100         Coags:     13.20  ----< 1.15    ( 30 Dec 2018 09:40 )     26.3        CARDIAC MARKERS ( 30 Dec 2018 05:24 )  x     / 0.03 ng/mL / x     / x     / x          IMAGING:    < from: CT Angio Chest Dissection Protocol (12.30.18 @ 05:59) >  IMPRESSION:   -Linear hyperdensity through the lower pole of the spleen suggestive of a   laceration, with a subcapsular hematoma and small volume extracapsular   extension layering along the peritoneum. No evidence of contrast blush to   suggest active arterial extravasation.  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 No acute aortic dissection  -Acute nondisplaced right anterior rib fractures of the third and fourth   rib. Nondisplaced left anterior rib fractures, age indeterminate, but   appear chronic in nature.  Dr. Kelly Krause discussed preliminary findings with Vinh Diop MD on 12/30/2018 6:15 AM.  < end of copied text > 2 seconds or less

## 2020-11-12 NOTE — DIETITIAN INITIAL EVALUATION ADULT. - SOURCE
Date of Service: 11/12/2020    PREOPERATIVE DIAGNOSIS:  Right popliteal artery thrombosis, left leg open fasciotomy wound. POSTOPERATIVE DIAGNOSIS:  Right popliteal artery thrombosis, left leg open fasciotomy wound. NATURE OF OPERATION:  Right popliteal thrombectomy, right posterior tibial artery thrombectomy, left leg VAC placement about 12x4 cm. SURGEON:   Nguyen Nielson MD.     ASSISTANT:  Matt Braswell, [address].     ANESTHESIA:  General.    INDICATIONS:  This is a 21-year-old gentleman with multiple comorbidities, had left leg revascularization, had a right popliteal thrombectomy. On clinical exam, has deterioration in the posterior tibial pulse, exam and arterial duplex showing a new thrombus despite him having a previous thrombectomy in the popliteal artery. Therefore, after discussion of risks, benefits and alternatives, he was brought to the operating room for thrombectomy. FINDINGS:  Significant amount of popliteal thrombus, red and white mixed and then some posterior tibial thrombus separately. DESCRIPTION OF PROCEDURE:   Bilateral lower extremities were prepped and draped in standard sterile fashion, timeout was performed. Site and side of surgery was confirmed. Prophylactic antibiotics were given. Procedure was started with opening the previous below-the-knee popliteal area incision with Metzenbaum scissors. Previously placed stitches were cut and popliteal fossa was then entered. His last surgery was about 2 weeks ago, but there was extensive scarring and adherence of the muscle to the popliteal fossa, and I could not visualize the popliteal artery and vein. Because of the significant scarring, it appeared like it would not be safe to explore the vessel in that area. Therefore, I decided to go above the knee. Above the knee medial incision was made, deepened down with electrocautery. Sharp dissection performed.   Distal SFA was sharply dissected, encircled with vessel loop, patient proximal and distal control. There was good pulse in the vessel in that area. Vessel was clamped after the patient was given Angiomax anticoagulation. Transverse arteriotomy was made and #3 Dennys catheter was passed distally multiple times. A large amount of thrombus was retrieved. Some fresh red and some white-appearing thrombus as well. Decent backbleeding was established. Good forward pulsatile flow was noted. Arteriotomy was closed with 6-0 Prolene in interrupted manner and blood flow was restored. There was good pulse in that area, but still there was no pulse in the posterior tibial artery at the ankle and no Doppler signal there either. Therefore, I decided to explore it there. A longitudinal skin incision was made just behind the medial malleolus, deepened down with electrocautery. Sharp dissection performed. Posterior tibial artery was sharply dissected, encircled with vessel loop, proximal and distal control. There was no pulse in it and there was no Doppler signal either. Transverse arteriotomy was made and a Dennys catheter was passed proximally and distally. Small amount of white thrombus was retrieved, and good pulsatile inflow was established and some backbleeding. Satisfied with that, arteriotomy was closed with 7-0 Prolene and now there was decent Doppler signal in that area. Satisfied with that, wound was irrigated, hemostasis achieved and confirmed and then wounds were closed in layers with 2-0 Vicryl, 3-0 Vicryl, 4-0 Monocryl and dressing applied. Attention was focused on the lateral fasciotomy wound on the left leg, wound was irrigated and washed. There was good pink granulation tissue at the base and therefore VAC dressing was placed and good seal was obtained. Dressing was applied. The patient transferred to recovery room for further care.         Dictated By: Iqra Oliveira MD  Signing Provider: MD MACIEJ Lazcano/germaine ([id number])  DD: 11/12/2020 16:35:40 TD: 11/12/2020 16:48:59    Copy Sent To:

## 2021-11-24 NOTE — PROVIDER CONTACT NOTE (OTHER) - ASSESSMENT
Pupils have sluggish reaction to light, Absent corneal reflex, diminished gag reflex, no movement to deep painful stimuli.
No

## 2023-01-26 NOTE — PRE-ANESTHESIA EVALUATION ADULT - HEIGHT IN CM
175.26 Acitretin Counseling:  I discussed with the patient the risks of acitretin including but not limited to hair loss, dry lips/skin/eyes, liver damage, hyperlipidemia, depression/suicidal ideation, photosensitivity.  Serious rare side effects can include but are not limited to pancreatitis, pseudotumor cerebri, bony changes, clot formation/stroke/heart attack.  Patient understands that alcohol is contraindicated since it can result in liver toxicity and significantly prolong the elimination of the drug by many years.

## 2023-05-16 NOTE — PHYSICAL THERAPY INITIAL EVALUATION ADULT - PLANNED THERAPY INTERVENTIONS, PT EVAL
transfer training/bed mobility training/balance training/gait training/strengthening Peng Advancement Flap Text: The defect edges were debeveled with a #15 scalpel blade.  Given the location of the defect, shape of the defect and the proximity to free margins a Peng advancement flap was deemed most appropriate.  Using a sterile surgical marker, an appropriate advancement flap was drawn incorporating the defect and placing the expected incisions within the relaxed skin tension lines where possible. The area thus outlined was incised deep to adipose tissue with a #15 scalpel blade.  The skin margins were undermined to an appropriate distance in all directions utilizing iris scissors.

## 2023-11-27 NOTE — ED ADULT NURSE REASSESSMENT NOTE - NS ED NURSE REASSESS COMMENT FT1
PCA reported pt states he has difficulty breathing. Pt found sitting on side of bed in tripod position. PRN Duoneb treatment and standing Symbicort medication given.  Vitals improved, including o2 sat. MD [id number] aware. Ordered to monitor pt and if vitals don't improve to call back.
pt states he is on midodrine 5mg and levothyroxine 50mcg.
pt seen by MAR, pt is aaox4 speaking in full sentences on nasal canula at 3L spo2 97%. fall safety measures maintained. positioned at semi-isaac's. will continue to monitor.
received handoff report from night shift RN. pt is asleep but easily awaken to call of name. on cardiac monitoring with pulse oximetry. pt is pending consult and admission orders. will continue to closely monitor, fall safety measures maintained.
pt c/o cough,  congestion, headahce, body ache and fever for 2 week.s pt was seen at urgent care last Friday, states medication are not working. temp at triage 101.4. no history.

## 2024-04-06 NOTE — DISCHARGE NOTE ADULT - PRINCIPAL DIAGNOSIS
[Normal] : no neck adenopathy [de-identified] : l>r click on opening and closing mouth [de-identified] : gait steady Congestive heart disease

## 2024-06-07 NOTE — H&P ADULT - ATTENDING COMMENTS
Eye Protection Applied?: Yes Post-Care Instructions: I reviewed with the patient in detail post-care instructions. Patient is to avoid sunlight for the next 2 days, and wear sun protection. Patients may expect sunburn like redness, discomfort and scabbing. Incubation Time: 01:30:00 Light Source: Elvin-U Ndc# (Optional): [id number] Consent: Written consent obtained.  The risks were reviewed with the patient including but not limited to: pigmentary changes, pain, blistering, scabbing, redness, and the remote possibility of scarring. Who Performed The Pdt?: Performed by MD MICHAEL, MIGUE or NP ([id number]) Pre-Procedure Text: The treatment areas were cleaned and prepped in the usual fashion. Was Levulan/Ameluz Applied On A Previous Day?: No Illumination Time: 00:16:40 Expiration Date (Optional): 12/26 Who Performed The Pdt (Provider): Danyell Ferreira PA-C Debridement Text (Will Only Render In Visit Note If You Select Debridement Option Under Who Performed The Pdt Field): Prior to application of the photodynamic medication the hyperkeratotic lesions were curetted to make them more amenable to therapy. Medical Necessity: Precancerous Lesions Lot # (Optional): [id number] Anesthesia Type: 1% lidocaine with epinephrine Treatment Number: 5 Which Photosensitizer Was Used: Levulan Detail Level: Zone Number Of Kerasticks/Tubes Billed For: 1 Incubation Start Time: 9:05 Total Number Of Aks Treated (Optional To Report): 0 I have seen and examined pt independently. I agree with the above resident's history, physical exam and plan except as documented below.  past medical records reviewed.   Vital Signs Last 24 Hrs  T(C): 36.4 (14 May 2018 06:56), Max: 36.4 (14 May 2018 06:56)  T(F): 97.5 (14 May 2018 06:56), Max: 97.5 (14 May 2018 06:56)  HR: 80 (14 May 2018 06:56) (77 - 91)  BP: 114/69 (14 May 2018 06:56) (113/70 - 137/76)  BP(mean): --  RR: 18 (14 May 2018 06:56) (18 - 20)  SpO2: 97% (14 May 2018 06:56) (93% - 97%)    77 yr old male with PMH of HTN, HFrEF/AICD, Afib, VT, DM2, CAD p/w 2 weeks h/o worsening dyspnea on minimal exertion. Pt denies chest pain/fever/palpitations/vomiting/non compliance. In ED, pt had sustained V tach this morning s/p shock by AICD. Currently pt is asymptomatic at rest. On exam, AAOx3, not in distress, +bibasilar crackles, s1s2 regular no murmur, abd-soft/NT, no LE edema/cyanosis. Labs, CXR, ECG reviewed. ECG-atrial paced with prolonged AV conduction, LBBB. TTE(2017)- 15-20% EF.    Assessment:  -Sustained VT episode in ED- s/p AICD shock  -Exacerbation of HFrEF  -h/o Afib on eliquis/HTN/DM2/CAD/hypothyroidism    Plan:  Upgrade to CCU  Lidocaine infusion  cont Amiodarone, mexiletine  IV lasix 40 mg q12  monitor Is&Os, daily weights, low sodium diet  EP and cardiology eval  check Mg  cont all other home meds

## 2025-02-14 NOTE — CHART NOTE - NSCHARTNOTESELECT_GEN_ALL_CORE
Called and let him know we did receive the denial on that this week. The denial states that we did not show proof he ahd been on 7 days of prescribed treatment that did not work (which we did) and that they would approve if he was considering epidural injections (which was stated int he note also)   So we have already started an appeal   Event Note

## 2025-06-05 NOTE — ED ADULT NURSE NOTE - DOES PATIENT HAVE ADVANCE DIRECTIVE
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 He agrees to RTC for an intranasal bx  HAE ordered as requested; RTC for an ear cleaning in 6 months; annual audios, sooner prn any changes. No